# Patient Record
Sex: MALE | Race: WHITE | Employment: OTHER | URBAN - METROPOLITAN AREA
[De-identification: names, ages, dates, MRNs, and addresses within clinical notes are randomized per-mention and may not be internally consistent; named-entity substitution may affect disease eponyms.]

---

## 2017-03-30 ENCOUNTER — APPOINTMENT (OUTPATIENT)
Dept: CT IMAGING | Age: 77
DRG: 190 | End: 2017-03-30
Attending: STUDENT IN AN ORGANIZED HEALTH CARE EDUCATION/TRAINING PROGRAM
Payer: MEDICARE

## 2017-03-30 ENCOUNTER — HOSPITAL ENCOUNTER (INPATIENT)
Age: 77
LOS: 5 days | Discharge: NURSING, REHAB OR LONG-TERM CARE | DRG: 190 | End: 2017-04-04
Attending: STUDENT IN AN ORGANIZED HEALTH CARE EDUCATION/TRAINING PROGRAM | Admitting: INTERNAL MEDICINE
Payer: MEDICARE

## 2017-03-30 ENCOUNTER — APPOINTMENT (OUTPATIENT)
Dept: GENERAL RADIOLOGY | Age: 77
DRG: 190 | End: 2017-03-30
Attending: STUDENT IN AN ORGANIZED HEALTH CARE EDUCATION/TRAINING PROGRAM
Payer: MEDICARE

## 2017-03-30 DIAGNOSIS — J44.1 ACUTE EXACERBATION OF CHRONIC OBSTRUCTIVE PULMONARY DISEASE (COPD) (HCC): Primary | ICD-10-CM

## 2017-03-30 PROBLEM — R77.8 ELEVATED TROPONIN: Status: ACTIVE | Noted: 2017-03-30

## 2017-03-30 LAB
ALBUMIN SERPL BCP-MCNC: 2.7 G/DL (ref 3.5–5)
ALBUMIN/GLOB SERPL: 0.8 {RATIO} (ref 1.1–2.2)
ALP SERPL-CCNC: 72 U/L (ref 45–117)
ALT SERPL-CCNC: 31 U/L (ref 12–78)
ANION GAP BLD CALC-SCNC: 7 MMOL/L (ref 5–15)
APPEARANCE UR: ABNORMAL
APTT PPP: 26.5 SEC (ref 22.1–32.5)
AST SERPL W P-5'-P-CCNC: 40 U/L (ref 15–37)
ATRIAL RATE: 59 BPM
BACTERIA URNS QL MICRO: ABNORMAL /HPF
BASOPHILS # BLD AUTO: 0 K/UL (ref 0–0.1)
BASOPHILS # BLD: 0 % (ref 0–1)
BILIRUB SERPL-MCNC: 0.4 MG/DL (ref 0.2–1)
BILIRUB UR QL CFM: NEGATIVE
BNP SERPL-MCNC: 539 PG/ML (ref 0–450)
BUN SERPL-MCNC: 14 MG/DL (ref 6–20)
BUN/CREAT SERPL: 14 (ref 12–20)
CALCIUM SERPL-MCNC: 8.7 MG/DL (ref 8.5–10.1)
CALCULATED R AXIS, ECG10: -30 DEGREES
CALCULATED T AXIS, ECG11: 59 DEGREES
CHLORIDE SERPL-SCNC: 106 MMOL/L (ref 97–108)
CK MB CFR SERPL CALC: 1 % (ref 0–2.5)
CK MB SERPL-MCNC: 3.4 NG/ML (ref 5–25)
CK SERPL-CCNC: 237 U/L (ref 39–308)
CK SERPL-CCNC: 340 U/L (ref 39–308)
CO2 SERPL-SCNC: 31 MMOL/L (ref 21–32)
COLOR UR: ABNORMAL
CREAT SERPL-MCNC: 0.97 MG/DL (ref 0.7–1.3)
DIAGNOSIS, 93000: NORMAL
EOSINOPHIL # BLD: 0.2 K/UL (ref 0–0.4)
EOSINOPHIL NFR BLD: 1 % (ref 0–7)
EPITH CASTS URNS QL MICRO: ABNORMAL /LPF
ERYTHROCYTE [DISTWIDTH] IN BLOOD BY AUTOMATED COUNT: 13 % (ref 11.5–14.5)
FLUAV AG NPH QL IA: NEGATIVE
FLUBV AG NOSE QL IA: NEGATIVE
GLOBULIN SER CALC-MCNC: 3.5 G/DL (ref 2–4)
GLUCOSE SERPL-MCNC: 95 MG/DL (ref 65–100)
GLUCOSE UR STRIP.AUTO-MCNC: NEGATIVE MG/DL
GRAN CASTS URNS QL MICRO: ABNORMAL /LPF
HCT VFR BLD AUTO: 39.2 % (ref 36.6–50.3)
HGB BLD-MCNC: 13.2 G/DL (ref 12.1–17)
HGB UR QL STRIP: ABNORMAL
INR PPP: 1.1 (ref 0.9–1.1)
KETONES UR QL STRIP.AUTO: >80 MG/DL
LEUKOCYTE ESTERASE UR QL STRIP.AUTO: ABNORMAL
LYMPHOCYTES # BLD AUTO: 11 % (ref 12–49)
LYMPHOCYTES # BLD: 1.3 K/UL (ref 0.8–3.5)
MAGNESIUM SERPL-MCNC: 1.8 MG/DL (ref 1.6–2.4)
MCH RBC QN AUTO: 32.1 PG (ref 26–34)
MCHC RBC AUTO-ENTMCNC: 33.7 G/DL (ref 30–36.5)
MCV RBC AUTO: 95.4 FL (ref 80–99)
MONOCYTES # BLD: 0.7 K/UL (ref 0–1)
MONOCYTES NFR BLD AUTO: 6 % (ref 5–13)
MUCOUS THREADS URNS QL MICRO: ABNORMAL /LPF
NEUTS SEG # BLD: 10.1 K/UL (ref 1.8–8)
NEUTS SEG NFR BLD AUTO: 82 % (ref 32–75)
NITRITE UR QL STRIP.AUTO: NEGATIVE
PH UR STRIP: 6 [PH] (ref 5–8)
PLATELET # BLD AUTO: 292 K/UL (ref 150–400)
POTASSIUM SERPL-SCNC: 3.8 MMOL/L (ref 3.5–5.1)
PROT SERPL-MCNC: 6.2 G/DL (ref 6.4–8.2)
PROT UR STRIP-MCNC: 100 MG/DL
PROTHROMBIN TIME: 11.1 SEC (ref 9–11.1)
Q-T INTERVAL, ECG07: 478 MS
QRS DURATION, ECG06: 120 MS
QTC CALCULATION (BEZET), ECG08: 489 MS
RBC # BLD AUTO: 4.11 M/UL (ref 4.1–5.7)
RBC #/AREA URNS HPF: ABNORMAL /HPF (ref 0–5)
SODIUM SERPL-SCNC: 144 MMOL/L (ref 136–145)
SP GR UR REFRACTOMETRY: 1.02 (ref 1–1.03)
THERAPEUTIC RANGE,PTTT: NORMAL SECS (ref 58–77)
TROPONIN I SERPL-MCNC: 0.08 NG/ML
TROPONIN I SERPL-MCNC: 0.1 NG/ML
UROBILINOGEN UR QL STRIP.AUTO: 0.2 EU/DL (ref 0.2–1)
VENTRICULAR RATE, ECG03: 63 BPM
WBC # BLD AUTO: 12.3 K/UL (ref 4.1–11.1)
WBC URNS QL MICRO: ABNORMAL /HPF (ref 0–4)

## 2017-03-30 PROCEDURE — 96361 HYDRATE IV INFUSION ADD-ON: CPT

## 2017-03-30 PROCEDURE — 36415 COLL VENOUS BLD VENIPUNCTURE: CPT | Performed by: INTERNAL MEDICINE

## 2017-03-30 PROCEDURE — 85730 THROMBOPLASTIN TIME PARTIAL: CPT | Performed by: STUDENT IN AN ORGANIZED HEALTH CARE EDUCATION/TRAINING PROGRAM

## 2017-03-30 PROCEDURE — 96374 THER/PROPH/DIAG INJ IV PUSH: CPT

## 2017-03-30 PROCEDURE — 94640 AIRWAY INHALATION TREATMENT: CPT

## 2017-03-30 PROCEDURE — 77030011943

## 2017-03-30 PROCEDURE — 84484 ASSAY OF TROPONIN QUANT: CPT | Performed by: STUDENT IN AN ORGANIZED HEALTH CARE EDUCATION/TRAINING PROGRAM

## 2017-03-30 PROCEDURE — 85610 PROTHROMBIN TIME: CPT | Performed by: STUDENT IN AN ORGANIZED HEALTH CARE EDUCATION/TRAINING PROGRAM

## 2017-03-30 PROCEDURE — 65660000000 HC RM CCU STEPDOWN

## 2017-03-30 PROCEDURE — 74011250637 HC RX REV CODE- 250/637: Performed by: INTERNAL MEDICINE

## 2017-03-30 PROCEDURE — 87804 INFLUENZA ASSAY W/OPTIC: CPT | Performed by: STUDENT IN AN ORGANIZED HEALTH CARE EDUCATION/TRAINING PROGRAM

## 2017-03-30 PROCEDURE — 77030013140 HC MSK NEB VYRM -A

## 2017-03-30 PROCEDURE — 87040 BLOOD CULTURE FOR BACTERIA: CPT | Performed by: STUDENT IN AN ORGANIZED HEALTH CARE EDUCATION/TRAINING PROGRAM

## 2017-03-30 PROCEDURE — 71275 CT ANGIOGRAPHY CHEST: CPT

## 2017-03-30 PROCEDURE — 74011000250 HC RX REV CODE- 250: Performed by: INTERNAL MEDICINE

## 2017-03-30 PROCEDURE — 83735 ASSAY OF MAGNESIUM: CPT | Performed by: STUDENT IN AN ORGANIZED HEALTH CARE EDUCATION/TRAINING PROGRAM

## 2017-03-30 PROCEDURE — 74011250636 HC RX REV CODE- 250/636: Performed by: STUDENT IN AN ORGANIZED HEALTH CARE EDUCATION/TRAINING PROGRAM

## 2017-03-30 PROCEDURE — 99285 EMERGENCY DEPT VISIT HI MDM: CPT

## 2017-03-30 PROCEDURE — 81001 URINALYSIS AUTO W/SCOPE: CPT | Performed by: STUDENT IN AN ORGANIZED HEALTH CARE EDUCATION/TRAINING PROGRAM

## 2017-03-30 PROCEDURE — 74011000250 HC RX REV CODE- 250: Performed by: STUDENT IN AN ORGANIZED HEALTH CARE EDUCATION/TRAINING PROGRAM

## 2017-03-30 PROCEDURE — 71010 XR CHEST PORT: CPT

## 2017-03-30 PROCEDURE — 93306 TTE W/DOPPLER COMPLETE: CPT

## 2017-03-30 PROCEDURE — 80053 COMPREHEN METABOLIC PANEL: CPT | Performed by: STUDENT IN AN ORGANIZED HEALTH CARE EDUCATION/TRAINING PROGRAM

## 2017-03-30 PROCEDURE — 93005 ELECTROCARDIOGRAM TRACING: CPT

## 2017-03-30 PROCEDURE — 51702 INSERT TEMP BLADDER CATH: CPT

## 2017-03-30 PROCEDURE — 82550 ASSAY OF CK (CPK): CPT | Performed by: INTERNAL MEDICINE

## 2017-03-30 PROCEDURE — 83880 ASSAY OF NATRIURETIC PEPTIDE: CPT | Performed by: INTERNAL MEDICINE

## 2017-03-30 PROCEDURE — 85025 COMPLETE CBC W/AUTO DIFF WBC: CPT | Performed by: STUDENT IN AN ORGANIZED HEALTH CARE EDUCATION/TRAINING PROGRAM

## 2017-03-30 PROCEDURE — 82553 CREATINE MB FRACTION: CPT | Performed by: INTERNAL MEDICINE

## 2017-03-30 PROCEDURE — 74011250636 HC RX REV CODE- 250/636: Performed by: INTERNAL MEDICINE

## 2017-03-30 RX ORDER — IPRATROPIUM BROMIDE AND ALBUTEROL SULFATE 2.5; .5 MG/3ML; MG/3ML
3 SOLUTION RESPIRATORY (INHALATION)
Status: DISCONTINUED | OUTPATIENT
Start: 2017-03-30 | End: 2017-04-04

## 2017-03-30 RX ORDER — SENNOSIDES 8.6 MG/1
2 TABLET ORAL
COMMUNITY
End: 2017-04-24 | Stop reason: ALTCHOICE

## 2017-03-30 RX ORDER — BISMUTH SUBSALICYLATE 262 MG
1 TABLET,CHEWABLE ORAL DAILY
COMMUNITY

## 2017-03-30 RX ORDER — SENNOSIDES 8.6 MG/1
2 TABLET ORAL
Status: DISCONTINUED | OUTPATIENT
Start: 2017-03-30 | End: 2017-04-04 | Stop reason: HOSPADM

## 2017-03-30 RX ORDER — FAMOTIDINE 20 MG/1
20 TABLET, FILM COATED ORAL
Status: DISCONTINUED | OUTPATIENT
Start: 2017-03-30 | End: 2017-04-04 | Stop reason: HOSPADM

## 2017-03-30 RX ORDER — ACETAMINOPHEN 325 MG/1
650 TABLET ORAL
Status: DISCONTINUED | OUTPATIENT
Start: 2017-03-30 | End: 2017-04-04 | Stop reason: HOSPADM

## 2017-03-30 RX ORDER — ATENOLOL 50 MG/1
50 TABLET ORAL EVERY EVENING
Status: DISCONTINUED | OUTPATIENT
Start: 2017-03-30 | End: 2017-04-04 | Stop reason: HOSPADM

## 2017-03-30 RX ORDER — ENOXAPARIN SODIUM 100 MG/ML
40 INJECTION SUBCUTANEOUS EVERY 24 HOURS
Status: DISCONTINUED | OUTPATIENT
Start: 2017-03-30 | End: 2017-04-04 | Stop reason: HOSPADM

## 2017-03-30 RX ORDER — ALBUTEROL SULFATE 0.83 MG/ML
1.25 SOLUTION RESPIRATORY (INHALATION)
Status: COMPLETED | OUTPATIENT
Start: 2017-03-30 | End: 2017-03-30

## 2017-03-30 RX ORDER — FLUTICASONE FUROATE AND VILANTEROL 100; 25 UG/1; UG/1
1 POWDER RESPIRATORY (INHALATION) DAILY
Status: DISCONTINUED | OUTPATIENT
Start: 2017-03-31 | End: 2017-04-04 | Stop reason: HOSPADM

## 2017-03-30 RX ORDER — HYDROCODONE BITARTRATE AND ACETAMINOPHEN 5; 325 MG/1; MG/1
1 TABLET ORAL
Status: ON HOLD | COMMUNITY
End: 2017-04-03

## 2017-03-30 RX ORDER — THERA TABS 400 MCG
1 TAB ORAL DAILY
Status: DISCONTINUED | OUTPATIENT
Start: 2017-03-31 | End: 2017-04-04 | Stop reason: HOSPADM

## 2017-03-30 RX ORDER — ONDANSETRON 4 MG/1
4 TABLET, FILM COATED ORAL
COMMUNITY
End: 2017-04-24 | Stop reason: ALTCHOICE

## 2017-03-30 RX ORDER — IPRATROPIUM BROMIDE AND ALBUTEROL SULFATE 2.5; .5 MG/3ML; MG/3ML
3 SOLUTION RESPIRATORY (INHALATION)
Status: COMPLETED | OUTPATIENT
Start: 2017-03-30 | End: 2017-03-30

## 2017-03-30 RX ORDER — SIMVASTATIN 20 MG/1
20 TABLET, FILM COATED ORAL
Status: DISCONTINUED | OUTPATIENT
Start: 2017-03-30 | End: 2017-04-04 | Stop reason: HOSPADM

## 2017-03-30 RX ORDER — ALBUTEROL SULFATE 90 UG/1
2 AEROSOL, METERED RESPIRATORY (INHALATION)
COMMUNITY
End: 2017-04-24 | Stop reason: ALTCHOICE

## 2017-03-30 RX ORDER — NALOXONE HYDROCHLORIDE 0.4 MG/ML
0.4 INJECTION, SOLUTION INTRAMUSCULAR; INTRAVENOUS; SUBCUTANEOUS AS NEEDED
Status: DISCONTINUED | OUTPATIENT
Start: 2017-03-30 | End: 2017-04-04 | Stop reason: HOSPADM

## 2017-03-30 RX ORDER — FERROUS SULFATE, DRIED 160(50) MG
1 TABLET, EXTENDED RELEASE ORAL
Status: DISCONTINUED | OUTPATIENT
Start: 2017-03-30 | End: 2017-04-04 | Stop reason: HOSPADM

## 2017-03-30 RX ORDER — HYDROCODONE BITARTRATE AND ACETAMINOPHEN 5; 325 MG/1; MG/1
1 TABLET ORAL
Status: DISCONTINUED | OUTPATIENT
Start: 2017-03-30 | End: 2017-04-04 | Stop reason: HOSPADM

## 2017-03-30 RX ORDER — LISINOPRIL 20 MG/1
10 TABLET ORAL DAILY
Status: DISCONTINUED | OUTPATIENT
Start: 2017-03-31 | End: 2017-04-04 | Stop reason: HOSPADM

## 2017-03-30 RX ORDER — ALBUTEROL SULFATE 1.25 MG/3ML
1.25 SOLUTION RESPIRATORY (INHALATION)
Status: DISCONTINUED | OUTPATIENT
Start: 2017-03-30 | End: 2017-04-04 | Stop reason: HOSPADM

## 2017-03-30 RX ORDER — ONDANSETRON 4 MG/1
4 TABLET, ORALLY DISINTEGRATING ORAL
Status: DISCONTINUED | OUTPATIENT
Start: 2017-03-30 | End: 2017-04-04 | Stop reason: HOSPADM

## 2017-03-30 RX ORDER — SODIUM CHLORIDE 0.9 % (FLUSH) 0.9 %
5-10 SYRINGE (ML) INJECTION AS NEEDED
Status: DISCONTINUED | OUTPATIENT
Start: 2017-03-30 | End: 2017-04-04 | Stop reason: HOSPADM

## 2017-03-30 RX ORDER — CLOPIDOGREL BISULFATE 75 MG/1
75 TABLET ORAL DAILY
Status: DISCONTINUED | OUTPATIENT
Start: 2017-03-31 | End: 2017-04-04 | Stop reason: HOSPADM

## 2017-03-30 RX ORDER — GUAIFENESIN 600 MG/1
600 TABLET, EXTENDED RELEASE ORAL EVERY 12 HOURS
Status: DISCONTINUED | OUTPATIENT
Start: 2017-03-30 | End: 2017-04-04 | Stop reason: HOSPADM

## 2017-03-30 RX ORDER — LEVOFLOXACIN 5 MG/ML
500 INJECTION, SOLUTION INTRAVENOUS ONCE
Status: COMPLETED | OUTPATIENT
Start: 2017-03-30 | End: 2017-03-30

## 2017-03-30 RX ORDER — TAMSULOSIN HYDROCHLORIDE 0.4 MG/1
0.8 CAPSULE ORAL DAILY
Status: DISCONTINUED | OUTPATIENT
Start: 2017-03-31 | End: 2017-04-04 | Stop reason: HOSPADM

## 2017-03-30 RX ORDER — GABAPENTIN 300 MG/1
300 CAPSULE ORAL
COMMUNITY
End: 2017-04-24 | Stop reason: ALTCHOICE

## 2017-03-30 RX ORDER — SODIUM CHLORIDE 0.9 % (FLUSH) 0.9 %
5-10 SYRINGE (ML) INJECTION EVERY 8 HOURS
Status: DISCONTINUED | OUTPATIENT
Start: 2017-03-30 | End: 2017-04-04 | Stop reason: HOSPADM

## 2017-03-30 RX ORDER — GABAPENTIN 300 MG/1
300 CAPSULE ORAL
Status: DISCONTINUED | OUTPATIENT
Start: 2017-03-30 | End: 2017-04-04 | Stop reason: HOSPADM

## 2017-03-30 RX ORDER — RANITIDINE 150 MG/1
150 TABLET, FILM COATED ORAL
COMMUNITY
End: 2017-12-07 | Stop reason: SDUPTHER

## 2017-03-30 RX ADMIN — SODIUM CHLORIDE 500 ML: 900 INJECTION, SOLUTION INTRAVENOUS at 11:21

## 2017-03-30 RX ADMIN — METHYLPREDNISOLONE SODIUM SUCCINATE 125 MG: 125 INJECTION, POWDER, FOR SOLUTION INTRAMUSCULAR; INTRAVENOUS at 13:40

## 2017-03-30 RX ADMIN — ALBUTEROL SULFATE 1.25 MG: 2.5 SOLUTION RESPIRATORY (INHALATION) at 13:41

## 2017-03-30 RX ADMIN — LEVOFLOXACIN 500 MG: 5 INJECTION, SOLUTION INTRAVENOUS at 13:49

## 2017-03-30 RX ADMIN — SODIUM CHLORIDE 500 ML: 900 INJECTION, SOLUTION INTRAVENOUS at 13:29

## 2017-03-30 RX ADMIN — ATENOLOL 50 MG: 50 TABLET ORAL at 17:31

## 2017-03-30 RX ADMIN — FAMOTIDINE 20 MG: 20 TABLET, FILM COATED ORAL at 22:12

## 2017-03-30 RX ADMIN — SIMVASTATIN 20 MG: 20 TABLET, FILM COATED ORAL at 22:12

## 2017-03-30 RX ADMIN — ENOXAPARIN SODIUM 40 MG: 40 INJECTION SUBCUTANEOUS at 15:22

## 2017-03-30 RX ADMIN — CALCIUM CARBONATE 500 MG (1,250 MG)-VITAMIN D3 200 UNIT TABLET 1 TABLET: at 17:31

## 2017-03-30 RX ADMIN — Medication 5 ML: at 14:00

## 2017-03-30 RX ADMIN — IPRATROPIUM BROMIDE AND ALBUTEROL SULFATE 3 ML: .5; 3 SOLUTION RESPIRATORY (INHALATION) at 11:22

## 2017-03-30 RX ADMIN — IPRATROPIUM BROMIDE AND ALBUTEROL SULFATE 3 ML: .5; 3 SOLUTION RESPIRATORY (INHALATION) at 21:01

## 2017-03-30 RX ADMIN — Medication 10 ML: at 22:14

## 2017-03-30 RX ADMIN — METHYLPREDNISOLONE SODIUM SUCCINATE 125 MG: 125 INJECTION, POWDER, FOR SOLUTION INTRAMUSCULAR; INTRAVENOUS at 17:31

## 2017-03-30 RX ADMIN — GUAIFENESIN 600 MG: 600 TABLET, EXTENDED RELEASE ORAL at 15:21

## 2017-03-30 NOTE — CONSULTS
Cardiology Consultation Note                                 [facility] SYSTEM HUGH Naranjo MD, [address], [place], [address] [facility]                         Phone [phone]; Fax [phone]            3/30/2017 10:28 AM  Prabha Medrano MD  :  1940   MRN:  [id number]     CC: Terry Carmona  Reason for consult: elevated troponin  Admission Diagnosis: COPD exacerbation ([facility] [address]  Requesting MD:     ASSESSMENT/RECOMMENDATIONS:   1)Elevated troponin  -supply and demand issue and not related to ischemia  -will see as needed    2) HTN  -hold antihypertensives with dehydration and reinitiate when appropriate. 3) Dyslipidemia      CT no PE and WBC elevated. ECG: 3/30/17): NSR with RBBB         Jarred Mcfarlane is a 69 y.o. male I am seeing for hx of CAD, CABG (2010) , HTN who is admitted with generalized weakness. He recently had a cholecystectomy ad JW. He was discharged two days ago and is now having diarrhea 2-3 daily. He has had abdominal pain and increasing SOB. hypertension, hyperlipidemia, coronary artery disease and status post CABG. Symptoms include: none  Cardiac risk factors: dyslipidemia, sedentary life style, male gender, hypertension. He has been followed by Dr. Darby Mendez and last saw him in . With chest pain. And work up at that time was negative patient stated. Heis coming in for dehydration from excessive diarrhea. He has fallen from being dehydrated. He has no chest pain or shortness of breath. No Known Allergies      Past Medical History:   Diagnosis Date    Arthritis of foot, degenerative     Dr. Satya Larry Bladder tumor     Dr Chasity Johnson, cysto and [facility] 10/29/11    CAD (coronary artery disease)     MI 2010,s/p CABG. Dr. Damaris Leon Carotid stenosis 10/20/15    Dr. Dian Grewal. endarterectomy 12/31/15. 50-69% L on doppler.   80% \"per CT\"    Cataract     Chest wall pain     incomplete sternal wound healing s/p CABG    COPD (chronic obstructive pulmonary disease) ([facility])     moderate, FEV1 1.41 7/2009. Maria Teresa Walker    GERD (gastroesophageal reflux disease)     HTN (hypertension)     Hyperlipidemia LDL goal < 70     Osteoporosis     tx w calcium, DEXA improved T-2.1 10/2011, 2012, 1/2015    PHN (postherpetic neuralgia)     Physiological tremor     Dr. Jessika Ramos [facility]) 2/2004    on lupron. PSA increased 0.149 2/2014    Pulmonary nodule, right     5 mm, stable on CT 10/14/10, 1/14/14    Shingles 12/12/12    right neck and back. Past Surgical History:   Procedure Laterality Date    BLADDER TUMOR ASSOC      CABG, ARTERIAL, THREE  7/2010    Dr Alida Lockhart. HUI to LAD, spah to cirm    CYSTOSCOPY  10/29/11    FISH, normal    CYSTOURETHROSCOPY  11/29/2016    normal    HX CAROTID ENDARTERECTOMY Left 12/31/15    Dr. Erika Iglesias  12/2011    right eye, Dr. Marlon Herrera HX COLONOSCOPY  10/2/13    hyperplastic polyp, Dr. Cezar Grande  2000        . Home Medications:  Prior to Admission Medications   Prescriptions Last Dose Informant Patient Reported? Taking? LEUPROLIDE ACETATE (LUPRON DEPOT, 4 MONTH, IM)  Self Yes No   Sig: by IntraMUSCular route. OTHER   Yes No   Sig: Foot ointment by prescription per Dr Gutierrez [place]- both Tim [place] 18 mcg inhalation capsule   No No   Sig: Inhale the contents of 1  capsule via HandiHaler  daily   acetaminophen (TYLENOL) 325 mg tablet   Yes No   Sig: Take  by mouth every four (4) hours as needed for Pain. albuterol (PROAIR HFA) 90 mcg/actuation inhaler   No No   Sig: USE 2 PUFFS EVERY 8 HOURS AS NEEDED   aspirin delayed-release 325 mg tablet  Self Yes No   Sig: Take 325 mg by mouth daily. atenolol (TENORMIN) 50 mg tablet   No No   Sig: TAKE ONE TABLET BY MOUTH ONCE DAILY.  INDICATIONS: HYPERTENSION   bacitracin (BACITRACIN) ointment   Yes No   calcium-vitamin D (OSCAL 250) 250-125 mg-unit tablet  Self Yes No   Sig: Take 2 Tabs by mouth daily (with breakfast). clopidogrel (PLAVIX) 75 mg tablet   Yes No   Sig: Take  by mouth daily. diclofenac sodium (PENNSAID) 20 mg/gram /actuation(2 %) sopm   Yes No   Sig: by Apply Externally route. fluticasone-vilanterol (BREO ELLIPTA) 100-25 mcg/dose inhaler   Yes No   Sig: Take 1 Puff by inhalation daily. gabapentin (NEURONTIN) 300 mg capsule   Yes Yes   Sig: Take 300 mg by mouth three (3) times daily. lisinopril (PRINIVIL, ZESTRIL) 10 mg tablet   No No   Sig: TAKE ONE TABLET BY MOUTH ONCE DAILY. INDICATIONS: HYPERTENSION   raNITIdine (ZANTAC) 150 mg tablet   No No   Sig: TAKE ONE TABLET BY MOUTH EVERY DAY PLUS TAKE ONE TABLET AT BEDTIME   simvastatin (ZOCOR) 20 mg tablet   No No   Sig: TAKE ONE TABLET BY MOUTH IN THE EVENING   tamsulosin (FLOMAX) 0.4 mg capsule   Yes No   Sig: Take 0.4 mg by mouth daily.       Facility-Administered Medications: None       Hospital Medications:  Current Facility-Administered Medications   Medication Dose Route Frequency    iopamidol (ISOVUE-370) 76 % injection 100 mL  100 mL IntraVENous RAD ONCE    methylPREDNISolone (PF) (SOLU-MEDROL) injection 125 mg  125 mg IntraVENous Q6H    levoFLOXacin (LEVAQUIN) 500 mg in D5W IVPB  500 mg IntraVENous ONCE    albuterol-ipratropium (DUO-NEB) 2.5 MG-0.5 MG/3 ML  3 mL Nebulization Q4H RT    albuterol (ACCUNEB) nebulizer solution 1.25 mg  1.25 mg Nebulization Q2H PRN    guaiFENesin ER (MUCINEX) tablet 600 mg  600 mg Oral Q12H    [START ON 3/31/2017] levoFLOXacin (LEVAQUIN) tablet 750 mg  750 mg Oral Q24H    sodium chloride (NS) flush 5-10 mL  5-10 mL IntraVENous Q8H    sodium chloride (NS) flush 5-10 mL  5-10 mL IntraVENous PRN    acetaminophen (TYLENOL) tablet 650 mg  650 mg Oral Q4H PRN    HYDROcodone-acetaminophen (NORCO) 5-325 mg per tablet 1 Tab  1 Tab Oral Q4H PRN    naloxone (NARCAN) injection 0.4 mg  0.4 mg IntraVENous PRN    ondansetron (ZOFRAN ODT) tablet 4 mg  4 mg Oral Q4H PRN    enoxaparin (LOVENOX) injection 40 mg  40 mg SubCUTAneous Q24H     Current Outpatient Prescriptions   Medication Sig    gabapentin (NEURONTIN) 300 mg capsule Take 300 mg by mouth three (3) times daily.  atenolol (TENORMIN) 50 mg tablet TAKE ONE TABLET BY MOUTH ONCE DAILY. INDICATIONS: HYPERTENSION    lisinopril (PRINIVIL, ZESTRIL) 10 mg tablet TAKE ONE TABLET BY MOUTH ONCE DAILY. INDICATIONS: HYPERTENSION    simvastatin (ZOCOR) 20 mg tablet TAKE ONE TABLET BY MOUTH IN THE EVENING    raNITIdine (ZANTAC) 150 mg tablet TAKE ONE TABLET BY MOUTH EVERY DAY PLUS TAKE ONE TABLET AT BEDTIME    albuterol (PROAIR HFA) 90 mcg/actuation inhaler USE 2 PUFFS EVERY 8 HOURS AS NEEDED    acetaminophen (TYLENOL) 325 mg tablet Take  by mouth every four (4) hours as needed for Pain.  clopidogrel (PLAVIX) 75 mg tablet Take  by mouth daily.  bacitracin (BACITRACIN) ointment     tamsulosin (FLOMAX) 0.4 mg capsule Take 0.4 mg by mouth daily.  OTHER Foot ointment by prescription per Dr Renetta Solis- both feet    diclofenac sodium (PENNSAID) 20 mg/gram /actuation(2 %) sopm by Apply Externally route.  SPIRIVA WITH HANDIHALER 18 mcg inhalation capsule Inhale the contents of 1  capsule via HandiHaler  daily    fluticasone-vilanterol (BREO ELLIPTA) 100-25 mcg/dose inhaler Take 1 Puff by inhalation daily.  LEUPROLIDE ACETATE (LUPRON DEPOT, 4 MONTH, IM) by IntraMUSCular route.  calcium-vitamin D (OSCAL 250) 250-125 mg-unit tablet Take 2 Tabs by mouth daily (with breakfast).  aspirin delayed-release 325 mg tablet Take 325 mg by mouth daily.           OBJECTIVE       Laboratory and Imaging have been reviewed and are notable for      ECG:  Date:  normal sinus rhythm, RBBB      Diagnostic Tests:     Recent Labs      03/30/17   1107   TROIQ  0.10*     Recent Labs      03/30/17   1107   NA  144   K  3.8   CO2  31   BUN  14   CREA  0.97   GLU  95   MG  1.8   WBC  12.3*   HGB  13.2   HCT  39.2   PLT  292         Cardiac work up to date:    1) Cholesterol  (12/19/16): , HDL 52, LDL 71,     2) Echocardiogram  (11/17/15): EF 65%    3) Nuclear stress  (7/8/10): significant septal wall defect    4) CABG  (7/9/10): LIMA to LAD, SVG to OM and SVG to PDA                   Social History:  Social History   Substance Use Topics    Smoking status: Former Smoker     Types: Cigarettes     Quit date: 12/5/2003    Smokeless tobacco: Never Used    Alcohol use No       Family History:  Family History   Problem Relation Age of Onset    Cancer Mother      bladder    Heart Disease Brother        Review of Symptoms:  A comprehensive review of systems was negative except for that written in the HPI. Physical Exam:      Visit Vitals    BP (!) 139/107    Pulse 69    Temp 97.8 °F (36.6 °C)    Resp 26    Ht 5' 6\" (1.676 m)    Wt 190 lb (86.2 kg)    SpO2 92%    BMI 30.67 kg/m2     General Appearance:  Well developed, well nourished,alert and oriented x 3, and individual in no acute distress. overweight and deconditioned   Ears/Nose/Mouth/Throat:   Hearing grossly normal.dry  oral mucosa,no scleral icterus     Neck: Supple no JVD or bruits,no cervical lymphadenopathy   Chest:   Lungs clear to auscultation anterior   Cardiovascular:  Regular rate and rhythm   Abdomen:   Soft, non-tender, bowel sounds are active. No abdominal bruits   Extremities: No edema bilaterally. Pulses detected, no varicosities   Skin: Warm and dry. + bruising on left arm   Neuro:                                                             I have discussed the diagnosis with the patient and the intended plan as seen in the above orders. Questions were answered concerning future plans. I have discussed medication side effects and warnings with the patient as well. Pura Renteria is in agreement to the plan listed above and wishes to proceed. he  was instructed not to smoke, eat heart healthy diet  and to exercise. Thank you for this consult.       Vadim Vogel Farrukh Bermeo MD

## 2017-03-30 NOTE — ED TRIAGE NOTES
Pt states he has been falling everyday about twice a day and injury to both knees and legs from falls. Some bruising noted and abrasions.

## 2017-03-30 NOTE — ED NOTES
TRANSFER - OUT REPORT:    Verbal report given to PEARL Graham on Talita Neighbor  being transferred to telemetry room 328 for routine progression of care       Report consisted of patients Situation, Background, Assessment and   Recommendations(SBAR). Information from the following report(s) SBAR was reviewed with the receiving nurse. Lines:   Peripheral IV 03/30/17 Left Forearm (Active)   Site Assessment Clean, dry, & intact 3/30/2017 11:10 AM   Phlebitis Assessment 0 3/30/2017 11:10 AM   Infiltration Assessment 0 3/30/2017 11:10 AM   Dressing Status Clean, dry, & intact 3/30/2017 11:10 AM   Dressing Type Tape;Transparent 3/30/2017 11:10 AM   Hub Color/Line Status Pink;Flushed;Patent 3/30/2017 11:10 AM   Action Taken Blood drawn 3/30/2017 11:10 AM        Opportunity for questions and clarification was provided. Patient transported with:   Monitor  O2 @ 2 liters    Pearl Pal states she will give breathing treatment once pt upstairs.

## 2017-03-30 NOTE — PROGRESS NOTES
Bedside shift change report given to Haleigh RN (oncoming nurse) by Willa Barrett RN (offgoing nurse). Report included the following information SBAR, Kardex, Intake/Output, MAR, Recent Results and Cardiac Rhythm NSR.

## 2017-03-30 NOTE — ED NOTES
Pt resting in room, pt alert and oriented x3, NSR on monitor, vs stable noted, pt has no new complaints at this time. Call bell within reach, bed in low position and locked. Pt aware to call the RN on the call bell if needs anything. Pt verbalizes understanding.

## 2017-03-30 NOTE — H&P
[facility]  [address], [place] [address]  [phone]    Admission History and Physical      NAME:  Pura Renteria   :   1940   MRN:  [id number]     PCP:  Bonnell Riedel, MD     Date/Time:  3/30/2017         Subjective:     CHIEF COMPLAINT: \"I'm okay, I'm just weak\"    HISTORY OF PRESENT ILLNESS:     Mr. Aldair Caldwell is a 69 y.o.  male with PMH of CAD, carotid stenosis, COPD, HTN, GERD and recently hospitalized and discharged from [facility] on 3/28 for lap shantanu (pt uncertain why he had this done) admitted for weakness and dyspnea. (+) cough. No LE edema. No fevers/chills. Has noted wheezing. Denies CP. Also with diarrhea that started when he was at [facility]. He was not sent home with home health services. Past Medical History:   Diagnosis Date    Arthritis of foot, degenerative     Dr. Jaimie Reeys Bladder tumor     Dr Beverly Gaspar, cysto and [facility] 10/29/11    CAD (coronary artery disease)     MI 2010,s/p CABG. Dr. Moody Economy Carotid stenosis 10/20/15    Dr. Renetta Horner. endarterectomy 12/31/15. 50-69% L on doppler. 80% \"per CT\"    Cataract     Chest wall pain     incomplete sternal wound healing s/p CABG    COPD (chronic obstructive pulmonary disease) (HCC)     moderate, FEV1 1.41 2009. Maria Teresa Walker    GERD (gastroesophageal reflux disease)     HTN (hypertension)     Hyperlipidemia LDL goal < 70     Osteoporosis     tx w calcium, DEXA improved T-2.1 10/2011, , 2015    PHN (postherpetic neuralgia)     Physiological tremor     Dr. Jessika Ramos [facility]) 2004    on lupron. PSA increased 0.149 2014    Pulmonary nodule, right     5 mm, stable on CT 10/14/10, 14    Shingles 12    right neck and back. Past Surgical History:   Procedure Laterality Date    BLADDER TUMOR ASSOC      CABG, ARTERIAL, THREE  2010    Dr Alida Lockhart.   HUI to LAD, spah to cirm    CYSTOSCOPY  10/29/11    FISH, normal    CYSTOURETHROSCOPY  11/29/2016    normal    HX CAROTID ENDARTERECTOMY Left 12/31/15    Dr. Jessika Garcia CATARACT REMOVAL  12/2011    right eye, Dr. Doreen Shaw HX COLONOSCOPY  10/2/13    hyperplastic polyp, Dr. Lauren Chu       Social History   Substance Use Topics    Smoking status: Former Smoker     Types: Cigarettes     Quit date: 12/5/2003    Smokeless tobacco: Never Used    Alcohol use No        Family History   Problem Relation Age of Onset    Cancer Mother      bladder    Heart Disease Brother         No Known Allergies     Prior to Admission medications    Medication Sig Start Date End Date Taking? Authorizing Provider   gabapentin (NEURONTIN) 300 mg capsule Take 300 mg by mouth three (3) times daily. Yes Phys Other, MD   atenolol (TENORMIN) 50 mg tablet TAKE ONE TABLET BY MOUTH ONCE DAILY. INDICATIONS: HYPERTENSION 3/10/17   Juanis Armstrong MD   lisinopril (PRINIVIL, ZESTRIL) 10 mg tablet TAKE ONE TABLET BY MOUTH ONCE DAILY. INDICATIONS: HYPERTENSION 3/10/17   Juanis Armstrong MD   simvastatin (ZOCOR) 20 mg tablet TAKE ONE TABLET BY MOUTH IN THE EVENING 3/10/17   Juanis Armstrong MD   raNITIdine (ZANTAC) 150 mg tablet TAKE ONE TABLET BY MOUTH EVERY DAY PLUS TAKE ONE TABLET AT BEDTIME 11/13/16   Juanis Armstrong MD   albuterol [facility] HFA) 90 mcg/actuation inhaler USE 2 PUFFS EVERY 8 HOURS AS NEEDED 8/4/16   Juanis Armstrong MD   acetaminophen (TYLENOL) 325 mg tablet Take  by mouth every four (4) hours as needed for Pain. Historical Provider   clopidogrel (PLAVIX) 75 mg tablet Take  by mouth daily. Historical Provider   bacitracin (BACITRACIN) ointment  1/21/16   Historical Provider   tamsulosin (FLOMAX) 0.4 mg capsule Take 0.4 mg by mouth daily. 12/14/15   Historical Provider   OTHER Foot ointment by prescription per Dr Darnell Waters- both feet    Historical Provider   diclofenac sodium (PENNSAID) 20 mg/gram /actuation(2 %) sopm by Apply Externally route.     Historical Provider   [address] HANDIHALER 18 mcg inhalation capsule Inhale the contents of 1  capsule via HandiHaler  daily 3/11/15   Taylor Coffman MD   fluticasone-vilanterol (BREO ELLIPTA) 100-25 mcg/dose inhaler Take 1 Puff by inhalation daily. Historical Provider   LEUPROLIDE ACETATE (LUPRON DEPOT, 4 MONTH, IM) by IntraMUSCular route. Historical Provider   calcium-vitamin D (OSCAL 250) 250-125 mg-unit tablet Take 2 Tabs by mouth daily (with breakfast). Historical Provider   aspirin delayed-release 325 mg tablet Take 325 mg by mouth daily. Historical Provider         Review of Systems:  (bold if positive, if negative)    Gen:  Eyes:  ENT:  CVS:  Pulm:  GI:    :    MS:  Skin:  Psych:  Endo:    Hem:  Renal:    Neuro:     Diarrhea, cough, sputum, dyspnea        Objective:      VITALS:    Vital signs reviewed; most recent are:    Visit Vitals    BP (!) 139/107    Pulse 69    Temp 97.8 °F (36.6 °C)    Resp 26    Ht 5' 6\" (1.676 m)    Wt 86.2 kg (190 lb)    SpO2 92%    BMI 30.67 kg/m2     SpO2 Readings from Last 6 Encounters:   03/30/17 92%   08/04/16 95%   01/27/16 95%   12/28/15 96%   10/12/15 96%   06/22/15 94%        No intake or output data in the 24 hours ending 03/30/17 1434         Exam:     Physical Exam:    Gen: Increased WOB   HEENT:  Pink conjunctivae, PERRL, hearing intact to voice, moist mucous membranes  Neck:  Supple, without masses, thyroid non-tender  Resp: Scattered expiratory wheezing   Card:  No murmurs, normal S1, S2 without thrills, bruits or peripheral edema  Abd: Multiple lap shantanu incisions C/D/I   Lymph:  No cervical adenopathy  Musc:  No cyanosis or clubbing  Skin:  No rashes or ulcers, skin turgor is good  Neuro: No focal deficits. Mild diffuse weakness   Psych:  Alert with good insight.   Oriented to person, place, and time    Labs:    Recent Labs      03/30/17   1107   WBC  12.3*   HGB  13.2   HCT  39.2   PLT  292     Recent Labs      03/30/17   1107   NA  144   K  3.8   CL  106   CO2  31   GLU  95 BUN  14   CREA  0.97   CA  8.7   MG  1.8   ALB  2.7*   SGOT  40*   ALT  31     No components found for: GLPOC  No results for input(s): PH, PCO2, PO2, HCO3, FIO2 in the last 72 hours. Recent Labs      03/30/17   1107   INR  1.1     Chest CT =>   Small bilateral effusions with minimal associated atelectasis. There is no pulmonary embolism. There is no aortic aneurysm or dissection. Assessment/Plan:    COPD exacerbation (HCC) ()      Overview: moderate, FEV1 1.41 7/2009  -start scheduled nebs, IV steroid and antibiotics   -continue home Breo       CAD (coronary artery disease) ()      Overview: MI 7/2010,s/p CABG  -currently without chest pain; trop mildly elevated   -trend CE's   -morphine PRN   -O2  -nitrates PRN   -continue home plavix       Elevated troponin (3/30/2017) - ?demand.  No chest pain   -trend CE's   -check TTE  -cards eval if CE's uptrend   -continue plavix, statin, beta blocker       GERD (gastroesophageal reflux disease) ()  -continue home Zantac      HTN (hypertension) ()  -continue home meds; lisinopril, atenolol      Cataract ()  -does not appear to be on meds    Surrogate decision maker: Wife    Total time spent with patient: 79 [address] discussed with: Patient and Family    Discussed:  Code Status, Care Plan and D/C Planning    Prophylaxis:  Lovenox    Probable Disposition:  Home w/Family           ___________________________________________________    Attending Physician: Alvaro Joshi MD

## 2017-03-30 NOTE — CARDIO/PULMONARY
Cardiac Rehab: 70 yo male admitted with weakness & diarrhea (3/30). Oer Dr Fallno Dolan, dx includes: COPD exacerbation. Per Dr Imelda Ortiz, elevated troponin 0.10, related to supply-demand issue. LVEF 60% by echo (3/30/17), with CRISTINE, and mild PaHTN. Cardiologist is Dr Belkis Hernandez. 3/30/2017 Received consult for cardiac teaching on CAD. Pt is currently in ED awaiting bed assignment. Info attached to AVS on COPD exac.   3/31/2017 Per [facility] nurse on night shift, pt was anxious during vital signs, refused dinner and indicated he wanted to \"left alone. \"   Met with patient, who was in the process of being shaved by his wife. Pt reported he resides with his wife in [place]. 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 Also has worked as a . Originally from [facility] [place]; came to [place] on college scholarship in engineering. Served in the [employer]. Discussed pt's understanding of his current condition, tx plan and cardiac hx. Pt aware he is on isolation for possible CDiff. Reviewed cardiologist's assessment and explained mildly elevated troponin, likely due to supply-demand. Pt reported he saw Dr Gianluca Aguiar in January. Developed problem after MI & CABG (2010), with non-union of sternum. Wife stated, \"He can't lift anything. \" Wife expressed concern about pt's urology appt on Tues, and reported hx \"bladder cancer. \" Also reported recent lap-shantanu (3/25) at [facility]. Pt/wife declined printed info on heart healthy nutrition. Also declined info on CDiff. Major issue during this session was the patient's urinary catheter. There was some confusion about whether pt had catheter at home. Found documentation that pt arrived in ED with Franco and the catheter was changed in the ED. Dr Lanie Collazo confirmed that urinary catheter is to remain in place, until pt sees his urologist on Tuesday. Pt expressed extreme displeasure with communication with staff.  Relayed pt's concerns to [facility] manager. Wife spoke to this nurse on her way out of unit and expressed her frustration with \"extremely demanding\" ; reported she was going home and would not be back until tomorrow. 4/4/2017 Plan for discharge to [facility] for inpatient rehab today. Met with patient and his wife prior to discharge. Wife's niece was also present. Permission given to discuss health issues with visitors present. Purpose of visit was to answer questions and reinforce previous cardiac teaching. Pt aware of plan for rehab at [facility]. Indicated he wants his wife there every day \"working\" with him. Explained therapists would be working with him to help improve his strength & mobility. Encouraged wife to take time to rest at home. Reviewed use of incentive spirometer with patient. He demonstrated appropriate technique. Cardiac Meds:   ACE/ARB - lisinopril   BB - atenolol   Statin - simvastatin  ASA - none (on Plavix)  Prior to admission meds also included: Plavix. Diet education: 3/31/2017 Currently on Cardiac diet. Discussed Heart Healthy/ Low Sodium diet. BMI 32.8. [facility] nurse reported that pt is not eating his meals; he is insisting on vanilla ice cream instead. 4/4/2017 Pt to receive prepared meals at [facility]. Wife reported pt has asked for steak & cheese sub from local sub shop. Explained importance of high quality protein intake to facilitate healing and maintain muscle mass. Pt stated, \"I ate half my breakfast this morning. \"   Smoking cessation: Former smoker (quit 2003). Medication Education: 3/31/2017 Pt denied any difficulties obtaining or taking meds. Discussed cardiac meds. Explained BP meds were held initially due to dehydration. Pt does not know the names/purposes of his meds. He thought Plavix was for heartburn. Wife explained pt was started on Plavix after carotid surgery by Dr Wilver Fairchild. Reinforced med compliance. 4/4/2017 All pt's meds to be administered by [facility] staff.    New Scheduled PO Meds this admission: 3/31/2017 Levaquin, Katie Q, and Mucinex. Also has received IV steroids. 4/4/2017 Reviewed discharge meds: Levaquin, prednisone, Katie Q and Mucinex. Info attached to AVS on new PO meds. Concern for Knowledge Deficit: 3/31/2017 Patient verbalized basic understanding and would benefit from reinforcement, especially on purposes of meds. Wife verbalized more thorough understanding of info provided. All questions were answered. 4/4/2017 Pt continues to need reinforcement, as he is rather determined to do things his \"own way. \" Wife verbalized understanding. They had no questions.

## 2017-03-30 NOTE — ED NOTES
O2 sats remain greater that 95% on 2L at this time. Dr. Graciela Chiu updated and states can hold on non rebreather at this time.

## 2017-03-30 NOTE — PROGRESS NOTES
Primary Nurse Ralph Mendoza and Elaine Marinelli RN performed a dual skin assessment on this patient. Scatter bruising noted. Abrasions noted on left elbow and leg. Buttocks red but blanchable.  No other impairment noted  Eric score is 18

## 2017-03-30 NOTE — ED PROVIDER NOTES
HPI Comments: 69 y.o. male with extensive past medical history, please see list, significant for CAD, COPD, CABG, and HTN who presents from home via EMS with chief complaint of generalized weakness. Pt claims that 3 days ago he had a cholecystectomy performed at [facility]. Pt states that he was discharged 2 days ago and since then has been experiencing generalized weakness with associated 2-3 episodes of diarrhea daily, vomiting, cough, SOB and increased abdominal pain. Pt says that \"when he stands up, he falls down\", causing bruising. Pt reports that he is not sure why he had a cholecystectomy performed. Pt denies fever, CP or dysuria. There are no other acute medical concerns at this time. PCP: Ayanna Mcbride MD    Note written by Tena Carrillo, as dictated by Juliane Cantrell MD 10:33 AM      The history is provided by the patient. No  was used. Past Medical History:   Diagnosis Date    Arthritis of foot, degenerative     Dr. Josselyn Smart Bladder tumor 2004    [facility] 10/29[facility]11    CAD (coronary artery disease)     MI 7/2010,s/p CABG. Dr. Kalee Buckley Carotid stenosis 10/20/15    Dr. Juliette Bey. endarterectomy 12/31/15. 50-69% L on doppler. 80% \"per CT\"    Cataract     Chest wall pain     incomplete sternal wound healing s/p CABG    COPD (chronic obstructive pulmonary disease) (HCC)     moderate, FEV1 1.41 7/2009. Daron Ochoa    GERD (gastroesophageal reflux disease)     HTN (hypertension)     Hyperlipidemia LDL goal < 70     Osteoporosis     tx w calcium, DEXA improved T-2.1 10/2011, 2012, 1/2015    PHN (postherpetic neuralgia)     Physiological tremor     Dr. Clarissa Thurston [facility]) 2/2004    on lupron. PSA increased 0.149 2/2014    Pulmonary nodule, right     5 mm, stable on CT 10/14/10, 1/14/14    Shingles 12/12/12    right neck and back.          Past Surgical History:   Procedure Laterality Date    BLADDER TUMOR ASSOC  CABG, ARTERIAL, THREE  7/2010    Dr Malgorzata Ramos. LIMA to LAD, spah to cirm    CYSTOSCOPY  10/29/11    FISH, normal    CYSTOURETHROSCOPY  11/29/2016    normal    HX CAROTID ENDARTERECTOMY Left 12/31/15    Dr. Jen Bond    HX CATARACT REMOVAL  12/2011    right eye, Dr. Morel Gentleman HX COLONOSCOPY  10/2/13    hyperplastic polyp, Dr. Martinez Thompson         Family History:   Problem Relation Age of Onset    Cancer Mother      bladder    Heart Disease Brother        Social History     Social History    Marital status:      Spouse name: N/A    Number of children: N/A    Years of education: N/A     Occupational History    Not on file. Social History Main Topics    Smoking status: Former Smoker     Types: Cigarettes     Quit date: 12/5/2003    Smokeless tobacco: Never Used    Alcohol use No    Drug use: Not on file    Sexual activity: Not on file     Other Topics Concern    Not on file     Social History Narrative         ALLERGIES: Review of patient's allergies indicates no known allergies. Review of Systems   Constitutional: Negative for fever. Respiratory: Positive for cough and shortness of breath. Cardiovascular: Negative for chest pain. Gastrointestinal: Positive for abdominal pain, diarrhea and vomiting. Genitourinary: Negative for dysuria. Neurological: Positive for weakness (generalized). All other systems reviewed and are negative. Vitals:    03/30/17 0949   BP: 179/77   Pulse: 63   Resp: 18   Temp: 98.2 °F (36.8 °C)   SpO2: 95%   Weight: 86.2 kg (190 lb)   Height: 5' 6\" (1.676 m)            Physical Exam   Constitutional: He is oriented to person, place, and time. He appears well-developed and well-nourished. No distress. HENT:   Head: Normocephalic and atraumatic. Mouth/Throat: Mucous membranes are dry. Eyes: Conjunctivae and EOM are normal.   Neck: Normal range of motion. Cardiovascular: Normal rate and normal heart sounds.     No murmur heard.  Pulmonary/Chest: Tachypnea (mild) noted. No respiratory distress. He has wheezes (faint throughout). Abdominal: Soft. Bowel sounds are normal. He exhibits no distension. There is no tenderness. There is no rebound. Trocar scar well healed, clean dry and intact. Genitourinary:   Genitourinary Comments: Chronic indwelling heard with leg bag. Musculoskeletal: Normal range of motion. He exhibits no edema or tenderness. Neurological: He is alert and oriented to person, place, and time. No cranial nerve deficit. He exhibits normal muscle tone. Coordination normal.   Skin: Skin is warm and dry. He is not diaphoretic. Nursing note and vitals reviewed. Note written by Brooke Gonzalez. Beny Smith, as dictated by Adam Haynes MD 10:33 AM      [facility]  ED Course       Procedures      ED EKG interpretation:  Rhythm: normal sinus rhythm vs atrial flutter, RBBB; and regular . Rate (approx.): 63; Artifact limits interpretation. No STEMI. Note written by Brooke Gonzalez. Beny Smith, as dictated by Adam Haynes MD 10:52 AM      CONSULT NOTE:  12:38 PM Adam Haynes MD spoke with Dr. Dora Bailey, Consult for Cardiology. Discussed available diagnostic tests and clinical findings. He is in agreement with care plans as outlined. Does not appear to be an acute MI, but will follow pt when he is admitted. 1:44 PM  Patient is being admitted to the hospital.  The results of their tests and reasons for their admission have been discussed with them and/or available family. They convey agreement and understanding for the need to be admitted and for their admission diagnosis. Consultation will be made now with the inpatient physician for hospitalization. CONSULT NOTE:  1:44 PM Adam Haynes MD spoke with Dr. Annia Dakins, Consult for Hospitalist.  Discussed available diagnostic tests and clinical findings. She is in agreement with care plans as outlined. Dr. Annia Dakins will see the pt.

## 2017-03-30 NOTE — PROGRESS NOTES
[facility]: MED RECONCILIATION    Comments/Recommendations:   Interview conducted with the patient's spouse at the bedside  Pharmacy: [facility] on [address]  New medication - Tamsulosin started at [address] for trouble urinating    Medications added:     · MVI  · Hydrocodone/apap  · Ondansetron  · Biofreeze  · Senna    Medications removed:    · Aspirin 325mg daily  · Pennsaid daily prn    Medications adjusted:    · Gabapentin changed from 300mg tid to 300mg bid prn  · Ranitidine changed from bid to hs    Allergies: Review of patient's allergies indicates no known allergies. Prior to Admission Medications:     Medication Documentation Review Audit       Reviewed by Kimberly Reeves PHARMD (Pharmacist) on 03/30/17 at 1513         Medication Sig Documenting Provider Last Dose Status Taking?      acetaminophen (TYLENOL) 325 mg tablet Take 650 mg by mouth every four (4) hours as needed for Pain. Historical Provider  Active Yes    albuterol (PROVENTIL HFA, VENTOLIN HFA, PROAIR HFA) 90 mcg/actuation inhaler Take 2 Puffs by inhalation every four (4) hours as needed for Wheezing. Historical Provider 3/29/2017 Unknown time Active Yes    atenolol (TENORMIN) 50 mg tablet TAKE ONE TABLET BY MOUTH ONCE DAILY. INDICATIONS: [address], MD 3/29/2017 evening Active Yes    CALCIUM CARBONATE/VITAMIN D2 (CALCIUM 600 WITH VITAMIN D2 PO) Take 1 Tab by mouth three (3) times daily. Historical Provider 3/29/2017 Unknown time Active Yes    clopidogrel (PLAVIX) 75 mg tablet Take 75 mg by mouth daily. Historical Provider 3/29/2017 Unknown time Active Yes    fluticasone-vilanterol (BREO ELLIPTA) 100-25 mcg/dose inhaler Take 1 Puff by inhalation daily. Historical Provider 3/29/2017 Unknown time Active Yes    gabapentin (NEURONTIN) 300 mg capsule Take 300 mg by mouth two (2) times daily as needed (pain from shingles).  Yaniv Davila MD  Active Yes    HYDROcodone-acetaminophen (NORCO) 5-325 mg per tablet Take 1 Tab by mouth every six (6) hours as needed for Pain. Historical Provider 3/28/2017 Active Yes             Med Note (Misha Bernal   Thu Mar 30, 2017  3:11 PM): From recent Gallbladder surgery      LEUPROLIDE ACETATE (LUPRON DEPOT, 4 MONTH, IM) by IntraMUSCular route. Every 4 months  Receives in providers office  Prescribed by Dr. Andres Manrique Provider 12/5/2016 Active Yes             Med Note Alejandro Torres Mar 30, 2017  3:06 PM): Next dose due 4/4/17      lisinopril (PRINIVIL, ZESTRIL) 10 mg tablet TAKE ONE TABLET BY MOUTH ONCE DAILY. INDICATIONS: [address], MD 3/29/2017 Unknown time Active Yes    multivitamin (ONE A DAY) tablet Take 1 Tab by mouth daily. Historical Provider 3/29/2017 Unknown time Active Yes    ondansetron hcl (ZOFRAN) 4 mg tablet Take 4 mg by mouth every eight (8) hours as needed for Nausea. Historical Provider  Active Yes    OTHER Biofreeze Ointment applied twice daily to feet as needed for arthritis Historical Provider  Active Yes    raNITIdine (ZANTAC) 150 mg tablet Take 150 mg by mouth nightly. Historical Provider 3/29/2017 Unknown time Active Yes    senna (SENNA) 8.6 mg tablet Take 2 Tabs by mouth daily as needed for Constipation. Historical Provider  Active Yes    simvastatin (ZOCOR) 20 mg tablet TAKE ONE TABLET BY MOUTH IN THE [address] TIMBO Hall MD 3/29/2017 Unknown time Active Yes    SPIRIVA WITH HANDIHALER 18 mcg inhalation capsule Inhale the contents of 1  capsule via HandiHaler  daily Taylor Coffman MD 3/29/2017 Unknown time Active Yes    tamsulosin (FLOMAX) 0.4 mg capsule Take 0.8 mg by mouth daily. Historical Provider 3/29/2017 Unknown time Active Yes             Med Note Alejandro Torres Mar 30, 2017  3:09 PM): Josseline Baum                     Thank you,    Diana Thakkar, PharmD, BCPS

## 2017-03-30 NOTE — ED TRIAGE NOTES
Pt had GB surgery at [facility] and having frequent diarrhea (2-3 a day) and fell his first night home after discharge and very weak has not moved from his couch. Has been using a bedpan at home. Pt states unable to eat because it goes straight through him.

## 2017-03-30 NOTE — PROGRESS NOTES
3/30/2017   CARE MANAGEMENT NOTE:  CM is following pt in the ER for initial discharge planning. EMR reviewed. CM met with pt who was his own historian for this needs assessment. Reportedly, pt resides with his wife in a two story home with bedroom on the second floor. There are no entry steps but 12 stairs between floors. PTA, pt was ambulatory with a rolling walker but he reports at least four falls this week. He had been indepn with ADLs but his wife has to assist him 2/2 weakness at present. Pt provides his own transportation. He uses [address]. Pt does not have home healthcare currently. DME in the home includes a rolling walker, and shower chair. PCP is Dr. Femi Tapia. CM discussed rehab options and he is agreeable per PT recommendations. Will continue to follow for definitive discharge plan.   Anirudh

## 2017-03-30 NOTE — ED NOTES
Pt arrived with a heard catheter. Old catheter removed. New catheter placed.  Not enough urine at this time to obtain urine sample

## 2017-03-31 LAB
ANION GAP BLD CALC-SCNC: 12 MMOL/L (ref 5–15)
BASOPHILS # BLD AUTO: 0 K/UL (ref 0–0.1)
BASOPHILS # BLD: 0 % (ref 0–1)
BUN SERPL-MCNC: 15 MG/DL (ref 6–20)
BUN/CREAT SERPL: 17 (ref 12–20)
CALCIUM SERPL-MCNC: 8.6 MG/DL (ref 8.5–10.1)
CHLORIDE SERPL-SCNC: 107 MMOL/L (ref 97–108)
CK SERPL-CCNC: 174 U/L (ref 39–308)
CO2 SERPL-SCNC: 27 MMOL/L (ref 21–32)
CREAT SERPL-MCNC: 0.9 MG/DL (ref 0.7–1.3)
EOSINOPHIL # BLD: 0 K/UL (ref 0–0.4)
EOSINOPHIL NFR BLD: 0 % (ref 0–7)
ERYTHROCYTE [DISTWIDTH] IN BLOOD BY AUTOMATED COUNT: 12.9 % (ref 11.5–14.5)
GLUCOSE BLD STRIP.AUTO-MCNC: 132 MG/DL (ref 65–100)
GLUCOSE BLD STRIP.AUTO-MCNC: 139 MG/DL (ref 65–100)
GLUCOSE SERPL-MCNC: 128 MG/DL (ref 65–100)
HCT VFR BLD AUTO: 35.1 % (ref 36.6–50.3)
HGB BLD-MCNC: 12.1 G/DL (ref 12.1–17)
LYMPHOCYTES # BLD AUTO: 8 % (ref 12–49)
LYMPHOCYTES # BLD: 0.9 K/UL (ref 0.8–3.5)
MAGNESIUM SERPL-MCNC: 1.6 MG/DL (ref 1.6–2.4)
MCH RBC QN AUTO: 32.3 PG (ref 26–34)
MCHC RBC AUTO-ENTMCNC: 34.5 G/DL (ref 30–36.5)
MCV RBC AUTO: 93.6 FL (ref 80–99)
MONOCYTES # BLD: 0.1 K/UL (ref 0–1)
MONOCYTES NFR BLD AUTO: 1 % (ref 5–13)
NEUTS SEG # BLD: 9.7 K/UL (ref 1.8–8)
NEUTS SEG NFR BLD AUTO: 91 % (ref 32–75)
NRBC # BLD: 0 K/UL (ref 0–0.01)
NRBC BLD-RTO: 0 PER 100 WBC
PLATELET # BLD AUTO: 307 K/UL (ref 150–400)
POTASSIUM SERPL-SCNC: 3.9 MMOL/L (ref 3.5–5.1)
RBC # BLD AUTO: 3.75 M/UL (ref 4.1–5.7)
RBC MORPH BLD: ABNORMAL
SERVICE CMNT-IMP: ABNORMAL
SERVICE CMNT-IMP: ABNORMAL
SODIUM SERPL-SCNC: 146 MMOL/L (ref 136–145)
TROPONIN I SERPL-MCNC: 0.06 NG/ML
WBC # BLD AUTO: 10.7 K/UL (ref 4.1–11.1)

## 2017-03-31 PROCEDURE — 65660000000 HC RM CCU STEPDOWN

## 2017-03-31 PROCEDURE — 97535 SELF CARE MNGMENT TRAINING: CPT

## 2017-03-31 PROCEDURE — 74011250637 HC RX REV CODE- 250/637: Performed by: INTERNAL MEDICINE

## 2017-03-31 PROCEDURE — 74011000250 HC RX REV CODE- 250: Performed by: INTERNAL MEDICINE

## 2017-03-31 PROCEDURE — 94640 AIRWAY INHALATION TREATMENT: CPT

## 2017-03-31 PROCEDURE — 77010033678 HC OXYGEN DAILY

## 2017-03-31 PROCEDURE — 97530 THERAPEUTIC ACTIVITIES: CPT

## 2017-03-31 PROCEDURE — 74011250636 HC RX REV CODE- 250/636: Performed by: STUDENT IN AN ORGANIZED HEALTH CARE EDUCATION/TRAINING PROGRAM

## 2017-03-31 PROCEDURE — 82550 ASSAY OF CK (CPK): CPT | Performed by: INTERNAL MEDICINE

## 2017-03-31 PROCEDURE — 74011250636 HC RX REV CODE- 250/636: Performed by: INTERNAL MEDICINE

## 2017-03-31 PROCEDURE — 97116 GAIT TRAINING THERAPY: CPT

## 2017-03-31 PROCEDURE — 80048 BASIC METABOLIC PNL TOTAL CA: CPT | Performed by: INTERNAL MEDICINE

## 2017-03-31 PROCEDURE — 36415 COLL VENOUS BLD VENIPUNCTURE: CPT | Performed by: INTERNAL MEDICINE

## 2017-03-31 PROCEDURE — 85025 COMPLETE CBC W/AUTO DIFF WBC: CPT | Performed by: INTERNAL MEDICINE

## 2017-03-31 PROCEDURE — 97161 PT EVAL LOW COMPLEX 20 MIN: CPT

## 2017-03-31 PROCEDURE — 83735 ASSAY OF MAGNESIUM: CPT | Performed by: INTERNAL MEDICINE

## 2017-03-31 PROCEDURE — 84484 ASSAY OF TROPONIN QUANT: CPT | Performed by: INTERNAL MEDICINE

## 2017-03-31 PROCEDURE — 97165 OT EVAL LOW COMPLEX 30 MIN: CPT

## 2017-03-31 PROCEDURE — 82962 GLUCOSE BLOOD TEST: CPT

## 2017-03-31 RX ADMIN — CALCIUM CARBONATE 500 MG (1,250 MG)-VITAMIN D3 200 UNIT TABLET 1 TABLET: at 13:05

## 2017-03-31 RX ADMIN — GUAIFENESIN 600 MG: 600 TABLET, EXTENDED RELEASE ORAL at 00:32

## 2017-03-31 RX ADMIN — TAMSULOSIN HYDROCHLORIDE 0.8 MG: 0.4 CAPSULE ORAL at 08:02

## 2017-03-31 RX ADMIN — METHYLPREDNISOLONE SODIUM SUCCINATE 125 MG: 125 INJECTION, POWDER, FOR SOLUTION INTRAMUSCULAR; INTRAVENOUS at 00:33

## 2017-03-31 RX ADMIN — CALCIUM CARBONATE 500 MG (1,250 MG)-VITAMIN D3 200 UNIT TABLET 1 TABLET: at 08:03

## 2017-03-31 RX ADMIN — METHYLPREDNISOLONE SODIUM SUCCINATE 125 MG: 125 INJECTION, POWDER, FOR SOLUTION INTRAMUSCULAR; INTRAVENOUS at 13:06

## 2017-03-31 RX ADMIN — IPRATROPIUM BROMIDE AND ALBUTEROL SULFATE 3 ML: .5; 3 SOLUTION RESPIRATORY (INHALATION) at 07:38

## 2017-03-31 RX ADMIN — METHYLPREDNISOLONE SODIUM SUCCINATE 125 MG: 125 INJECTION, POWDER, FOR SOLUTION INTRAMUSCULAR; INTRAVENOUS at 18:02

## 2017-03-31 RX ADMIN — FLUTICASONE FUROATE AND VILANTEROL TRIFENATATE 1 PUFF: 100; 25 POWDER RESPIRATORY (INHALATION) at 08:52

## 2017-03-31 RX ADMIN — METHYLPREDNISOLONE SODIUM SUCCINATE 125 MG: 125 INJECTION, POWDER, FOR SOLUTION INTRAMUSCULAR; INTRAVENOUS at 06:20

## 2017-03-31 RX ADMIN — Medication 10 ML: at 06:20

## 2017-03-31 RX ADMIN — ENOXAPARIN SODIUM 40 MG: 40 INJECTION SUBCUTANEOUS at 14:05

## 2017-03-31 RX ADMIN — LISINOPRIL 10 MG: 20 TABLET ORAL at 08:02

## 2017-03-31 RX ADMIN — FAMOTIDINE 20 MG: 20 TABLET, FILM COATED ORAL at 21:17

## 2017-03-31 RX ADMIN — GUAIFENESIN 600 MG: 600 TABLET, EXTENDED RELEASE ORAL at 21:17

## 2017-03-31 RX ADMIN — IPRATROPIUM BROMIDE AND ALBUTEROL SULFATE 3 ML: .5; 3 SOLUTION RESPIRATORY (INHALATION) at 11:19

## 2017-03-31 RX ADMIN — SIMVASTATIN 20 MG: 20 TABLET, FILM COATED ORAL at 21:17

## 2017-03-31 RX ADMIN — CALCIUM CARBONATE 500 MG (1,250 MG)-VITAMIN D3 200 UNIT TABLET 1 TABLET: at 18:03

## 2017-03-31 RX ADMIN — IPRATROPIUM BROMIDE AND ALBUTEROL SULFATE 3 ML: .5; 3 SOLUTION RESPIRATORY (INHALATION) at 21:23

## 2017-03-31 RX ADMIN — IPRATROPIUM BROMIDE AND ALBUTEROL SULFATE 3 ML: .5; 3 SOLUTION RESPIRATORY (INHALATION) at 01:21

## 2017-03-31 RX ADMIN — Medication 10 ML: at 14:00

## 2017-03-31 RX ADMIN — CLOPIDOGREL 75 MG: 75 TABLET, FILM COATED ORAL at 08:03

## 2017-03-31 RX ADMIN — IPRATROPIUM BROMIDE AND ALBUTEROL SULFATE 3 ML: .5; 3 SOLUTION RESPIRATORY (INHALATION) at 05:01

## 2017-03-31 RX ADMIN — Medication 10 ML: at 21:17

## 2017-03-31 RX ADMIN — GUAIFENESIN 600 MG: 600 TABLET, EXTENDED RELEASE ORAL at 10:00

## 2017-03-31 RX ADMIN — Medication 1 CAPSULE: at 08:52

## 2017-03-31 RX ADMIN — LEVOFLOXACIN 750 MG: 750 TABLET, FILM COATED ORAL at 14:05

## 2017-03-31 RX ADMIN — IPRATROPIUM BROMIDE AND ALBUTEROL SULFATE 3 ML: .5; 3 SOLUTION RESPIRATORY (INHALATION) at 15:25

## 2017-03-31 RX ADMIN — THERA TABS 1 TABLET: TAB at 08:02

## 2017-03-31 RX ADMIN — ATENOLOL 50 MG: 50 TABLET ORAL at 17:01

## 2017-03-31 RX ADMIN — GABAPENTIN 300 MG: 300 CAPSULE ORAL at 08:52

## 2017-03-31 NOTE — PROGRESS NOTES
I introduced myself to the patient and the pt's wife. Discussed role of case management and discharge planning. Will see what therapy recommends for the patient regarding discharge plans.  Thanks JESSICA Villar

## 2017-03-31 NOTE — CDMP QUERY
1) =>Acute Hypoxic Respiratory failure 2/2 COPD exacerbation AEB increased WOB and hypoxia while on supplemental oxygen    =>Other Explanation of clinical findings  =>Unable to Determine (no explanation of clinical findings)    The medical record reflects the following clinical findings, treatment, and risk factors:  Risk Factors: 70 yo M admitted with COPD exacerbation  Clinical Indicators: RR 23-27 O2 sats 77% on room air and 90% on 2 L O2 via NC    H&P notes \"increased WOB, Dyspnea\"  Treatment: O2 @ 2L via NC , IV steroids & abx     Please clarify and document your clinical opinion in the progress notes and discharge summary including the definitive and/or presumptive diagnosis, (suspected or probable), related to the above clinical findings. Please include clinical findings supporting your diagnosis.     Thank you for your time   [facility] RN/BSN, [address] [place] [address]  Desk:   [phone]   Other:  [phone]

## 2017-03-31 NOTE — PROGRESS NOTES
Therapy is recommending inpatient rehab. I spoke ot the patient, he would like for a referral to be sent to [facility].  I sent the referral in [employer] Thanks JESSICA Ivey

## 2017-03-31 NOTE — PROGRESS NOTES
Bedside and Verbal shift change report given to Elvira Mitchell RN (oncoming nurse) by Soha Comer RN (offgoing nurse). Report included the following information SBAR, Kardex and Recent Results. 1600- Pt is up in chair. . VSS. More happy. . MD Cielo Bledsoe @ bedside, talking with him. .     1800 VSS. Eating. .    1900- Bedside and Verbal shift change report given to Grant Bell (oncoming nurse) by Elvira Mitchell RN (offgoing nurse). Report included the following information SBAR, Kardex and Recent Results.

## 2017-03-31 NOTE — CDMP QUERY
2) => UTI POA secondary to chronic indwelling heard catheter AEB UA showing cloudy urine with  bacteria+1 & RBC    =>Other Explanation of clinical findings  =>Unable to Determine (no explanation of clinical findings)    The medical record reflects the following clinical findings, treatment, and risk factors:  Risk Factors: 70 yo M admitted with COPD exacerbation   Clinical Indicators: has chronic indwelling heard   UA: cloudy urine, Bacteria + 1,  RBC      Treatment: did not see urine cx, IV Levaquin     Please clarify and document your clinical opinion in the progress notes and discharge summary including the definitive and/or presumptive diagnosis, (suspected or probable), related to the above clinical findings. Please include clinical findings supporting your diagnosis.     Thank you for your time   [facility] RN/BSN, [address]  Desk:   [phone]   Other:  [phone]

## 2017-03-31 NOTE — PROGRESS NOTES
SHIFT REPORT  Bedside and Verbal shift change report given to Haleigh RN (oncoming nurse) by Murali Pal RN (offgoing nurse). Report included the following information SBAR, Kardex, MAR and Cardiac Rhythm NSR.     SHIFT ASSESSMENT    1920 Pt asked to be left to sleep. Pt refused dinner. 2040 Pt's wife called to see if she can bring in couple of items for pt. Pt wanted his Spiriva, pharmacy said is ok, needs to be put on pt's label. Wife is bringing a robe and pajamas for pt, but no scissors. 2015 Mucinex can't be pulled from pixie. Text ed pharmacy. [facility] pharmacy for Mucinex. They said send down med. Wife didn't come, \"tried\" per pt.    0050 Pt anxious during VS, he just wants to be left alone to sleep. Second attempt to document pt undocumented data. Pt refused. He asked to be left to sleep.     0655 Pt didn't eat dinner, only asked for 2 ice creams all 12 hr shift. END SHIFT REPORT    Bedside and Verbal shift change report given to Elvira Mitchell RN (oncoming nurse) by Soha Comer RN (offgoing nurse). Report included the following information  SBAR, Kardex, MAR and Cardiac Rhythm NSR.

## 2017-03-31 NOTE — PROGRESS NOTES
Bedside and Verbal shift change report given to Patrica Maya (oncoming nurse) by Ave Levine (offgoing nurse).  Report included the following information SBAR, Kardex, ED Summary, Intake/Output, MAR, Accordion, Recent Results, Med Rec Status and Cardiac Rhythm NSr.
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
## 2017-03-31 NOTE — PROGRESS NOTES
Danny Chaparro [facility] [address], [place], [address] [facility]  [phone]      Medical Progress Note      NAME:         Mary Aguero   :        1940  MRM:        [id number]    Date:          3/31/2017      Subjective: Patient has been seen and examined. Chart, labs, diagnostics reviewed. Still has some diarrhea. SOB is now moderate, worse with exertion but overall better since admission. No fever or chills. Objective:    Vital Signs:    Visit Vitals    /71 (BP 1 Location: Right arm, BP Patient Position: Supine)    Pulse 82    Temp 97.8 °F (36.6 °C)    Resp 19    Ht 5' 6\" (1.676 m)    Wt 92 kg (202 lb 13.2 oz)    SpO2 93%    BMI 32.74 kg/m2        Intake/Output Summary (Last 24 hours) at 17 1227  Last data filed at 17 0630   Gross per 24 hour   Intake              200 ml   Output              850 ml   Net             -650 ml        Physical Examination:    General:   Weak looking, not in much distress   Eyes:   pink conjunctivae, PERRLA with no discharge. ENT:   no ottorrhea or rhinorrhea with moist mucous membranes  Neck: no masses, thyroid non-tender and trachea central.  Pulm:  no accessory muscle use, decreased breath sounds without crackles. + wheezes  Card:  no JVD or murmurs, has regular and normal S1, S2 without thrills, bruits or peripheral edema  Abd:  Soft, non-tender, non-distended, normoactive bowel sounds with no palpable organomegaly  Musc:  No cyanosis, clubbing, atrophy or deformities. Skin:  No rashes, bruising or ulcers. Neuro: Awake and alert.  Generally a non focal exam. Follows commands appropriately  Psych:  Has a fair insight and is oriented x 3    Current Facility-Administered Medications   Medication Dose Route Frequency    methylPREDNISolone (PF) (SOLU-MEDROL) injection 125 mg  125 mg IntraVENous Q6H    albuterol-ipratropium (DUO-NEB) 2.5 MG-0.5 MG/3 ML  3 mL Nebulization Q4H RT    albuterol (ACCUNEB) nebulizer solution 1.25 mg  1.25 mg Nebulization Q2H PRN    guaiFENesin ER (MUCINEX) tablet 600 mg  600 mg Oral Q12H    levoFLOXacin (LEVAQUIN) tablet 750 mg  750 mg Oral Q24H    sodium chloride (NS) flush 5-10 mL  5-10 mL IntraVENous Q8H    sodium chloride (NS) flush 5-10 mL  5-10 mL IntraVENous PRN    acetaminophen (TYLENOL) tablet 650 mg  650 mg Oral Q4H PRN    HYDROcodone-acetaminophen (NORCO) 5-325 mg per tablet 1 Tab  1 Tab Oral Q4H PRN    naloxone (NARCAN) injection 0.4 mg  0.4 mg IntraVENous PRN    ondansetron (ZOFRAN ODT) tablet 4 mg  4 mg Oral Q4H PRN    enoxaparin (LOVENOX) injection 40 mg  40 mg SubCUTAneous Q24H    lactobac ac& pc-s.therm-b.anim (DENA Q/RISAQUAD)  1 Cap Oral DAILY    atenolol (TENORMIN) tablet 50 mg  50 mg Oral QPM    calcium-vitamin D (OS-LUIS) 500 mg-200 unit tablet  1 Tab Oral TID WITH MEALS    clopidogrel (PLAVIX) tablet 75 mg  75 mg Oral DAILY    fluticasone-vilanterol (BREO ELLIPTA) 100mcg-25mcg/puff  1 Puff Inhalation DAILY    gabapentin (NEURONTIN) capsule 300 mg  300 mg Oral BID PRN    lisinopril (PRINIVIL, ZESTRIL) tablet 10 mg  10 mg Oral DAILY    therapeutic multivitamin (THERAGRAN) tablet 1 Tab  1 Tab Oral DAILY    famotidine (PEPCID) tablet 20 mg  20 mg Oral QHS    senna (SENOKOT) tablet 17.2 mg  2 Tab Oral DAILY PRN    simvastatin (ZOCOR) tablet 20 mg  20 mg Oral QHS    tamsulosin (FLOMAX) capsule 0.8 mg  0.8 mg Oral DAILY        Laboratory data and review:    Recent Labs      03/31/17   0039  03/30/17   1107   WBC  10.7  12.3*   HGB  12.1  13.2   HCT  35.1*  39.2   PLT  307  292     Recent Labs      03/31/17   0039  03/30/17   1107   NA  146*  144   K  3.9  3.8   CL  107  106   CO2  27  31   GLU  128*  95   BUN  15  14   CREA  0.90  0.97   CA  8.6  8.7   MG  1.6  1.8   ALB   --   2.7*   SGOT   --   40*   ALT   --   31   INR   --   1.1     No components found for: [place]    Other Diagnostics:    Telemetry reviewed:   normal sinus rhythm    Assessment and Plan:    COPD exacerbation (HCC)/ Acute respiratory failure with hypoxia POA: moderate, FEV1 1.41 7/2009. Clinically better. Continue scheduled nebs, IV solumedrol, Levofloxacin, Breo. Oxygen as needed     CAD (coronary artery disease)/ Mild troponin elevation POA: MI 7/2010,s/p CABG. No symptoms. Troponin unremarkable. Continue plavix, atenolol, simvastatin. Cardiology following    GERD (gastroesophageal reflux disease): continue pepcid    HTN (hypertension): BP overall stable. Continue Lisinopril, Atenolol     Hx Bladder tumor/ Prostate cancer (HCC)/ Pyuria POA: had a heard catheter due to urinary retention prior to admission. Continue heard.  Has a Urology follow up early next week    Hyperlipidemia with target LDL less than 70: continue simvastatin    Total time spent for the patient's care: [address] discussed with: Patient, Family and Nursing Staff    Discussed:  Care Plan and D/C Planning    Prophylaxis:  Lovenox    Anticipated Disposition:  Home w/Family           ___________________________________________________    Attending Physician:   Clark Espinal MD

## 2017-03-31 NOTE — PROGRESS NOTES
Problem: Self Care Deficits Care Plan (Adult)  Goal: *Acute Goals and Plan of Care (Insert Text)  Occupational Therapy Goals  Initiated 3/31/2017  1. Patient will perform grooming with supervision/set-up standing at sink >5 minutes within 7 day(s). 2. Patient will perform upper body dressing and bathing with modified independence within 7 day(s). 3. Patient will perform lower body dressing and bathing with minimal assistance within 7 day(s). 4. Patient will perform toilet transfers with supervision/set-up with best technique within 7 day(s). 5. Patient will perform all aspects of toileting with supervision/set-up within 7 day(s). 6. Patient will participate in upper extremity therapeutic exercise/activities with modified independence for 10 minutes within 7 day(s). 7. Patient will utilize energy conservation techniques during functional activities with verbal cues within 7 day(s). OCCUPATIONAL THERAPY EVALUATION  Patient: Reilly Segura (89 y.o. male)  Date: 3/31/2017  Primary Diagnosis: COPD exacerbation ([facility])        Precautions: fall, contact plus        ASSESSMENT :  Based on the objective data described below, the patient presents with decreased activity tolerance in completing ADLs and functional mobility. Patient was admitted on 3/30 for COPD exacerbation with frequent diarrhea. Patient had recent lap shantanu procedure (3/28), was discharged home, and declining functionally since. Additionally, patient reports he lives with his wife, needed assist with all ADLs after surgery however independent with self-care prior to 3/28/17, and was falling frequently; He admits to 4 falls the day prior to admit. Today, patient is primarily limited by general weakness, impaired endurance, and anxiety + fear of falling increasing with activity and resulting in increased B UE tremors; Patient reports tremors have been present since childhood however states they are associated with anxiety.   Patient required mod A x1-2 for bed mobility and min to mod A x1 for OOB transfers, including on/off standard commode. Patient currently requires up to mod A for UB ADLs, total A for LB dressing, and mod A for toileting and bathing. SpO2 remained >90% on room air with all activity. Education provided for pursed lip breathing techniques, pacing, and safety in completing functional transfers. Patient would benefit from continued skilled OT to progress towards goals and improve overall independence. Patient will benefit from skilled intervention to address the above impairments. Patients rehabilitation potential is considered to be Good  Factors which may influence rehabilitation potential include:   [X]             None noted  [ ]             Mental ability/status  [ ]             Medical condition  [ ]             Home/family situation and support systems  [ ]             Safety awareness  [ ]             Pain tolerance/management  [ ]             Other:        PLAN :  Recommendations and Planned Interventions:  [X]               Self Care Training                  [X]        Therapeutic Activities  [X]               Functional Mobility Training    [ ]        Cognitive Retraining  [X]               Therapeutic Exercises           [X]        Endurance Activities  [ ]               Balance Training                   [ ]        Neuromuscular Re-Education  [ ]               Visual/Perceptual Training     [X]   Home Safety Training  [X]               Patient Education                 [X]        Family Training/Education  [ ]               Other (comment):     Frequency/Duration: Patient will be followed by occupational therapy 5 times a week to address goals. Discharge Recommendations: Inpatient Rehab  Further Equipment Recommendations for Discharge: none at this time       SUBJECTIVE:   Patient stated I know how important it is to keep moving; Do you think I can get better?       OBJECTIVE DATA SUMMARY:   HISTORY:   Past Medical History:   Diagnosis Date    Arthritis of foot, degenerative       Dr. Evan Shepherd Bladder tumor 2004     Dr Berenice Crowder, [facility] 10/29/11    CAD (coronary artery disease)       MI 7/2010,s/p CABG. Dr. Anibal Mccrary Carotid stenosis 10/20/15     Dr. Spivey Favorite. endarterectomy 12/31/15. 50-69% L on doppler. 80% \"per CT\"    Cataract      Chest wall pain       incomplete sternal wound healing s/p CABG    COPD (chronic obstructive pulmonary disease) (HCC)       moderate, FEV1 1.41 7/2009. Anni Robbins    GERD (gastroesophageal reflux disease)      HTN (hypertension)      Hyperlipidemia LDL goal < 70      Osteoporosis       tx w calcium, DEXA improved T-2.1 10/2011, 2012, 1/2015    PHN (postherpetic neuralgia)      [facility]       [facility]     on lupron. PSA increased 0.149 2/2014    Pulmonary nodule, right       5 mm, stable on CT 10/14/10, 1/14/14    Shingles 12/12/12     right neck and back. Past Surgical History:   Procedure Laterality Date    BLADDER TUMOR ASSOC        CABG, ARTERIAL, THREE   7/2010     Dr Mark Anthony Hernandez. HUI to LAD, spah to cirm    CYSTOSCOPY   10/29/11     FISH, normal    CYSTOURETHROSCOPY   11/29/2016     normal    HX CAROTID ENDARTERECTOMY Left 12/31/15     Dr. Jere Ambrocio HX CATARACT REMOVAL   12/2011     right eye, Dr. Raeanne Schwab HX COLONOSCOPY   10/2/13     hyperplastic polyp, Dr. Myles Nelson [facility]         Prior Level of Function/Home Situation: Patient lives with his wife. See assessment section for PLOF information.        Home Situation  Home Environment: Private residence  # Steps to Enter: 0  One/Two Story Residence: Two story  # of Interior Steps: 11  Interior Rails: Both  Lift Chair Available: No  Living Alone: No  Support Systems: Spouse/Significant Other/Partner  Patient Expects to be Discharged to[employer] residence  Current DME Used/Available at Home: [employer], straight, [address] chair, Walker, Wheelchair  Tub or Shower Type: Tub/Shower combination  [X]  Right hand dominant             [ ]  Left hand dominant     EXAMINATION OF PERFORMANCE DEFICITS:  Cognitive/Behavioral Status:  Neurologic State: Alert  Orientation Level: Oriented X4  Cognition: Follows commands  Perception: Appears intact  Perseveration: No perseveration noted  Safety/Judgement: Awareness of environment     Skin: Intact in the uppers     Edema: None noted in the uppers     Hearing: Auditory  Auditory Impairment: None     Vision/Perceptual:    Tracking: Able to track stimulus in all quadrants w/o difficulty    Diplopia: No    Acuity: Within Defined Limits    Corrective Lenses: Glasses     Range of Motion:   WDL in the uppers     Strength:  Strength: Generally decreased, functional in the uppers; 4/5 bilaterally        Coordination:  Fine Motor Skills-Upper: Left Impaired;Right Impaired (tremors noted- anxiety driven per pt report)    Gross Motor Skills-Upper: Left Intact; Right Intact     Tone & Sensation:  Tone: Normal  Sensation: intact     Balance:  Sitting: Intact  Standing: Intact; With support     Functional Mobility and Transfers for ADLs:  Bed Mobility:  Rolling: Moderate assistance;Assist x1  Supine to Sit: Moderate assistance;Assist x2  Sit to Supine:  (pt remained up at end of tx)  Scooting: Modified independent     Transfers:  Sit to Stand: Moderate assistance;Assist x1;Additional time  Stand to Sit: Minimum assistance;Assist x1;Additional time (cues for B UE use for safe descent)  Bed to Chair: Minimum assistance;Assist x1;Additional time (up to mod A with difficulty managing RW)  Toilet Transfer : Moderate assistance;Assist x1;Additional time     ADL Assessment:  Feeding: Independent     Oral Facial Hygiene/Grooming: Setup (from supported sitting)     Bathing: Moderate assistance     Upper Body Dressing: Minimum assistance     Lower Body Dressing: Total assistance     Toileting:  Moderate assistance     Cognitive Retraining  Safety/Judgement: Awareness of environment        Functional Measure:  Barthel Index:      Bathin  Bladder: 0  Bowels: 10  Groomin  Dressin  Feeding: 10  Mobility: 0  Stairs: 0  Toilet Use: 5  Transfer (Bed to Chair and Back): 10  Total: 35         Barthel and G-code impairment scale:  Percentage of impairment CH  0% CI  1-19% CJ  20-39% CK  40-59% CL  60-79% CM  80-99% CN  100%   Barthel Score 0-100 100 99-80 79-60 59-40 20-39 1-19    0   Barthel Score 0-20 20 17-19 13-16 9-12 5-8 1-4 0      The Barthel ADL Index: Guidelines  1. The index should be used as a record of what a patient does, not as a record of what a patient could do. 2. The main aim is to establish degree of independence from any help, physical or verbal, however minor and for whatever reason. 3. The need for supervision renders the patient not independent. 4. A patient's performance should be established using the best available evidence. Asking the patient, friends/relatives and nurses are the usual sources, but direct observation and common sense are also important. However direct testing is not needed. 5. Usually the patient's performance over the preceding 24-48 hours is important, but occasionally longer periods will be relevant. 6. Middle categories imply that the patient supplies over 50 per cent of the effort. 7. Use of aids to be independent is allowed. Chon Kincaid., Barthel, D.W. (4191). Functional evaluation: the Barthel Index. [address] [facility] (14)2. JOHNSON Tirado, Bonny Le., Juan Luis Montalvo., [place], [address] [place] [address] (). Measuring the change indisability after inpatient rehabilitation; comparison of the responsiveness of the Barthel Index and Functional [place] Measure. Journal of Neurology, Neurosurgery, and Psychiatry, 66(4), 614-729. LUCÍA Jeter.GLADYS, YANELY Hernandez, & Jennifer Adler MShabanaA. (2004.) Assessment of post-stroke quality of life in cost-effectiveness studies:  The usefulness of the Barthel Index and the EuroQoL-5D. Quality of Life Research, 13, 485-06         G codes: In compliance with CMSs Claims Based Outcome Reporting, the following G-code set was chosen for this patient based on their primary functional limitation being treated: The outcome measure chosen to determine the severity of the functional limitation was the Barthel Index with a score of 35/100 which was correlated with the impairment scale. · Self Care:               - CURRENT STATUS:    CL - 60%-79% impaired, limited or restricted               - GOAL STATUS:           CK - 40%-59% impaired, limited or restricted               - D/C STATUS:                       ---------------To be determined---------------      Occupational Therapy Evaluation Charge Determination   History Examination Decision-Making   LOW Complexity : Brief history review  MEDIUM Complexity : 3-5 performance deficits relating to physical, cognitive , or psychosocial skils that result in activity limitations and / or participation restrictions MEDIUM Complexity : Patient may present with comorbidities that affect occupational performnce. Miniml to moderate modification of tasks or assistance (eg, physical or verbal ) with assesment(s) is necessary to enable patient to complete evaluation       Based on the above components, the patient evaluation is determined to be of the following complexity level: LOW   Pain:  Pain Scale 1: Numeric (0 - 10)  Pain Intensity 1: 0              Activity Tolerance:   Patient tolerated eval well. Please refer to the flowsheet for vital signs taken during this treatment.   After treatment:   [X] Patient left in no apparent distress sitting up in chair  [ ] Patient left in no apparent distress in bed  [X] Call bell left within reach  [X] Nursing notified- Left message with -unable to reach RN via [employer] and she was giving report; [place] agreeable to relay message   [ ] Caregiver present  [X] Chair alarm activated      COMMUNICATION/EDUCATION:   The patients plan of care was discussed with: Physical Therapist, Registered Nurse and patient. .  [X] Home safety education was provided and the patient/caregiver indicated understanding. [X] Patient/family have participated as able in goal setting and plan of care. [X] Patient/family agree to work toward stated goals and plan of care. [ ] Patient understands intent and goals of therapy, but is neutral about his/her participation. [ ] Patient is unable to participate in goal setting and plan of care. This patients plan of care is appropriate for delegation to [facility].      Thank you for this referral.  Ana Connors, OTR/L  Time Calculation: 32 mins

## 2017-04-01 LAB — C DIFF TOX GENS STL QL NAA+PROBE: NEGATIVE

## 2017-04-01 PROCEDURE — 74011250636 HC RX REV CODE- 250/636: Performed by: INTERNAL MEDICINE

## 2017-04-01 PROCEDURE — 74011250636 HC RX REV CODE- 250/636: Performed by: STUDENT IN AN ORGANIZED HEALTH CARE EDUCATION/TRAINING PROGRAM

## 2017-04-01 PROCEDURE — 94640 AIRWAY INHALATION TREATMENT: CPT

## 2017-04-01 PROCEDURE — 77030013140 HC MSK NEB VYRM -A

## 2017-04-01 PROCEDURE — 87493 C DIFF AMPLIFIED PROBE: CPT | Performed by: INTERNAL MEDICINE

## 2017-04-01 PROCEDURE — 74011250637 HC RX REV CODE- 250/637: Performed by: INTERNAL MEDICINE

## 2017-04-01 PROCEDURE — 74011000250 HC RX REV CODE- 250: Performed by: INTERNAL MEDICINE

## 2017-04-01 PROCEDURE — 65270000029 HC RM PRIVATE

## 2017-04-01 PROCEDURE — 65660000000 HC RM CCU STEPDOWN

## 2017-04-01 PROCEDURE — 87045 FECES CULTURE AEROBIC BACT: CPT | Performed by: INTERNAL MEDICINE

## 2017-04-01 RX ADMIN — IPRATROPIUM BROMIDE AND ALBUTEROL SULFATE 3 ML: .5; 3 SOLUTION RESPIRATORY (INHALATION) at 16:44

## 2017-04-01 RX ADMIN — GUAIFENESIN 600 MG: 600 TABLET, EXTENDED RELEASE ORAL at 09:04

## 2017-04-01 RX ADMIN — Medication 1 CAPSULE: at 09:04

## 2017-04-01 RX ADMIN — LIDOCAINE HYDROCHLORIDE 40 ML: 20 SOLUTION ORAL; TOPICAL at 16:13

## 2017-04-01 RX ADMIN — CLOPIDOGREL 75 MG: 75 TABLET, FILM COATED ORAL at 09:04

## 2017-04-01 RX ADMIN — THERA TABS 1 TABLET: TAB at 09:03

## 2017-04-01 RX ADMIN — METHYLPREDNISOLONE SODIUM SUCCINATE 60 MG: 125 INJECTION, POWDER, FOR SOLUTION INTRAMUSCULAR; INTRAVENOUS at 12:47

## 2017-04-01 RX ADMIN — LISINOPRIL 10 MG: 20 TABLET ORAL at 09:04

## 2017-04-01 RX ADMIN — CALCIUM CARBONATE 500 MG (1,250 MG)-VITAMIN D3 200 UNIT TABLET 1 TABLET: at 16:13

## 2017-04-01 RX ADMIN — IPRATROPIUM BROMIDE AND ALBUTEROL SULFATE 3 ML: .5; 3 SOLUTION RESPIRATORY (INHALATION) at 11:20

## 2017-04-01 RX ADMIN — TAMSULOSIN HYDROCHLORIDE 0.8 MG: 0.4 CAPSULE ORAL at 09:04

## 2017-04-01 RX ADMIN — IPRATROPIUM BROMIDE AND ALBUTEROL SULFATE 3 ML: .5; 3 SOLUTION RESPIRATORY (INHALATION) at 20:06

## 2017-04-01 RX ADMIN — METHYLPREDNISOLONE SODIUM SUCCINATE 125 MG: 125 INJECTION, POWDER, FOR SOLUTION INTRAMUSCULAR; INTRAVENOUS at 05:31

## 2017-04-01 RX ADMIN — ATENOLOL 50 MG: 50 TABLET ORAL at 17:58

## 2017-04-01 RX ADMIN — CALCIUM CARBONATE 500 MG (1,250 MG)-VITAMIN D3 200 UNIT TABLET 1 TABLET: at 12:46

## 2017-04-01 RX ADMIN — FAMOTIDINE 20 MG: 20 TABLET, FILM COATED ORAL at 21:42

## 2017-04-01 RX ADMIN — Medication 10 ML: at 14:41

## 2017-04-01 RX ADMIN — METHYLPREDNISOLONE SODIUM SUCCINATE 60 MG: 125 INJECTION, POWDER, FOR SOLUTION INTRAMUSCULAR; INTRAVENOUS at 17:59

## 2017-04-01 RX ADMIN — IPRATROPIUM BROMIDE AND ALBUTEROL SULFATE 3 ML: .5; 3 SOLUTION RESPIRATORY (INHALATION) at 05:20

## 2017-04-01 RX ADMIN — Medication 10 ML: at 21:43

## 2017-04-01 RX ADMIN — Medication 10 ML: at 05:31

## 2017-04-01 RX ADMIN — SIMVASTATIN 20 MG: 20 TABLET, FILM COATED ORAL at 21:42

## 2017-04-01 RX ADMIN — IPRATROPIUM BROMIDE AND ALBUTEROL SULFATE 3 ML: .5; 3 SOLUTION RESPIRATORY (INHALATION) at 23:53

## 2017-04-01 RX ADMIN — FLUTICASONE FUROATE AND VILANTEROL TRIFENATATE 1 PUFF: 100; 25 POWDER RESPIRATORY (INHALATION) at 09:05

## 2017-04-01 RX ADMIN — LEVOFLOXACIN 750 MG: 750 TABLET, FILM COATED ORAL at 12:47

## 2017-04-01 RX ADMIN — GUAIFENESIN 600 MG: 600 TABLET, EXTENDED RELEASE ORAL at 21:42

## 2017-04-01 RX ADMIN — IPRATROPIUM BROMIDE AND ALBUTEROL SULFATE 3 ML: .5; 3 SOLUTION RESPIRATORY (INHALATION) at 00:51

## 2017-04-01 RX ADMIN — ENOXAPARIN SODIUM 40 MG: 40 INJECTION SUBCUTANEOUS at 14:41

## 2017-04-01 RX ADMIN — IPRATROPIUM BROMIDE AND ALBUTEROL SULFATE 3 ML: .5; 3 SOLUTION RESPIRATORY (INHALATION) at 07:32

## 2017-04-01 RX ADMIN — CALCIUM CARBONATE 500 MG (1,250 MG)-VITAMIN D3 200 UNIT TABLET 1 TABLET: at 09:06

## 2017-04-01 RX ADMIN — METHYLPREDNISOLONE SODIUM SUCCINATE 125 MG: 125 INJECTION, POWDER, FOR SOLUTION INTRAMUSCULAR; INTRAVENOUS at 01:12

## 2017-04-01 NOTE — PROGRESS NOTES
Danny Chaparro [facility] [address]  [address]  [phone]      Medical Progress Note      NAME: Rain Rodrigues   :  1940  MRM:  [id number]    Date/Time: 2017  9:43 AM       Assessment and Plan:     COPD exacerbation (HCC)/ Acute respiratory failure with hypoxia POA: moderate, FEV1 1.41 2009. Improving. Continue scheduled nebs, wena IV solumedrol, continue Levofloxacin, Breo. Oxygen as needed. May need 6 min walk      CAD (coronary artery disease)/ Mild troponin elevation POA: MI 2010,s/p CABG. No symptoms. Troponin unremarkable. Continue plavix, atenolol, simvastatin. Cardiology following     GERD (gastroesophageal reflux disease): continue pepcid     HTN (hypertension): BP overall stable. Continue Lisinopril, Atenolol      Hx Bladder tumor/ Prostate cancer (HCC)/ Pyuria POA: had a heard catheter due to urinary retention prior to admission. Continue heard. Has a Urology follow up early next week     Hyperlipidemia: continue simvastatin    Diarrhea. ?related to meds/abx v. CDiff/Infectious. Check c. Diff and stool cultures. Wean steroids. If stool studies negative, can give imodium       Subjective:     Chief Complaint:  Patient seen and examined. Feels discouraged by setbacks in his health but thinks that rehab will be helpful    ROS:  (bold if positive, if negative)      Tolerating PT  Tolerating Diet        Objective:     Last 24hrs VS reviewed since prior progress note.  Most recent are:    Visit Vitals    /80 (BP 1 Location: Right arm, BP Patient Position: At rest)    Pulse 78    Temp 98.5 °F (36.9 °C)    Resp 12    Ht 5' 6\" (1.676 m)    Wt 92 kg (202 lb 13.2 oz)    SpO2 98%    BMI 32.74 kg/m2     SpO2 Readings from Last 6 Encounters:   17 98%   16 95%   16 95%   12/28/15 96%   10/12/15 96%   06/22/15 94%    O2 Flow Rate (L/min): 2 l/min     Intake/Output Summary (Last 24 hours) at 17 4007  Last data filed at 04/01/17 0553   Gross per 24 hour   Intake             1400 ml   Output              900 ml   Net              500 ml        Physical Exam:    Gen:  Well-developed, well-nourished, in no acute distress  HEENT:  Pink conjunctivae, PERRL, hearing intact to voice, moist mucous membranes  Neck:  Supple, without masses, thyroid non-tender  Resp:  No accessory muscle use, minimal wheezing bilaterally, good air movement.   Card:  No murmurs, normal S1, S2 without thrills, bruits or peripheral edema  Abd:  Soft, non-tender, non-distended, normoactive bowel sounds are present, no palpable organomegaly and no detectable hernias  Lymph:  No cervical or inguinal adenopathy  Musc:  No cyanosis or clubbing  Skin:  No rashes or ulcers, skin turgor is good  Neuro:  Cranial nerves are grossly intact, no focal motor weakness, follows commands appropriately  Psych:  Good insight, oriented to person, place and time, alert    ____________________________________________________  Medications Reviewed: (see below)  Medications:     Current Facility-Administered Medications   Medication Dose Route Frequency    methylPREDNISolone (PF) (SOLU-MEDROL) injection 60 mg  60 mg IntraVENous Q6H    albuterol-ipratropium (DUO-NEB) 2.5 MG-0.5 MG/3 ML  3 mL Nebulization Q4H RT    albuterol (ACCUNEB) nebulizer solution 1.25 mg  1.25 mg Nebulization Q2H PRN    guaiFENesin ER (MUCINEX) tablet 600 mg  600 mg Oral Q12H    levoFLOXacin (LEVAQUIN) tablet 750 mg  750 mg Oral Q24H    sodium chloride (NS) flush 5-10 mL  5-10 mL IntraVENous Q8H    sodium chloride (NS) flush 5-10 mL  5-10 mL IntraVENous PRN    acetaminophen (TYLENOL) tablet 650 mg  650 mg Oral Q4H PRN    HYDROcodone-acetaminophen (NORCO) 5-325 mg per tablet 1 Tab  1 Tab Oral Q4H PRN    naloxone (NARCAN) injection 0.4 mg  0.4 mg IntraVENous PRN    ondansetron (ZOFRAN ODT) tablet 4 mg  4 mg Oral Q4H PRN    enoxaparin (LOVENOX) injection 40 mg  40 mg SubCUTAneous Q24H    lactobac ac& pc-s.therm-b.anim (DENA Q/RISAQUAD)  1 Cap Oral DAILY    atenolol (TENORMIN) tablet 50 mg  50 mg Oral QPM    calcium-vitamin D (OS-LUIS) 500 mg-200 unit tablet  1 Tab Oral TID WITH MEALS    clopidogrel (PLAVIX) tablet 75 mg  75 mg Oral DAILY    fluticasone-vilanterol (BREO ELLIPTA) 100mcg-25mcg/puff  1 Puff Inhalation DAILY    gabapentin (NEURONTIN) capsule 300 mg  300 mg Oral BID PRN    lisinopril (PRINIVIL, ZESTRIL) tablet 10 mg  10 mg Oral DAILY    therapeutic multivitamin (THERAGRAN) tablet 1 Tab  1 Tab Oral DAILY    famotidine (PEPCID) tablet 20 mg  20 mg Oral QHS    senna (SENOKOT) tablet 17.2 mg  2 Tab Oral DAILY PRN    simvastatin (ZOCOR) tablet 20 mg  20 mg Oral QHS    tamsulosin (FLOMAX) capsule 0.8 mg  0.8 mg Oral DAILY        Lab Data Reviewed: (see below)  Lab Review:     Recent Labs      03/31/17   0039  03/30/17   1107   WBC  10.7  12.3*   HGB  12.1  13.2   HCT  35.1*  39.2   PLT  307  292     Recent Labs      03/31/17   0039  03/30/17   1107   NA  146*  144   K  3.9  3.8   CL  107  106   CO2  27  31   GLU  128*  95   BUN  15  14   CREA  0.90  0.97   CA  8.6  8.7   MG  1.6  1.8   ALB   --   2.7*   TBILI   --   0.4   SGOT   --   40*   ALT   --   31   INR   --   1.1     Lab Results   Component Value Date/Time    Glucose (POC) 139 03/31/2017 04:06 PM    Glucose (POC) 132 03/31/2017 07:39 AM     No results for input(s): PH, PCO2, PO2, HCO3, FIO2 in the last 72 hours. Recent Labs      03/30/17   1107   INR  1.1     All Micro Results     Procedure Component Value Units Date/Time    CULTURE, STOOL [[id number]] Collected:  04/01/17 0451    Order Status:  Completed Specimen:  Stool Updated:  04/01/17 0753    C.  DIFFICILE (DNA) [[id number]] Collected:  04/01/17 0455    Order Status:  Completed Updated:  04/01/17 0752    CULTURE, BLOOD [[id number]] Collected:  03/30/17 1347    Order Status:  Completed Specimen:  Blood from Blood Updated:  03/31/17 1005     Special Requests: NO SPECIAL REQUESTS Culture result: NO GROWTH AFTER 19 HOURS       CULTURE, URINE [[id number]]     Order Status:  Sent Specimen:  Urine from Franco Specimen     INFLUENZA A & B AG (RAPID TEST) [[id number]] Collected:  03/30/17 1107    Order Status:  Completed Specimen:  Nasopharyngeal from Nasal washing Updated:  03/30/17 1151     Influenza A Antigen NEGATIVE         Influenza B Antigen NEGATIVE              I have reviewed notes of prior 24hr.     Other pertinent lab: C.diff pending    Total time spent with patient: 30 [address] [facility] discussed with: Patient, Nursing Staff and >50% of time spent in counseling and coordination of care    Discussed:  Care Plan and D/C Planning    Prophylaxis:  Lovenox    Disposition:  [facility]/Rehab           ___________________________________________________    Attending Physician: [facility] [place], DO

## 2017-04-01 NOTE — PROGRESS NOTES
[facility]

## 2017-04-01 NOTE — PROGRESS NOTES
Primary Nurse Heather Lopez RN and Melodie Jerome RN performed a dual skin assessment on this patient No impairment noted  Eric score is 21    Old lap sites from PTA surgery. Scattered abrasions on LE and abdomen. Abrasion on L elbow with bruising. Cut on face ( R side) from shaving. Otherwise, skin intact.

## 2017-04-01 NOTE — PROGRESS NOTES
Bedside and Verbal shift change report given to Brit Olson (oncoming nurse) by Manav Figueroa (offgoing nurse). Report included the following information SBAR, Kardex, Intake/Output, MAR and Recent Results.

## 2017-04-01 NOTE — PROGRESS NOTES
Bedside and Verbal shift change report given to ivy queen (oncoming nurse) by Sabrina Conway (offgoing nurse). Report included the following information SBAR, Kardex and Recent Results. 0900- noted pt is having shaking episode. . Pt is saying that when he get anxious he does more. . Like life planing, discharged plan, regarding diet, and diarhoea. .updated everything. . Will be chill out shortly. .. TRANSFER - OUT REPORT:    Verbal report given to [address] (name) on Wagner Reyes  being transferred to [facility](unit) for routine progression of care       Report consisted of patients Situation, Background, Assessment and   Recommendations(SBAR). Information from the following report(s) SBAR, Kardex and Recent Results was reviewed with the receiving nurse. Lines:   Peripheral IV 03/30/17 Left Forearm (Active)   Site Assessment Clean, dry, & intact 3/31/2017  8:52 PM   Phlebitis Assessment 0 3/31/2017  8:52 PM   Infiltration Assessment 0 3/31/2017  8:52 PM   Dressing Status Clean, dry, & intact 3/31/2017  8:52 PM   Dressing Type Transparent 3/31/2017  8:52 PM   Hub Color/Line Status Pink 3/31/2017  8:52 PM   Action Taken Open ports on tubing capped 3/31/2017  8:52 PM   Alcohol Cap Used Yes 3/31/2017  8:52 PM        Opportunity for questions and clarification was provided. Patient transported with:   KIM Alfaro

## 2017-04-02 LAB
ANION GAP BLD CALC-SCNC: 11 MMOL/L (ref 5–15)
BASOPHILS # BLD AUTO: 0 K/UL (ref 0–0.1)
BASOPHILS # BLD: 0 % (ref 0–1)
BUN SERPL-MCNC: 21 MG/DL (ref 6–20)
BUN/CREAT SERPL: 18 (ref 12–20)
CALCIUM SERPL-MCNC: 8.4 MG/DL (ref 8.5–10.1)
CHLORIDE SERPL-SCNC: 110 MMOL/L (ref 97–108)
CO2 SERPL-SCNC: 28 MMOL/L (ref 21–32)
CREAT SERPL-MCNC: 1.14 MG/DL (ref 0.7–1.3)
DIFFERENTIAL METHOD BLD: ABNORMAL
EOSINOPHIL # BLD: 0 K/UL (ref 0–0.4)
EOSINOPHIL NFR BLD: 0 % (ref 0–7)
ERYTHROCYTE [DISTWIDTH] IN BLOOD BY AUTOMATED COUNT: 13.4 % (ref 11.5–14.5)
GLUCOSE SERPL-MCNC: 122 MG/DL (ref 65–100)
HCT VFR BLD AUTO: 34.6 % (ref 36.6–50.3)
HGB BLD-MCNC: 11.2 G/DL (ref 12.1–17)
LYMPHOCYTES # BLD AUTO: 7 % (ref 12–49)
LYMPHOCYTES # BLD: 0.8 K/UL (ref 0.8–3.5)
MAGNESIUM SERPL-MCNC: 1.8 MG/DL (ref 1.6–2.4)
MCH RBC QN AUTO: 31 PG (ref 26–34)
MCHC RBC AUTO-ENTMCNC: 32.4 G/DL (ref 30–36.5)
MCV RBC AUTO: 95.8 FL (ref 80–99)
MONOCYTES # BLD: 0.6 K/UL (ref 0–1)
MONOCYTES NFR BLD AUTO: 6 % (ref 5–13)
NEUTS SEG # BLD: 9.3 K/UL (ref 1.8–8)
NEUTS SEG NFR BLD AUTO: 87 % (ref 32–75)
PHOSPHATE SERPL-MCNC: 4.5 MG/DL (ref 2.6–4.7)
PLATELET # BLD AUTO: 288 K/UL (ref 150–400)
POTASSIUM SERPL-SCNC: 3.3 MMOL/L (ref 3.5–5.1)
RBC # BLD AUTO: 3.61 M/UL (ref 4.1–5.7)
RBC MORPH BLD: ABNORMAL
SODIUM SERPL-SCNC: 149 MMOL/L (ref 136–145)
WBC # BLD AUTO: 10.7 K/UL (ref 4.1–11.1)

## 2017-04-02 PROCEDURE — 74011250636 HC RX REV CODE- 250/636: Performed by: INTERNAL MEDICINE

## 2017-04-02 PROCEDURE — 84100 ASSAY OF PHOSPHORUS: CPT | Performed by: INTERNAL MEDICINE

## 2017-04-02 PROCEDURE — 74011000250 HC RX REV CODE- 250: Performed by: INTERNAL MEDICINE

## 2017-04-02 PROCEDURE — 83735 ASSAY OF MAGNESIUM: CPT | Performed by: INTERNAL MEDICINE

## 2017-04-02 PROCEDURE — 36415 COLL VENOUS BLD VENIPUNCTURE: CPT | Performed by: INTERNAL MEDICINE

## 2017-04-02 PROCEDURE — 94640 AIRWAY INHALATION TREATMENT: CPT

## 2017-04-02 PROCEDURE — 65660000000 HC RM CCU STEPDOWN

## 2017-04-02 PROCEDURE — 80048 BASIC METABOLIC PNL TOTAL CA: CPT | Performed by: INTERNAL MEDICINE

## 2017-04-02 PROCEDURE — 74011250637 HC RX REV CODE- 250/637: Performed by: INTERNAL MEDICINE

## 2017-04-02 PROCEDURE — 85025 COMPLETE CBC W/AUTO DIFF WBC: CPT | Performed by: INTERNAL MEDICINE

## 2017-04-02 RX ORDER — LOPERAMIDE HYDROCHLORIDE 2 MG/1
2 CAPSULE ORAL
Status: DISCONTINUED | OUTPATIENT
Start: 2017-04-02 | End: 2017-04-04 | Stop reason: HOSPADM

## 2017-04-02 RX ORDER — DEXTROSE AND POTASSIUM CHLORIDE 5; .15 G/100ML; G/100ML
SOLUTION INTRAVENOUS CONTINUOUS
Status: DISCONTINUED | OUTPATIENT
Start: 2017-04-02 | End: 2017-04-02

## 2017-04-02 RX ADMIN — Medication 10 ML: at 05:47

## 2017-04-02 RX ADMIN — GUAIFENESIN 600 MG: 600 TABLET, EXTENDED RELEASE ORAL at 22:27

## 2017-04-02 RX ADMIN — ENOXAPARIN SODIUM 40 MG: 40 INJECTION SUBCUTANEOUS at 13:52

## 2017-04-02 RX ADMIN — METHYLPREDNISOLONE SODIUM SUCCINATE 60 MG: 125 INJECTION, POWDER, FOR SOLUTION INTRAMUSCULAR; INTRAVENOUS at 01:20

## 2017-04-02 RX ADMIN — LISINOPRIL 10 MG: 20 TABLET ORAL at 09:12

## 2017-04-02 RX ADMIN — POTASSIUM CHLORIDE AND DEXTROSE MONOHYDRATE: 150; 5 INJECTION, SOLUTION INTRAVENOUS at 07:53

## 2017-04-02 RX ADMIN — IPRATROPIUM BROMIDE AND ALBUTEROL SULFATE 3 ML: .5; 3 SOLUTION RESPIRATORY (INHALATION) at 04:06

## 2017-04-02 RX ADMIN — ATENOLOL 50 MG: 50 TABLET ORAL at 17:42

## 2017-04-02 RX ADMIN — Medication 10 ML: at 22:28

## 2017-04-02 RX ADMIN — THERA TABS 1 TABLET: TAB at 09:11

## 2017-04-02 RX ADMIN — METHYLPREDNISOLONE SODIUM SUCCINATE 40 MG: 40 INJECTION, POWDER, FOR SOLUTION INTRAMUSCULAR; INTRAVENOUS at 13:52

## 2017-04-02 RX ADMIN — TAMSULOSIN HYDROCHLORIDE 0.8 MG: 0.4 CAPSULE ORAL at 09:11

## 2017-04-02 RX ADMIN — Medication 10 ML: at 13:53

## 2017-04-02 RX ADMIN — SIMVASTATIN 20 MG: 20 TABLET, FILM COATED ORAL at 22:27

## 2017-04-02 RX ADMIN — IPRATROPIUM BROMIDE AND ALBUTEROL SULFATE 3 ML: .5; 3 SOLUTION RESPIRATORY (INHALATION) at 08:13

## 2017-04-02 RX ADMIN — LEVOFLOXACIN 750 MG: 750 TABLET, FILM COATED ORAL at 12:27

## 2017-04-02 RX ADMIN — IPRATROPIUM BROMIDE AND ALBUTEROL SULFATE 3 ML: .5; 3 SOLUTION RESPIRATORY (INHALATION) at 11:26

## 2017-04-02 RX ADMIN — FLUTICASONE FUROATE AND VILANTEROL TRIFENATATE 1 PUFF: 100; 25 POWDER RESPIRATORY (INHALATION) at 09:12

## 2017-04-02 RX ADMIN — IPRATROPIUM BROMIDE AND ALBUTEROL SULFATE 3 ML: .5; 3 SOLUTION RESPIRATORY (INHALATION) at 15:36

## 2017-04-02 RX ADMIN — FAMOTIDINE 20 MG: 20 TABLET, FILM COATED ORAL at 22:27

## 2017-04-02 RX ADMIN — GUAIFENESIN 600 MG: 600 TABLET, EXTENDED RELEASE ORAL at 09:12

## 2017-04-02 RX ADMIN — CLOPIDOGREL 75 MG: 75 TABLET, FILM COATED ORAL at 09:11

## 2017-04-02 RX ADMIN — Medication 1 CAPSULE: at 09:11

## 2017-04-02 RX ADMIN — CALCIUM CARBONATE 500 MG (1,250 MG)-VITAMIN D3 200 UNIT TABLET 1 TABLET: at 09:12

## 2017-04-02 RX ADMIN — CALCIUM CARBONATE 500 MG (1,250 MG)-VITAMIN D3 200 UNIT TABLET 1 TABLET: at 17:42

## 2017-04-02 RX ADMIN — METHYLPREDNISOLONE SODIUM SUCCINATE 40 MG: 40 INJECTION, POWDER, FOR SOLUTION INTRAMUSCULAR; INTRAVENOUS at 22:27

## 2017-04-02 RX ADMIN — IPRATROPIUM BROMIDE AND ALBUTEROL SULFATE 3 ML: .5; 3 SOLUTION RESPIRATORY (INHALATION) at 19:51

## 2017-04-02 RX ADMIN — CALCIUM CARBONATE 500 MG (1,250 MG)-VITAMIN D3 200 UNIT TABLET 1 TABLET: at 12:26

## 2017-04-02 RX ADMIN — METHYLPREDNISOLONE SODIUM SUCCINATE 60 MG: 125 INJECTION, POWDER, FOR SOLUTION INTRAMUSCULAR; INTRAVENOUS at 05:45

## 2017-04-02 NOTE — PROGRESS NOTES
Danny Chaparro [facility] 79  [address]  [phone]      Medical Progress Note      NAME: Bhanu Woodard   :  1940  MRM:  [id number]    Date/Time: 2017  9:43 AM       Assessment and Plan:     COPD exacerbation (HCC)/ Acute respiratory failure with hypoxia POA: moderate, FEV1 1.41 2009. Improving. Continue scheduled nebs, weaning IV solumedrol, continue Levofloxacin, Breo. Oxygen as needed. May need 6 min walk      CAD (coronary artery disease)/ Mild troponin elevation POA: MI 2010,s/p CABG. No symptoms. Troponin unremarkable. Continue plavix, atenolol, simvastatin. Cardiology following     GERD (gastroesophageal reflux disease): continue pepcid     HTN (hypertension): BP overall stable. Continue Lisinopril, Atenolol      Hx Bladder tumor/ Prostate cancer (HCC)/ Pyuria POA: had a heard catheter due to urinary retention prior to admission. Continue heard. Has a Urology follow up early next week for Lupron injection so will need to arrange for that to be given. Debility. Evaluation for [facility] pending.     Hyperlipidemia: continue simvastatin    Diarrhea. Likely related to meds/abx. C. Diff and stool cultures negative. can give imodium. On probiotics as well. Subjective:     Chief Complaint:  Patient seen and examined. Feels like breathing is getting better    ROS:  (bold if positive, if negative)      Tolerating PT  Tolerating Diet        Objective:     Last 24hrs VS reviewed since prior progress note.  Most recent are:    Visit Vitals    /71 (BP 1 Location: Right arm, BP Patient Position: At rest)    Pulse 89    Temp 97.9 °F (36.6 °C)    Resp 16    Ht 5' 6\" (1.676 m)    Wt 92 kg (202 lb 13.2 oz)    SpO2 94%    BMI 32.74 kg/m2     SpO2 Readings from Last 6 Encounters:   17 94%   16 95%   16 95%   12/28/15 96%   10/12/15 96%   06/22/15 94%    O2 Flow Rate (L/min): 2 l/min       Intake/Output Summary (Last 24 hours) at 04/02/17 1353  Last data filed at 04/02/17 0747   Gross per 24 hour   Intake                0 ml   Output              850 ml   Net             -850 ml        Physical Exam:    Gen:  Well-developed, well-nourished, in no acute distress  HEENT:  Pink conjunctivae, PERRL, hearing intact to voice, moist mucous membranes  Neck:  Supple, without masses, thyroid non-tender  Resp:  No accessory muscle use, minimal wheezing bilaterally, good air movement.   Card:  No murmurs, normal S1, S2 without thrills, bruits or peripheral edema  Abd:  Soft, non-tender, non-distended, normoactive bowel sounds are present, no palpable organomegaly and no detectable hernias  Lymph:  No cervical or inguinal adenopathy  Musc:  No cyanosis or clubbing  Skin:  No rashes or ulcers, skin turgor is good  Neuro:  Cranial nerves are grossly intact, no focal motor weakness, follows commands appropriately  Psych:  Good insight, oriented to person, place and time, alert    ____________________________________________________  Medications Reviewed: (see below)  Medications:     Current Facility-Administered Medications   Medication Dose Route Frequency    methylPREDNISolone (PF) (SOLU-MEDROL) injection 40 mg  40 mg IntraVENous Q8H    loperamide (IMODIUM) capsule 2 mg  2 mg Oral Q4H PRN    albuterol-ipratropium (DUO-NEB) 2.5 MG-0.5 MG/3 ML  3 mL Nebulization Q4H RT    albuterol (ACCUNEB) nebulizer solution 1.25 mg  1.25 mg Nebulization Q2H PRN    guaiFENesin ER (MUCINEX) tablet 600 mg  600 mg Oral Q12H    levoFLOXacin (LEVAQUIN) tablet 750 mg  750 mg Oral Q24H    sodium chloride (NS) flush 5-10 mL  5-10 mL IntraVENous Q8H    sodium chloride (NS) flush 5-10 mL  5-10 mL IntraVENous PRN    acetaminophen (TYLENOL) tablet 650 mg  650 mg Oral Q4H PRN    HYDROcodone-acetaminophen (NORCO) 5-325 mg per tablet 1 Tab  1 Tab Oral Q4H PRN    naloxone (NARCAN) injection 0.4 mg  0.4 mg IntraVENous PRN    ondansetron (ZOFRAN ODT) tablet 4 mg  4 mg Oral Q4H PRN    enoxaparin (LOVENOX) injection 40 mg  40 mg SubCUTAneous Q24H    lactobac ac& pc-s.therm-b.anim (DENA Q/RISAQUAD)  1 Cap Oral DAILY    atenolol (TENORMIN) tablet 50 mg  50 mg Oral QPM    calcium-vitamin D (OS-LUIS) 500 mg-200 unit tablet  1 Tab Oral TID WITH MEALS    clopidogrel (PLAVIX) tablet 75 mg  75 mg Oral DAILY    fluticasone-vilanterol (BREO ELLIPTA) 100mcg-25mcg/puff  1 Puff Inhalation DAILY    gabapentin (NEURONTIN) capsule 300 mg  300 mg Oral BID PRN    lisinopril (PRINIVIL, ZESTRIL) tablet 10 mg  10 mg Oral DAILY    therapeutic multivitamin (THERAGRAN) tablet 1 Tab  1 Tab Oral DAILY    famotidine (PEPCID) tablet 20 mg  20 mg Oral QHS    senna (SENOKOT) tablet 17.2 mg  2 Tab Oral DAILY PRN    simvastatin (ZOCOR) tablet 20 mg  20 mg Oral QHS    tamsulosin (FLOMAX) capsule 0.8 mg  0.8 mg Oral DAILY        Lab Data Reviewed: (see below)  Lab Review:     Recent Labs      04/02/17 0128  03/31/17   0039   WBC  10.7  10.7   HGB  11.2*  12.1   HCT  34.6*  35.1*   PLT  288  307     Recent Labs      04/02/17 0128  03/31/17   0039   NA  149*  146*   K  3.3*  3.9   CL  110*  107   CO2  28  27   GLU  122*  128*   BUN  21*  15   CREA  1.14  0.90   CA  8.4*  8.6   MG  1.8  1.6   PHOS  4.5   --      Lab Results   Component Value Date/Time    Glucose (POC) 139 03/31/2017 04:06 PM    Glucose (POC) 132 03/31/2017 07:39 AM     No results for input(s): PH, PCO2, PO2, HCO3, FIO2 in the last 72 hours. No results for input(s): INR in the last 72 hours.     No lab exists for component: Chucho Joel  All Micro Results     Procedure Component Value Units Date/Time    CULTURE, BLOOD [[id number]] Collected:  03/30/17 1341    Order Status:  Completed Specimen:  Blood from Blood Updated:  04/02/17 6348     Special Requests: NO SPECIAL REQUESTS        Culture result: NO GROWTH 3 DAYS       CULTURE, STOOL [[id number]] Collected:  04/01/17 0455    Order Status:  Completed Specimen:  Stool Updated:  04/02/17 8427     Special Requests: NO SPECIAL REQUESTS        Campylobacter antigen NEGATIVE        Culture result:       NO ROUTINE ENTERIC PATHOGENS ISOLATED INCLUDING SALMONELLA, SHIGELLA, YERSINIA, VIBRIO OR SHIGA TOXIN PRODUCING E. COLI  SO FAR        NO COLIFORMS ISOLATED         HEAVY  GRAM POSITIVE DENA       C. DIFFICILE (DNA) [[id number]] Collected:  04/01/17 0455    Order Status:  Completed Specimen:  Stool Updated:  04/01/17 1503     C. difficile (DNA) NEGATIVE          This specimen is negative for toxigenic C difficile by DNA amplification. Repeat testing is not recommended for confirmation, samples received within 7 days of this negative result will be rejected. CULTURE, URINE [[id number]] Collected:  03/30/17 1245    Order Status:  Canceled Specimen:  Urine from Franco Specimen     INFLUENZA A & B AG (RAPID TEST) [[id number]] Collected:  03/30/17 1107    Order Status:  Completed Specimen:  Nasopharyngeal from Nasal washing Updated:  03/30/17 1151     Influenza A Antigen NEGATIVE         Influenza B Antigen NEGATIVE              I have reviewed notes of prior 24hr.     Other pertinent lab: C.diff pending    Total time spent with patient: [address] discussed with: Patient, Nursing Staff and >50% of time spent in counseling and coordination of care    Discussed:  Care Plan and D/C Planning    Prophylaxis:  Lovenox    Disposition:  [facility]/Rehab           ___________________________________________________    Attending Physician: Addi Rene DO

## 2017-04-02 NOTE — PROGRESS NOTES
Bedside and Verbal shift change report given to [facility] (oncoming nurse) by Valentino Pick, RN (offgoing nurse). Report included the following information SBAR, Kardex, Intake/Output, MAR, Accordion and Recent Results.

## 2017-04-02 NOTE — PROGRESS NOTES
Bedside and Verbal shift change report given to Brit S Harjinder Olson, (oncoming nurse) by Steven Rivera (offgoing nurse). Report included the following information SBAR, Kardex, Intake/Output, MAR and Recent Results.

## 2017-04-03 LAB
ANION GAP BLD CALC-SCNC: 9 MMOL/L (ref 5–15)
BACTERIA SPEC CULT: NORMAL
BUN SERPL-MCNC: 25 MG/DL (ref 6–20)
BUN/CREAT SERPL: 22 (ref 12–20)
C JEJUNI+C COLI AG STL QL: NEGATIVE
CALCIUM SERPL-MCNC: 8.3 MG/DL (ref 8.5–10.1)
CHLORIDE SERPL-SCNC: 111 MMOL/L (ref 97–108)
CO2 SERPL-SCNC: 28 MMOL/L (ref 21–32)
CREAT SERPL-MCNC: 1.13 MG/DL (ref 0.7–1.3)
E COLI SXT1+2 STL IA: NEGATIVE
GLUCOSE SERPL-MCNC: 106 MG/DL (ref 65–100)
MAGNESIUM SERPL-MCNC: 1.7 MG/DL (ref 1.6–2.4)
PHOSPHATE SERPL-MCNC: 3.9 MG/DL (ref 2.6–4.7)
POTASSIUM SERPL-SCNC: 3.9 MMOL/L (ref 3.5–5.1)
SERVICE CMNT-IMP: NORMAL
SODIUM SERPL-SCNC: 148 MMOL/L (ref 136–145)

## 2017-04-03 PROCEDURE — 36415 COLL VENOUS BLD VENIPUNCTURE: CPT | Performed by: INTERNAL MEDICINE

## 2017-04-03 PROCEDURE — 74011250637 HC RX REV CODE- 250/637: Performed by: INTERNAL MEDICINE

## 2017-04-03 PROCEDURE — 74011250636 HC RX REV CODE- 250/636: Performed by: INTERNAL MEDICINE

## 2017-04-03 PROCEDURE — 65660000000 HC RM CCU STEPDOWN

## 2017-04-03 PROCEDURE — 83735 ASSAY OF MAGNESIUM: CPT | Performed by: INTERNAL MEDICINE

## 2017-04-03 PROCEDURE — 74011000250 HC RX REV CODE- 250: Performed by: INTERNAL MEDICINE

## 2017-04-03 PROCEDURE — 74011636637 HC RX REV CODE- 636/637: Performed by: INTERNAL MEDICINE

## 2017-04-03 PROCEDURE — 84100 ASSAY OF PHOSPHORUS: CPT | Performed by: INTERNAL MEDICINE

## 2017-04-03 PROCEDURE — 97530 THERAPEUTIC ACTIVITIES: CPT

## 2017-04-03 PROCEDURE — 97116 GAIT TRAINING THERAPY: CPT

## 2017-04-03 PROCEDURE — 80048 BASIC METABOLIC PNL TOTAL CA: CPT | Performed by: INTERNAL MEDICINE

## 2017-04-03 PROCEDURE — 94640 AIRWAY INHALATION TREATMENT: CPT

## 2017-04-03 PROCEDURE — 97535 SELF CARE MNGMENT TRAINING: CPT

## 2017-04-03 RX ORDER — LEVOFLOXACIN 750 MG/1
750 TABLET ORAL EVERY 24 HOURS
Qty: 3 TAB | Refills: 0 | Status: SHIPPED | OUTPATIENT
Start: 2017-04-03 | End: 2017-04-17 | Stop reason: ALTCHOICE

## 2017-04-03 RX ORDER — PREDNISONE 20 MG/1
40 TABLET ORAL
Status: DISCONTINUED | OUTPATIENT
Start: 2017-04-03 | End: 2017-04-04 | Stop reason: HOSPADM

## 2017-04-03 RX ORDER — GUAIFENESIN 600 MG/1
600 TABLET, EXTENDED RELEASE ORAL EVERY 12 HOURS
Qty: 14 TAB | Refills: 0 | Status: SHIPPED | OUTPATIENT
Start: 2017-04-03 | End: 2017-04-10

## 2017-04-03 RX ORDER — HYDROCODONE BITARTRATE AND ACETAMINOPHEN 5; 325 MG/1; MG/1
1 TABLET ORAL
Qty: 20 TAB | Refills: 0 | Status: SHIPPED | OUTPATIENT
Start: 2017-04-03 | End: 2017-04-24 | Stop reason: ALTCHOICE

## 2017-04-03 RX ORDER — PREDNISONE 20 MG/1
TABLET ORAL
Qty: 15 TAB | Refills: 0 | Status: SHIPPED | OUTPATIENT
Start: 2017-04-03 | End: 2017-04-24 | Stop reason: ALTCHOICE

## 2017-04-03 RX ADMIN — THERA TABS 1 TABLET: TAB at 09:15

## 2017-04-03 RX ADMIN — Medication 1 CAPSULE: at 09:16

## 2017-04-03 RX ADMIN — FAMOTIDINE 20 MG: 20 TABLET, FILM COATED ORAL at 21:00

## 2017-04-03 RX ADMIN — Medication 10 ML: at 21:00

## 2017-04-03 RX ADMIN — IPRATROPIUM BROMIDE AND ALBUTEROL SULFATE 3 ML: .5; 3 SOLUTION RESPIRATORY (INHALATION) at 00:01

## 2017-04-03 RX ADMIN — IPRATROPIUM BROMIDE AND ALBUTEROL SULFATE 3 ML: .5; 3 SOLUTION RESPIRATORY (INHALATION) at 03:45

## 2017-04-03 RX ADMIN — IPRATROPIUM BROMIDE AND ALBUTEROL SULFATE 3 ML: .5; 3 SOLUTION RESPIRATORY (INHALATION) at 11:47

## 2017-04-03 RX ADMIN — CALCIUM CARBONATE 500 MG (1,250 MG)-VITAMIN D3 200 UNIT TABLET 1 TABLET: at 12:11

## 2017-04-03 RX ADMIN — TAMSULOSIN HYDROCHLORIDE 0.8 MG: 0.4 CAPSULE ORAL at 09:15

## 2017-04-03 RX ADMIN — LEVOFLOXACIN 750 MG: 750 TABLET, FILM COATED ORAL at 12:11

## 2017-04-03 RX ADMIN — Medication 10 ML: at 05:21

## 2017-04-03 RX ADMIN — CALCIUM CARBONATE 500 MG (1,250 MG)-VITAMIN D3 200 UNIT TABLET 1 TABLET: at 16:27

## 2017-04-03 RX ADMIN — METHYLPREDNISOLONE SODIUM SUCCINATE 40 MG: 40 INJECTION, POWDER, FOR SOLUTION INTRAMUSCULAR; INTRAVENOUS at 05:21

## 2017-04-03 RX ADMIN — IPRATROPIUM BROMIDE AND ALBUTEROL SULFATE 3 ML: .5; 3 SOLUTION RESPIRATORY (INHALATION) at 20:30

## 2017-04-03 RX ADMIN — ENOXAPARIN SODIUM 40 MG: 40 INJECTION SUBCUTANEOUS at 16:26

## 2017-04-03 RX ADMIN — CALCIUM CARBONATE 500 MG (1,250 MG)-VITAMIN D3 200 UNIT TABLET 1 TABLET: at 09:14

## 2017-04-03 RX ADMIN — PREDNISONE 40 MG: 20 TABLET ORAL at 09:15

## 2017-04-03 RX ADMIN — LISINOPRIL 10 MG: 20 TABLET ORAL at 09:00

## 2017-04-03 RX ADMIN — GUAIFENESIN 600 MG: 600 TABLET, EXTENDED RELEASE ORAL at 21:00

## 2017-04-03 RX ADMIN — SIMVASTATIN 20 MG: 20 TABLET, FILM COATED ORAL at 21:00

## 2017-04-03 RX ADMIN — Medication 10 ML: at 16:26

## 2017-04-03 RX ADMIN — CLOPIDOGREL 75 MG: 75 TABLET, FILM COATED ORAL at 09:15

## 2017-04-03 RX ADMIN — ATENOLOL 50 MG: 50 TABLET ORAL at 17:05

## 2017-04-03 RX ADMIN — FLUTICASONE FUROATE AND VILANTEROL TRIFENATATE 1 PUFF: 100; 25 POWDER RESPIRATORY (INHALATION) at 09:20

## 2017-04-03 RX ADMIN — GUAIFENESIN 600 MG: 600 TABLET, EXTENDED RELEASE ORAL at 09:16

## 2017-04-03 RX ADMIN — IPRATROPIUM BROMIDE AND ALBUTEROL SULFATE 3 ML: .5; 3 SOLUTION RESPIRATORY (INHALATION) at 15:28

## 2017-04-03 NOTE — PROGRESS NOTES
Problem: Mobility Impaired (Adult and Pediatric)  Goal: *Acute Goals and Plan of Care (Insert Text)  Physical Therapy Goals  Initiated 3/31/2017  1. Patient will move from supine to sit and sit to supine in bed with minimal assistance/contact guard assist within 7 day(s). 2. Patient will transfer from bed to chair and chair to bed with minimal assistance/contact guard assist using the least restrictive device within 7 day(s). 3. Patient will perform sit to stand with minimal assistance/contact guard assist within 7 day(s). 4. Patient will ambulate with minimal assistance/contact guard assist for 100 feet with the least restrictive device within 7 day(s). 5. Patient will ascend/descend 11 stairs with single handrail(s) with minimal assistance/contact guard assist within 7 day(s). PHYSICAL THERAPY TREATMENT  Patient: Leopoldo Gault (64 y.o. male)  Date: 4/3/2017  Diagnosis: COPD exacerbation (HCC) COPD exacerbation (HCC)       Precautions:        ASSESSMENT:  Pt received in bed, agreeable to participate with physical therapy. Bed mobility>sitting EOB with CGA. Sit<>stand using RW with CGA. Gait training using RW x 40' then 2 x 10' with min A, demonstrates increased postural sway and requires verbal cues to stay within walker for steadying. Pt understands pacing techniques,PLB and need for rest breaks due to dyspnea with activity. O2 sats 91 % or greater on room air during activity. No buckling of knees noted during gait training. Performed bathroom transfers using RW and grab bars. Pt requested to return to bed secondary to fatigue post activity. Progression toward goals:  [X]    Improving appropriately and progressing toward goals  [ ]    Improving slowly and progressing toward goals  [ ]    Not making progress toward goals and plan of care will be adjusted       PLAN:  Patient continues to benefit from skilled intervention to address the above impairments.   Continue treatment per established plan of care.  Discharge Recommendations:  Inpatient Rehab  Further Equipment Recommendations for Discharge:  none       SUBJECTIVE:   Patient stated I am feeling better.       OBJECTIVE DATA SUMMARY:   Critical Behavior:  Neurologic State: Alert, Appropriate for age  Orientation Level: Oriented X4, Appropriate for age  Cognition: Appropriate for age attention/concentration, Appropriate decision making, Appropriate safety awareness, Follows commands  Safety/Judgement: Awareness of environment  Functional Mobility Training:  Bed Mobility:  Rolling: Contact guard assistance  Supine to Sit: Contact guard assistance  Sit to Supine: Contact guard assistance  Scooting: Contact guard assistance        Transfers:  Sit to Stand: Contact guard assistance  Stand to Sit: Contact guard assistance                             Balance:  Sitting: Intact  Standing - Static: Fair;Constant support  Standing - Dynamic : Poor  Ambulation/Gait Training:  Distance (ft): 50 Feet (ft)  Assistive Device: Walker, rolling;Gait belt  Ambulation - Level of Assistance: Minimal assistance        Gait Abnormalities: Path deviations; Antalgic        Base of Support: Widened     Speed/Salima: Slow                                  Stairs:                       Neuro Re-Education:     Therapeutic Exercises:      Pain:  Pain Scale 1: Numeric (0 - 10)  Pain Intensity 1: 0              Activity Tolerance:   Good  Please refer to the flowsheet for vital signs taken during this treatment.   After treatment:   [ ]    Patient left in no apparent distress sitting up in chair  [X]    Patient left in no apparent distress in bed  [X]    Call bell left within reach  [X]    Nursing notified  [ ]    Caregiver present  [ ]    Bed alarm activated      COMMUNICATION/COLLABORATION:   The patients plan of care was discussed with: Registered Nurse     Modesta Hammans   Time Calculation: 25 mins

## 2017-04-03 NOTE — DISCHARGE INSTRUCTIONS
Patient Discharge Instructions    Ike Del Angel / [id number] : 1940    Admitted 3/30/2017 Discharged: 4/3/2017     Primary Diagnoses  Problem List as of 4/3/2017  Date Reviewed: 3/30/2017          Codes Class Noted - Resolved    Elevated troponin ICD-10-CM: R74.8  ICD-9-CM: 790.6  3/30/2017 - Present        Advanced care planning/counseling discussion ICD-10-CM: Z71.89  ICD-9-CM: V65.49  2015 - Present    Overview Signed 2015 11:40 AM by Rosa Dickey RN     A copy of patient's AMD is on file. NN reviewed document with patient & patient denies changes to the document. Carotid stenosis ICD-10-CM: I65.29  ICD-9-CM: 433.10  10/20/2015 - Present    Overview Signed 2015  9:44 AM by MD Dr. Randee Alvarez. endarterectomy 12/31/15. 50-69% L on doppler.   80% \"per CT\"             Arthritis of foot, degenerative ICD-10-CM: M19.079  ICD-9-CM: 715.97  Unknown - Present        PHN (postherpetic neuralgia) ICD-10-CM: B02.29  ICD-9-CM: 053.19  Unknown - Present        Hyperlipidemia with target LDL less than 70 ICD-10-CM: E78.5  ICD-9-CM: 272.4  Unknown - Present        Cataract ICD-10-CM: H26.9  ICD-9-CM: 366.9  Unknown - Present        Bladder tumor ICD-10-CM: D49.4  ICD-9-CM: 239.4  Unknown - Present    Overview Signed 2011  2:22 PM by MD Dr Paloma Alvarez             Prostate cancer [facility]) ICD-10-CM: C61  ICD-9-CM: 80  Unknown - Present    Overview Signed 2011  2:23 PM by MD silas Alvarezron             CAD (coronary artery disease) ICD-10-CM: I25.10  ICD-9-CM: 414.00  Unknown - Present    Overview Signed 2011  2:23 PM by Marzena Bautista MD     MI 2010,s/p CABG             Osteoporosis ICD-10-CM: M81.0  ICD-9-CM: 733.00  Unknown - Present        * (Principal[facility] ICD-10-CM: J44.1  ICD-9-CM: 491.21  Unknown - Present    Overview Signed 2011  2:23 PM by Marzena Bautista MD     moderate, FEV1 1.41 2009 GERD (gastroesophageal reflux disease) ICD-10-CM: K21.9  ICD-9-CM: 530.81  Unknown - Present        Chest wall pain ICD-10-CM: R07.89  ICD-9-CM: 786.52  Unknown - Present    Overview Signed 12/5/2011  2:23 PM by Antonina Wang MD     incomplete sternal wound             HTN (hypertension) ICD-10-CM: I10  ICD-9-CM: 401.9  Unknown - Present        Physiological tremor ICD-10-CM: R25.1  ICD-9-CM: 333.1  Unknown - Present    Overview Signed 12/5/2011  2:23 PM by MD Dr. Raul Frias, resolved                   Take Home Medications     · It is important that you take the medication exactly as they are prescribed. · Keep your medication in the bottles provided by the pharmacist and keep a list of the medication names, dosages, and times to be taken in your wallet. · Do not take other medications without consulting your doctor. What to do at Home    Recommended diet: Cardiac Diet    Recommended activity: Activity as tolerated    If you experience worsening shortness of breath not improved with your inhalers or with rest, please follow up with nearest ER. Follow-up with your PCP in 2 week        Information obtained by :  I understand that if any problems occur once I am at home I am to contact my physician. I understand and acknowledge receipt of the instructions indicated above.                                                                                                                                            Physician's or R.N.'s Signature                                                                  Date/Time                                                                                                                                              Patient or Representative Signature                                                          Date/Time       Chronic Obstructive Pulmonary Disease (COPD) Flare-Ups: Care Instructions  Your Care Instructions    Chronic obstructive pulmonary disease (COPD) is a lung disease that makes it hard to breathe. It is caused by damage to the lungs over many years, usually from smoking. COPD is often a mix of two diseases:  · Chronic bronchitis: The airways that carry air to the lungs (bronchial tubes) get inflamed and make a lot of mucus. This can narrow or block the airways. · Emphysema: In a healthy person, the tiny air sacs in the lungs are like balloons. As you breathe in and out, they get bigger and smaller to move air through your lungs. But with emphysema, these air sacs are damaged and lose their stretch. Less air gets in and out of the lungs. Many people with COPD have attacks called flare-ups or exacerbations. This is when your usual symptoms quickly get worse and stay worse. The doctor has checked you carefully. But problems can develop later. If you notice any problems or new symptoms, get medical treatment right away. Follow-up care is a key part of your treatment and safety. Be sure to make and go to all appointments, and call your doctor if you are having problems. It's also a good idea to know your test results and keep a list of the medicines you take. How can you care for yourself at home? · Be safe with medicines. Take your medicines exactly as prescribed. Call your doctor if you think you are having a problem with your medicine. You may be taking medicines such as:  ¨ Bronchodilators. These help open your airways and make breathing easier. ¨ Corticosteroids. These reduce airway inflammation. They may be given as pills, in a vein, or in an inhaled form. You may go home with pills in addition to an inhaler that you already use. · A spacer may help you get more inhaled medicine to your lungs. Ask your doctor or pharmacist if a spacer is right for you. If it is, ask how to use it properly. · If your doctor prescribed antibiotics, take them as directed. Do not stop taking them just because you feel better.  You need to take the full course of antibiotics. · If your doctor prescribed oxygen, use the flow rate your doctor has recommended. Do not change it without talking to your doctor first.  · Do not smoke. Smoking makes COPD worse. If you need help quitting, talk to your doctor about stop-smoking programs and medicines. These can increase your chances of quitting for good. When should you call for help? Call 911 anytime you think you may need emergency care. For example, call if:  · You have severe trouble breathing. Call your doctor now or seek immediate medical care if:  · You have new or worse trouble breathing. · Your coughing or wheezing gets worse. · You cough up dark brown or bloody mucus (sputum). · You have a new or higher fever. Watch closely for changes in your health, and be sure to contact your doctor if:  · You notice more mucus or a change in the color of your mucus. · You need to use your antibiotic or steroid pills. · You do not get better as expected. Where can you learn more? Go to http://india-renard.info/. Enter S313 in the search box to learn more about \"Chronic Obstructive Pulmonary Disease (COPD) Flare-Ups: Care Instructions. \"  Current as of: July 21, 2016  Content Version: 11.2  © 6521-7266 [employer]. Care instructions adapted under license by [employer] (which disclaims liability or warranty for this information). If you have questions about a medical condition or this instruction, always ask your healthcare professional. [place] [address] any warranty or liability for your use of this information.

## 2017-04-03 NOTE — PROGRESS NOTES
Bedside and Verbal shift change report given to Denise Lazo RN (oncoming nurse) by Juan Pablo Woodson RN (offgoing nurse). Report included the following information SBAR, Kardex, ED Summary, Intake/Output, MAR, Accordion and Recent Results.

## 2017-04-03 NOTE — PROGRESS NOTES
[facility]  [address] [facility], [place], [address] [place] [address]  [phone]      Medical Progress Note      NAME: Leroy Mcclure   :  1940  MRM:  [id number]    Date/Time: 4/3/2017  9:43 AM       Assessment and Plan:     COPD exacerbation (HCC)/ Acute respiratory failure with hypoxia POA: moderate, FEV1 1.41 2009. Improving. Continue scheduled nebs, prednisone taper, continue Levofloxacin, Breo. Oxygen as needed.      CAD (coronary artery disease)/ Mild troponin elevation POA: MI 2010,s/p CABG. No symptoms. Troponin unremarkable. Continue plavix, atenolol, simvastatin. Cardiology consult appreciated.     GERD (gastroesophageal reflux disease): continue pepcid     HTN (hypertension): BP overall stable. Continue Lisinopril, Atenolol      Hx Bladder tumor/ Prostate cancer (HCC)/ Pyuria POA: had a heard catheter due to urinary retention prior to admission. Continue heard. Needs follow-up once out of rehab to get lupron depot. Debility. Evaluation for [facility] pending.     Hyperlipidemia: continue simvastatin    Diarrhea. Likely related to meds/abx. C. Diff and stool cultures negative. can give imodium if needed. On probiotics as well. Subjective:     Chief Complaint:  Patient seen and examined. Feels like breathing is much better. Wants to go to rehab    ROS:  (bold if positive, if negative)      Tolerating PT  Tolerating Diet        Objective:     Last 24hrs VS reviewed since prior progress note.  Most recent are:    Visit Vitals    /83 (BP 1 Location: Right arm)    Pulse 86    Temp 98.3 °F (36.8 °C)    Resp 20    Ht 5' 6\" (1.676 m)    Wt 92 kg (202 lb 13.2 oz)    SpO2 94%    BMI 32.74 kg/m2     SpO2 Readings from Last 6 Encounters:   17 94%   16 95%   16 95%   12/28/15 96%   10/12/15 96%   06/22/15 94%    O2 Flow Rate (L/min): 2 l/min       Intake/Output Summary (Last 24 hours) at 17 1406  Last data filed at 17 1105   Gross per 24 hour   Intake                0 ml   Output             2150 ml   Net            -2150 ml        Physical Exam:    Gen:  Well-developed, well-nourished, in no acute distress  HEENT:  Pink conjunctivae, PERRL, hearing intact to voice, moist mucous membranes  Neck:  Supple, without masses, thyroid non-tender  Resp:  No accessory muscle use, no wheezing or crackles, good air movement.   Card:  No murmurs, normal S1, S2 without thrills, bruits or peripheral edema  Abd:  Soft, non-tender, non-distended, normoactive bowel sounds are present, no palpable organomegaly and no detectable hernias  Lymph:  No cervical or inguinal adenopathy  Musc:  No cyanosis or clubbing  Skin:  No rashes or ulcers, skin turgor is good  Neuro:  Cranial nerves are grossly intact, no focal motor weakness, follows commands appropriately  Psych:  Good insight, oriented to person, place and time, alert    ____________________________________________________  Medications Reviewed: (see below)  Medications:     Current Facility-Administered Medications   Medication Dose Route Frequency    predniSONE (DELTASONE) tablet 40 mg  40 mg Oral DAILY WITH BREAKFAST    loperamide (IMODIUM) capsule 2 mg  2 mg Oral Q4H PRN    albuterol-ipratropium (DUO-NEB) 2.5 MG-0.5 MG/3 ML  3 mL Nebulization Q4H RT    albuterol (ACCUNEB) nebulizer solution 1.25 mg  1.25 mg Nebulization Q2H PRN    guaiFENesin ER (MUCINEX) tablet 600 mg  600 mg Oral Q12H    levoFLOXacin (LEVAQUIN) tablet 750 mg  750 mg Oral Q24H    sodium chloride (NS) flush 5-10 mL  5-10 mL IntraVENous Q8H    sodium chloride (NS) flush 5-10 mL  5-10 mL IntraVENous PRN    acetaminophen (TYLENOL) tablet 650 mg  650 mg Oral Q4H PRN    HYDROcodone-acetaminophen (NORCO) 5-325 mg per tablet 1 Tab  1 Tab Oral Q4H PRN    naloxone (NARCAN) injection 0.4 mg  0.4 mg IntraVENous PRN    ondansetron (ZOFRAN ODT) tablet 4 mg  4 mg Oral Q4H PRN    enoxaparin (LOVENOX) injection 40 mg  40 mg SubCUTAneous Q24H    lactobac ac& pc-s.therm-b.anim (DENA Q/RISAQUAD)  1 Cap Oral DAILY    atenolol (TENORMIN) tablet 50 mg  50 mg Oral QPM    calcium-vitamin D (OS-LUIS) 500 mg-200 unit tablet  1 Tab Oral TID WITH MEALS    clopidogrel (PLAVIX) tablet 75 mg  75 mg Oral DAILY    fluticasone-vilanterol (BREO ELLIPTA) 100mcg-25mcg/puff  1 Puff Inhalation DAILY    gabapentin (NEURONTIN) capsule 300 mg  300 mg Oral BID PRN    lisinopril (PRINIVIL, ZESTRIL) tablet 10 mg  10 mg Oral DAILY    therapeutic multivitamin (THERAGRAN) tablet 1 Tab  1 Tab Oral DAILY    famotidine (PEPCID) tablet 20 mg  20 mg Oral QHS    senna (SENOKOT) tablet 17.2 mg  2 Tab Oral DAILY PRN    simvastatin (ZOCOR) tablet 20 mg  20 mg Oral QHS    tamsulosin (FLOMAX) capsule 0.8 mg  0.8 mg Oral DAILY        Lab Data Reviewed: (see below)  Lab Review:     Recent Labs      04/02/17   0128   WBC  10.7   HGB  11.2*   HCT  34.6*   PLT  288     Recent Labs      04/03/17   0356  04/02/17   0128   NA  148*  149*   K  3.9  3.3*   CL  111*  110*   CO2  28  28   GLU  106*  122*   BUN  25*  21*   CREA  1.13  1.14   CA  8.3*  8.4*   MG  1.7  1.8   PHOS  3.9  4.5     Lab Results   Component Value Date/Time    Glucose (POC) 139 03/31/2017 04:06 PM    Glucose (POC) 132 03/31/2017 07:39 AM     No results for input(s): PH, PCO2, PO2, HCO3, FIO2 in the last 72 hours. No results for input(s): INR in the last 72 hours.     No lab exists for component: Codie Zamora  All Micro Results     Procedure Component Value Units Date/Time    CULTURE, BLOOD [[id number]] Collected:  03/30/17 1347    Order Status:  Completed Specimen:  Blood from Blood Updated:  04/03/17 0890     Special Requests: NO SPECIAL REQUESTS        Culture result: NO GROWTH 4 DAYS       CULTURE, STOOL [[id number]] Collected:  04/01/17 3583    Order Status:  Completed Specimen:  Stool Updated:  04/03/17 0824     Special Requests: NO SPECIAL REQUESTS        Campylobacter antigen NEGATIVE        Shiga toxin-producing E. coli Ag NEGATIVE        Culture result:         NO ROUTINE ENTERIC PATHOGENS ISOLATED INCLUDING SALMONELLA, SHIGELLA, YERSINIA, VIBRIO OR SHIGA TOXIN PRODUCING E. COLI      NO COLIFORMS ISOLATED         HEAVY  GRAM POSITIVE DENA       C. DIFFICILE (DNA) [[id number]] Collected:  04/01/17 0455    Order Status:  Completed Specimen:  Stool Updated:  04/01/17 1503     C. difficile (DNA) NEGATIVE          This specimen is negative for toxigenic C difficile by DNA amplification. Repeat testing is not recommended for confirmation, samples received within 7 days of this negative result will be rejected. CULTURE, URINE [[id number]] Collected:  03/30/17 1245    Order Status:  Canceled Specimen:  Urine from Franco Specimen     INFLUENZA A & B AG (RAPID TEST) [[id number]] Collected:  03/30/17 1107    Order Status:  Completed Specimen:  Nasopharyngeal from Nasal washing Updated:  03/30/17 1151     Influenza A Antigen NEGATIVE         Influenza B Antigen NEGATIVE              I have reviewed notes of prior 24hr.     Other pertinent lab: None    Total time spent with patient: [address] [place] [address] discussed with: Patient, Nursing Staff and >50% of time spent in counseling and coordination of care    Discussed:  Care Plan and D/C Planning    Prophylaxis:  Lovenox    Disposition:  [facility]hab           ___________________________________________________    Attending Physician: Gurwinder Ruvalcaba, DO

## 2017-04-03 NOTE — PROGRESS NOTES
Problem: Self Care Deficits Care Plan (Adult)  Goal: *Acute Goals and Plan of Care (Insert Text)  Occupational Therapy Goals  Initiated 3/31/2017  1. Patient will perform grooming with supervision/set-up standing at sink >5 minutes within 7 day(s). 2. Patient will perform upper body dressing and bathing with modified independence within 7 day(s). 3. Patient will perform lower body dressing and bathing with minimal assistance within 7 day(s). 4. Patient will perform toilet transfers with supervision/set-up with best technique within 7 day(s). 5. Patient will perform all aspects of toileting with supervision/set-up within 7 day(s). 6. Patient will participate in upper extremity therapeutic exercise/activities with modified independence for 10 minutes within 7 day(s). 7. Patient will utilize energy conservation techniques during functional activities with verbal cues within 7 day(s). OCCUPATIONAL THERAPY TREATMENT  Patient: Rubén Roberts (63 y.o. male)  Date: 4/3/2017  Diagnosis: COPD exacerbation ([facility]) COPD exacerbation ([facility] [address]       Precautions:    Chart, occupational therapy assessment, plan of care, and goals were reviewed. ASSESSMENT:  Pt agreeable to ADL's. O2 sats on room air 94% before activity. Pt bathed with min assist for his back, he washed both LE's down to his feet and moderate assist for posterior. Pt wanted to don pajama pants and was able to thread them over both feet with rest break between each LE. He than needed to use bathroom urgently. Moderate/minimal assist to transfer to commode and with verbal cueing for hand placement and safety. Pt needed min assist for bowel hygiene. Assisted pt to change pants as they had gotten soiled. Pt donned hospital gown with min assist. Pt left seated in chair. Recommend rehab.   Progression toward goals:  [ ]          Improving appropriately and progressing toward goals  [X]          Improving slowly and progressing toward goals  [ ]          Not making progress toward goals and plan of care will be adjusted       PLAN:  Patient continues to benefit from skilled intervention to address the above impairments. Continue treatment per established plan of care. Discharge Recommendations:  Rehab  Further Equipment Recommendations for Discharge:  None       SUBJECTIVE:   Patient stated I fell a lot at home.       OBJECTIVE DATA SUMMARY:   Cognitive/Behavioral Status:  Neurologic State: Alert; Appropriate for age  Orientation Level: Appropriate for age;Oriented X4  Cognition: Appropriate decision making; Appropriate for age attention/concentration; Appropriate safety awareness; Follows commands           Functional Mobility and Transfers for ADLs:              Bed Mobility:     Supine to Sit: Moderate assistance;Assist x1                      Transfers:  Sit to Stand: Moderate assistance  Functional Transfers  Toilet Transfer : Minimum assistance; Moderate assistance  Adaptive Equipment: Grab bars     Balance:     ADL Intervention:        Grooming  Shaving:  Total assistance (dependent) (wanted his wife's assist, would not try)     Upper Body Bathing  Bathing Assistance: Minimum assistance  Position Performed: Seated in chair  Cues: Physical assistance     Lower Body Bathing  Bathing Assistance: Minimum assistance  Perineal  : Minimum assistance  Position Performed: Seated in chair     [address]: Minimum assistance  Hospital Gown: Minimum  assistance     Lower Body Dressing Assistance  Dressing Assistance: Minimum assistance  Pants With Elastic Waist: Minimum assistance  Leg Crossed Method Used: No  Position Performed: Seated in chair  Cues: Doff;Don;Physical assistance     Toileting  Toileting Assistance: Minimum assistance  Bowel Hygiene: Minimum assistance        Pain:  Pain Scale 1: Numeric (0 - 10)  Pain Intensity 1: 0                 Activity Tolerance:    Fair  Please refer to the flowsheet for vital signs taken during this treatment.   After treatment:   [X]  Patient left in no apparent distress sitting up in chair  [ ]  Patient left in no apparent distress in bed  [X]  Call bell left within reach  [X]  Nursing notified  [X]  Caregiver present  [X]  Chair  alarm activated      COMMUNICATION/COLLABORATION:   The patients plan of care was discussed with: Physical Therapy Assistant, Occupational Therapist and Registered Nurse     ALLIE Crain  Time Calculation: 44 mins

## 2017-04-03 NOTE — PROGRESS NOTES
4/3/2017 10:59 AM [facility] is evaluating for admission. Discussed with pt and pt's [facility]([phone]). 1 Derek Sebastian liaison reported she is awaiting PT and OT updates from today. CM will follow up.  KANG CookW

## 2017-04-03 NOTE — PROGRESS NOTES
Bedside and Verbal shift change report given to [address] (oncoming nurse) by Renato Seat (offgoing nurse). Report included the following information SBAR, Kardex, Intake/Output, MAR and Recent Results.

## 2017-04-04 ENCOUNTER — HOSPITAL ENCOUNTER (OUTPATIENT)
Age: 77
Discharge: HOME | End: 2017-04-15
Attending: PHYSICAL MEDICINE & REHABILITATION | Admitting: PHYSICAL MEDICINE & REHABILITATION

## 2017-04-04 VITALS
HEART RATE: 73 BPM | WEIGHT: 202.82 LBS | DIASTOLIC BLOOD PRESSURE: 68 MMHG | RESPIRATION RATE: 20 BRPM | OXYGEN SATURATION: 94 % | HEIGHT: 66 IN | TEMPERATURE: 97.9 F | BODY MASS INDEX: 32.6 KG/M2 | SYSTOLIC BLOOD PRESSURE: 136 MMHG

## 2017-04-04 LAB
APPEARANCE UR: ABNORMAL
BACTERIA URNS QL MICRO: NEGATIVE /HPF
BILIRUB UR QL: NEGATIVE
COLOR UR: ABNORMAL
EPITH CASTS URNS QL MICRO: ABNORMAL /LPF
GLUCOSE UR STRIP.AUTO-MCNC: NEGATIVE MG/DL
HGB UR QL STRIP: ABNORMAL
KETONES UR QL STRIP.AUTO: NEGATIVE MG/DL
LEUKOCYTE ESTERASE UR QL STRIP.AUTO: NEGATIVE
NITRITE UR QL STRIP.AUTO: NEGATIVE
PH UR STRIP: 7 [PH] (ref 5–8)
PROT UR STRIP-MCNC: ABNORMAL MG/DL
RBC #/AREA URNS HPF: ABNORMAL /HPF (ref 0–5)
SP GR UR REFRACTOMETRY: 1.02 (ref 1–1.03)
UROBILINOGEN UR QL STRIP.AUTO: 0.2 EU/DL (ref 0.2–1)
WBC URNS QL MICRO: ABNORMAL /HPF (ref 0–4)

## 2017-04-04 PROCEDURE — 87086 URINE CULTURE/COLONY COUNT: CPT | Performed by: PHYSICAL MEDICINE & REHABILITATION

## 2017-04-04 PROCEDURE — 74011250636 HC RX REV CODE- 250/636: Performed by: INTERNAL MEDICINE

## 2017-04-04 PROCEDURE — 74011636637 HC RX REV CODE- 636/637: Performed by: INTERNAL MEDICINE

## 2017-04-04 PROCEDURE — 74011250637 HC RX REV CODE- 250/637: Performed by: PHYSICAL MEDICINE & REHABILITATION

## 2017-04-04 PROCEDURE — 94640 AIRWAY INHALATION TREATMENT: CPT

## 2017-04-04 PROCEDURE — 74011250637 HC RX REV CODE- 250/637: Performed by: INTERNAL MEDICINE

## 2017-04-04 PROCEDURE — 74011000250 HC RX REV CODE- 250: Performed by: INTERNAL MEDICINE

## 2017-04-04 PROCEDURE — 81001 URINALYSIS AUTO W/SCOPE: CPT | Performed by: PHYSICAL MEDICINE & REHABILITATION

## 2017-04-04 PROCEDURE — 97110 THERAPEUTIC EXERCISES: CPT

## 2017-04-04 PROCEDURE — 97535 SELF CARE MNGMENT TRAINING: CPT

## 2017-04-04 RX ORDER — ONDANSETRON 4 MG/1
4 TABLET, ORALLY DISINTEGRATING ORAL
Status: DISCONTINUED | OUTPATIENT
Start: 2017-04-04 | End: 2017-04-15 | Stop reason: HOSPADM

## 2017-04-04 RX ORDER — ENOXAPARIN SODIUM 100 MG/ML
40 INJECTION SUBCUTANEOUS EVERY 24 HOURS
Status: DISCONTINUED | OUTPATIENT
Start: 2017-04-05 | End: 2017-04-15 | Stop reason: HOSPADM

## 2017-04-04 RX ORDER — DOCUSATE SODIUM 100 MG/1
100 CAPSULE, LIQUID FILLED ORAL 2 TIMES DAILY
Status: DISCONTINUED | OUTPATIENT
Start: 2017-04-04 | End: 2017-04-06

## 2017-04-04 RX ORDER — ALBUTEROL SULFATE 90 UG/1
2 AEROSOL, METERED RESPIRATORY (INHALATION)
Status: DISCONTINUED | OUTPATIENT
Start: 2017-04-04 | End: 2017-04-15 | Stop reason: HOSPADM

## 2017-04-04 RX ORDER — GABAPENTIN 300 MG/1
300 CAPSULE ORAL
Status: DISCONTINUED | OUTPATIENT
Start: 2017-04-04 | End: 2017-04-15 | Stop reason: HOSPADM

## 2017-04-04 RX ORDER — CLOPIDOGREL BISULFATE 75 MG/1
75 TABLET ORAL DAILY
Status: DISCONTINUED | OUTPATIENT
Start: 2017-04-05 | End: 2017-04-15 | Stop reason: HOSPADM

## 2017-04-04 RX ORDER — TAMSULOSIN HYDROCHLORIDE 0.4 MG/1
0.8 CAPSULE ORAL
Status: DISCONTINUED | OUTPATIENT
Start: 2017-04-05 | End: 2017-04-15 | Stop reason: HOSPADM

## 2017-04-04 RX ORDER — FAMOTIDINE 20 MG/1
20 TABLET, FILM COATED ORAL
Status: DISCONTINUED | OUTPATIENT
Start: 2017-04-04 | End: 2017-04-15 | Stop reason: HOSPADM

## 2017-04-04 RX ORDER — ACETAMINOPHEN 325 MG/1
650 TABLET ORAL
Status: DISCONTINUED | OUTPATIENT
Start: 2017-04-04 | End: 2017-04-15 | Stop reason: HOSPADM

## 2017-04-04 RX ORDER — FACIAL-BODY WIPES
10 EACH TOPICAL DAILY PRN
Status: DISCONTINUED | OUTPATIENT
Start: 2017-04-04 | End: 2017-04-15 | Stop reason: HOSPADM

## 2017-04-04 RX ORDER — THERA TABS 400 MCG
1 TAB ORAL DAILY
Status: DISCONTINUED | OUTPATIENT
Start: 2017-04-05 | End: 2017-04-15 | Stop reason: HOSPADM

## 2017-04-04 RX ORDER — PREDNISONE 5 MG/1
10 TABLET ORAL
Status: DISCONTINUED | OUTPATIENT
Start: 2017-04-14 | End: 2017-04-15 | Stop reason: HOSPADM

## 2017-04-04 RX ORDER — LISINOPRIL 5 MG/1
10 TABLET ORAL DAILY
Status: DISCONTINUED | OUTPATIENT
Start: 2017-04-05 | End: 2017-04-15 | Stop reason: HOSPADM

## 2017-04-04 RX ORDER — ADHESIVE BANDAGE
30 BANDAGE TOPICAL DAILY PRN
Status: DISCONTINUED | OUTPATIENT
Start: 2017-04-04 | End: 2017-04-15 | Stop reason: HOSPADM

## 2017-04-04 RX ORDER — OXYCODONE HYDROCHLORIDE 5 MG/1
5 TABLET ORAL
Status: DISCONTINUED | OUTPATIENT
Start: 2017-04-04 | End: 2017-04-15 | Stop reason: HOSPADM

## 2017-04-04 RX ORDER — FERROUS SULFATE, DRIED 160(50) MG
1 TABLET, EXTENDED RELEASE ORAL
Status: DISCONTINUED | OUTPATIENT
Start: 2017-04-05 | End: 2017-04-15 | Stop reason: HOSPADM

## 2017-04-04 RX ORDER — FLUTICASONE FUROATE AND VILANTEROL 100; 25 UG/1; UG/1
1 POWDER RESPIRATORY (INHALATION) DAILY
Status: DISCONTINUED | OUTPATIENT
Start: 2017-04-05 | End: 2017-04-15 | Stop reason: HOSPADM

## 2017-04-04 RX ORDER — PREDNISONE 20 MG/1
20 TABLET ORAL
Status: COMPLETED | OUTPATIENT
Start: 2017-04-11 | End: 2017-04-13

## 2017-04-04 RX ORDER — ATENOLOL 50 MG/1
50 TABLET ORAL DAILY
Status: DISCONTINUED | OUTPATIENT
Start: 2017-04-05 | End: 2017-04-15 | Stop reason: HOSPADM

## 2017-04-04 RX ORDER — AMOXICILLIN 250 MG
1 CAPSULE ORAL DAILY
Status: DISCONTINUED | OUTPATIENT
Start: 2017-04-05 | End: 2017-04-06

## 2017-04-04 RX ORDER — GUAIFENESIN 600 MG/1
600 TABLET, EXTENDED RELEASE ORAL EVERY 12 HOURS
Status: COMPLETED | OUTPATIENT
Start: 2017-04-04 | End: 2017-04-11

## 2017-04-04 RX ORDER — SIMVASTATIN 20 MG/1
20 TABLET, FILM COATED ORAL
Status: DISCONTINUED | OUTPATIENT
Start: 2017-04-04 | End: 2017-04-15 | Stop reason: HOSPADM

## 2017-04-04 RX ORDER — IPRATROPIUM BROMIDE AND ALBUTEROL SULFATE 2.5; .5 MG/3ML; MG/3ML
3 SOLUTION RESPIRATORY (INHALATION)
Status: DISCONTINUED | OUTPATIENT
Start: 2017-04-04 | End: 2017-04-04 | Stop reason: HOSPADM

## 2017-04-04 RX ORDER — PREDNISONE 20 MG/1
40 TABLET ORAL
Status: COMPLETED | OUTPATIENT
Start: 2017-04-05 | End: 2017-04-07

## 2017-04-04 RX ADMIN — CALCIUM CARBONATE 500 MG (1,250 MG)-VITAMIN D3 200 UNIT TABLET 1 TABLET: at 17:53

## 2017-04-04 RX ADMIN — Medication 10 ML: at 06:42

## 2017-04-04 RX ADMIN — FAMOTIDINE 20 MG: 20 TABLET, FILM COATED ORAL at 21:55

## 2017-04-04 RX ADMIN — IPRATROPIUM BROMIDE AND ALBUTEROL SULFATE 3 ML: .5; 3 SOLUTION RESPIRATORY (INHALATION) at 00:38

## 2017-04-04 RX ADMIN — THERA TABS 1 TABLET: TAB at 08:31

## 2017-04-04 RX ADMIN — CALCIUM CARBONATE 500 MG (1,250 MG)-VITAMIN D3 200 UNIT TABLET 1 TABLET: at 08:32

## 2017-04-04 RX ADMIN — ENOXAPARIN SODIUM 40 MG: 40 INJECTION SUBCUTANEOUS at 13:23

## 2017-04-04 RX ADMIN — GUAIFENESIN 600 MG: 600 TABLET, EXTENDED RELEASE ORAL at 21:55

## 2017-04-04 RX ADMIN — DOCUSATE SODIUM 100 MG: 100 CAPSULE ORAL at 21:55

## 2017-04-04 RX ADMIN — GUAIFENESIN 600 MG: 600 TABLET, EXTENDED RELEASE ORAL at 08:32

## 2017-04-04 RX ADMIN — TAMSULOSIN HYDROCHLORIDE 0.8 MG: 0.4 CAPSULE ORAL at 08:30

## 2017-04-04 RX ADMIN — FLUTICASONE FUROATE AND VILANTEROL TRIFENATATE 1 PUFF: 100; 25 POWDER RESPIRATORY (INHALATION) at 08:36

## 2017-04-04 RX ADMIN — LISINOPRIL 10 MG: 20 TABLET ORAL at 08:31

## 2017-04-04 RX ADMIN — SIMVASTATIN 20 MG: 20 TABLET, FILM COATED ORAL at 21:54

## 2017-04-04 RX ADMIN — Medication 10 ML: at 15:42

## 2017-04-04 RX ADMIN — CLOPIDOGREL 75 MG: 75 TABLET, FILM COATED ORAL at 08:32

## 2017-04-04 RX ADMIN — CALCIUM CARBONATE 500 MG (1,250 MG)-VITAMIN D3 200 UNIT TABLET 1 TABLET: at 13:22

## 2017-04-04 RX ADMIN — PREDNISONE 40 MG: 20 TABLET ORAL at 08:32

## 2017-04-04 RX ADMIN — IPRATROPIUM BROMIDE AND ALBUTEROL SULFATE 3 ML: .5; 3 SOLUTION RESPIRATORY (INHALATION) at 04:34

## 2017-04-04 RX ADMIN — Medication 1 CAPSULE: at 08:31

## 2017-04-04 RX ADMIN — LEVOFLOXACIN 750 MG: 750 TABLET, FILM COATED ORAL at 13:22

## 2017-04-04 RX ADMIN — ATENOLOL 50 MG: 50 TABLET ORAL at 17:53

## 2017-04-04 NOTE — PROGRESS NOTES
04/04/17 0705   Chart and Patient  Assessment   Pulmonary History 3   Surgical History 0   Chest X-ray 0   Respiratory Pattern 0   Mental Status 0   Breath Sounds 0   Cough 0   Level of Activity/Mobility 1   Respiratory Assessment Total Score 4   Date Last Assessed 04/04/17     Neb frequency changed to PRN per RT Protocol. Spoke with patient yesterday about jet nebs and he stated he is fine just taking his inhalers, which are in his home-regimen.

## 2017-04-04 NOTE — DISCHARGE SUMMARY
Physician Discharge Summary     Patient ID:  Urbano Brown  [id number]  59 y.o.  1940    Admit date: 3/30/2017    Discharge date and time: 4/4/2017    Admission Diagnoses: COPD exacerbation [facility])    Discharge Diagnoses:    Principal Diagnosis   COPD exacerbation ([facility] [address]                                             Other Diagnoses  Principal Problem:    COPD exacerbation ([facility] [address] ()      Overview: moderate, FEV1 1.41 7/2009    Active Problems:    Bladder tumor ()      Overview: Dr Lazarus [facility]      Prostate cancer [facility]) ()      Overview: lupron      CAD (coronary artery disease) ()      Overview: MI 7/2010,s/p CABG      GERD (gastroesophageal reflux disease) ()      HTN (hypertension) ()      Cataract ()      Hyperlipidemia with target LDL less than 70 ()      Elevated troponin (3/30/2017)         Hospital Course:     COPD exacerbation (HCC)/ Acute respiratory failure with hypoxia POA: moderate, FEV1 1.41 7/2009. Improving. Continue scheduled nebs, prednisone taper, continue Levofloxacin, Breo. .      CAD (coronary artery disease)/ Mild troponin elevation POA: MI 7/2010,s/p CABG. No symptoms. Troponin unremarkable. Continue plavix, atenolol, simvastatin. Cardiology consult appreciated.      GERD (gastroesophageal reflux disease): continue pepcid      HTN (hypertension): BP overall stable. Continue Lisinopril, Atenolol      Hx Bladder tumor/ Prostate cancer (HCC)/ Pyuria POA: had a heard catheter due to urinary retention prior to admission. Continue heard. Needs follow-up once out of rehab to get lupron depot.     Debility. [facility] after DC      Hyperlipidemia: continue simvastatin     Diarrhea. Likely related to meds/abx. C. Diff and stool cultures negative. can give imodium if needed. On probiotics as well.        PCP: Alessandro Pollard MD    Consults: Cardiology    Significant Diagnostic Studies: See Hospital Course    Discharged home in improved condition.     Discharge Exam:    Visit Vitals    /79 (BP 1 Location: Right arm, BP Patient Position: At rest)    Pulse 77    Temp 98 °F (36.7 °C)    Resp 20    Ht 5' 6\" (1.676 m)    Wt 92 kg (202 lb 13.2 oz)    SpO2 94%    BMI 32.74 kg/m2     Physical Exam:    Gen: Well-developed, well-nourished, in no acute distress  HEENT:  Pink conjunctivae, hearing intact to voice, moist mucous membranes  Neck: Supple  Resp: No accessory muscle use, clear breath sounds without wheezes rales or rhonchi  Card: No murmurs, normal S1, S2 without thrills or peripheral edema  Abd:  Soft, non-tender, non-distended, normoactive bowel sounds are present  Musc: No cyanosis or clubbing  Skin: No rashes or ulcers, skin turgor is good  Neuro:  Cranial nerves are grossly intact,  strength is 5/5 bilaterally, hip flexion is 5/5 bilaterally, follows commands appropriately  Psych:  Good insight, oriented to person, place and time, alert    Disposition: Rehab      Patient Instructions:   Current Discharge Medication List      START taking these medications    Details   guaiFENesin ER (MUCINEX) 600 mg ER tablet Take 1 Tab by mouth every twelve (12) hours for 7 days. Qty: 14 Tab, Refills: 0      L. acidoph & paracasei- S therm- Bifido (DENA-Q/RISAQUAD) 8 billion cell cap cap Take 1 Cap by mouth daily for 7 days. Qty: 7 Cap, Refills: 0      levoFLOXacin (LEVAQUIN) 750 mg tablet Take 1 Tab by mouth every twenty-four (24) hours. Qty: 3 Tab, Refills: 0      predniSONE (DELTASONE) 20 mg tablet Please take 2 tabs PO x 3 days then take 1.5 tabs PO x 3 days then take 1 tabs PO x 3 days then take 0.5 tabs PO x 3 days  Qty: 15 Tab, Refills: 0         CONTINUE these medications which have CHANGED    Details   HYDROcodone-acetaminophen (NORCO) 5-325 mg per tablet Take 1 Tab by mouth every six (6) hours as needed for Pain. Max Daily Amount: 4 Tabs.   Qty: 20 Tab, Refills: 0         CONTINUE these medications which have NOT CHANGED    Details   gabapentin (NEURONTIN) 300 mg capsule Take 300 mg by mouth two (2) times daily as needed (pain from shingles). albuterol (PROVENTIL HFA, VENTOLIN HFA, PROAIR HFA) 90 mcg/actuation inhaler Take 2 Puffs by inhalation every four (4) hours as needed for Wheezing. CALCIUM CARBONATE/VITAMIN D2 (CALCIUM 600 WITH VITAMIN D2 PO) Take 1 Tab by mouth three (3) times daily. raNITIdine (ZANTAC) 150 mg tablet Take 150 mg by mouth nightly. senna (SENNA) 8.6 mg tablet Take 2 Tabs by mouth daily as needed for Constipation. multivitamin (ONE A DAY) tablet Take 1 Tab by mouth daily. ondansetron hcl (ZOFRAN) 4 mg tablet Take 4 mg by mouth every eight (8) hours as needed for Nausea. atenolol (TENORMIN) 50 mg tablet TAKE ONE TABLET BY MOUTH ONCE DAILY. INDICATIONS: HYPERTENSION  Qty: 90 Tab, Refills: 1      lisinopril (PRINIVIL, ZESTRIL) 10 mg tablet TAKE ONE TABLET BY MOUTH ONCE DAILY. INDICATIONS: HYPERTENSION  Qty: 90 Tab, Refills: 1      simvastatin (ZOCOR) 20 mg tablet TAKE ONE TABLET BY MOUTH IN THE EVENING  Qty: 90 Tab, Refills: 1      acetaminophen (TYLENOL) 325 mg tablet Take 650 mg by mouth every four (4) hours as needed for Pain. clopidogrel (PLAVIX) 75 mg tablet Take 75 mg by mouth daily. tamsulosin (FLOMAX) 0.4 mg capsule Take 0.8 mg by mouth daily. OTHER Biofreeze Ointment applied twice daily to feet as needed for arthritis      SPIRIVA WITH HANDIHALER 18 mcg inhalation capsule Inhale the contents of 1  capsule via HandiHaler  daily  Qty: 90 Cap, Refills: 2      fluticasone-vilanterol (BREO ELLIPTA) 100-25 mcg/dose inhaler Take 1 Puff by inhalation daily. Associated Diagnoses: COPD (chronic obstructive pulmonary disease) (HCC)      LEUPROLIDE ACETATE (LUPRON DEPOT, 4 MONTH, IM) by IntraMUSCular route.  Every 4 months  Receives in providers office  Prescribed by Dr. Antionette Clark           Activity: Activity as tolerated  Diet: Cardiac Diet  Wound Care: None needed    Follow-up with PCP after rehab    Signed:  Macie Bolton DO  4/4/2017  9:57 AM    Greater than 30 mins was spent in coordination, counseling, and execution of this patient's discharge

## 2017-04-04 NOTE — PROGRESS NOTES
Rounded on [facility] patients and provided Anointing of the Sick at request of patient    Fr. Cari Rey

## 2017-04-04 NOTE — PROGRESS NOTES
Problem: Self Care Deficits Care Plan (Adult)  Goal: *Acute Goals and Plan of Care (Insert Text)  Occupational Therapy Goals  Initiated 3/31/2017  1. Patient will perform grooming with supervision/set-up standing at sink >5 minutes within 7 day(s). 2. Patient will perform upper body dressing and bathing with modified independence within 7 day(s). 3. Patient will perform lower body dressing and bathing with minimal assistance within 7 day(s). 4. Patient will perform toilet transfers with supervision/set-up with best technique within 7 day(s). 5. Patient will perform all aspects of toileting with supervision/set-up within 7 day(s). 6. Patient will participate in upper extremity therapeutic exercise/activities with modified independence for 10 minutes within 7 day(s). 7. Patient will utilize energy conservation techniques during functional activities with verbal cues within 7 day(s). OCCUPATIONAL THERAPY TREATMENT  Patient: Cyndi Bowens (30 y.o. male)  Date: 4/4/2017  Diagnosis: COPD exacerbation ([facility]) COPD exacerbation ([facility] [address]       Precautions:    Chart, occupational therapy assessment, plan of care, and goals were reviewed. ASSESSMENT:  Pt seated in chair and agreeable to OT. He ambulated to bathroom to stand at the sink to perform basic grooming with contact guard for safety. Chair was placed behind him in case he became fatigued. Pt able to stand 3 minutes before ambulating back to his chair. O2 sats decreased briefly on room air to 88% however with rest break increased to 90%. Pt engaged with one set of UE therapeutic exercises and he agreed to perform more later. Pt very motivated to improve his statis and has been accepted to [facility] rehab.   Progression toward goals:  [X]          Improving appropriately and progressing toward goals  [ ]          Improving slowly and progressing toward goals  [ ]          Not making progress toward goals and plan of care will be adjusted       PLAN:  Patient continues to benefit from skilled intervention to address the above impairments. Continue treatment per established plan of care. Discharge Recommendations:  Rehab  Further Equipment Recommendations for Discharge:  None       SUBJECTIVE:   Patient stated This thing here has been a life saver.  and regarding spirometer. OBJECTIVE DATA SUMMARY:   Cognitive/Behavioral Status:  Neurologic State: Alert  Orientation Level: Oriented X4  Cognition: Appropriate decision making           Functional Mobility and Transfers for ADLs:              Bed Mobility:      Not tested as pt already up in chair. Transfers:  Sit to Stand: Contact guard assistance        Balance:  Sitting: Intact  Standing: Intact; With support  ADL Intervention:        Grooming  Brushing Teeth: Contact guard assistance (standing at sink)  Brushing/Combing Hair: Contact guard assistance  For safety. Pt requires frequent rest breaks during ADL tasks. Therapeutic Exercises:     EXERCISE   Sets   Reps   Active Active Assist   Passive   Comments   Finger flex/ext 1 10 [X]           [ ]           [ ]               Wrist flex/ext 1 10 [X]           [ ]           [ ]               Elbow flex/ext 1 10 [X]           [ ]           [ ]               Chest presses 1 10 [X]           [ ]           [ ]               Forearm supination/pronation 1 10 [X]           [ ]           [ ]               Shoulder flex/ext 1 10 [X]           [ ]           [ ]                     [ ]           [ ]           [ ]                     [ ]           [ ]           [ ]                     [ ]           [ ]           [ ]                     [ ]           [ ]           [ ]                     [ ]           [ ]           [ ]                              Activity Tolerance:    Fair  Please refer to the flowsheet for vital signs taken during this treatment.   After treatment:   [X]  Patient left in no apparent distress sitting up in chair  [ ]  Patient left in no apparent distress in bed  [X]  Call bell left within reach  [ ]  Nursing notified  [ ]  Caregiver present  [ ]  Bed alarm activated      COMMUNICATION/COLLABORATION:   The patients plan of care was discussed with: Occupational Therapist     BUNNY Triplett/L  Time Calculation: 30 mins

## 2017-04-04 NOTE — ROUTINE PROCESS
Bedside and Verbal shift change report given to Avtar Maki RN (oncoming nurse) by Sonia Richards RN (offgoing nurse). Report included the following information SBAR, Procedure Summary, Intake/Output, MAR and Accordion.

## 2017-04-04 NOTE — PROGRESS NOTES
4/4/2017 2:52 PM [facility] can accept pt after 4PM today. Nursing please call reported to [phone]. Thanks. 4/4/2017  10:01 AM Pt has been accepted to [facility] today. Awaiting room assignment.  KANG CollinsW

## 2017-04-05 LAB
25(OH)D3 SERPL-MCNC: 37 NG/ML (ref 30–100)
ANION GAP BLD CALC-SCNC: 9 MMOL/L (ref 5–15)
BACTERIA SPEC CULT: NORMAL
BUN SERPL-MCNC: 21 MG/DL (ref 6–20)
BUN/CREAT SERPL: 21 (ref 12–20)
CALCIUM SERPL-MCNC: 8.5 MG/DL (ref 8.5–10.1)
CHLORIDE SERPL-SCNC: 108 MMOL/L (ref 97–108)
CO2 SERPL-SCNC: 27 MMOL/L (ref 21–32)
CREAT SERPL-MCNC: 1 MG/DL (ref 0.7–1.3)
ERYTHROCYTE [DISTWIDTH] IN BLOOD BY AUTOMATED COUNT: 13.3 % (ref 11.5–14.5)
GLUCOSE SERPL-MCNC: 90 MG/DL (ref 65–100)
HCT VFR BLD AUTO: 36.5 % (ref 36.6–50.3)
HGB BLD-MCNC: 11.6 G/DL (ref 12.1–17)
MCH RBC QN AUTO: 30.5 PG (ref 26–34)
MCHC RBC AUTO-ENTMCNC: 31.8 G/DL (ref 30–36.5)
MCV RBC AUTO: 96.1 FL (ref 80–99)
PLATELET # BLD AUTO: 304 K/UL (ref 150–400)
POTASSIUM SERPL-SCNC: 3.3 MMOL/L (ref 3.5–5.1)
RBC # BLD AUTO: 3.8 M/UL (ref 4.1–5.7)
SERVICE CMNT-IMP: NORMAL
SODIUM SERPL-SCNC: 144 MMOL/L (ref 136–145)
WBC # BLD AUTO: 9.1 K/UL (ref 4.1–11.1)

## 2017-04-05 PROCEDURE — 74011250636 HC RX REV CODE- 250/636: Performed by: PHYSICAL MEDICINE & REHABILITATION

## 2017-04-05 PROCEDURE — 85027 COMPLETE CBC AUTOMATED: CPT | Performed by: PHYSICAL MEDICINE & REHABILITATION

## 2017-04-05 PROCEDURE — 74011636637 HC RX REV CODE- 636/637: Performed by: PHYSICAL MEDICINE & REHABILITATION

## 2017-04-05 PROCEDURE — 74011250637 HC RX REV CODE- 250/637: Performed by: PHYSICAL MEDICINE & REHABILITATION

## 2017-04-05 PROCEDURE — 82306 VITAMIN D 25 HYDROXY: CPT | Performed by: PHYSICAL MEDICINE & REHABILITATION

## 2017-04-05 PROCEDURE — 80048 BASIC METABOLIC PNL TOTAL CA: CPT | Performed by: PHYSICAL MEDICINE & REHABILITATION

## 2017-04-05 PROCEDURE — 36415 COLL VENOUS BLD VENIPUNCTURE: CPT | Performed by: PHYSICAL MEDICINE & REHABILITATION

## 2017-04-05 RX ADMIN — Medication 1 CAPSULE: at 09:03

## 2017-04-05 RX ADMIN — THERA TABS 1 TABLET: TAB at 09:03

## 2017-04-05 RX ADMIN — FAMOTIDINE 20 MG: 20 TABLET, FILM COATED ORAL at 21:24

## 2017-04-05 RX ADMIN — FLUTICASONE FUROATE AND VILANTEROL TRIFENATATE 1 PUFF: 100; 25 POWDER RESPIRATORY (INHALATION) at 09:02

## 2017-04-05 RX ADMIN — ENOXAPARIN SODIUM 40 MG: 40 INJECTION SUBCUTANEOUS at 13:01

## 2017-04-05 RX ADMIN — CALCIUM CARBONATE-VITAMIN D TAB 500 MG-200 UNIT 1 TABLET: 500-200 TAB at 09:03

## 2017-04-05 RX ADMIN — ATENOLOL 50 MG: 50 TABLET ORAL at 09:03

## 2017-04-05 RX ADMIN — LISINOPRIL 10 MG: 5 TABLET ORAL at 09:03

## 2017-04-05 RX ADMIN — CALCIUM CARBONATE-VITAMIN D TAB 500 MG-200 UNIT 1 TABLET: 500-200 TAB at 12:34

## 2017-04-05 RX ADMIN — GUAIFENESIN 600 MG: 600 TABLET, EXTENDED RELEASE ORAL at 21:24

## 2017-04-05 RX ADMIN — CLOPIDOGREL 75 MG: 75 TABLET, FILM COATED ORAL at 09:03

## 2017-04-05 RX ADMIN — LEVOFLOXACIN 750 MG: 500 TABLET, FILM COATED ORAL at 12:34

## 2017-04-05 RX ADMIN — DOCUSATE SODIUM 100 MG: 100 CAPSULE ORAL at 21:24

## 2017-04-05 RX ADMIN — CALCIUM CARBONATE-VITAMIN D TAB 500 MG-200 UNIT 1 TABLET: 500-200 TAB at 17:35

## 2017-04-05 RX ADMIN — SIMVASTATIN 20 MG: 20 TABLET, FILM COATED ORAL at 21:24

## 2017-04-05 RX ADMIN — TAMSULOSIN HYDROCHLORIDE 0.8 MG: 0.4 CAPSULE ORAL at 09:03

## 2017-04-05 RX ADMIN — PREDNISONE 40 MG: 20 TABLET ORAL at 09:03

## 2017-04-05 RX ADMIN — GUAIFENESIN 600 MG: 600 TABLET, EXTENDED RELEASE ORAL at 09:03

## 2017-04-06 LAB
BACTERIA SPEC CULT: NORMAL
CC UR VC: NORMAL
SERVICE CMNT-IMP: NORMAL

## 2017-04-06 PROCEDURE — 74011250636 HC RX REV CODE- 250/636: Performed by: PHYSICAL MEDICINE & REHABILITATION

## 2017-04-06 PROCEDURE — 74011636637 HC RX REV CODE- 636/637: Performed by: PHYSICAL MEDICINE & REHABILITATION

## 2017-04-06 PROCEDURE — 74011250637 HC RX REV CODE- 250/637: Performed by: PHYSICAL MEDICINE & REHABILITATION

## 2017-04-06 RX ADMIN — LEVOFLOXACIN 750 MG: 500 TABLET, FILM COATED ORAL at 12:23

## 2017-04-06 RX ADMIN — ATENOLOL 50 MG: 50 TABLET ORAL at 08:51

## 2017-04-06 RX ADMIN — DOCUSATE SODIUM 100 MG: 100 CAPSULE ORAL at 08:51

## 2017-04-06 RX ADMIN — CALCIUM CARBONATE-VITAMIN D TAB 500 MG-200 UNIT 1 TABLET: 500-200 TAB at 08:51

## 2017-04-06 RX ADMIN — Medication 1 CAPSULE: at 08:50

## 2017-04-06 RX ADMIN — THERA TABS 1 TABLET: TAB at 08:50

## 2017-04-06 RX ADMIN — FLUTICASONE FUROATE AND VILANTEROL TRIFENATATE 1 PUFF: 100; 25 POWDER RESPIRATORY (INHALATION) at 08:50

## 2017-04-06 RX ADMIN — SIMVASTATIN 20 MG: 20 TABLET, FILM COATED ORAL at 22:03

## 2017-04-06 RX ADMIN — FAMOTIDINE 20 MG: 20 TABLET, FILM COATED ORAL at 22:03

## 2017-04-06 RX ADMIN — TAMSULOSIN HYDROCHLORIDE 0.8 MG: 0.4 CAPSULE ORAL at 08:51

## 2017-04-06 RX ADMIN — CALCIUM CARBONATE-VITAMIN D TAB 500 MG-200 UNIT 1 TABLET: 500-200 TAB at 17:27

## 2017-04-06 RX ADMIN — ENOXAPARIN SODIUM 40 MG: 40 INJECTION SUBCUTANEOUS at 14:08

## 2017-04-06 RX ADMIN — GUAIFENESIN 600 MG: 600 TABLET, EXTENDED RELEASE ORAL at 08:51

## 2017-04-06 RX ADMIN — PREDNISONE 40 MG: 20 TABLET ORAL at 08:51

## 2017-04-06 RX ADMIN — CLOPIDOGREL 75 MG: 75 TABLET, FILM COATED ORAL at 08:51

## 2017-04-06 RX ADMIN — GUAIFENESIN 600 MG: 600 TABLET, EXTENDED RELEASE ORAL at 22:03

## 2017-04-06 RX ADMIN — CALCIUM CARBONATE-VITAMIN D TAB 500 MG-200 UNIT 1 TABLET: 500-200 TAB at 12:24

## 2017-04-06 RX ADMIN — LISINOPRIL 10 MG: 5 TABLET ORAL at 08:51

## 2017-04-07 PROCEDURE — 74011250636 HC RX REV CODE- 250/636: Performed by: PHYSICAL MEDICINE & REHABILITATION

## 2017-04-07 PROCEDURE — 74011636637 HC RX REV CODE- 636/637: Performed by: PHYSICAL MEDICINE & REHABILITATION

## 2017-04-07 PROCEDURE — 74011250637 HC RX REV CODE- 250/637: Performed by: PHYSICAL MEDICINE & REHABILITATION

## 2017-04-07 RX ADMIN — Medication 1 CAPSULE: at 09:14

## 2017-04-07 RX ADMIN — ENOXAPARIN SODIUM 40 MG: 40 INJECTION SUBCUTANEOUS at 13:01

## 2017-04-07 RX ADMIN — LISINOPRIL 10 MG: 5 TABLET ORAL at 09:14

## 2017-04-07 RX ADMIN — SIMVASTATIN 20 MG: 20 TABLET, FILM COATED ORAL at 21:38

## 2017-04-07 RX ADMIN — GUAIFENESIN 600 MG: 600 TABLET, EXTENDED RELEASE ORAL at 09:15

## 2017-04-07 RX ADMIN — CALCIUM CARBONATE-VITAMIN D TAB 500 MG-200 UNIT 1 TABLET: 500-200 TAB at 09:14

## 2017-04-07 RX ADMIN — FAMOTIDINE 20 MG: 20 TABLET, FILM COATED ORAL at 21:38

## 2017-04-07 RX ADMIN — PREDNISONE 40 MG: 20 TABLET ORAL at 09:15

## 2017-04-07 RX ADMIN — THERA TABS 1 TABLET: TAB at 09:15

## 2017-04-07 RX ADMIN — TAMSULOSIN HYDROCHLORIDE 0.8 MG: 0.4 CAPSULE ORAL at 09:14

## 2017-04-07 RX ADMIN — LEVOFLOXACIN 750 MG: 500 TABLET, FILM COATED ORAL at 13:02

## 2017-04-07 RX ADMIN — CALCIUM CARBONATE-VITAMIN D TAB 500 MG-200 UNIT 1 TABLET: 500-200 TAB at 13:01

## 2017-04-07 RX ADMIN — ATENOLOL 50 MG: 50 TABLET ORAL at 09:15

## 2017-04-07 RX ADMIN — CALCIUM CARBONATE-VITAMIN D TAB 500 MG-200 UNIT 1 TABLET: 500-200 TAB at 17:35

## 2017-04-07 RX ADMIN — FLUTICASONE FUROATE AND VILANTEROL TRIFENATATE 1 PUFF: 100; 25 POWDER RESPIRATORY (INHALATION) at 09:13

## 2017-04-07 RX ADMIN — GUAIFENESIN 600 MG: 600 TABLET, EXTENDED RELEASE ORAL at 21:38

## 2017-04-07 RX ADMIN — CLOPIDOGREL 75 MG: 75 TABLET, FILM COATED ORAL at 09:15

## 2017-04-08 PROCEDURE — 74011250637 HC RX REV CODE- 250/637: Performed by: PHYSICAL MEDICINE & REHABILITATION

## 2017-04-08 PROCEDURE — 74011250636 HC RX REV CODE- 250/636: Performed by: PHYSICAL MEDICINE & REHABILITATION

## 2017-04-08 PROCEDURE — 74011636637 HC RX REV CODE- 636/637: Performed by: PHYSICAL MEDICINE & REHABILITATION

## 2017-04-08 RX ORDER — POTASSIUM CHLORIDE 750 MG/1
20 TABLET, FILM COATED, EXTENDED RELEASE ORAL 2 TIMES DAILY
Status: DISPENSED | OUTPATIENT
Start: 2017-04-08 | End: 2017-04-10

## 2017-04-08 RX ADMIN — CALCIUM CARBONATE-VITAMIN D TAB 500 MG-200 UNIT 1 TABLET: 500-200 TAB at 12:06

## 2017-04-08 RX ADMIN — SIMVASTATIN 20 MG: 20 TABLET, FILM COATED ORAL at 21:00

## 2017-04-08 RX ADMIN — GUAIFENESIN 600 MG: 600 TABLET, EXTENDED RELEASE ORAL at 20:45

## 2017-04-08 RX ADMIN — CALCIUM CARBONATE-VITAMIN D TAB 500 MG-200 UNIT 1 TABLET: 500-200 TAB at 16:07

## 2017-04-08 RX ADMIN — FLUTICASONE FUROATE AND VILANTEROL TRIFENATATE 1 PUFF: 100; 25 POWDER RESPIRATORY (INHALATION) at 08:45

## 2017-04-08 RX ADMIN — ATENOLOL 50 MG: 50 TABLET ORAL at 08:41

## 2017-04-08 RX ADMIN — Medication 1 CAPSULE: at 08:41

## 2017-04-08 RX ADMIN — GUAIFENESIN 600 MG: 600 TABLET, EXTENDED RELEASE ORAL at 08:41

## 2017-04-08 RX ADMIN — LISINOPRIL 10 MG: 5 TABLET ORAL at 08:41

## 2017-04-08 RX ADMIN — ENOXAPARIN SODIUM 40 MG: 40 INJECTION SUBCUTANEOUS at 13:59

## 2017-04-08 RX ADMIN — FAMOTIDINE 20 MG: 20 TABLET, FILM COATED ORAL at 21:00

## 2017-04-08 RX ADMIN — PREDNISONE 30 MG: 5 TABLET ORAL at 08:41

## 2017-04-08 RX ADMIN — TAMSULOSIN HYDROCHLORIDE 0.8 MG: 0.4 CAPSULE ORAL at 08:41

## 2017-04-08 RX ADMIN — CLOPIDOGREL 75 MG: 75 TABLET, FILM COATED ORAL at 08:41

## 2017-04-08 RX ADMIN — THERA TABS 1 TABLET: TAB at 08:41

## 2017-04-08 RX ADMIN — POTASSIUM CHLORIDE 20 MEQ: 750 TABLET, FILM COATED, EXTENDED RELEASE ORAL at 19:10

## 2017-04-08 RX ADMIN — CALCIUM CARBONATE-VITAMIN D TAB 500 MG-200 UNIT 1 TABLET: 500-200 TAB at 08:41

## 2017-04-09 PROCEDURE — 74011250637 HC RX REV CODE- 250/637: Performed by: PHYSICAL MEDICINE & REHABILITATION

## 2017-04-09 PROCEDURE — 74011250636 HC RX REV CODE- 250/636: Performed by: PHYSICAL MEDICINE & REHABILITATION

## 2017-04-09 PROCEDURE — 74011636637 HC RX REV CODE- 636/637: Performed by: PHYSICAL MEDICINE & REHABILITATION

## 2017-04-09 RX ORDER — LOPERAMIDE HYDROCHLORIDE 2 MG/1
2 CAPSULE ORAL AS NEEDED
Status: DISCONTINUED | OUTPATIENT
Start: 2017-04-09 | End: 2017-04-15 | Stop reason: HOSPADM

## 2017-04-09 RX ADMIN — PREDNISONE 30 MG: 5 TABLET ORAL at 07:44

## 2017-04-09 RX ADMIN — FLUTICASONE FUROATE AND VILANTEROL TRIFENATATE 1 PUFF: 100; 25 POWDER RESPIRATORY (INHALATION) at 07:46

## 2017-04-09 RX ADMIN — THERA TABS 1 TABLET: TAB at 07:44

## 2017-04-09 RX ADMIN — ENOXAPARIN SODIUM 40 MG: 40 INJECTION SUBCUTANEOUS at 12:13

## 2017-04-09 RX ADMIN — CALCIUM CARBONATE-VITAMIN D TAB 500 MG-200 UNIT 1 TABLET: 500-200 TAB at 07:43

## 2017-04-09 RX ADMIN — CALCIUM CARBONATE-VITAMIN D TAB 500 MG-200 UNIT 1 TABLET: 500-200 TAB at 12:12

## 2017-04-09 RX ADMIN — LISINOPRIL 10 MG: 5 TABLET ORAL at 07:45

## 2017-04-09 RX ADMIN — POTASSIUM CHLORIDE 20 MEQ: 750 TABLET, FILM COATED, EXTENDED RELEASE ORAL at 07:43

## 2017-04-09 RX ADMIN — Medication 1 CAPSULE: at 07:43

## 2017-04-09 RX ADMIN — CLOPIDOGREL 75 MG: 75 TABLET, FILM COATED ORAL at 07:44

## 2017-04-09 RX ADMIN — GUAIFENESIN 600 MG: 600 TABLET, EXTENDED RELEASE ORAL at 07:43

## 2017-04-09 RX ADMIN — POTASSIUM CHLORIDE 20 MEQ: 750 TABLET, FILM COATED, EXTENDED RELEASE ORAL at 22:03

## 2017-04-09 RX ADMIN — GUAIFENESIN 600 MG: 600 TABLET, EXTENDED RELEASE ORAL at 22:03

## 2017-04-09 RX ADMIN — ATENOLOL 50 MG: 50 TABLET ORAL at 07:45

## 2017-04-09 RX ADMIN — CALCIUM CARBONATE-VITAMIN D TAB 500 MG-200 UNIT 1 TABLET: 500-200 TAB at 17:35

## 2017-04-09 RX ADMIN — FAMOTIDINE 20 MG: 20 TABLET, FILM COATED ORAL at 22:03

## 2017-04-09 RX ADMIN — SIMVASTATIN 20 MG: 20 TABLET, FILM COATED ORAL at 22:03

## 2017-04-09 RX ADMIN — TAMSULOSIN HYDROCHLORIDE 0.8 MG: 0.4 CAPSULE ORAL at 07:44

## 2017-04-10 LAB
ANION GAP BLD CALC-SCNC: 10 MMOL/L (ref 5–15)
BUN SERPL-MCNC: 20 MG/DL (ref 6–20)
BUN/CREAT SERPL: 20 (ref 12–20)
CALCIUM SERPL-MCNC: 8.6 MG/DL (ref 8.5–10.1)
CHLORIDE SERPL-SCNC: 109 MMOL/L (ref 97–108)
CO2 SERPL-SCNC: 25 MMOL/L (ref 21–32)
CREAT SERPL-MCNC: 1 MG/DL (ref 0.7–1.3)
GLUCOSE SERPL-MCNC: 87 MG/DL (ref 65–100)
POTASSIUM SERPL-SCNC: 4.2 MMOL/L (ref 3.5–5.1)
SODIUM SERPL-SCNC: 144 MMOL/L (ref 136–145)

## 2017-04-10 PROCEDURE — 74011250636 HC RX REV CODE- 250/636: Performed by: PHYSICAL MEDICINE & REHABILITATION

## 2017-04-10 PROCEDURE — 74011250637 HC RX REV CODE- 250/637: Performed by: PHYSICAL MEDICINE & REHABILITATION

## 2017-04-10 PROCEDURE — 36415 COLL VENOUS BLD VENIPUNCTURE: CPT | Performed by: PHYSICAL MEDICINE & REHABILITATION

## 2017-04-10 PROCEDURE — 74011636637 HC RX REV CODE- 636/637: Performed by: PHYSICAL MEDICINE & REHABILITATION

## 2017-04-10 PROCEDURE — 80048 BASIC METABOLIC PNL TOTAL CA: CPT | Performed by: PHYSICAL MEDICINE & REHABILITATION

## 2017-04-10 RX ADMIN — GUAIFENESIN 600 MG: 600 TABLET, EXTENDED RELEASE ORAL at 08:40

## 2017-04-10 RX ADMIN — ATENOLOL 50 MG: 50 TABLET ORAL at 08:40

## 2017-04-10 RX ADMIN — Medication 1 CAPSULE: at 08:40

## 2017-04-10 RX ADMIN — PREDNISONE 30 MG: 5 TABLET ORAL at 08:40

## 2017-04-10 RX ADMIN — SIMVASTATIN 20 MG: 20 TABLET, FILM COATED ORAL at 21:55

## 2017-04-10 RX ADMIN — CALCIUM CARBONATE-VITAMIN D TAB 500 MG-200 UNIT 1 TABLET: 500-200 TAB at 12:26

## 2017-04-10 RX ADMIN — ENOXAPARIN SODIUM 40 MG: 40 INJECTION SUBCUTANEOUS at 14:00

## 2017-04-10 RX ADMIN — CALCIUM CARBONATE-VITAMIN D TAB 500 MG-200 UNIT 1 TABLET: 500-200 TAB at 08:40

## 2017-04-10 RX ADMIN — TAMSULOSIN HYDROCHLORIDE 0.8 MG: 0.4 CAPSULE ORAL at 08:40

## 2017-04-10 RX ADMIN — FLUTICASONE FUROATE AND VILANTEROL TRIFENATATE 1 PUFF: 100; 25 POWDER RESPIRATORY (INHALATION) at 09:00

## 2017-04-10 RX ADMIN — CLOPIDOGREL 75 MG: 75 TABLET, FILM COATED ORAL at 08:40

## 2017-04-10 RX ADMIN — GUAIFENESIN 600 MG: 600 TABLET, EXTENDED RELEASE ORAL at 21:55

## 2017-04-10 RX ADMIN — FAMOTIDINE 20 MG: 20 TABLET, FILM COATED ORAL at 21:55

## 2017-04-10 RX ADMIN — THERA TABS 1 TABLET: TAB at 08:40

## 2017-04-10 RX ADMIN — CALCIUM CARBONATE-VITAMIN D TAB 500 MG-200 UNIT 1 TABLET: 500-200 TAB at 17:14

## 2017-04-10 RX ADMIN — LISINOPRIL 10 MG: 5 TABLET ORAL at 08:40

## 2017-04-10 RX ADMIN — LOPERAMIDE HYDROCHLORIDE 2 MG: 2 CAPSULE ORAL at 08:53

## 2017-04-11 PROCEDURE — 74011636637 HC RX REV CODE- 636/637: Performed by: PHYSICAL MEDICINE & REHABILITATION

## 2017-04-11 PROCEDURE — 74011250636 HC RX REV CODE- 250/636: Performed by: PHYSICAL MEDICINE & REHABILITATION

## 2017-04-11 PROCEDURE — 74011250637 HC RX REV CODE- 250/637: Performed by: PHYSICAL MEDICINE & REHABILITATION

## 2017-04-11 RX ADMIN — FLUTICASONE FUROATE AND VILANTEROL TRIFENATATE 1 PUFF: 100; 25 POWDER RESPIRATORY (INHALATION) at 08:31

## 2017-04-11 RX ADMIN — ATENOLOL 50 MG: 50 TABLET ORAL at 08:31

## 2017-04-11 RX ADMIN — CALCIUM CARBONATE-VITAMIN D TAB 500 MG-200 UNIT 1 TABLET: 500-200 TAB at 17:09

## 2017-04-11 RX ADMIN — CLOPIDOGREL 75 MG: 75 TABLET, FILM COATED ORAL at 08:31

## 2017-04-11 RX ADMIN — FAMOTIDINE 20 MG: 20 TABLET, FILM COATED ORAL at 21:19

## 2017-04-11 RX ADMIN — CALCIUM CARBONATE-VITAMIN D TAB 500 MG-200 UNIT 1 TABLET: 500-200 TAB at 08:31

## 2017-04-11 RX ADMIN — GUAIFENESIN 600 MG: 600 TABLET, EXTENDED RELEASE ORAL at 08:31

## 2017-04-11 RX ADMIN — PREDNISONE 20 MG: 20 TABLET ORAL at 08:31

## 2017-04-11 RX ADMIN — SIMVASTATIN 20 MG: 20 TABLET, FILM COATED ORAL at 21:19

## 2017-04-11 RX ADMIN — CALCIUM CARBONATE-VITAMIN D TAB 500 MG-200 UNIT 1 TABLET: 500-200 TAB at 12:31

## 2017-04-11 RX ADMIN — TAMSULOSIN HYDROCHLORIDE 0.8 MG: 0.4 CAPSULE ORAL at 08:31

## 2017-04-11 RX ADMIN — Medication 1 CAPSULE: at 08:31

## 2017-04-11 RX ADMIN — LISINOPRIL 10 MG: 5 TABLET ORAL at 08:31

## 2017-04-11 RX ADMIN — ENOXAPARIN SODIUM 40 MG: 40 INJECTION SUBCUTANEOUS at 12:31

## 2017-04-11 RX ADMIN — THERA TABS 1 TABLET: TAB at 08:31

## 2017-04-12 PROCEDURE — 74011250636 HC RX REV CODE- 250/636: Performed by: PHYSICAL MEDICINE & REHABILITATION

## 2017-04-12 PROCEDURE — 74011250637 HC RX REV CODE- 250/637: Performed by: PHYSICAL MEDICINE & REHABILITATION

## 2017-04-12 PROCEDURE — 74011636637 HC RX REV CODE- 636/637: Performed by: PHYSICAL MEDICINE & REHABILITATION

## 2017-04-12 RX ADMIN — ATENOLOL 50 MG: 50 TABLET ORAL at 08:38

## 2017-04-12 RX ADMIN — TAMSULOSIN HYDROCHLORIDE 0.8 MG: 0.4 CAPSULE ORAL at 08:38

## 2017-04-12 RX ADMIN — THERA TABS 1 TABLET: TAB at 08:37

## 2017-04-12 RX ADMIN — CALCIUM CARBONATE-VITAMIN D TAB 500 MG-200 UNIT 1 TABLET: 500-200 TAB at 17:33

## 2017-04-12 RX ADMIN — ENOXAPARIN SODIUM 40 MG: 40 INJECTION SUBCUTANEOUS at 15:42

## 2017-04-12 RX ADMIN — FAMOTIDINE 20 MG: 20 TABLET, FILM COATED ORAL at 21:50

## 2017-04-12 RX ADMIN — CALCIUM CARBONATE-VITAMIN D TAB 500 MG-200 UNIT 1 TABLET: 500-200 TAB at 12:37

## 2017-04-12 RX ADMIN — FLUTICASONE FUROATE AND VILANTEROL TRIFENATATE 1 PUFF: 100; 25 POWDER RESPIRATORY (INHALATION) at 08:42

## 2017-04-12 RX ADMIN — SIMVASTATIN 20 MG: 20 TABLET, FILM COATED ORAL at 21:50

## 2017-04-12 RX ADMIN — CLOPIDOGREL 75 MG: 75 TABLET, FILM COATED ORAL at 08:37

## 2017-04-12 RX ADMIN — PREDNISONE 20 MG: 20 TABLET ORAL at 08:38

## 2017-04-12 RX ADMIN — CALCIUM CARBONATE-VITAMIN D TAB 500 MG-200 UNIT 1 TABLET: 500-200 TAB at 08:38

## 2017-04-13 LAB
ANION GAP BLD CALC-SCNC: 10 MMOL/L (ref 5–15)
BUN SERPL-MCNC: 17 MG/DL (ref 6–20)
BUN/CREAT SERPL: 17 (ref 12–20)
CALCIUM SERPL-MCNC: 8.9 MG/DL (ref 8.5–10.1)
CHLORIDE SERPL-SCNC: 106 MMOL/L (ref 97–108)
CO2 SERPL-SCNC: 27 MMOL/L (ref 21–32)
CREAT SERPL-MCNC: 1.03 MG/DL (ref 0.7–1.3)
ERYTHROCYTE [DISTWIDTH] IN BLOOD BY AUTOMATED COUNT: 13.3 % (ref 11.5–14.5)
GLUCOSE SERPL-MCNC: 83 MG/DL (ref 65–100)
HCT VFR BLD AUTO: 38.7 % (ref 36.6–50.3)
HGB BLD-MCNC: 13 G/DL (ref 12.1–17)
MCH RBC QN AUTO: 31.8 PG (ref 26–34)
MCHC RBC AUTO-ENTMCNC: 33.6 G/DL (ref 30–36.5)
MCV RBC AUTO: 94.6 FL (ref 80–99)
PLATELET # BLD AUTO: 229 K/UL (ref 150–400)
POTASSIUM SERPL-SCNC: 3.5 MMOL/L (ref 3.5–5.1)
RBC # BLD AUTO: 4.09 M/UL (ref 4.1–5.7)
SODIUM SERPL-SCNC: 143 MMOL/L (ref 136–145)
WBC # BLD AUTO: 9.4 K/UL (ref 4.1–11.1)

## 2017-04-13 PROCEDURE — 74011250636 HC RX REV CODE- 250/636: Performed by: PHYSICAL MEDICINE & REHABILITATION

## 2017-04-13 PROCEDURE — 74011636637 HC RX REV CODE- 636/637: Performed by: PHYSICAL MEDICINE & REHABILITATION

## 2017-04-13 PROCEDURE — 85027 COMPLETE CBC AUTOMATED: CPT | Performed by: PHYSICAL MEDICINE & REHABILITATION

## 2017-04-13 PROCEDURE — 74011250637 HC RX REV CODE- 250/637: Performed by: PHYSICAL MEDICINE & REHABILITATION

## 2017-04-13 PROCEDURE — 80048 BASIC METABOLIC PNL TOTAL CA: CPT | Performed by: PHYSICAL MEDICINE & REHABILITATION

## 2017-04-13 PROCEDURE — 36415 COLL VENOUS BLD VENIPUNCTURE: CPT | Performed by: PHYSICAL MEDICINE & REHABILITATION

## 2017-04-13 RX ORDER — POTASSIUM CHLORIDE 750 MG/1
10 TABLET, FILM COATED, EXTENDED RELEASE ORAL DAILY
Status: DISCONTINUED | OUTPATIENT
Start: 2017-04-13 | End: 2017-04-15 | Stop reason: HOSPADM

## 2017-04-13 RX ADMIN — SIMVASTATIN 20 MG: 20 TABLET, FILM COATED ORAL at 22:04

## 2017-04-13 RX ADMIN — FAMOTIDINE 20 MG: 20 TABLET, FILM COATED ORAL at 22:04

## 2017-04-13 RX ADMIN — CALCIUM CARBONATE-VITAMIN D TAB 500 MG-200 UNIT 1 TABLET: 500-200 TAB at 18:07

## 2017-04-13 RX ADMIN — ENOXAPARIN SODIUM 40 MG: 40 INJECTION SUBCUTANEOUS at 15:02

## 2017-04-13 RX ADMIN — ACETAMINOPHEN 650 MG: 325 TABLET ORAL at 12:33

## 2017-04-13 RX ADMIN — PREDNISONE 20 MG: 20 TABLET ORAL at 09:17

## 2017-04-13 RX ADMIN — THERA TABS 1 TABLET: TAB at 09:16

## 2017-04-13 RX ADMIN — ATENOLOL 50 MG: 50 TABLET ORAL at 09:16

## 2017-04-13 RX ADMIN — LISINOPRIL 10 MG: 5 TABLET ORAL at 09:16

## 2017-04-13 RX ADMIN — FLUTICASONE FUROATE AND VILANTEROL TRIFENATATE 1 PUFF: 100; 25 POWDER RESPIRATORY (INHALATION) at 09:17

## 2017-04-13 RX ADMIN — LOPERAMIDE HYDROCHLORIDE 2 MG: 2 CAPSULE ORAL at 15:07

## 2017-04-13 RX ADMIN — POTASSIUM CHLORIDE 10 MEQ: 750 TABLET, FILM COATED, EXTENDED RELEASE ORAL at 12:33

## 2017-04-13 RX ADMIN — CALCIUM CARBONATE-VITAMIN D TAB 500 MG-200 UNIT 1 TABLET: 500-200 TAB at 12:33

## 2017-04-13 RX ADMIN — CLOPIDOGREL 75 MG: 75 TABLET, FILM COATED ORAL at 09:16

## 2017-04-13 RX ADMIN — TAMSULOSIN HYDROCHLORIDE 0.8 MG: 0.4 CAPSULE ORAL at 09:16

## 2017-04-13 RX ADMIN — CALCIUM CARBONATE-VITAMIN D TAB 500 MG-200 UNIT 1 TABLET: 500-200 TAB at 09:16

## 2017-04-14 PROCEDURE — 74011636637 HC RX REV CODE- 636/637: Performed by: PHYSICAL MEDICINE & REHABILITATION

## 2017-04-14 PROCEDURE — 74011250637 HC RX REV CODE- 250/637: Performed by: PHYSICAL MEDICINE & REHABILITATION

## 2017-04-14 PROCEDURE — 74011250636 HC RX REV CODE- 250/636: Performed by: PHYSICAL MEDICINE & REHABILITATION

## 2017-04-14 RX ADMIN — LISINOPRIL 10 MG: 5 TABLET ORAL at 08:37

## 2017-04-14 RX ADMIN — PREDNISONE 10 MG: 5 TABLET ORAL at 08:37

## 2017-04-14 RX ADMIN — THERA TABS 1 TABLET: TAB at 08:37

## 2017-04-14 RX ADMIN — CLOPIDOGREL 75 MG: 75 TABLET, FILM COATED ORAL at 08:37

## 2017-04-14 RX ADMIN — FLUTICASONE FUROATE AND VILANTEROL TRIFENATATE 1 PUFF: 100; 25 POWDER RESPIRATORY (INHALATION) at 08:38

## 2017-04-14 RX ADMIN — FAMOTIDINE 20 MG: 20 TABLET, FILM COATED ORAL at 22:31

## 2017-04-14 RX ADMIN — ENOXAPARIN SODIUM 40 MG: 40 INJECTION SUBCUTANEOUS at 15:22

## 2017-04-14 RX ADMIN — ACETAMINOPHEN 650 MG: 325 TABLET ORAL at 17:04

## 2017-04-14 RX ADMIN — POTASSIUM CHLORIDE 10 MEQ: 750 TABLET, FILM COATED, EXTENDED RELEASE ORAL at 08:37

## 2017-04-14 RX ADMIN — TAMSULOSIN HYDROCHLORIDE 0.8 MG: 0.4 CAPSULE ORAL at 08:37

## 2017-04-14 RX ADMIN — CALCIUM CARBONATE-VITAMIN D TAB 500 MG-200 UNIT 1 TABLET: 500-200 TAB at 12:12

## 2017-04-14 RX ADMIN — SIMVASTATIN 20 MG: 20 TABLET, FILM COATED ORAL at 22:31

## 2017-04-14 RX ADMIN — ATENOLOL 50 MG: 50 TABLET ORAL at 08:37

## 2017-04-14 RX ADMIN — CALCIUM CARBONATE-VITAMIN D TAB 500 MG-200 UNIT 1 TABLET: 500-200 TAB at 16:56

## 2017-04-14 RX ADMIN — CALCIUM CARBONATE-VITAMIN D TAB 500 MG-200 UNIT 1 TABLET: 500-200 TAB at 08:37

## 2017-04-15 VITALS
SYSTOLIC BLOOD PRESSURE: 117 MMHG | WEIGHT: 183.19 LBS | BODY MASS INDEX: 29.57 KG/M2 | DIASTOLIC BLOOD PRESSURE: 82 MMHG | HEART RATE: 81 BPM

## 2017-04-15 PROCEDURE — 74011636637 HC RX REV CODE- 636/637: Performed by: PHYSICAL MEDICINE & REHABILITATION

## 2017-04-15 PROCEDURE — 74011250637 HC RX REV CODE- 250/637: Performed by: PHYSICAL MEDICINE & REHABILITATION

## 2017-04-15 RX ADMIN — PREDNISONE 10 MG: 5 TABLET ORAL at 09:08

## 2017-04-15 RX ADMIN — TAMSULOSIN HYDROCHLORIDE 0.8 MG: 0.4 CAPSULE ORAL at 09:08

## 2017-04-15 RX ADMIN — FLUTICASONE FUROATE AND VILANTEROL TRIFENATATE 1 PUFF: 100; 25 POWDER RESPIRATORY (INHALATION) at 09:14

## 2017-04-15 RX ADMIN — CALCIUM CARBONATE-VITAMIN D TAB 500 MG-200 UNIT 1 TABLET: 500-200 TAB at 09:08

## 2017-04-15 RX ADMIN — LISINOPRIL 10 MG: 5 TABLET ORAL at 09:08

## 2017-04-15 RX ADMIN — ATENOLOL 50 MG: 50 TABLET ORAL at 09:08

## 2017-04-15 RX ADMIN — POTASSIUM CHLORIDE 10 MEQ: 750 TABLET, FILM COATED, EXTENDED RELEASE ORAL at 09:08

## 2017-04-15 RX ADMIN — THERA TABS 1 TABLET: TAB at 09:08

## 2017-04-15 RX ADMIN — CLOPIDOGREL 75 MG: 75 TABLET, FILM COATED ORAL at 09:08

## 2017-04-17 ENCOUNTER — PATIENT OUTREACH (OUTPATIENT)
Dept: INTERNAL MEDICINE CLINIC | Age: 77
End: 2017-04-17

## 2017-04-24 ENCOUNTER — OFFICE VISIT (OUTPATIENT)
Dept: INTERNAL MEDICINE CLINIC | Age: 77
End: 2017-04-24

## 2017-04-24 VITALS
HEART RATE: 64 BPM | DIASTOLIC BLOOD PRESSURE: 66 MMHG | SYSTOLIC BLOOD PRESSURE: 134 MMHG | BODY MASS INDEX: 29.86 KG/M2 | OXYGEN SATURATION: 96 % | WEIGHT: 185.8 LBS | RESPIRATION RATE: 24 BRPM | HEIGHT: 66 IN | TEMPERATURE: 98.1 F

## 2017-04-24 DIAGNOSIS — Z90.49 S/P CHOLECYSTECTOMY: ICD-10-CM

## 2017-04-24 DIAGNOSIS — R53.81 DEBILITY: ICD-10-CM

## 2017-04-24 DIAGNOSIS — R53.1 WEAKNESS: Primary | ICD-10-CM

## 2017-04-24 DIAGNOSIS — R53.83 FATIGUE, UNSPECIFIED TYPE: ICD-10-CM

## 2017-04-24 DIAGNOSIS — R25.1 TREMOR: ICD-10-CM

## 2017-04-24 DIAGNOSIS — J44.9 CHRONIC OBSTRUCTIVE PULMONARY DISEASE, UNSPECIFIED COPD TYPE (HCC): ICD-10-CM

## 2017-04-24 RX ORDER — POTASSIUM CHLORIDE 750 MG/1
TABLET, FILM COATED, EXTENDED RELEASE ORAL
COMMUNITY
End: 2018-01-03 | Stop reason: CLARIF

## 2017-04-24 RX ORDER — ZOLPIDEM TARTRATE 5 MG/1
TABLET ORAL
COMMUNITY
End: 2018-01-03 | Stop reason: CLARIF

## 2017-04-24 RX ORDER — ASPIRIN 325 MG
325 TABLET ORAL DAILY
Status: ON HOLD | COMMUNITY
End: 2018-01-04

## 2017-04-24 NOTE — PROGRESS NOTES
HISTORY OF PRESENT ILLNESS    Chief Complaint   Patient presents with   [facility] Follow Up       Presents for follow-up of 2 admissions. Presents for 10-day transitional care management (TCM) visit s/p of hospital admission. Nurse Navigator call noted to patient and documented in [facility]. Hospital record and relevant lab results, test results and consult notes have personally been reviewed by me at this office visit. Medications reviewed and reconciled. Was admitted to [facility] 3/22/17 for gangrenous cholecystis. Had urgent cholecystectomy 3/25/17 at [facility]. Was home 3/28-3/30/17 and started feeling weak, so went back to hospital.     Admitted 3/30-4/4/17 to [facility], then rehab admission until 4/15/17  COPD exacerbation (HCC)/ Acute respiratory failure with hypoxia POA: moderate, FEV1 1.41 7/2009. Improving. Continue scheduled nebs, prednisone taper, continue Levofloxacin, Breo. Gevena Kim CAD (coronary artery disease)/ Mild troponin elevation POA: MI 7/2010,s/p CABG. No symptoms. Troponin unremarkable. Continue plavix, atenolol, simvastatin. Cardiology consult appreciated. GERD (gastroesophageal reflux disease): continue pepcid  HTN (hypertension): BP overall stable. Continue Lisinopril, Atenolol  Hx Bladder tumor/ Prostate cancer (HCC)/ Pyuria POA: had a heard catheter due to urinary retention prior to admission. Continue heard. Needs follow-up once out of rehab to get lupron depot. Debility. SAH after DC  Hyperlipidemia: continue simvastatin  Diarrhea. Likely related to meds/abx. C. Diff and stool cultures negative. can give imodium if needed. On probiotics as well. Was sent to [facility] until 4/15/17. Home is coming [facility] for PT, OT, SN    His weakness if improved. Reports mild right epigastric pains. No vomiting. Labs showed hgb. He is able to urinate w/o a heard for 10 days. Seeing urology next week for Lupron and PSA. Taking tamsulosin. Taking KCL for 1 month. Review of Systems   All other systems reviewed and are negative, except as noted in HPI    Past Medical and Surgical History   has a past medical history of Arthritis of foot, degenerative; Bladder tumor (2004); CAD (coronary artery disease); Carotid stenosis (10/20/15); Cataract; Chest wall pain; COPD (chronic obstructive pulmonary disease) ([facility] [address] GERD (gastroesophageal reflux disease); HTN (hypertension); Hyperlipidemia LDL goal < 70; Osteoporosis; PHN (postherpetic neuralgia); Physiological tremor; Prostate cancer ([facility] [address] (2/2004); Pulmonary nodule, right; and Shingles (12/12/12). has a past surgical history that includes cabg, arterial, three (7/2010); hernia repair (2000); bladder tumor assoc; cystoscopy (10/29/11); cataract removal (12/2011); colonoscopy (10/2/13); carotid endarterectomy (Left, 12/31/15); cystourethroscopy (11/29/2016); and lap cholecystectomy (03/24/2017). reports that he quit smoking about 13 years ago. His smoking use included Cigarettes. He has never used smokeless tobacco. He reports that he does not drink alcohol.  family history includes Cancer in his mother; Heart Disease in his brother. Physical Exam   Nursing note and vitals reviewed. Blood pressure 134/66, pulse 64, temperature 98.1 °F (36.7 °C), resp. rate 24, height 5' 6\" (1.676 m), weight 185 lb 12.8 oz (84.3 kg), SpO2 96 %. Constitutional:  No distress. Eyes: Conjunctivae are normal.   Ears:  Hearing grossly intact  Cardiovascular: Normal rate. regular rhythm, no murmurs or gallops  No edema  Pulmonary/Chest: Effort normal.   CTAB  Musculoskeletal: moves all 4 extremities   Neurological: Alert and oriented to person, place, and time. Skin: No rash noted. Psychiatric: Normal mood and affect. Behavior is normal.   Walking w rolling walker    ASSESSMENT and PLAN  Roopa Ball was seen today for hospital follow up.     Diagnoses and all orders for this visit:    Weakness  Fatigue, unspecified type  S/P cholecystectomy  Debility  Continues to recover. Cont PT, OT    Chronic obstructive pulmonary disease, unspecified COPD type ([facility] [address]  Currently asymptomatic      Tremor  A little worse since hospital, due to debility  Hopefully will improve      lab results and schedule of future lab studies reviewed with patient  reviewed medications and side effects in detail    Return to clinic for further evaluation if new symptoms develop    Follow-up Disposition: Not on File    Current Outpatient Prescriptions   Medication Sig    zolpidem (AMBIEN) 5 mg tablet Take  by mouth nightly as needed for Sleep.  aspirin (ASPIRIN) 325 mg tablet Take 325 mg by mouth daily.  potassium chloride SR (KLOR-CON 10) 10 mEq tablet Take  by mouth.  CALCIUM CARBONATE/VITAMIN D2 (CALCIUM 600 WITH VITAMIN D2 PO) Take 1 Tab by mouth three (3) times daily.  raNITIdine (ZANTAC) 150 mg tablet Take 150 mg by mouth nightly.  multivitamin (ONE A DAY) tablet Take 1 Tab by mouth daily.  atenolol (TENORMIN) 50 mg tablet TAKE ONE TABLET BY MOUTH ONCE DAILY. INDICATIONS: HYPERTENSION    lisinopril (PRINIVIL, ZESTRIL) 10 mg tablet TAKE ONE TABLET BY MOUTH ONCE DAILY. INDICATIONS: HYPERTENSION    simvastatin (ZOCOR) 20 mg tablet TAKE ONE TABLET BY MOUTH IN THE EVENING    clopidogrel (PLAVIX) 75 mg tablet Take 75 mg by mouth daily.  tamsulosin (FLOMAX) 0.4 mg capsule Take 0.8 mg by mouth daily.  OTHER Biofreeze Ointment applied twice daily to feet as needed for arthritis    SPIRIVA WITH HANDIHALER 18 mcg inhalation capsule Inhale the contents of 1  capsule via HandiHaler  daily    fluticasone-vilanterol (BREO ELLIPTA) 100-25 mcg/dose inhaler Take 1 Puff by inhalation daily.  LEUPROLIDE ACETATE (LUPRON DEPOT, 4 MONTH, IM) by IntraMUSCular route. Every 4 months  Receives in providers office  Prescribed by Dr. Evie Gonzalez     No current facility-administered medications for this visit.

## 2017-04-24 NOTE — MR AVS SNAPSHOT
Visit Information Date & Time Provider Department Dept. Phone Encounter #  
 4/24/2017  9:30 AM Maria Ferrell MD [facility] [id number] Your Appointments 5/3/2017  9:45 AM  
ROUTINE CARE with Maria Ferrell MD  
[facility] [address]) Appt Note: f[facility] [address] [facility] [id number]  
[phone]  
  
   
 [address] [facility] [id number] Upcoming Health Maintenance Date Due ZOSTER VACCINE AGE 60> 2/9/2001 GLAUCOMA SCREENING Q2Y 11/12/2017 MEDICARE YEARLY EXAM 12/16/2017 COLONOSCOPY 10/2/2018 DTaP/Tdap/Td series (2 - Td) 10/24/2023 Allergies as of 4/24/2017  Review Complete On: 4/24/2017 By: Maria Ferrell MD  
 No Known Allergies Current Immunizations  Reviewed on 12/15/2016 Name Date Influenza Vaccine 8/25/2015, 10/3/2013 Pneumococcal Conjugate (PCV-13) 12/28/2015 Pneumococcal Vaccine (Unspecified Type) 2/1/2011 Tdap 10/24/2013 Not reviewed this visit You Were Diagnosed With   
  
 Codes Comments Weakness    -  Primary ICD-10-CM: R53.1 ICD-9-CM: 780.79 Fatigue, unspecified type     ICD-10-CM: R53.83 ICD-9-CM: 780.79 S/P cholecystectomy     ICD-10-CM: Z90.49 ICD-9-CM: V45.79 Debility     ICD-10-CM: R53.81 ICD-9-CM: 799.3 Chronic obstructive pulmonary disease, unspecified COPD type ([facility]     ICD-10-CM: J44.9 ICD-9-CM: 075 Tremor     ICD-10-CM: R25.1 ICD-9-CM: 051. 0 Vitals BP Pulse Temp Resp Height(growth percentile) Weight(growth percentile) 134/66 (BP 1 Location: Left arm, BP Patient Position: Sitting) 64 98.1 °F (36.7 °C) 24 5' 6\" (1.676 m) 185 lb 12.8 oz (84.3 kg) SpO2 BMI Smoking Status 96% 29.99 kg/m2 Former Smoker Vitals History BMI and BSA Data Body Mass Index Body Surface Area  
 29.99 kg/m 2 1.98 m 2 [facility] [phone] [phone] Your Updated Medication List  
  
   
This list is accurate as of: 4/24/17 10:21 AM.  Always use your most recent med list.  
  
  
  
  
 AMBIEN 5 mg tablet Generic drug:  zolpidem Take  by mouth nightly as needed for Sleep. aspirin 325 mg tablet Commonly known as:  ASPIRIN Take 325 mg by mouth daily. atenolol 50 mg tablet Commonly known as:  TENORMIN  
TAKE ONE TABLET BY MOUTH ONCE DAILY. INDICATIONS: HYPERTENSION  
  
 BREO ELLIPTA 100-25 mcg/dose inhaler Generic drug:  fluticasone-vilanterol Take 1 Puff by inhalation daily. CALCIUM 600 WITH VITAMIN D2 PO Take 1 Tab by mouth three (3) times daily. clopidogrel 75 mg Tab Commonly known as:  PLAVIX Take 75 mg by mouth daily. lisinopril 10 mg tablet Commonly known as:  PRINIVIL, ZESTRIL  
TAKE ONE TABLET BY MOUTH ONCE DAILY. INDICATIONS: HYPERTENSION  
  
 LUPRON DEPOT (4 MONTH) IM  
by IntraMUSCular route. Every 4 months Receives in providers office Prescribed by Dr. Honorio Winchester  
  
 multivitamin tablet Commonly known as:  ONE A DAY Take 1 Tab by mouth daily. OTHER Biofreeze Ointment applied twice daily to feet as needed for arthritis  
  
 potassium chloride SR 10 mEq tablet Commonly known as:  KLOR-CON 10 Take  by mouth. raNITIdine 150 mg tablet Commonly known as:  ZANTAC Take 150 mg by mouth nightly. simvastatin 20 mg tablet Commonly known as:  ZOCOR  
TAKE ONE TABLET BY MOUTH IN THE EVENING  
  
 SPIRIVA WITH HANDIHALER 18 mcg inhalation capsule Generic drug:  tiotropium Inhale the contents of 1  capsule via HandiHaler  daily  
  
 tamsulosin 0.4 mg capsule Commonly known as:  FLOMAX Take 0.8 mg by mouth daily. Introducing [facility]! Dear Jleena Thompson: Thank you for requesting a [employer] account.   Our records indicate that you already have an active [employer] account. You can access your account anytime at [email or web address]. [employer] Did you know that you can access your hospital and ER discharge instructions at any time in [employer]? You can also review all of your test results from your hospital stay or ER visit. Additional Information If you have questions, please visit the Frequently Asked Questions section of the [employer] website at [email or web address]. [employer]/. Remember, [employer] is NOT to be used for urgent needs. For medical emergencies, dial 911. Now available from your iPhone and Android! Please provide this summary of care documentation to your next provider. Your primary care clinician is listed as Brian Jones. If you have any questions after today's visit, please call [phone].

## 2017-04-24 NOTE — PROGRESS NOTES
Patient presents for a hospital follow up. He states his stomach hurts from [address] surgery. Weakness in legs. Will \"buckle\". PT coming to home.

## 2017-09-05 RX ORDER — LISINOPRIL 10 MG/1
TABLET ORAL
Qty: 90 TAB | Refills: 1 | Status: ON HOLD | OUTPATIENT
Start: 2017-09-05 | End: 2018-01-04

## 2017-10-03 RX ORDER — ATENOLOL 50 MG/1
TABLET ORAL
Qty: 90 TAB | Refills: 1 | Status: SHIPPED | OUTPATIENT
Start: 2017-10-03 | End: 2017-10-04 | Stop reason: ALTCHOICE

## 2017-10-04 ENCOUNTER — TELEPHONE (OUTPATIENT)
Dept: INTERNAL MEDICINE CLINIC | Age: 77
End: 2017-10-04

## 2017-10-04 RX ORDER — METOPROLOL SUCCINATE 50 MG/1
50 TABLET, EXTENDED RELEASE ORAL DAILY
Qty: 90 TAB | Refills: 1 | Status: SHIPPED | OUTPATIENT
Start: 2017-10-04 | End: 2018-01-03 | Stop reason: CLARIF

## 2017-10-04 NOTE — TELEPHONE ENCOUNTER
Pharmacy advised pt that they dont make the atenolol anymore? And pt would like us to call pharmacy to see what is going on or need to start a new medication to take place of atenolol.   [facility] # [phone] (H) and pharmacy [address] [place] [address] # [phone]

## 2017-10-05 RX ORDER — SIMVASTATIN 20 MG/1
TABLET, FILM COATED ORAL
Qty: 90 TAB | Refills: 1 | Status: SHIPPED | OUTPATIENT
Start: 2017-10-05 | End: 2018-04-23 | Stop reason: SDUPTHER

## 2017-10-05 NOTE — TELEPHONE ENCOUNTER
Pt does not want Metoprolol per pt. ., Atenolol was sent to [facility] , #[phone].

## 2018-01-03 ENCOUNTER — HOSPITAL ENCOUNTER (INPATIENT)
Age: 78
LOS: 8 days | Discharge: HOME HEALTH | DRG: 392 | End: 2018-01-11
Attending: EMERGENCY MEDICINE | Admitting: INTERNAL MEDICINE
Payer: MEDICARE

## 2018-01-03 ENCOUNTER — APPOINTMENT (OUTPATIENT)
Dept: GENERAL RADIOLOGY | Age: 78
DRG: 392 | End: 2018-01-03
Attending: EMERGENCY MEDICINE
Payer: MEDICARE

## 2018-01-03 ENCOUNTER — APPOINTMENT (OUTPATIENT)
Dept: CT IMAGING | Age: 78
DRG: 392 | End: 2018-01-03
Attending: EMERGENCY MEDICINE
Payer: MEDICARE

## 2018-01-03 DIAGNOSIS — J18.9 COMMUNITY ACQUIRED PNEUMONIA, UNSPECIFIED LATERALITY: ICD-10-CM

## 2018-01-03 DIAGNOSIS — R19.7 DIARRHEA, UNSPECIFIED TYPE: Primary | ICD-10-CM

## 2018-01-03 DIAGNOSIS — R53.1 WEAKNESS: ICD-10-CM

## 2018-01-03 DIAGNOSIS — R09.02 HYPOXIA: ICD-10-CM

## 2018-01-03 PROBLEM — R00.0 SINUS TACHYCARDIA: Status: ACTIVE | Noted: 2018-01-03

## 2018-01-03 PROBLEM — R10.9 ABDOMINAL PAIN: Status: ACTIVE | Noted: 2018-01-03

## 2018-01-03 PROBLEM — D72.829 LEUKOCYTOSIS: Status: ACTIVE | Noted: 2018-01-03

## 2018-01-03 PROBLEM — R11.2 NAUSEA & VOMITING: Status: ACTIVE | Noted: 2018-01-03

## 2018-01-03 PROBLEM — A41.9 SEPSIS (HCC): Status: ACTIVE | Noted: 2018-01-03

## 2018-01-03 PROBLEM — R77.8 ELEVATED TROPONIN: Status: RESOLVED | Noted: 2017-03-30 | Resolved: 2018-01-03

## 2018-01-03 PROBLEM — K52.9 COLITIS: Status: ACTIVE | Noted: 2018-01-03

## 2018-01-03 PROBLEM — R50.9 FEVER: Status: ACTIVE | Noted: 2018-01-03

## 2018-01-03 LAB
ALBUMIN SERPL-MCNC: 3.5 G/DL (ref 3.5–5)
ALBUMIN/GLOB SERPL: 0.8 {RATIO} (ref 1.1–2.2)
ALP SERPL-CCNC: 98 U/L (ref 45–117)
ALT SERPL-CCNC: 46 U/L (ref 12–78)
ANION GAP SERPL CALC-SCNC: 12 MMOL/L (ref 5–15)
APPEARANCE UR: CLEAR
AST SERPL-CCNC: 43 U/L (ref 15–37)
BACTERIA URNS QL MICRO: NEGATIVE /HPF
BASOPHILS # BLD: 0 K/UL (ref 0–0.1)
BASOPHILS NFR BLD: 0 % (ref 0–1)
BILIRUB SERPL-MCNC: 0.7 MG/DL (ref 0.2–1)
BILIRUB UR QL: NEGATIVE
BNP SERPL-MCNC: 112 PG/ML (ref 0–450)
BUN SERPL-MCNC: 17 MG/DL (ref 6–20)
BUN/CREAT SERPL: 14 (ref 12–20)
CALCIUM SERPL-MCNC: 9.5 MG/DL (ref 8.5–10.1)
CHLORIDE SERPL-SCNC: 101 MMOL/L (ref 97–108)
CO2 SERPL-SCNC: 27 MMOL/L (ref 21–32)
COLOR UR: ABNORMAL
CREAT SERPL-MCNC: 1.19 MG/DL (ref 0.7–1.3)
EOSINOPHIL # BLD: 0.1 K/UL (ref 0–0.4)
EOSINOPHIL NFR BLD: 1 % (ref 0–7)
EPITH CASTS URNS QL MICRO: ABNORMAL /LPF
ERYTHROCYTE [DISTWIDTH] IN BLOOD BY AUTOMATED COUNT: 13 % (ref 11.5–14.5)
FLUAV AG NPH QL IA: NEGATIVE
FLUBV AG NOSE QL IA: NEGATIVE
GLOBULIN SER CALC-MCNC: 4.5 G/DL (ref 2–4)
GLUCOSE SERPL-MCNC: 118 MG/DL (ref 65–100)
GLUCOSE UR STRIP.AUTO-MCNC: NEGATIVE MG/DL
HCT VFR BLD AUTO: 43.7 % (ref 36.6–50.3)
HEMOCCULT STL QL: NEGATIVE
HGB BLD-MCNC: 15 G/DL (ref 12.1–17)
HGB UR QL STRIP: NEGATIVE
HYALINE CASTS URNS QL MICRO: ABNORMAL /LPF (ref 0–5)
KETONES UR QL STRIP.AUTO: NEGATIVE MG/DL
LACTATE SERPL-SCNC: 1.5 MMOL/L (ref 0.4–2)
LEUKOCYTE ESTERASE UR QL STRIP.AUTO: NEGATIVE
LYMPHOCYTES # BLD: 1.5 K/UL (ref 0.8–3.5)
LYMPHOCYTES NFR BLD: 10 % (ref 12–49)
MCH RBC QN AUTO: 32.2 PG (ref 26–34)
MCHC RBC AUTO-ENTMCNC: 34.3 G/DL (ref 30–36.5)
MCV RBC AUTO: 93.8 FL (ref 80–99)
MONOCYTES # BLD: 1.2 K/UL (ref 0–1)
MONOCYTES NFR BLD: 8 % (ref 5–13)
NEUTS SEG # BLD: 11.9 K/UL (ref 1.8–8)
NEUTS SEG NFR BLD: 81 % (ref 32–75)
NITRITE UR QL STRIP.AUTO: NEGATIVE
PH UR STRIP: 5 [PH] (ref 5–8)
PLATELET # BLD AUTO: 221 K/UL (ref 150–400)
POTASSIUM SERPL-SCNC: 3.9 MMOL/L (ref 3.5–5.1)
PROT SERPL-MCNC: 8 G/DL (ref 6.4–8.2)
PROT UR STRIP-MCNC: 100 MG/DL
RBC # BLD AUTO: 4.66 M/UL (ref 4.1–5.7)
RBC #/AREA URNS HPF: ABNORMAL /HPF (ref 0–5)
SODIUM SERPL-SCNC: 140 MMOL/L (ref 136–145)
SP GR UR REFRACTOMETRY: 1.02 (ref 1–1.03)
TROPONIN I SERPL-MCNC: <0.04 NG/ML
UA: UC IF INDICATED,UAUC: ABNORMAL
UROBILINOGEN UR QL STRIP.AUTO: 0.2 EU/DL (ref 0.2–1)
WBC # BLD AUTO: 14.7 K/UL (ref 4.1–11.1)
WBC URNS QL MICRO: ABNORMAL /HPF (ref 0–4)

## 2018-01-03 PROCEDURE — 74011250637 HC RX REV CODE- 250/637: Performed by: EMERGENCY MEDICINE

## 2018-01-03 PROCEDURE — 71046 X-RAY EXAM CHEST 2 VIEWS: CPT

## 2018-01-03 PROCEDURE — 81001 URINALYSIS AUTO W/SCOPE: CPT | Performed by: EMERGENCY MEDICINE

## 2018-01-03 PROCEDURE — 96365 THER/PROPH/DIAG IV INF INIT: CPT

## 2018-01-03 PROCEDURE — 87899 AGENT NOS ASSAY W/OPTIC: CPT | Performed by: INTERNAL MEDICINE

## 2018-01-03 PROCEDURE — 74011000250 HC RX REV CODE- 250: Performed by: EMERGENCY MEDICINE

## 2018-01-03 PROCEDURE — 87040 BLOOD CULTURE FOR BACTERIA: CPT | Performed by: EMERGENCY MEDICINE

## 2018-01-03 PROCEDURE — 36415 COLL VENOUS BLD VENIPUNCTURE: CPT | Performed by: EMERGENCY MEDICINE

## 2018-01-03 PROCEDURE — 83880 ASSAY OF NATRIURETIC PEPTIDE: CPT | Performed by: EMERGENCY MEDICINE

## 2018-01-03 PROCEDURE — 99285 EMERGENCY DEPT VISIT HI MDM: CPT

## 2018-01-03 PROCEDURE — 94640 AIRWAY INHALATION TREATMENT: CPT

## 2018-01-03 PROCEDURE — 82272 OCCULT BLD FECES 1-3 TESTS: CPT | Performed by: EMERGENCY MEDICINE

## 2018-01-03 PROCEDURE — 80053 COMPREHEN METABOLIC PANEL: CPT | Performed by: EMERGENCY MEDICINE

## 2018-01-03 PROCEDURE — 51701 INSERT BLADDER CATHETER: CPT

## 2018-01-03 PROCEDURE — 74011250636 HC RX REV CODE- 250/636: Performed by: INTERNAL MEDICINE

## 2018-01-03 PROCEDURE — 74011000258 HC RX REV CODE- 258: Performed by: EMERGENCY MEDICINE

## 2018-01-03 PROCEDURE — 77030013140 HC MSK NEB VYRM -A

## 2018-01-03 PROCEDURE — 74011250636 HC RX REV CODE- 250/636: Performed by: EMERGENCY MEDICINE

## 2018-01-03 PROCEDURE — 74011636320 HC RX REV CODE- 636/320: Performed by: RADIOLOGY

## 2018-01-03 PROCEDURE — 74011250637 HC RX REV CODE- 250/637: Performed by: INTERNAL MEDICINE

## 2018-01-03 PROCEDURE — 87798 DETECT AGENT NOS DNA AMP: CPT | Performed by: INTERNAL MEDICINE

## 2018-01-03 PROCEDURE — 87449 NOS EACH ORGANISM AG IA: CPT | Performed by: INTERNAL MEDICINE

## 2018-01-03 PROCEDURE — 83605 ASSAY OF LACTIC ACID: CPT | Performed by: EMERGENCY MEDICINE

## 2018-01-03 PROCEDURE — 84484 ASSAY OF TROPONIN QUANT: CPT | Performed by: EMERGENCY MEDICINE

## 2018-01-03 PROCEDURE — 65660000000 HC RM CCU STEPDOWN

## 2018-01-03 PROCEDURE — 87804 INFLUENZA ASSAY W/OPTIC: CPT | Performed by: EMERGENCY MEDICINE

## 2018-01-03 PROCEDURE — 85025 COMPLETE CBC W/AUTO DIFF WBC: CPT | Performed by: EMERGENCY MEDICINE

## 2018-01-03 PROCEDURE — 96361 HYDRATE IV INFUSION ADD-ON: CPT

## 2018-01-03 PROCEDURE — 94762 N-INVAS EAR/PLS OXIMTRY CONT: CPT

## 2018-01-03 PROCEDURE — 77030011943

## 2018-01-03 PROCEDURE — 74177 CT ABD & PELVIS W/CONTRAST: CPT

## 2018-01-03 PROCEDURE — 93005 ELECTROCARDIOGRAM TRACING: CPT

## 2018-01-03 RX ORDER — PROMETHAZINE HYDROCHLORIDE 25 MG/1
25 TABLET ORAL
Qty: 12 TAB | Refills: 0 | Status: SHIPPED | OUTPATIENT
Start: 2018-01-03 | End: 2020-01-01

## 2018-01-03 RX ORDER — ARFORMOTEROL TARTRATE 15 UG/2ML
15 SOLUTION RESPIRATORY (INHALATION)
Status: DISCONTINUED | OUTPATIENT
Start: 2018-01-03 | End: 2018-01-11 | Stop reason: HOSPADM

## 2018-01-03 RX ORDER — IPRATROPIUM BROMIDE AND ALBUTEROL SULFATE 2.5; .5 MG/3ML; MG/3ML
3 SOLUTION RESPIRATORY (INHALATION)
Status: COMPLETED | OUTPATIENT
Start: 2018-01-03 | End: 2018-01-03

## 2018-01-03 RX ORDER — LEVOFLOXACIN 750 MG/1
750 TABLET ORAL
Status: DISCONTINUED | OUTPATIENT
Start: 2018-01-03 | End: 2018-01-03

## 2018-01-03 RX ORDER — IPRATROPIUM BROMIDE AND ALBUTEROL SULFATE 2.5; .5 MG/3ML; MG/3ML
3 SOLUTION RESPIRATORY (INHALATION)
Status: DISCONTINUED | OUTPATIENT
Start: 2018-01-03 | End: 2018-01-11 | Stop reason: HOSPADM

## 2018-01-03 RX ORDER — DIPHENHYDRAMINE HCL 25 MG
25 CAPSULE ORAL
Status: DISCONTINUED | OUTPATIENT
Start: 2018-01-03 | End: 2018-01-11 | Stop reason: HOSPADM

## 2018-01-03 RX ORDER — ONDANSETRON 2 MG/ML
4 INJECTION INTRAMUSCULAR; INTRAVENOUS
Status: DISCONTINUED | OUTPATIENT
Start: 2018-01-03 | End: 2018-01-11 | Stop reason: HOSPADM

## 2018-01-03 RX ORDER — ASPIRIN 325 MG
325 TABLET ORAL DAILY
Status: DISCONTINUED | OUTPATIENT
Start: 2018-01-04 | End: 2018-01-04

## 2018-01-03 RX ORDER — HYDROCODONE BITARTRATE AND ACETAMINOPHEN 5; 325 MG/1; MG/1
1 TABLET ORAL
Status: DISCONTINUED | OUTPATIENT
Start: 2018-01-03 | End: 2018-01-11 | Stop reason: HOSPADM

## 2018-01-03 RX ORDER — FAMOTIDINE 20 MG/1
10 TABLET, FILM COATED ORAL 2 TIMES DAILY
Status: DISCONTINUED | OUTPATIENT
Start: 2018-01-03 | End: 2018-01-11 | Stop reason: HOSPADM

## 2018-01-03 RX ORDER — ENOXAPARIN SODIUM 100 MG/ML
40 INJECTION SUBCUTANEOUS EVERY 24 HOURS
Status: DISCONTINUED | OUTPATIENT
Start: 2018-01-03 | End: 2018-01-11 | Stop reason: HOSPADM

## 2018-01-03 RX ORDER — LEVOFLOXACIN 750 MG/1
750 TABLET ORAL DAILY
Qty: 6 TAB | Refills: 0 | Status: SHIPPED | OUTPATIENT
Start: 2018-01-03 | End: 2018-01-03

## 2018-01-03 RX ORDER — BUDESONIDE 0.5 MG/2ML
500 INHALANT ORAL
Status: DISCONTINUED | OUTPATIENT
Start: 2018-01-03 | End: 2018-01-11 | Stop reason: HOSPADM

## 2018-01-03 RX ORDER — ATENOLOL 50 MG/1
50 TABLET ORAL DAILY
Status: DISCONTINUED | OUTPATIENT
Start: 2018-01-04 | End: 2018-01-11 | Stop reason: HOSPADM

## 2018-01-03 RX ORDER — ATENOLOL 50 MG/1
50 TABLET ORAL DAILY
COMMUNITY
End: 2018-01-16 | Stop reason: SDUPTHER

## 2018-01-03 RX ORDER — DEXTROSE, SODIUM CHLORIDE, AND POTASSIUM CHLORIDE 5; .45; .15 G/100ML; G/100ML; G/100ML
50 INJECTION INTRAVENOUS CONTINUOUS
Status: DISCONTINUED | OUTPATIENT
Start: 2018-01-03 | End: 2018-01-06

## 2018-01-03 RX ORDER — SODIUM CHLORIDE 0.9 % (FLUSH) 0.9 %
5-10 SYRINGE (ML) INJECTION AS NEEDED
Status: DISCONTINUED | OUTPATIENT
Start: 2018-01-03 | End: 2018-01-11 | Stop reason: HOSPADM

## 2018-01-03 RX ORDER — ACETAMINOPHEN 325 MG/1
650 TABLET ORAL
Status: COMPLETED | OUTPATIENT
Start: 2018-01-03 | End: 2018-01-03

## 2018-01-03 RX ORDER — ACETAMINOPHEN 325 MG/1
650 TABLET ORAL
Status: DISCONTINUED | OUTPATIENT
Start: 2018-01-03 | End: 2018-01-11 | Stop reason: HOSPADM

## 2018-01-03 RX ADMIN — AZITHROMYCIN MONOHYDRATE 500 MG: 500 INJECTION, POWDER, LYOPHILIZED, FOR SOLUTION INTRAVENOUS at 19:22

## 2018-01-03 RX ADMIN — IOPAMIDOL 95 ML: 755 INJECTION, SOLUTION INTRAVENOUS at 17:09

## 2018-01-03 RX ADMIN — FAMOTIDINE 10 MG: 20 TABLET ORAL at 20:24

## 2018-01-03 RX ADMIN — SODIUM CHLORIDE 1000 ML: 900 INJECTION, SOLUTION INTRAVENOUS at 18:30

## 2018-01-03 RX ADMIN — SODIUM CHLORIDE 500 ML: 900 INJECTION, SOLUTION INTRAVENOUS at 13:04

## 2018-01-03 RX ADMIN — SODIUM CHLORIDE 500 ML: 900 INJECTION, SOLUTION INTRAVENOUS at 16:35

## 2018-01-03 RX ADMIN — IPRATROPIUM BROMIDE AND ALBUTEROL SULFATE 3 ML: .5; 3 SOLUTION RESPIRATORY (INHALATION) at 13:01

## 2018-01-03 RX ADMIN — ACETAMINOPHEN 650 MG: 325 TABLET ORAL at 13:00

## 2018-01-03 RX ADMIN — Medication 10 ML: at 22:33

## 2018-01-03 RX ADMIN — CEFTRIAXONE 2 G: 2 INJECTION, POWDER, FOR SOLUTION INTRAMUSCULAR; INTRAVENOUS at 18:34

## 2018-01-03 RX ADMIN — DEXTROSE MONOHYDRATE, SODIUM CHLORIDE, AND POTASSIUM CHLORIDE 50 ML/HR: 50; 4.5; 1.49 INJECTION, SOLUTION INTRAVENOUS at 22:40

## 2018-01-03 RX ADMIN — ENOXAPARIN SODIUM 40 MG: 100 INJECTION SUBCUTANEOUS at 20:22

## 2018-01-03 NOTE — IP AVS SNAPSHOT
[address] 
[phone] Patient: Imtiaz Ochoa MRN: [id number] ZXV:[birth year] A check andi indicates which time of day the medication should be taken. My Medications START taking these medications Instructions Each Dose to Equal  
 Morning Noon Evening Bedtime  
 cholestyramine-aspartame 4 gram packet Commonly known as:  Tra Collazo Your last dose was: Your next dose is: Take 1 Packet by mouth three (3) times daily (with meals) for 7 days. 4 g  
    
   
   
   
  
 L. acidoph & paracasei- S therm- Bifido 8 billion cell Cap cap Commonly known as:  DENA-Q/RISAQUAD Start taking on:  1/12/2018 Your last dose was: Your next dose is: Take 1 Cap by mouth daily. 1 Cap  
    
   
   
   
  
 predniSONE 20 mg tablet Commonly known as:  Lucy Jennifer Your last dose was: Your next dose is: Take 40mg x 2 days then 20mg x 2 days then 10mg x 2 days  
     
   
   
   
  
 promethazine 25 mg tablet Commonly known as:  PHENERGAN Your last dose was: Your next dose is: Take 1 Tab by mouth every six (6) hours as needed for Nausea. 25 mg  
    
   
   
   
  
 tamsulosin 0.4 mg capsule Commonly known as:  FLOMAX Start taking on:  1/12/2018 Your last dose was: Your next dose is: Take 1 Cap by mouth daily. 0.4 mg  
    
   
   
   
  
  
CHANGE how you take these medications Instructions Each Dose to Equal  
 Morning Noon Evening Bedtime * PROAIR HFA 90 mcg/actuation inhaler Generic drug:  albuterol What changed:  Another medication with the same name was added. Make sure you understand how and when to take each. Your last dose was:     
   
Your next dose is:    
   
   
 USE 2 PUFFS EVERY 8 HOURS AS NEEDED  
     
   
   
   
  
 * albuterol 1.25 mg/3 mL Nebu Commonly known as:  Tavo Polo What changed: You were already taking a medication with the same name, and this prescription was added. Make sure you understand how and when to take each. Your last dose was: Your next dose is:    
   
   
 3 mL by Nebulization route every six (6) hours as needed. Indications: Chronic Obstructive Pulmonary Disease 1.25 mg  
    
   
   
   
  
 * Notice: This list has 2 medication(s) that are the same as other medications prescribed for you. Read the directions carefully, and ask your doctor or other care provider to review them with you. CONTINUE taking these medications Instructions Each Dose to Equal  
 Morning Noon Evening Bedtime  
 atenolol 50 mg tablet Commonly known as:  TENORMIN Your last dose was: Your next dose is: Take 50 mg by mouth daily. 50 mg  
    
   
   
   
  
 BREO ELLIPTA 100-25 mcg/dose inhaler Generic drug:  fluticasone-vilanterol Your last dose was: Your next dose is: Take 1 Puff by inhalation daily. 1 Puff CALCIUM 600 WITH VITAMIN D2 PO Your last dose was: Your next dose is: Take 1 Tab by mouth two (2) times a day. 1 Tab  
    
   
   
   
  
 clopidogrel 75 mg Tab Commonly known as:  PLAVIX Your last dose was: Your next dose is: Take 75 mg by mouth daily. 75 mg  
    
   
   
   
  
 lisinopril 20 mg tablet Commonly known as:  Cantrell Nguyễn Your last dose was: Your next dose is: Take 20 mg by mouth daily. 20 mg  
    
   
   
   
  
 LUPRON DEPOT (4 MONTH) IM Your last dose was: Your next dose is:    
   
   
 by IntraMUSCular route. Every 4 months Receives in providers office Prescribed by Dr. Sergei Burns  
     
   
   
   
  
 multivitamin tablet Commonly known as:  ONE A DAY  
   
 Your last dose was: Your next dose is: Take 1 Tab by mouth daily. 1 Tab OTHER Your last dose was: Your next dose is:    
   
   
 Biofreeze Ointment applied twice daily to feet as needed for arthritis  
     
   
   
   
  
 raNITIdine 150 mg tablet Commonly known as:  ZANTAC Your last dose was: Your next dose is: TAKE ONE TABLET BY MOUTH EVERY DAY PLUS TAKE ONE TABLET AT BEDTIME  
     
   
   
   
  
 simvastatin 20 mg tablet Commonly known as:  ZOCOR Your last dose was: Your next dose is: TAKE ONE TABLET BY MOUTH ONCE DAILY IN THE EVENING  
     
   
   
   
  
 SPIRIVA WITH HANDIHALER 18 mcg inhalation capsule Generic drug:  tiotropium Your last dose was: Your next dose is:    
   
   
 Inhale the contents of 1  capsule via HandiHaler  daily Where to Get Your Medications Information on where to get these meds will be given to you by the nurse or doctor. ! Ask your nurse or doctor about these medications  
  albuterol 1.25 mg/3 mL Nebu  
 cholestyramine-aspartame 4 gram packet L. acidoph & paracasei- S therm- Bifido 8 billion cell Cap cap  
 predniSONE 20 mg tablet  
 promethazine 25 mg tablet  
 tamsulosin 0.4 mg capsule

## 2018-01-03 NOTE — ED TRIAGE NOTES
Patient arrived by ems c/o lower left abdominal pain n/v/d x 2 days states he believes he may have blood in vomit and stool. Patient c/o strong smell in urine with blood in urine. Patient states he takes a blood thinner. Patient c/o generalized weakness and dizziness since yesterday. Patient is resting in bed, bed in low position, call bell in reach, monitor x3.

## 2018-01-03 NOTE — ED NOTES
Pt unable to tolerate ambulation. Walked approx 10ft w/ significant BARKLEY with decrease in sats. MD aware.

## 2018-01-03 NOTE — ED PROVIDER NOTES
HPI Comments: 69 y.o. male with past medical history significant for bladder tumor, prostate CA, HLD, CAD, osteoporosis, COPD, GERD, HTN, pulmonary nodule, carotid stenosis, CABG, hernia repair, and cholecyctectomy who presents from home via EMS with chief complaint of diarrhea. The pt reports that he started to have cold like sx 3 days ago such as a productive cough. 2 days ago, the pt started to have weakness, nausea, diarrhea, one episode of vomiting, fever, and abdominal discomfort. One day ago, he reports that his urine was foul smelling with blood and that he has been having dark stools. The pt took tylenol this morning. The pt denies recent sick contact. There are no other acute medical concerns at this time. Social hx: former smoker, no EtOH use  PCP: Estefania Ford MD    Note written by Fabiano Morgan, as dictated by Eveline Clark MD 12:53 PM      The history is provided by the patient. No  was used. Past Medical History:   Diagnosis Date    Arthritis of foot, degenerative     Dr. Genia Arshad Bladder tumor 2004    Dr Rukhsana Sosa, [facility] 10/29/11    CAD (coronary artery disease)     MI 7/2010,s/p CABG. Dr. Arely Martinez Carotid stenosis 10/20/15    Dr. Padma Robertson. endarterectomy 12/31/15. 50-69% L on doppler. 80% \"per CT\"    Cataract     Chest wall pain     incomplete sternal wound healing s/p CABG    COPD (chronic obstructive pulmonary disease) (HCC)     moderate, FEV1 1.41 7/2009. eSda Gaines    GERD (gastroesophageal reflux disease)     HTN (hypertension)     Hyperlipidemia LDL goal < 70     Osteoporosis     tx w calcium, DEXA improved T-2.1 10/2011, 2012, 1/2015    PHN (postherpetic neuralgia)     Physiological tremor     Dr. Tomás Ferreira [facility]) 2/2004    on lupron. PSA increased 0.149 2/2014    Pulmonary nodule, right     5 mm, stable on CT 10/14/10, 1/14/14    Shingles 12/12/12    right neck and back.          Past Surgical History: Procedure Laterality Date    BLADDER TUMOR ASSOC      CABG, ARTERIAL, THREE  7/2010    Dr Tomeka Riddle. LIMA to LAD, spah to cirm    CYSTOSCOPY  10/29/11    FISH, normal    CYSTOURETHROSCOPY  11/29/2016    normal    HX CAROTID ENDARTERECTOMY Left 12/31/15    Dr. Perez Bending  12/2011    right eye, Dr. Lesa Hennessy HX COLONOSCOPY  10/2/13    hyperplastic polyp, Dr. Dale Metzger HX [facility] [address]  03/24/2017    gangranous. Dr. Cristina Collins         Family History:   Problem Relation Age of Onset    Cancer Mother      bladder    Heart Disease Brother        Social History     Social History    Marital status:      Spouse name: N/A    Number of children: N/A    Years of education: N/A     Occupational History    Not on file. Social History Main Topics    Smoking status: Former Smoker     Types: Cigarettes     Quit date: 12/5/2003    Smokeless tobacco: Never Used    Alcohol use No    Drug use: Not on file    Sexual activity: Not on file     Other Topics Concern    Not on file     Social History Narrative         ALLERGIES: Review of patient's allergies indicates no known allergies. Review of Systems   Constitutional: Positive for fever. Respiratory: Positive for cough. Gastrointestinal: Positive for abdominal pain (discomfort), diarrhea, nausea and vomiting. Genitourinary: Positive for hematuria. Foul urine   Neurological: Positive for weakness. All other systems reviewed and are negative. Vitals:    01/03/18 1223 01/03/18 1230   BP: 160/48 133/88   Pulse: 82 83   Resp: 20 26   Temp: 97.9 °F (36.6 °C) (!) 100.6 °F (38.1 °C)   SpO2: 94% 93%   Weight: 86.2 kg (190 lb)    Height: 5' 6\" (1.676 m)             Physical Exam   Constitutional: He is oriented to person, place, and time. He appears well-developed and well-nourished. No distress. HENT:   Head: Normocephalic and atraumatic.    Eyes: Conjunctivae are normal.   Neck: Normal range of motion. Neck supple. Cardiovascular: Normal rate, regular rhythm, normal heart sounds and intact distal pulses. Exam reveals no friction rub. No murmur heard. Pulmonary/Chest: Effort normal. No respiratory distress. He has no wheezes. He has no rales. He exhibits no tenderness. Rhonchi b/l   Abdominal: Soft. Bowel sounds are normal. He exhibits no distension. There is no tenderness. There is no rebound and no guarding. Genitourinary:   Genitourinary Comments: Brown stool   Musculoskeletal: Normal range of motion. He exhibits no edema or tenderness. Neurological: He is alert and oriented to person, place, and time. Coordination normal.   Skin: Skin is warm and dry. He is not diaphoretic. No pallor. Psychiatric: He has a normal mood and affect. His behavior is normal.   Nursing note and vitals reviewed. MDM  Number of Diagnoses or Management Options  Community acquired pneumonia, unspecified laterality:   Diarrhea, unspecified type:   Weakness:   Diagnosis management comments: PNA vs flu vs bronchitis       Amount and/or Complexity of Data Reviewed  Clinical lab tests: reviewed and ordered  Tests in the radiology section of CPT®: ordered and reviewed  Obtain history from someone other than the patient: yes  Discuss the patient with other providers: yes (hospitalist)  Independent visualization of images, tracings, or specimens: yes (ekg)    Patient Progress  Patient progress: stable    ED Course       Procedures      EKG interpretation: (Preliminary)  Rhythm: RBBB; and regular . Rate (approx.): 80; Axis: normal; P wave: normal; QRS interval: prolonged; ST/T wave: non-specific changes rbbb unchanged     1:42 PM  Reevaluated the pt. The pt is not wheezing. Waiting on urine. 3:39 PM  Reevaluated the pt. The pt still does not feel well. The pt's temperature is 98.9. His abdomen is still bothering him. Will give the pt more fluids and obtain a CT abdomen.      6:04 PM  The pt was just ambulated and he was unable to make it to the nurse's station. Oxygen saturations dropped to the 80's. Initial chest xray was negative but PNa on CT will order abx     CONSULT NOTE:  6:47 PM Heather Rodas MD spoke with Dr. Alexandru Renee DO, Consult for Hospitalist.  Discussed available diagnostic tests and clinical findings. He is in agreement with care plans as outlined. He will see and admit the pt.

## 2018-01-03 NOTE — IP AVS SNAPSHOT
[address] 1007 [facility] 
[phone] Patient: Mahad Churchill MRN: [id number] NVT:[id number]/9/0999 About your hospitalization You were admitted on:  January 3, 2018 You last received care in the:  [facility] 5M1 MED SURG 1 You were discharged on:  January 11, 2018 Why you were hospitalized Your primary diagnosis was:  Not on File Your diagnoses also included:  Pneumonia, Leukocytosis, Sinus Tachycardia, Fever, Sepsis (Hcc), Cad (Coronary Artery Disease), Copd (Chronic Obstructive Pulmonary Disease) (Hcc), Gerd (Gastroesophageal Reflux Disease), Htn (Hypertension), Hyperlipidemia With Target Ldl Less Than 70, Abdominal Pain, Colitis, Nausea & Vomiting Follow-up Information Follow up With Details Comments Contact Info [phone] and [facility] [address] 
[phone] Daniel Puga MD Go on 1/12/2018 Hospital follow-up appointment at 4:00PM [address] [facility] [address] [facility] [address] [place] [address] 
[phone] Discharge Orders None A check andi indicates which time of day the medication should be taken. My Medications START taking these medications Instructions Each Dose to Equal  
 Morning Noon Evening Bedtime  
 cholestyramine-aspartame 4 gram packet Commonly known as:  Allena  Your last dose was: Your next dose is: Take 1 Packet by mouth three (3) times daily (with meals) for 7 days. 4 g  
    
   
   
   
  
 L. acidoph & paracasei- S therm- Bifido 8 billion cell Cap cap Commonly known as:  DENA-Q/RISAQUAD Start taking on:  1/12/2018 Your last dose was: Your next dose is: Take 1 Cap by mouth daily. 1 Cap  
    
   
   
   
  
 predniSONE 20 mg tablet Commonly known as:  Ana Maria Aver Your last dose was: Your next dose is: Take 40mg x 2 days then 20mg x 2 days then 10mg x 2 days  
     
   
   
   
  
 promethazine 25 mg tablet Commonly known as:  PHENERGAN Your last dose was: Your next dose is: Take 1 Tab by mouth every six (6) hours as needed for Nausea. 25 mg  
    
   
   
   
  
 tamsulosin 0.4 mg capsule Commonly known as:  FLOMAX Start taking on:  1/12/2018 Your last dose was: Your next dose is: Take 1 Cap by mouth daily. 0.4 mg  
    
   
   
   
  
  
CHANGE how you take these medications Instructions Each Dose to Equal  
 Morning Noon Evening Bedtime * PROAIR HFA 90 mcg/actuation inhaler Generic drug:  albuterol What changed:  Another medication with the same name was added. Make sure you understand how and when to take each. Your last dose was: Your next dose is:    
   
   
 USE 2 PUFFS EVERY 8 HOURS AS NEEDED  
     
   
   
   
  
 * albuterol 1.25 mg/3 mL Nebu Commonly known as:  Peter Carey What changed: You were already taking a medication with the same name, and this prescription was added. Make sure you understand how and when to take each. Your last dose was: Your next dose is:    
   
   
 3 mL by Nebulization route every six (6) hours as needed. Indications: Chronic Obstructive Pulmonary Disease 1.25 mg  
    
   
   
   
  
 * Notice: This list has 2 medication(s) that are the same as other medications prescribed for you. Read the directions carefully, and ask your doctor or other care provider to review them with you. CONTINUE taking these medications Instructions Each Dose to Equal  
 Morning Noon Evening Bedtime  
 atenolol 50 mg tablet Commonly known as:  TENORMIN Your last dose was: Your next dose is: Take 50 mg by mouth daily. 50 mg  
    
   
   
   
  
 BREO ELLIPTA 100-25 mcg/dose inhaler Generic drug:  fluticasone-vilanterol Your last dose was: Your next dose is: Take 1 Puff by inhalation daily. 1 Puff CALCIUM 600 WITH VITAMIN D2 PO Your last dose was: Your next dose is: Take 1 Tab by mouth two (2) times a day. 1 Tab  
    
   
   
   
  
 clopidogrel 75 mg Tab Commonly known as:  PLAVIX Your last dose was: Your next dose is: Take 75 mg by mouth daily. 75 mg  
    
   
   
   
  
 lisinopril 20 mg tablet Commonly known as:  Asya November Your last dose was: Your next dose is: Take 20 mg by mouth daily. 20 mg  
    
   
   
   
  
 LUPRON DEPOT (4 MONTH) IM Your last dose was: Your next dose is:    
   
   
 by IntraMUSCular route. Every 4 months Receives in providers office Prescribed by Dr. David Caputo  
     
   
   
   
  
 multivitamin tablet Commonly known as:  ONE A DAY Your last dose was: Your next dose is: Take 1 Tab by mouth daily. 1 Tab OTHER Your last dose was: Your next dose is:    
   
   
 Biofreeze Ointment applied twice daily to feet as needed for arthritis  
     
   
   
   
  
 raNITIdine 150 mg tablet Commonly known as:  ZANTAC Your last dose was: Your next dose is: TAKE ONE TABLET BY MOUTH EVERY DAY PLUS TAKE ONE TABLET AT BEDTIME  
     
   
   
   
  
 simvastatin 20 mg tablet Commonly known as:  ZOCOR Your last dose was: Your next dose is: TAKE ONE TABLET BY MOUTH ONCE DAILY IN THE EVENING  
     
   
   
   
  
 SPIRIVA WITH HANDIHALER 18 mcg inhalation capsule Generic drug:  tiotropium Your last dose was: Your next dose is:    
   
   
 Inhale the contents of 1  capsule via HandiHaler  daily Where to Get Your Medications Information on where to get these meds will be given to you by the nurse or doctor. ! Ask your nurse or doctor about these medications  
  albuterol 1.25 mg/3 mL Nebu  
 cholestyramine-aspartame 4 gram packet L. acidoph & paracasei- S therm- Bifido 8 billion cell Cap cap  
 predniSONE 20 mg tablet  
 promethazine 25 mg tablet  
 tamsulosin 0.4 mg capsule Discharge Instructions HOSPITALIST DISCHARGE INSTRUCTIONS 
NAME: Katerin Knight :  1940 MRN:  [id number] Date/Time:  2018 12:06 PM 
 
ADMIT DATE: 1/3/2018 DISCHARGE DATE: 2018 ADMITTING DIAGNOSIS: 
Diarrhea DISCHARGE DIAGNOSIS: 
As above MEDICATIONS: 
  
· It is important that you take the medication exactly as they are prescribed. · Keep your medication in the bottles provided by the pharmacist and keep a list of the medication names, dosages, and times to be taken in your wallet. · Do not take other medications without consulting your doctor. Pain Management: per above medications What to do at [facility] Recommended diet:  Regular Diet Recommended activity: Activity as tolerated If you experience any of the following symptoms then please call your primary care physician or return to the emergency room if you cannot get hold of your doctor: 
Fever, chills, nausea, vomiting, diarrhea, change in mentation, falling, bleeding, shortness of breath, chest pain Follow Up: Follow-up Information Follow up With Details Comments Contact Info  [address] [facility] and [place] [id number] 
[phone] Shira Hidalgo MD Go on 2018 Hospital follow-up appointment at 4:00PM Anna Gama [address] [place] [address] 250 Internal Medicine [address] 
[phone] Please follow-up with [place] [facility] in one week for voiding trial as an outpatient. Please call [phone] to schedule an appointment Information obtained by : 
 I understand that if any problems occur once I am at home I am to contact my physician. I understand and acknowledge receipt of the instructions indicated above. Physician's or R.N.'s Signature                                                                  Date/Time Patient or Representative Signature                                                          Date/Time Diarrhea: Care Instructions Your Care Instructions Diarrhea is loose, watery stools (bowel movements). The exact cause is often hard to find. Sometimes diarrhea is your body's way of getting rid of what caused an upset stomach. Viruses, food poisoning, and many medicines can cause diarrhea. Some people get diarrhea in response to emotional stress, anxiety, or certain foods. Almost everyone has diarrhea now and then. It usually isn't serious, and your stools will return to normal soon. The important thing to do is replace the fluids you have lost, so you can prevent dehydration. The doctor has checked you carefully, but problems can develop later. If you notice any problems or new symptoms, get medical treatment right away. Follow-up care is a key part of your treatment and safety. Be sure to make and go to all appointments, and call your doctor if you are having problems. It's also a good idea to know your test results and keep a list of the medicines you take. How can you care for yourself at home? · Watch for signs of dehydration, which means your body has lost too much water. Dehydration is a serious condition and should be treated right away. Signs of dehydration are: 
¨ Increasing thirst and dry eyes and mouth. ¨ Feeling faint or lightheaded. ¨ Darker urine, and a smaller amount of urine than normal. 
· To prevent dehydration, drink plenty of fluids, enough so that your urine is light yellow or clear like water. Choose water and other caffeine-free clear liquids until you feel better. If you have kidney, heart, or liver disease and have to limit fluids, talk with your doctor before you increase the amount of fluids you drink. · Begin eating small amounts of mild foods the next day, if you feel like it. ¨ Try yogurt that has live cultures of Lactobacillus. (Check the label.) ¨ Avoid spicy foods, fruits, alcohol, and caffeine until 48 hours after all symptoms are gone. ¨ Avoid chewing gum that contains sorbitol. ¨ Avoid dairy products (except for yogurt with Lactobacillus) while you have diarrhea and for 3 days after symptoms are gone. · The doctor may recommend that you take over-the-counter medicine, such as loperamide (Imodium), if you still have diarrhea after 6 hours. Read and follow all instructions on the label. Do not use this medicine if you have bloody diarrhea, a high fever, or other signs of serious illness. Call your doctor if you think you are having a problem with your medicine. When should you call for help? Call 911 anytime you think you may need emergency care. For example, call if: 
? · You passed out (lost consciousness). ? · Your stools are maroon or very bloody. ?Call your doctor now or seek immediate medical care if: 
? · You are dizzy or lightheaded, or you feel like you may faint. ? · Your stools are black and look like tar, or they have streaks of blood. ? · You have new or worse belly pain. ? · You have symptoms of dehydration, such as: ¨ Dry eyes and a dry mouth. ¨ Passing only a little dark urine. ¨ Feeling thirstier than usual.  
? · You have a new or higher fever. ? Watch closely for changes in your health, and be sure to contact your doctor if: 
? · Your diarrhea is getting worse. ? · You see pus in the diarrhea. ? · You are not getting better after 2 days (48 hours). Where can you learn more? Go to http://india-renard.info/. Enter T929 in the search box to learn more about \"Diarrhea: Care Instructions. \" Current as of: March 20, 2017 Content Version: 11.4 © 7843-2895 [employer]. Care instructions adapted under license by [employer] (which disclaims liability or warranty for this information). If you have questions about a medical condition or this instruction, always ask your healthcare professional. [place] [address] any warranty or liability for your use of this information. Pneumonia: Care Instructions Your Care Instructions Pneumonia is an infection of the lungs. Most cases are caused by infections from bacteria or viruses. Pneumonia may be mild or very severe. If it is caused by bacteria, you will be treated with antibiotics. It may take a few weeks to a few months to recover fully from pneumonia, depending on how sick you were and whether your overall health is good. Follow-up care is a key part of your treatment and safety. Be sure to make and go to all appointments, and call your doctor if you are having problems. It's also a good idea to know your test results and keep a list of the medicines you take. How can you care for yourself at home? · Take your antibiotics exactly as directed. Do not stop taking the medicine just because you are feeling better. You need to take the full course of antibiotics. · Take your medicines exactly as prescribed. Call your doctor if you think you are having a problem with your medicine. · Get plenty of rest and sleep. You may feel weak and tired for a while, but your energy level will improve with time. · To prevent dehydration, drink plenty of fluids, enough so that your urine is light yellow or clear like water.  Choose water and other caffeine-free clear liquids until you feel better. If you have kidney, heart, or liver disease and have to limit fluids, talk with your doctor before you increase the amount of fluids you drink. · Take care of your cough so you can rest. A cough that brings up mucus from your lungs is common with pneumonia. It is one way your body gets rid of the infection. But if coughing keeps you from resting or causes severe fatigue and chest-wall pain, talk to your doctor. He or she may suggest that you take a medicine to reduce the cough. · Use a vaporizer or humidifier to add moisture to your bedroom. Follow the directions for cleaning the machine. · Do not smoke or allow others to smoke around you. Smoke will make your cough last longer. If you need help quitting, talk to your doctor about stop-smoking programs and medicines. These can increase your chances of quitting for good. · Take an over-the-counter pain medicine, such as acetaminophen (Tylenol), ibuprofen (Advil, Motrin), or naproxen (Aleve). Read and follow all instructions on the label. · Do not take two or more pain medicines at the same time unless the doctor told you to. Many pain medicines have acetaminophen, which is Tylenol. Too much acetaminophen (Tylenol) can be harmful. · If you were given a spirometer to measure how well your lungs are working, use it as instructed. This can help your doctor tell how your recovery is going. · To prevent pneumonia in the future, talk to your doctor about getting a flu vaccine (once a year) and a pneumococcal vaccine (one time only for most people). When should you call for help? Call 911 anytime you think you may need emergency care. For example, call if: 
? · You have severe trouble breathing. ?Call your doctor now or seek immediate medical care if: 
? · You cough up dark brown or bloody mucus (sputum). ? · You have new or worse trouble breathing. ? · You are dizzy or lightheaded, or you feel like you may faint. ? Watch closely for changes in your health, and be sure to contact your doctor if: 
? · You have a new or higher fever. ? · You are coughing more deeply or more often. ? · You are not getting better after 2 days (48 hours). ? · You do not get better as expected. Where can you learn more? Go to http://india-renard.info/. Enter [id number] in the search box to learn more about \"Pneumonia: Care Instructions. \" Current as of: May 12, 2017 Content Version: 11.4 © 2965-5841 [employer]. Care instructions adapted under license by DocuSign (which disclaims liability or warranty for this information). If you have questions about a medical condition or this instruction, always ask your healthcare professional. [place] [address] any warranty or liability for your use of this information. Weakness: Care Instructions Your Care Instructions Weakness is a lack of physical or muscle strength. You may feel that you need to make extra effort to move your arms, legs, or other muscles. Generalized weakness means that you feel weak in most areas of your body. Another type of weakness may affect just one muscle or group of muscles. You may feel weak and tired after you have done too much activity, such as taking an extra-long hike. This is not a serious problem. It often goes away on its own. Feeling weak can also be caused by medical conditions like thyroid problems, depression, or a virus. Sometimes the cause can be serious. Your doctor may want to do more tests to try to find the cause of the weakness. The doctor has checked you carefully, but problems can develop later. If you notice any problems or new symptoms, get medical treatment right away. Follow-up care is a key part of your treatment and safety.  Be sure to make and go to all appointments, and call your doctor if you are having problems. It's also a good idea to know your test results and keep a list of the medicines you take. How can you care for yourself at home? · Rest when you feel tired. · Be safe with medicines. If your doctor prescribed medicine, take it exactly as prescribed. Call your doctor if you think you are having a problem with your medicine. You will get more details on the specific medicines your doctor prescribes. · Do not skip meals. Eating a balanced diet may increase your energy level. · Get some physical activity every day, but do not get too tired. When should you call for help? Call your doctor now or seek immediate medical care if: 
? · You have new or worse weakness. ? · You are dizzy or lightheaded, or you feel like you may faint. ? Watch closely for changes in your health, and be sure to contact your doctor if: 
? · You do not get better as expected. Where can you learn more? Go to http://indiaNICErenard.info/. Enter 534 [id number] in the search box to learn more about \"Weakness: Care Instructions. \" Current as of: March 20, 2017 Content Version: 11.4 © 6261-0536 [employer]. Care instructions adapted under license by [employer] (which disclaims liability or warranty for this information). If you have questions about a medical condition or this instruction, always ask your healthcare professional. [address] any warranty or liability for your use of this information. ACO Transitions of Care Introducing [employer] [address] Janet Adi offers a voluntary care coordination program to provide high quality service and care to [facility] fee-for-service beneficiaries. Raghu Ramirez was designed to help you enhance your health and well-being through the following services: ? Transitions of Care  support for individuals who are transitioning from one care setting to another (example: Hospital to home). ? Chronic and Complex Care Coordination  support for individuals and caregivers of those with serious or chronic illnesses or with more than one chronic (ongoing) condition and those who take a number of different medications. If you meet specific medical criteria, a [facility] may call you directly to coordinate your care with your primary care physician and your other care providers. For questions about the [facility] programs, please, contact your physicians office. For general questions or additional information about Accountable Care Organizations: 
Please visit www.medicare.gov/acos. html or call 1-800-MEDICARE (4-804.312.3311) TTY users should call 6-577.483.4618. Introducing [facility]! Dear Melo Lewis: Thank you for requesting a Relux account. Our records indicate that you already have an active Relux account. You can access your account anytime at [email or web address] Did you know that you can access your hospital and ER discharge instructions at any time in Relux? You can also review all of your test results from your hospital stay or ER visit. Additional Information If you have questions, please visit the Frequently Asked Questions section of the Relux website at [email or web address] Remember, Relux is NOT to be used for urgent needs. For medical emergencies, dial 911. Now available from your iPhone and Android! Providers Seen During Your Hospitalization Provider Specialty Primary office phone Shimon Newsome MD Emergency Medicine [phone] Wendie Crocker MD Internal Medicine [phone] Bia Moore MD Internal Medicine [phone] Shanti Villalobos DO Internal Medicine [phone] Ambrocio Phelan MD Internal Medicine [phone] Immunizations Administered for This Admission Name Date Influenza Vaccine (Quad) PF  Deferred () Your Primary Care Physician (PCP) Primary Care Physician Office Phone Office Fax Zelalem Mahmood [phone] You are allergic to the following No active allergies Recent Documentation Height Weight BMI Smoking Status 1.676 m 87.8 kg 31.24 kg/m2 Former Smoker Emergency Contacts Name Discharge Info Relation Home Work Mobile Sherita Mann DISCHARGE CAREGIVER [3] Spouse [3] [phone] Patient Belongings The following personal items are in your possession at time of discharge: 
  Dental Appliances: None  Visual Aid: Glasses      Home Medications: None   Jewelry: None  Clothing: At bedside    Other Valuables: At bedside Please provide this summary of care documentation to your next provider. Signatures-by signing, you are acknowledging that this After Visit Summary has been reviewed with you and you have received a copy. Patient Signature:  ____________________________________________________________ Date:  ____________________________________________________________  
  
[facility] Provider Signature:  ____________________________________________________________ Date:  ____________________________________________________________

## 2018-01-04 LAB
ALBUMIN SERPL-MCNC: 2.7 G/DL (ref 3.5–5)
ALBUMIN/GLOB SERPL: 0.8 {RATIO} (ref 1.1–2.2)
ALP SERPL-CCNC: 77 U/L (ref 45–117)
ALT SERPL-CCNC: 35 U/L (ref 12–78)
ANION GAP SERPL CALC-SCNC: 12 MMOL/L (ref 5–15)
AST SERPL-CCNC: 25 U/L (ref 15–37)
ATRIAL RATE: 82 BPM
B PERT DNA SPEC QL NAA+PROBE: NOT DETECTED
BILIRUB SERPL-MCNC: 0.6 MG/DL (ref 0.2–1)
BUN SERPL-MCNC: 14 MG/DL (ref 6–20)
BUN/CREAT SERPL: 12 (ref 12–20)
C PNEUM DNA SPEC QL NAA+PROBE: NOT DETECTED
CALCIUM SERPL-MCNC: 8 MG/DL (ref 8.5–10.1)
CALCULATED P AXIS, ECG09: 60 DEGREES
CALCULATED R AXIS, ECG10: 17 DEGREES
CALCULATED T AXIS, ECG11: 59 DEGREES
CHLORIDE SERPL-SCNC: 105 MMOL/L (ref 97–108)
CO2 SERPL-SCNC: 22 MMOL/L (ref 21–32)
CREAT SERPL-MCNC: 1.13 MG/DL (ref 0.7–1.3)
DIAGNOSIS, 93000: NORMAL
ERYTHROCYTE [DISTWIDTH] IN BLOOD BY AUTOMATED COUNT: 13.2 % (ref 11.5–14.5)
FLUAV H1 2009 PAND RNA SPEC QL NAA+PROBE: NOT DETECTED
FLUAV H1 RNA SPEC QL NAA+PROBE: NOT DETECTED
FLUAV H3 RNA SPEC QL NAA+PROBE: NOT DETECTED
FLUAV SUBTYP SPEC NAA+PROBE: NOT DETECTED
FLUBV RNA SPEC QL NAA+PROBE: NOT DETECTED
GLOBULIN SER CALC-MCNC: 3.6 G/DL (ref 2–4)
GLUCOSE SERPL-MCNC: 121 MG/DL (ref 65–100)
HADV DNA SPEC QL NAA+PROBE: NOT DETECTED
HCOV 229E RNA SPEC QL NAA+PROBE: NOT DETECTED
HCOV HKU1 RNA SPEC QL NAA+PROBE: NOT DETECTED
HCOV NL63 RNA SPEC QL NAA+PROBE: NOT DETECTED
HCOV OC43 RNA SPEC QL NAA+PROBE: NOT DETECTED
HCT VFR BLD AUTO: 38.2 % (ref 36.6–50.3)
HGB BLD-MCNC: 12.6 G/DL (ref 12.1–17)
HMPV RNA SPEC QL NAA+PROBE: NOT DETECTED
HPIV1 RNA SPEC QL NAA+PROBE: NOT DETECTED
HPIV2 RNA SPEC QL NAA+PROBE: NOT DETECTED
HPIV3 RNA SPEC QL NAA+PROBE: NOT DETECTED
HPIV4 RNA SPEC QL NAA+PROBE: NOT DETECTED
M PNEUMO DNA SPEC QL NAA+PROBE: NOT DETECTED
MAGNESIUM SERPL-MCNC: 1.8 MG/DL (ref 1.6–2.4)
MCH RBC QN AUTO: 31.5 PG (ref 26–34)
MCHC RBC AUTO-ENTMCNC: 33 G/DL (ref 30–36.5)
MCV RBC AUTO: 95.5 FL (ref 80–99)
P-R INTERVAL, ECG05: 140 MS
PHOSPHATE SERPL-MCNC: 2.3 MG/DL (ref 2.6–4.7)
PLATELET # BLD AUTO: 184 K/UL (ref 150–400)
POTASSIUM SERPL-SCNC: 3.9 MMOL/L (ref 3.5–5.1)
PROT SERPL-MCNC: 6.3 G/DL (ref 6.4–8.2)
Q-T INTERVAL, ECG07: 398 MS
QRS DURATION, ECG06: 120 MS
QTC CALCULATION (BEZET), ECG08: 464 MS
RBC # BLD AUTO: 4 M/UL (ref 4.1–5.7)
RSV RNA SPEC QL NAA+PROBE: NOT DETECTED
RV+EV RNA SPEC QL NAA+PROBE: NOT DETECTED
SODIUM SERPL-SCNC: 139 MMOL/L (ref 136–145)
VENTRICULAR RATE, ECG03: 82 BPM
WBC # BLD AUTO: 13.3 K/UL (ref 4.1–11.1)

## 2018-01-04 PROCEDURE — 74011000250 HC RX REV CODE- 250: Performed by: INTERNAL MEDICINE

## 2018-01-04 PROCEDURE — 94640 AIRWAY INHALATION TREATMENT: CPT

## 2018-01-04 PROCEDURE — 80053 COMPREHEN METABOLIC PANEL: CPT | Performed by: INTERNAL MEDICINE

## 2018-01-04 PROCEDURE — 97161 PT EVAL LOW COMPLEX 20 MIN: CPT

## 2018-01-04 PROCEDURE — 74011250636 HC RX REV CODE- 250/636: Performed by: INTERNAL MEDICINE

## 2018-01-04 PROCEDURE — 74011250637 HC RX REV CODE- 250/637: Performed by: INTERNAL MEDICINE

## 2018-01-04 PROCEDURE — 85027 COMPLETE CBC AUTOMATED: CPT | Performed by: INTERNAL MEDICINE

## 2018-01-04 PROCEDURE — 36415 COLL VENOUS BLD VENIPUNCTURE: CPT | Performed by: INTERNAL MEDICINE

## 2018-01-04 PROCEDURE — 65660000000 HC RM CCU STEPDOWN

## 2018-01-04 PROCEDURE — 77010033678 HC OXYGEN DAILY

## 2018-01-04 PROCEDURE — 84100 ASSAY OF PHOSPHORUS: CPT | Performed by: INTERNAL MEDICINE

## 2018-01-04 PROCEDURE — 97116 GAIT TRAINING THERAPY: CPT

## 2018-01-04 PROCEDURE — 74011000258 HC RX REV CODE- 258: Performed by: INTERNAL MEDICINE

## 2018-01-04 PROCEDURE — 83735 ASSAY OF MAGNESIUM: CPT | Performed by: INTERNAL MEDICINE

## 2018-01-04 RX ORDER — METRONIDAZOLE 500 MG/100ML
500 INJECTION, SOLUTION INTRAVENOUS EVERY 12 HOURS
Status: DISCONTINUED | OUTPATIENT
Start: 2018-01-04 | End: 2018-01-06

## 2018-01-04 RX ORDER — CLOPIDOGREL BISULFATE 75 MG/1
75 TABLET ORAL DAILY
COMMUNITY

## 2018-01-04 RX ORDER — CLOPIDOGREL BISULFATE 75 MG/1
75 TABLET ORAL DAILY
Status: DISCONTINUED | OUTPATIENT
Start: 2018-01-05 | End: 2018-01-11 | Stop reason: HOSPADM

## 2018-01-04 RX ORDER — LEVOFLOXACIN 5 MG/ML
750 INJECTION, SOLUTION INTRAVENOUS EVERY 24 HOURS
Status: DISCONTINUED | OUTPATIENT
Start: 2018-01-04 | End: 2018-01-06

## 2018-01-04 RX ORDER — LISINOPRIL 20 MG/1
20 TABLET ORAL DAILY
COMMUNITY
End: 2018-03-29 | Stop reason: SDUPTHER

## 2018-01-04 RX ORDER — METRONIDAZOLE 500 MG/100ML
500 INJECTION, SOLUTION INTRAVENOUS EVERY 8 HOURS
Status: DISCONTINUED | OUTPATIENT
Start: 2018-01-04 | End: 2018-01-04 | Stop reason: DRUGHIGH

## 2018-01-04 RX ADMIN — ASPIRIN 325 MG: 325 TABLET, COATED ORAL at 08:15

## 2018-01-04 RX ADMIN — ENOXAPARIN SODIUM 40 MG: 100 INJECTION SUBCUTANEOUS at 20:29

## 2018-01-04 RX ADMIN — PIPERACILLIN SODIUM AND TAZOBACTAM SODIUM 3.38 G: 3; .375 INJECTION, POWDER, LYOPHILIZED, FOR SOLUTION INTRAVENOUS at 00:47

## 2018-01-04 RX ADMIN — BUDESONIDE 500 MCG: 0.5 INHALANT RESPIRATORY (INHALATION) at 21:07

## 2018-01-04 RX ADMIN — IPRATROPIUM BROMIDE AND ALBUTEROL SULFATE 3 ML: .5; 3 SOLUTION RESPIRATORY (INHALATION) at 13:14

## 2018-01-04 RX ADMIN — ARFORMOTEROL TARTRATE 15 MCG: 15 SOLUTION RESPIRATORY (INHALATION) at 07:15

## 2018-01-04 RX ADMIN — IPRATROPIUM BROMIDE AND ALBUTEROL SULFATE 3 ML: .5; 3 SOLUTION RESPIRATORY (INHALATION) at 21:07

## 2018-01-04 RX ADMIN — BUDESONIDE 500 MCG: 0.5 INHALANT RESPIRATORY (INHALATION) at 07:15

## 2018-01-04 RX ADMIN — PIPERACILLIN SODIUM AND TAZOBACTAM SODIUM 3.38 G: 3; .375 INJECTION, POWDER, LYOPHILIZED, FOR SOLUTION INTRAVENOUS at 06:45

## 2018-01-04 RX ADMIN — IPRATROPIUM BROMIDE AND ALBUTEROL SULFATE 3 ML: .5; 3 SOLUTION RESPIRATORY (INHALATION) at 07:15

## 2018-01-04 RX ADMIN — LEVOFLOXACIN 750 MG: 5 INJECTION, SOLUTION INTRAVENOUS at 08:18

## 2018-01-04 RX ADMIN — ARFORMOTEROL TARTRATE 15 MCG: 15 SOLUTION RESPIRATORY (INHALATION) at 21:07

## 2018-01-04 RX ADMIN — METRONIDAZOLE 500 MG: 500 INJECTION, SOLUTION INTRAVENOUS at 08:18

## 2018-01-04 RX ADMIN — ATENOLOL 50 MG: 50 TABLET ORAL at 08:15

## 2018-01-04 RX ADMIN — FAMOTIDINE 10 MG: 20 TABLET ORAL at 08:15

## 2018-01-04 RX ADMIN — FAMOTIDINE 10 MG: 20 TABLET ORAL at 17:42

## 2018-01-04 RX ADMIN — METRONIDAZOLE 500 MG: 500 INJECTION, SOLUTION INTRAVENOUS at 20:29

## 2018-01-04 NOTE — ROUTINE PROCESS
TRANSFER - OUT REPORT:    Verbal report given to  Lata Tobin Aid  being transferred to 5 for routine progression of care       Report consisted of patients Situation, Background, Assessment and   Recommendations(SBAR). Information from the following report(s) SBAR, ED Summary, [facility] and Recent Results was reviewed with the receiving nurse. Opportunity for questions and clarification was provided.

## 2018-01-04 NOTE — PROGRESS NOTES
Bedside and Verbal shift change report given to [employer] (oncoming nurse) by Ricardo Sherwood (offgoing nurse). Report included the following information SBAR, Kardex, Intake/Output, MAR and Recent Results.

## 2018-01-04 NOTE — CONSULTS
Gastroenterology Consultation Note      Admit Date: 1/3/2018  Consult Date: 1/4/2018   I greatly appreciate your asking me to see Mahad Churchill, thank you very much for the opportunity to participate in his care. Narrative Assessment and Plan   · Diarrhea  · Nausea/vomiting  · Abdominal pain    Suspect viral gastroenteritis vs infectious diarrhea (symptoms started [place] Plan for conservative management. - await results of stool tests  - continue fluids for rehydration  - monitor electrolytes and replace as needed  - advance diet as tolerated  - following    Plan of care discussed with attending physician (Dr. Inez Peck) - full attestation to follow    -----  Marlen Vicente. Inez Peck MD  [phone] office  [phone] voicemail   I have personally reviewed the history and independently examined the patient. I have reviewed the chart and agree with the documentation recorded by the Mid Level Provider, including the assessment, treatment plan, and disposition. ASSESSMENT AND PLAN:  Hopefully this is a temporary viral process. Following. Lucille Manrique MD        Subjective:     Chief Complaint: nausea,vomiting, diarrhea, abdominal pain    History of Present Illness: Mahad Churchill is a 69 y.o. male who presents with a sudden onset of nausea, vomiting, diarrhea, and abdominal pain that started 4 days ago. He states that for New Years he went [place] and noticed that evening he had abdominal discomfort. Abdominal pain was worse with trying to eat or drink - describes a cramping sensation. The following day vomiting and diarrhea started. Averaging 2 bowel movements per day but stool was watery. No visible blood in stool. Associated fever (100.6). No chills. Denies recent travel. No new medications. No sick contacts. Presented to ED because of persistent symptoms.      In ED: WBC 14.7 and CT scan with findings of There are fluid-filled loops of small bowel which are not distended. There is a suggestion of air-fluid levels in multiple loops of small bowel. Fluid-filled large bowel with air-fluid levels. Findings are nonspecific and suggests enteritis and/or diarrhea. Patient states that he is feeling better today. Tolerating clear liquids. No further vomiting. Last colonoscopy 2013 - small 2mm polyp in cecum and small 5mm sessile polyp in transverse colon    PCP:  Zaki Marr MD    Past Medical History:   Diagnosis Date    Arthritis of foot, degenerative     Dr. Angelia Clarke Bladder tumor 2004    Dr Moni Laguna, [facility] 10/29/11    CAD (coronary artery disease)     MI 7/2010,s/p CABG. Dr. Brennen Mak Carotid stenosis 10/20/15    Dr. Benton Jo. endarterectomy 12/31/15. 50-69% L on doppler. 80% \"per CT\"    Cataract     COPD (chronic obstructive pulmonary disease) (HCC)     moderate, FEV1 1.41 7/2009. Taco Sanchez    GERD (gastroesophageal reflux disease)     HTN (hypertension)     Hyperlipidemia LDL goal < 70     Osteoporosis     tx w calcium, DEXA improved T-2.1 10/2011, 2012, 1/2015    PHN (postherpetic neuralgia)     Physiological tremor     Dr. Stacey Jeffers [facility]) 2/2004    on lupron. PSA increased 0.149 2/2014    Pulmonary nodule, right     5 mm, stable on CT 10/14/10, 1/14/14    Shingles 12/12/12    right neck and back. Past Surgical History:   Procedure Laterality Date    BLADDER TUMOR ASSOC      CABG, ARTERIAL, THREE  7/2010    Dr Marco Walker. HUI to LAD, spah to cirm    CYSTOSCOPY  10/29/11    FISH, normal    CYSTOURETHROSCOPY  11/29/2016    normal    HX CAROTID ENDARTERECTOMY Left 12/31/15    Dr. Yang Shed  12/2011    right eye, Dr. Nakia Torres HX COLONOSCOPY  10/2/13    hyperplastic [facility] [address]  [facility]    gangranous.   Dr. Clifton [place]       Social History   Substance Use Topics    Smoking status: Former Smoker     Types: Cigarettes     Quit date: 12/5/2003    Smokeless tobacco: Never Used    Alcohol use No        Family History   Problem Relation Age of Onset    Cancer Mother      bladder    Heart Disease Brother         No Known Allergies         Home Medications:  Prior to Admission Medications   Prescriptions Last Dose Informant Patient Reported? Taking? CALCIUM CARBONATE/VITAMIN D2 (CALCIUM 600 WITH VITAMIN D2 PO) 1/3/2018 at am Significant Other Yes Yes   Sig: Take 1 Tab by mouth two (2) times a day. LEUPROLIDE ACETATE (LUPRON DEPOT, 4 MONTH, IM) October at Unknown time Significant Other Yes No   Sig: by IntraMUSCular route. Every 4 months  Receives in providers office  Prescribed by Dr. Natali Garcia Unknown at Unknown time Significant Other Yes No   Sig: Biofreeze Ointment applied twice daily to feet as needed for arthritis   PROAIR HFA 90 mcg/actuation inhaler 1/3/2018 at am  No Yes   Sig: USE 2 PUFFS EVERY 8 HOURS AS NEEDED   SPIRIVA WITH HANDIHALER 18 mcg inhalation capsule 1/3/2018 at am Significant Other No Yes   Sig: Inhale the contents of 1  capsule via HandiHaler  daily   aspirin (ASPIRIN) 325 mg tablet Unknown at    Yes No   Sig: Take 325 mg by mouth daily. atenolol (TENORMIN) 50 mg tablet 1/3/2018 at am  Yes Yes   Sig: Take 50 mg by mouth daily. clopidogrel (PLAVIX) 75 mg tab 1/3/2018 at am  Yes Yes   Sig: Take 75 mg by mouth daily. fluticasone-vilanterol (BREO ELLIPTA) 100-25 mcg/dose inhaler 1/3/2018 at Unknown time Significant Other Yes Yes   Sig: Take 1 Puff by inhalation daily. lisinopril (PRINIVIL, ZESTRIL) 20 mg tablet 1/3/2018 at am  Yes Yes   Sig: Take 20 mg by mouth daily. multivitamin (ONE A DAY) tablet 1/3/2018 at am Significant Other Yes Yes   Sig: Take 1 Tab by mouth daily.    raNITIdine (ZANTAC) 150 mg tablet 1/3/2018 at am  No Yes   Sig: TAKE ONE TABLET BY MOUTH EVERY DAY PLUS TAKE ONE TABLET AT BEDTIME   simvastatin (ZOCOR) 20 mg tablet 1/3/2018 at am  No Yes   Sig: TAKE ONE TABLET BY MOUTH ONCE DAILY IN THE EVENING      Facility-Administered Medications: None       Hospital Medications:  Current Facility-Administered Medications   Medication Dose Route Frequency    levoFLOXacin (LEVAQUIN) 750 mg in D5W IVPB  750 mg IntraVENous Q24H    metroNIDAZOLE (FLAGYL) IVPB premix 500 mg  500 mg IntraVENous Q12H    sodium chloride (NS) flush 5-10 mL  5-10 mL IntraVENous PRN    aspirin (ASPIRIN) tablet 325 mg  325 mg Oral DAILY    atenolol (TENORMIN) tablet 50 mg  50 mg Oral DAILY    famotidine (PEPCID) tablet 10 mg  10 mg Oral BID    albuterol-ipratropium (DUO-NEB) 2.5 MG-0.5 MG/3 ML  3 mL Nebulization Q6H RT    dextrose 5% - 0.45% NaCl with KCl 20 mEq/L infusion  50 mL/hr IntraVENous CONTINUOUS    acetaminophen (TYLENOL) tablet 650 mg  650 mg Oral Q4H PRN    diphenhydrAMINE (BENADRYL) capsule 25 mg  25 mg Oral Q4H PRN    ondansetron (ZOFRAN) injection 4 mg  4 mg IntraVENous Q4H PRN    HYDROcodone-acetaminophen (NORCO) 5-325 mg per tablet 1 Tab  1 Tab Oral Q4H PRN    enoxaparin (LOVENOX) injection 40 mg  40 mg SubCUTAneous Q24H    budesonide (PULMICORT) 500 mcg/2 ml nebulizer suspension  500 mcg Nebulization BID RT    arformoterol (BROVANA) neb solution 15 mcg  15 mcg Nebulization BID RT       Review of Systems: Admission ROS by Rosalind Benz MD from 1/3/2018 were reviewed with the patient and changes (other than per HPI) include: none      Objective:     Physical Exam:  Visit Vitals    /69 (BP 1 Location: Left arm, BP Patient Position: At rest)    Pulse 69    Temp 98.1 °F (36.7 °C)    Resp 20    Ht 5' 6\" (1.676 m)    Wt 86.2 kg (190 lb)    SpO2 95%    BMI 30.67 kg/m2     SpO2 Readings from Last 6 Encounters:   01/04/18 95%   04/24/17 96%   04/04/17 94%   08/04/16 95%   01/27/16 95%   12/28/15 96%    O2 Flow Rate (L/min): 1 l/min     Intake/Output Summary (Last 24 hours) at 01/04/18 1227  Last data filed at 01/04/18 1149   Gross per 24 hour   Intake                0 ml Output              300 ml   Net             -300 ml      General: no distress, comfortable  Skin:  No rash or palpable dermatologic mass lesions  HEENT: Pupils equal, sclera anicteric, oropharynx with no gross lesions  Cardiovascular: No abnormal audible heart sounds, well perfused, no edema  Respiratory:  No abnormal audible breath sounds, normal respiratory effort, no throacic deformity  GI:  Bowel sounds present, soft, nondistended, mild left side tenderness. No rebound or guarding. Musculoskeletal:  No skeletal deformity nor acute arthritis noted. Neurological:  Motor and sensory function intact in upper extremeties  Psychiatric:  Normal affect, memory intact, appears to have insight into current illness    Laboratory:    Recent Results (from the past 24 hour(s))   CBC WITH AUTOMATED DIFF    Collection Time: 01/03/18 12:43 PM   Result Value Ref Range    WBC 14.7 (H) 4.1 - 11.1 K/uL    RBC 4.66 4.10 - 5.70 M/uL    HGB 15.0 12.1 - 17.0 g/dL    HCT 43.7 36.6 - 50.3 %    MCV 93.8 80.0 - 99.0 FL    MCH 32.2 26.0 - 34.0 PG    MCHC 34.3 30.0 - 36.5 g/dL    RDW 13.0 11.5 - 14.5 %    PLATELET 946 730 - 314 K/uL    NEUTROPHILS 81 (H) 32 - 75 %    LYMPHOCYTES 10 (L) 12 - 49 %    MONOCYTES 8 5 - 13 %    EOSINOPHILS 1 0 - 7 %    BASOPHILS 0 0 - 1 %    ABS. NEUTROPHILS 11.9 (H) 1.8 - 8.0 K/UL    ABS. LYMPHOCYTES 1.5 0.8 - 3.5 K/UL    ABS. MONOCYTES 1.2 (H) 0.0 - 1.0 K/UL    ABS. EOSINOPHILS 0.1 0.0 - 0.4 K/UL    ABS.  BASOPHILS 0.0 0.0 - 0.1 K/UL   METABOLIC PANEL, COMPREHENSIVE    Collection Time: 01/03/18 12:43 PM   Result Value Ref Range    Sodium 140 136 - 145 mmol/L    Potassium 3.9 3.5 - 5.1 mmol/L    Chloride 101 97 - 108 mmol/L    CO2 27 21 - 32 mmol/L    Anion gap 12 5 - 15 mmol/L    Glucose 118 (H) 65 - 100 mg/dL    BUN 17 6 - 20 MG/DL    Creatinine 1.19 0.70 - 1.30 MG/DL    BUN/Creatinine ratio 14 12 - 20      GFR est AA >60 >60 ml/min/1.73m2    GFR est non-AA 59 (L) >60 ml/min/1.73m2    Calcium 9.5 8.5 - 10.1 MG/DL    Bilirubin, total 0.7 0.2 - 1.0 MG/DL    ALT (SGPT) 46 12 - 78 U/L    AST (SGOT) 43 (H) 15 - 37 U/L    Alk.  phosphatase 98 45 - 117 U/L    Protein, total 8.0 6.4 - 8.2 g/dL    Albumin 3.5 3.5 - 5.0 g/dL    Globulin 4.5 (H) 2.0 - 4.0 g/dL    A-G Ratio 0.8 (L) 1.1 - 2.2     LACTIC ACID    Collection Time: 01/03/18 12:43 PM   Result Value Ref Range    Lactic acid 1.5 0.4 - 2.0 MMOL/L   INFLUENZA A & B AG (RAPID TEST)    Collection Time: 01/03/18 12:43 PM   Result Value Ref Range    Influenza A Antigen NEGATIVE  NEG      Influenza B Antigen NEGATIVE  NEG     TROPONIN I    Collection Time: 01/03/18 12:43 PM   Result Value Ref Range    Troponin-I, Qt. <0.04 <0.05 ng/mL   NT-PRO BNP    Collection Time: 01/03/18 12:43 PM   Result Value Ref Range    NT pro- 0 - 450 PG/ML   OCCULT BLOOD, STOOL    Collection Time: 01/03/18 12:43 PM   Result Value Ref Range    Occult blood, stool NEGATIVE  NEG     URINALYSIS W/ REFLEX CULTURE    Collection Time: 01/03/18  2:51 PM   Result Value Ref Range    Color DARK YELLOW      Appearance CLEAR CLEAR      Specific gravity 1.025 1.003 - 1.030      pH (UA) 5.0 5.0 - 8.0      Protein 100 (A) NEG mg/dL    Glucose NEGATIVE  NEG mg/dL    Ketone NEGATIVE  NEG mg/dL    Bilirubin NEGATIVE  NEG      Blood NEGATIVE  NEG      Urobilinogen 0.2 0.2 - 1.0 EU/dL    Nitrites NEGATIVE  NEG      Leukocyte Esterase NEGATIVE  NEG      WBC 0-4 0 - 4 /hpf    RBC 0-5 0 - 5 /hpf    Epithelial cells FEW FEW /lpf    Bacteria NEGATIVE  NEG /hpf    UA:UC IF INDICATED CULTURE NOT INDICATED BY UA RESULT CNI      Hyaline cast 0-2 0 - 5 /lpf   RESPIRATORY PANEL,PCR,NASOPHARYNGEAL    Collection Time: 01/03/18  8:19 PM   Result Value Ref Range    Adenovirus NOT DETECTED NOTD      Coronavirus 229E NOT DETECTED NOTD      Coronavirus HKU1 NOT DETECTED NOTD      Coronavirus CVNL63 NOT DETECTED NOTD      Coronavirus OC43 NOT DETECTED NOTD      Metapneumovirus NOT DETECTED NOTD      Rhinovirus and Enterovirus NOT DETECTED NOTD      Influenza A NOT DETECTED NOTD      Influenza A, subtype H1 NOT DETECTED NOTD      Influenza A, subtype H3 NOT DETECTED NOTD      INFLUENZA A H1N1 PCR NOT DETECTED NOTD      Influenza B NOT DETECTED NOTD      Parainfluenza 1 NOT DETECTED NOTD      Parainfluenza 2 NOT DETECTED NOTD      Parainfluenza 3 NOT DETECTED NOTD      Parainfluenza virus 4 NOT DETECTED NOTD      RSV by PCR NOT DETECTED NOTD      Bordetella pertussis - PCR NOT DETECTED NOTD      Chlamydophila pneumoniae DNA, QL, PCR NOT DETECTED NOTD      Mycoplasma pneumoniae DNA, QL, PCR NOT DETECTED NOTD     CBC W/O DIFF    Collection Time: 01/04/18  5:23 AM   Result Value Ref Range    WBC 13.3 (H) 4.1 - 11.1 K/uL    RBC 4.00 (L) 4.10 - 5.70 M/uL    HGB 12.6 12.1 - 17.0 g/dL    HCT 38.2 36.6 - 50.3 %    MCV 95.5 80.0 - 99.0 FL    MCH 31.5 26.0 - 34.0 PG    MCHC 33.0 30.0 - 36.5 g/dL    RDW 13.2 11.5 - 14.5 %    PLATELET 549 173 - 442 K/uL   MAGNESIUM    Collection Time: 01/04/18  5:23 AM   Result Value Ref Range    Magnesium 1.8 1.6 - 2.4 mg/dL   METABOLIC PANEL, COMPREHENSIVE    Collection Time: 01/04/18  5:23 AM   Result Value Ref Range    Sodium 139 136 - 145 mmol/L    Potassium 3.9 3.5 - 5.1 mmol/L    Chloride 105 97 - 108 mmol/L    CO2 22 21 - 32 mmol/L    Anion gap 12 5 - 15 mmol/L    Glucose 121 (H) 65 - 100 mg/dL    BUN 14 6 - 20 MG/DL    Creatinine 1.13 0.70 - 1.30 MG/DL    BUN/Creatinine ratio 12 12 - 20      GFR est AA >60 >60 ml/min/1.73m2    GFR est non-AA >60 >60 ml/min/1.73m2    Calcium 8.0 (L) 8.5 - 10.1 MG/DL    Bilirubin, total 0.6 0.2 - 1.0 MG/DL    ALT (SGPT) 35 12 - 78 U/L    AST (SGOT) 25 15 - 37 U/L    Alk.  phosphatase 77 45 - 117 U/L    Protein, total 6.3 (L) 6.4 - 8.2 g/dL    Albumin 2.7 (L) 3.5 - 5.0 g/dL    Globulin 3.6 2.0 - 4.0 g/dL    A-G Ratio 0.8 (L) 1.1 - 2.2     PHOSPHORUS    Collection Time: 01/04/18  5:23 AM   Result Value Ref Range    Phosphorus 2.3 (L) 2.6 - 4.7 MG/DL         Assessment/Plan:     Active Problems:    CAD (coronary artery disease) ()      Overview: MI 7/2010,s/p CABG      COPD (chronic obstructive pulmonary disease) (HCC) ()      Overview: moderate, FEV1 1.41 7/2009      GERD (gastroesophageal reflux disease) ()      HTN (hypertension) ()      Hyperlipidemia with target LDL less than 70 ()      Pneumonia (1/3/2018)      Leukocytosis (1/3/2018)      Sinus tachycardia (1/3/2018)      Fever (1/3/2018)      Sepsis ([address] (1/3/2018)      Abdominal pain (1/3/2018)      Colitis (1/3/2018)      Nausea & vomiting (1/3/2018)         See above narrative for full detail.     Kaitlynn Han PA-C  01/04/18  12:27 PM

## 2018-01-04 NOTE — PROGRESS NOTES
TRANSFER - IN REPORT:    Verbal report received from Cindy(name) on Jaylen Mann  being received from ER(unit) for routine progression of care      Report consisted of patients Situation, Background, Assessment and   Recommendations(SBAR). Information from the following report(s) SBAR, Kardex, Intake/Output, MAR and Recent Results was reviewed with the receiving nurse. Opportunity for questions and clarification was provided. Assessment completed upon patients arrival to unit and care assumed.      ER nurse will recheck temperature that was previously 100.6 rectally

## 2018-01-04 NOTE — PROGRESS NOTES
01/04/18     MSW met with the patient who is alert and oriented. Also called and talked with his wife. Address confirmed. He lives with his wife in a 2 story home with 4 steps to enter and another 6 steps to the 2nd floor where he mostly stays. He uses a cane at home and also has a walker and wheelchair. He had O2 in the past but does not have it at home anymore. He reports having 9 major surgeries in the past 5 years but has continued to be independent and driving. He had [facility] in the past and was happy with their services so would use them again. His PCP is Dr. Mina Bar. [facility] Will follow and assist as needed. Care Management Interventions  PCP Verified by CM:  Yes (Dr. Mina Bar)  Palliative Care Criteria Met (RRAT>21 & CHF Dx)?: No  Transition of Care Consult (CM Consult): Discharge Planning  Physical Therapy Consult: Yes  Occupational Therapy Consult: Yes  Current Support Network: Lives with Spouse  Confirm Follow Up Transport: Family  Plan discussed with Pt/Family/Caregiver: Yes  Discharge Location  Discharge Placement: Home     JESSICA Oliver

## 2018-01-04 NOTE — H&P
[address]  [phone]    Hospitalist Admission Note      NAME:  Jerrica Albarran   :   1940   MRN:  [id number]     PCP:  Chandrakant Alvarez MD     Date/Time:  1/3/2018 7:39 PM          Subjective:     CHIEF COMPLAINT: vomiting and abd pain     HISTORY OF PRESENT ILLNESS:     Mr. Rao Chavez is a 69 y.o.  male who presented to the Emergency Department complaining of vomiting and abdominal pain. Vomiting started 5 days ago, including the first night, and has persisted every day. Now dry heaves. Some cough but not much sputum. Associated with 4 days of sharp left flank abd pain. Now some diarrhea. Fever at home last 3 days. No sick contacts or travel. ER workup with CT abd showing distended non-obstructed SB and LB, suggesting colitis. Xray lungs suggesting PNA. He meets sepsis criteria. We will admit him for management. Past Medical History:   Diagnosis Date    Arthritis of foot, degenerative     Dr. Gabriel Lagos Bladder tumor     Dr Lucas Warren, [facility] 10/29/11    CAD (coronary artery disease)     MI 2010,s/p CABG. Dr. Gaudencio Linda Carotid stenosis 10/20/15    Dr. Mali Cali. endarterectomy 12/31/15. 50-69% L on doppler. 80% \"per CT\"    Cataract     COPD (chronic obstructive pulmonary disease) (HCC)     moderate, FEV1 1.41 2009. Francenia Bunting    GERD (gastroesophageal reflux disease)     HTN (hypertension)     Hyperlipidemia LDL goal < 70     Osteoporosis     tx w calcium, DEXA improved T-2.1 10/2011, , 2015    PHN (postherpetic neuralgia)     Physiological tremor     Dr. Bebe Palm [facility]) 2004    on lupron. PSA increased 0.149 2014    Pulmonary nodule, right     5 mm, stable on CT 10/14/10, 14    Shingles 12    right neck and back.           Past Surgical History:   Procedure Laterality Date    BLADDER TUMOR ASSOC      CABG, ARTERIAL, THREE  2010     Claudia Koroma.  HUI to LAD, spah to cirm    CYSTOSCOPY  10/29/11    FISH, normal    CYSTOURETHROSCOPY  11/29/2016    normal    HX CAROTID ENDARTERECTOMY Left 12/31/15    Dr. Wes Jensen  12/2011    right eye, Dr. Villa Haas HX COLONOSCOPY  10/2/13    hyperplastic polyp, [facility]ferd [facility] [address]  [facility]2017    gangranous. Dr. Author Vera       Social History   Substance Use Topics    Smoking status: Former Smoker     Types: Cigarettes     Quit date: 12/5/2003    Smokeless tobacco: Never Used    Alcohol use No        Family History   Problem Relation Age of Onset    Cancer Mother      bladder    Heart Disease Brother         No Known Allergies     Prior to Admission medications    Medication Sig Start Date End Date Taking? Authorizing Provider   atenolol (TENORMIN) 50 mg tablet Take  by mouth daily. Yes Yaniv Davila MD   promethazine (PHENERGAN) 25 mg tablet Take 1 Tab by mouth every six (6) hours as needed for Nausea. 1/3/18  Yes India Alvarez MD   raNITIdine (ZANTAC) 150 mg tablet TAKE ONE TABLET BY MOUTH EVERY DAY PLUS TAKE ONE TABLET AT BEDTIME 12/7/17  Yes Rosa Maria Pittman MD   [facility] 90 mcg/actuation inhaler USE 2 PUFFS EVERY 8 HOURS AS NEEDED 11/13/17  Yes Rosa Maria Pittman MD   simvastatin (ZOCOR) 20 mg tablet TAKE ONE TABLET BY MOUTH ONCE DAILY IN THE EVENING 10/5/17  Yes Rosa Maria Pittman MD   lisinopril (PRINIVIL, ZESTRIL) 10 mg tablet TAKE ONE TABLET BY MOUTH ONCE DAILY FOR HYPERTENSION 9/5/17  Yes Rosa Maria Pittman MD   aspirin (ASPIRIN) 325 mg tablet Take 325 mg by mouth daily. Yes Historical Provider   CALCIUM CARBONATE/VITAMIN D2 (CALCIUM 600 WITH VITAMIN D2 PO) Take 1 Tab by mouth three (3) times daily. Yes Historical Provider   multivitamin (ONE A DAY) tablet Take 1 Tab by mouth daily.    Yes Historical Provider   [address] [place] [address] [address] 18 mcg inhalation capsule Inhale the contents of 1  capsule via HandiHaler  daily 3/11/15  Yes Rosa Maria Pittman MD fluticasone-vilanterol (BREO ELLIPTA) 100-25 mcg/dose inhaler Take 1 Puff by inhalation daily. Yes Historical Provider   OTHER Biofreeze Ointment applied twice daily to feet as needed for arthritis    Historical Provider   LEUPROLIDE ACETATE (LUPRON DEPOT, 4 MONTH, IM) by IntraMUSCular route.  Every 4 months  Receives in providers office  Prescribed by Dr. Timoteo Ramirez Provider       Review of Systems:  (bold if positive, if negative)    Gen:  fever, chills, fatigueEyes:  ENT:  CVS:  Pulm:  Cough, dyspnea, sputum,GI:  Abdominal pain, nausea, emesis, diarrhea  :  dysuria  MS:  Skin:  Psych:  anxietyEndo:    Hem:  Renal:    Neuro:  weakness      Objective:      VITALS:    Vital signs reviewed; most recent are:    Visit Vitals    /71    Pulse (!) 101    Temp (!) 100.6 °F (38.1 °C)    Resp 19    Ht 5' 6\" (1.676 m)    Wt 86.2 kg (190 lb)    SpO2 90%    BMI 30.67 kg/m2     SpO2 Readings from Last 6 Encounters:   01/03/18 90%   04/24/17 96%   04/04/17 94%   08/04/16 95%   01/27/16 95%   12/28/15 96%        No intake or output data in the 24 hours ending 01/03/18 1939     Exam:     Physical Exam:    Gen:  Obese, in no acute distress  HEENT:  Pink conjunctivae, PERRL, hearing intact to voice, dry mucous membranes  Neck:  Supple, without masses, thyroid non-tender  Resp:  No accessory muscle use, bilateral breath sounds with rhonchi  Card:  No murmurs, tachycardic S1, S2 without thrills, bruits or peripheral edema  Abd:  Soft, mildly-tender, mildly distended, normoactive bowel sounds are present, no mass  Lymph:  No cervical or inguinal adenopathy  Musc:  No cyanosis or clubbing  Skin:  No rashes or ulcers, skin turgor is reduced  Neuro:  Cranial nerves are grossly intact, general motor weakness, follows commands   Psych:  Good insight, oriented to person, place and time, alert, tangential     Labs:    Recent Labs      01/03/18   1243   WBC  14.7*   HGB  15.0   HCT  43.7   PLT  221     Recent Labs 01/03/18   1243   NA  140   K  3.9   CL  101   CO2  27   GLU  118*   BUN  17   CREA  1.19   CA  9.5   ALB  3.5   TBILI  0.7   SGOT  43*   ALT  46     Lab Results   Component Value Date/Time    Glucose (POC) 139 03/31/2017 04:06 PM    Glucose (POC) 132 03/31/2017 07:39 AM     No results for input(s): PH, PCO2, PO2, HCO3, FIO2 in the last 72 hours. No results for input(s): INR in the last 72 hours. No lab exists for component: INREXT  All Micro Results     Procedure Component Value Units Date/Time    LEGIONELLA PNEUMOPHILA AG, URINE [[id number]]     Order Status:  Sent Specimen:  Urine from Urine     CARA Miller, UR/CSF [[id number]]     Order Status:  Sent Specimen:  Other     RESPIRATORY PANEL,PCR,NASOPHARYNGEAL [[id number]]     Order Status:  Sent Specimen:  NASOPHARYNGEAL SWAB     CULTURE, STOOL [[id number]]     Order Status:  Sent Specimen:  Stool     C. DIFFICILE (DNA) [[id number]]     Order Status:  Sent     CULTURE, BLOOD, PAIRED [[id number]] Collected:  01/03/18 1243    Order Status:  Completed Specimen:  Blood Updated:  01/03/18 1908    INFLUENZA A & B AG (RAPID TEST) [[id number]] Collected:  01/03/18 1243    Order Status:  Completed Specimen:  Nasopharyngeal from Nasal washing Updated:  01/03/18 1320     Influenza A Antigen NEGATIVE         Influenza B Antigen NEGATIVE              I have reviewed previous records       Assessment and Plan:      Pneumonia - POA, based on xray, cough and sepsis. Perhaps aspiration based on initial vomiting. Convert to IV Zosyn. Follow Blood X. Check urine and PCR labs. Sepsis / Leukocytosis / Sinus tachycardia / Fever - POA, presumed due to PNA, possible due to colitis. Zosyn to cover both empirically. Follow Cx. NOT SEVERE sepsis, due to lack of end organ damage, shock or lactic acid. Did not need IVF bolus, but start IVF. Abdominal pain / Colitis / Nausea & vomiting / GERD (gastroesophageal reflux disease) - Unclear etiolgy. Check stool labs. Zosyn.   GI consult. Clear diet. Aspiration precautions. Continue ranitidine. CAD (coronary artery disease) / HTN (hypertension) - Appears stable. For now continue atenolol, ASA. Hold lisinopril due to sepsis. Could resume if needed. COPD (chronic obstructive pulmonary disease) - Appears stable, but PNA may be leading to exacerbation. Continue Breo. Schedule duonebs. Hyperlipidemia - Not listing meds.      Telemetry reviewed:   normal sinus rhythm    Risk of deterioration: high      Total time spent with patient: 79 [phone] [address] discussed with: Patient, Nursing Staff, Consultant/Specialist and >50% of time spent in counseling and coordination of care    Discussed:  Care Plan       ___________________________________________________    Attending Physician: Hannah Giraldo MD

## 2018-01-04 NOTE — PROGRESS NOTES
[facility]: MED RECONCILIATION    Comments/Recommendations:    Patient/wife report medication compliance; last doses were yesterday morning    Medications added:     · Clopidogrel 75 mg PO daily - he has been taking this (instead of full dose aspirin) for a while now since surgery for carotid stenosis. Per wife, patient is scheduled to follow up with Dr. Precious Hawkins in April to see if he still needs this. Medications removed:    · Aspirin 325 mg - takes clopidogrel instead    Medications adjusted:    · Lisinopril - changed to 20 mg PO daily (instead of 10 mg PO daily)    Information obtained from: patient, patient's wife    Significant PMH/Disease States:   Past Medical History:   Diagnosis Date    Arthritis of foot, degenerative     Dr. Maryln Landau Bladder tumor 2004    Dr Cory Claude, [facility] 10/29/11    CAD (coronary artery disease)     MI 7/2010,s/p CABG. Dr. Salgado Dopibeth Carotid stenosis 10/20/15    Dr. Nava Green. endarterectomy 12/31/15. 50-69% L on doppler. 80% \"per CT\"    Cataract     COPD (chronic obstructive pulmonary disease) (HCC)     moderate, FEV1 1.41 7/2009. Raquel Loya    GERD (gastroesophageal reflux disease)     HTN (hypertension)     Hyperlipidemia LDL goal < 70     Osteoporosis     tx w calcium, DEXA improved T-2.1 10/2011, 2012, 1/2015    PHN (postherpetic neuralgia)     Physiological tremor     Dr. Og Todd [facility]) 2/2004    on lupron. PSA increased 0.149 2/2014    Pulmonary nodule, right     5 mm, stable on CT 10/14/10, 1/14/14    Shingles 12/12/12    right neck and back. Chief Complaint for this Admission:   Chief Complaint   Patient presents with    Abdominal Pain    Dizziness    Fatigue     Allergies: Review of patient's allergies indicates no known allergies.     Prior to Admission Medications:     Medication Documentation Review Audit       Reviewed by Mikey Souza, PHARMD (Pharmacist) on 01/04/18 at 1234         Medication Sig Documenting Provider Last Dose Status Taking?      atenolol (TENORMIN) 50 mg tablet Take 50 mg by mouth daily. Yaniv Davila MD 1/3/2018 am Active Yes    CALCIUM CARBONATE/VITAMIN D2 (CALCIUM 600 WITH VITAMIN D2 PO) Take 1 Tab by mouth two (2) times a day. Historical Provider 1/3/2018 am Active Yes    clopidogrel (PLAVIX) 75 mg tab Take 75 mg by mouth daily. Historical Provider 1/3/2018 am Active Yes    fluticasone-vilanterol (BREO ELLIPTA) 100-25 mcg/dose inhaler Take 1 Puff by inhalation daily. Historical Provider 1/3/2018 Unknown time Active Yes    LEUPROLIDE ACETATE (LUPRON DEPOT, 4 MONTH, IM) by IntraMUSCular route. Every 4 months  Receives in providers office  Prescribed by Dr. Bruno Sebastain Provider October Unknown time Active No             Med Note (Jayla Varela Jan 4, 2018 12:11 PM):        lisinopril (PRINIVIL, ZESTRIL) 20 mg tablet Take 20 mg by mouth daily. Historical Provider 1/3/2018 am Active Yes    multivitamin (ONE A DAY) tablet Take 1 Tab by mouth daily. Historical Provider 1/3/2018 am Active Yes    OTHER Biofreeze Ointment applied twice daily to feet as needed for arthritis Historical Provider Unknown Unknown time Active No    PROAIR HFA 90 mcg/actuation inhaler USE 2 PUFFS EVERY 8 HOURS AS NEEDED Marimar Hummel MD 1/3/2018 am Active Yes    raNITIdine (ZANTAC) 150 mg tablet TAKE ONE TABLET BY MOUTH EVERY DAY PLUS TAKE ONE TABLET AT BEDTIME Marimar Hummel MD 1/3/2018 am Active Yes    simvastatin (ZOCOR) 20 mg tablet TAKE ONE TABLET BY MOUTH ONCE DAILY IN THE [address] [facility], MD 1/3/2018 am Active Yes    SPIRIVA WITH HANDIHALER 18 mcg inhalation capsule Inhale the contents of 1  capsule via HandiHaler  daily Marimar Hummel MD 1/3/2018 am Active Yes                  Thank you for the consult,  Sam Bueno

## 2018-01-04 NOTE — PROGRESS NOTES
Problem: Mobility Impaired (Adult and Pediatric)  Goal: *Acute Goals and Plan of Care (Insert Text)  Physical Therapy Goals  Initiated 1/4/2018  1. Patient will move from supine to sit and sit to supine  in bed with modified independence within 7 day(s). 2.  Patient will transfer from bed to chair and chair to bed with modified independence using the least restrictive device within 7 day(s). 3.  Patient will perform sit to stand with modified independence within 7 day(s). 4.  Patient will ambulate with modified independence for 150 feet with the least restrictive device within 7 day(s). 5.  Patient will ascend/descend 4 stairs with 1 handrail(s) with modified independence within 7 day(s). physical Therapy EVALUATION  Patient: Yash Licona (62 y.o. male)  Date: 1/4/2018  Primary Diagnosis: Pneumonia        Precautions:   WBAT, Fall, Contact    ASSESSMENT :  Based on the objective data described below, the patient presents with decreased endurance and strength following admission for abdominal pain and fatigue. Patient underwent a CT of the abdomen which showed bowel obstruction and chest x-ray shows PNA. Patient is on 2L NC. And states he wears home supplemental O2. He is unable of home many liters at home. Patient today with increased fatigue but agreeable to PT. Patient uses a RW at baseline, he was able to ambulate in room and no loss of balance with RW. Increased dyspnea and increased verbal cuing due to O2 sats on 2L at 85%. With sitting rest break able to recover to 90%. Patient would benefit from home health at discharge. Patient will benefit from skilled intervention to address the above impairments.   Patients rehabilitation potential is considered to be Good  Factors which may influence rehabilitation potential include:   [x]         None noted  []         Mental ability/status  []         Medical condition  []         Home/family situation and support systems  []         Safety awareness  []         Pain tolerance/management  []         Other:      PLAN :  Recommendations and Planned Interventions:  [x]           Bed Mobility Training             [x]    Neuromuscular Re-Education  [x]           Transfer Training                   []    Orthotic/Prosthetic Training  [x]           Gait Training                         []    Modalities  [x]           Therapeutic Exercises           []    Edema Management/Control  [x]           Therapeutic Activities            [x]    Patient and Family Training/Education  []           Other (comment):    Frequency/Duration: Patient will be followed by physical therapy  5 times a week to address goals. Discharge Recommendations: Home Health  Further Equipment Recommendations for Discharge: owns RW     SUBJECTIVE:   Patient stated your going to get your way.     OBJECTIVE DATA SUMMARY:   HISTORY:    Past Medical History:   Diagnosis Date    Arthritis of foot, degenerative     Dr. Tracy Mederos Bladder tumor 2004    Dr Danielle Mazariegos, [facility] 10/29/11    CAD (coronary artery disease)     MI 7/2010,s/p CABG. Dr. Benedict Hemphill Carotid stenosis 10/20/15    Dr. Armando Campos. endarterectomy 12/31/15. 50-69% L on doppler. 80% \"per CT\"    Cataract     COPD (chronic obstructive pulmonary disease) (HCC)     moderate, FEV1 1.41 7/2009. Levonia Evens    GERD (gastroesophageal reflux disease)     HTN (hypertension)     Hyperlipidemia LDL goal < 70     Osteoporosis     tx w calcium, DEXA improved T-2.1 10/2011, 2012, 1/2015    PHN (postherpetic neuralgia)     Physiological tremor     Dr. Gulshan Gallardo [facility]) 2/2004    on lupron. PSA increased 0.149 2/2014    Pulmonary nodule, right     5 mm, stable on CT 10/14/10, 1/14/14    Shingles 12/12/12    right neck and back. Past Surgical History:   Procedure Laterality Date    BLADDER TUMOR ASSOC      CABG, ARTERIAL, THREE  7/2010    Dr Daljit Puentes.   HUI to LAD, spah to cirm    CYSTOSCOPY  10/29/11    FISH, normal  CYSTOURETHROSCOPY  11/29/2016    normal    HX CAROTID ENDARTERECTOMY Left 12/31/15    Dr. Eri Colmenares  12/2011    right eye, Dr. Barb Capellan HX COLONOSCOPY  10/2/13    [facility] [address]  03/24/2017    gangranous. Dr. Caesar Poe     Prior Level of Function/Home Situation: all exposed intact  Personal factors and/or comorbidities impacting plan of care:     Home Situation  Home Environment: Private residence  One/Two Story Residence: Two story  # of Interior Steps: 12  Interior Rails: Left  Lift Chair Available: No  Living Alone: No  Support Systems: Spouse/Significant Other/Partner  Current DME Used/Available at Home: Walker, rolling, Wheelchair, Cane, straight    EXAMINATION/PRESENTATION/DECISION MAKING:   Critical Behavior:  Neurologic State: Alert, Appropriate for age  Orientation Level: Oriented X4  Cognition: Appropriate for age attention/concentration     Hearing: Auditory  Auditory Impairment: None  Skin:  All exposed intact  Edema: none noted  Range Of Motion:  AROM: Within functional limits           PROM: Within functional limits           Strength:    Strength: Generally decreased, functional                    Tone & Sensation:   Tone: Normal              Sensation: Intact               Coordination:  Coordination: Within functional limits  Vision:      Functional Mobility:  Bed Mobility:  Rolling: Supervision  Supine to Sit: Supervision  Sit to Supine: Supervision     Transfers:  Sit to Stand: Contact guard assistance  Stand to Sit: Contact guard assistance        Bed to Chair: Contact guard assistance              Balance:   Sitting: Intact  Standing: Intact; With support  Ambulation/Gait Training:  Distance (ft): 25 Feet (ft)  Assistive Device: Walker, rolling;Gait belt  Ambulation - Level of Assistance: Contact guard assistance     Gait Description (WDL): Exceptions to [facility] Stairs: Therapeutic Exercises:       Functional Measure:  Barthel Index:    Bathin  Bladder: 10  Bowels: 10  Groomin  Dressing: 10  Feeding: 10  Mobility: 5  Stairs: 0  Toilet Use: 10  Transfer (Bed to Chair and Back): 10  Total: 75       Barthel and G-code impairment scale:  Percentage of impairment CH  0% CI  1-19% CJ  20-39% CK  40-59% CL  60-79% CM  80-99% CN  100%   Barthel Score 0-100 100 99-80 79-60 59-40 20-39 1-19   0   Barthel Score 0-20 20 17-19 13-16 9-12 5-8 1-4 0      The Barthel ADL Index: Guidelines  1. The index should be used as a record of what a patient does, not as a record of what a patient could do. 2. The main aim is to establish degree of independence from any help, physical or verbal, however minor and for whatever reason. 3. The need for supervision renders the patient not independent. 4. A patient's performance should be established using the best available evidence. Asking the patient, friends/relatives and nurses are the usual sources, but direct observation and common sense are also important. However direct testing is not needed. 5. Usually the patient's performance over the preceding 24-48 hours is important, but occasionally longer periods will be relevant. 6. Middle categories imply that the patient supplies over 50 per cent of the effort. 7. Use of aids to be independent is allowed. Efraín Kearns., Barthel, D.W. (1126). Functional evaluation: the Barthel Index. [address] [facility] (14)2. Murphy Morillo marlen JOHNSON Dotson, Pati Higgins, Chandler Olivia., [place], [address] [place] [address] (). Measuring the change indisability after inpatient rehabilitation; comparison of the responsiveness of the Barthel Index and Functional Thayer Measure. Journal of Neurology, Neurosurgery, and Psychiatry, 66(4), 591-617. MIMI [email or web address], YANELY Hernandez, & Tere Gordon MShabanaA. (2004.) Assessment of post-stroke quality of life in cost-effectiveness studies:  The usefulness of the Barthel Index and the EuroQoL-5D. Quality of Life Research, 13, 237-21       G codes: In compliance with CMSs Claims Based Outcome Reporting, the following G-code set was chosen for this patient based on their primary functional limitation being treated: The outcome measure chosen to determine the severity of the functional limitation was the Barthel Index with a score of 75/100 which was correlated with the impairment scale. ? Mobility - Walking and Moving Around:     - CURRENT STATUS: CJ - 20%-39% impaired, limited or restricted    - GOAL STATUS: CI - 1%-19% impaired, limited or restricted    - D/C STATUS:  ---------------To be determined---------------      Physical Therapy Evaluation Charge Determination   History Examination Presentation Decision-Making   MEDIUM  Complexity : 1-2 comorbidities / personal factors will impact the outcome/ POC  MEDIUM Complexity : 3 Standardized tests and measures addressing body structure, function, activity limitation and / or participation in recreation  LOW Complexity : Stable, uncomplicated  Other outcome measures Barthel Index  LOW       Based on the above components, the patient evaluation is determined to be of the following complexity level: LOW     Pain:  Pain Scale 1: Numeric (0 - 10)  Pain Intensity 1: 5  Pain Location 1: Abdomen  Pain Orientation 1: Anterior; Upper;Left  Pain Description 1: Aching  Pain Intervention(s) 1: Declines  Activity Tolerance:   Fair- no complications  Please refer to the flowsheet for vital signs taken during this treatment.   After treatment:   []         Patient left in no apparent distress sitting up in chair  []         Patient left in no apparent distress in bed  []         Call bell left within reach  []         Nursing notified  []         Caregiver present  []         Bed alarm activated    COMMUNICATION/EDUCATION:   The patients plan of care was discussed with: Registered Nurse.  []         Fall prevention education was provided and the patient/caregiver indicated understanding. []         Patient/family have participated as able in goal setting and plan of care. []         Patient/family agree to work toward stated goals and plan of care. []         Patient understands intent and goals of therapy, but is neutral about his/her participation. []         Patient is unable to participate in goal setting and plan of care.     Thank you for this referral.  Jaylen Stuart, PT, DPT   Time Calculation: 17 mins

## 2018-01-04 NOTE — PROGRESS NOTES
Danny Chaparro [facility] 79  [address] [facility], [address] [place] [address]  [phone]      Medical Progress Note      NAME: Jimena Sandhu   :  1940  MRM:  [id number]    Date/Time: 2018         Subjective:     Chief Complaint:  Patient was seen and examined by me. Chart reviewed. Still with abd pain, but diarrhea is better       Objective:       Vitals:       Last 24hrs VS reviewed since prior progress note.  Most recent are:    Visit Vitals    /69 (BP 1 Location: Left arm, BP Patient Position: At rest)    Pulse 69    Temp 98.1 °F (36.7 °C)    Resp 20    Ht 5' 6\" (1.676 m)    Wt 86.2 kg (190 lb)    SpO2 95%    BMI 30.67 kg/m2     SpO2 Readings from Last 6 Encounters:   18 95%   17 96%   17 94%   16 95%   16 95%   12/28/15 96%    O2 Flow Rate (L/min): 1 l/min     Intake/Output Summary (Last 24 hours) at 18 1313  Last data filed at 18 1149   Gross per 24 hour   Intake                0 ml   Output              300 ml   Net             -300 ml        Exam:     Physical Exam:    Gen:  Well-developed, well-nourished, obese, in mild distress  HEENT:  Pink conjunctivae, PERRL, hearing intact to voice, moist mucous membranes  Neck:  Supple, without masses, thyroid non-tender  Resp:  No accessory muscle use, clear breath sounds without wheezes rales or rhonchi  Card:  No murmurs, normal S1, S2 without thrills, bruits or peripheral edema  Abd:  Soft, LLQ pain, non-distended, normoactive bowel sounds are present  Musc:  No cyanosis or clubbing  Skin:  No rashes  Neuro:  Cranial nerves 3-12 are grossly intact, follows commands appropriately  Psych:  Good insight, oriented to person, place and time, alert    Medications Reviewed: (see below)    Lab Data Reviewed: (see below)    ______________________________________________________________________    Medications:     Current Facility-Administered Medications   Medication Dose Route Frequency  levoFLOXacin (LEVAQUIN) 750 mg in D5W IVPB  750 mg IntraVENous Q24H    metroNIDAZOLE (FLAGYL) IVPB premix 500 mg  500 mg IntraVENous Q12H    [START ON 1/5/2018] clopidogrel (PLAVIX) tablet 75 mg  75 mg Oral DAILY    sodium chloride (NS) flush 5-10 mL  5-10 mL IntraVENous PRN    atenolol (TENORMIN) tablet 50 mg  50 mg Oral DAILY    famotidine (PEPCID) tablet 10 mg  10 mg Oral BID    albuterol-ipratropium (DUO-NEB) 2.5 MG-0.5 MG/3 ML  3 mL Nebulization Q6H RT    dextrose 5% - 0.45% NaCl with KCl 20 mEq/L infusion  50 mL/hr IntraVENous CONTINUOUS    acetaminophen (TYLENOL) tablet 650 mg  650 mg Oral Q4H PRN    diphenhydrAMINE (BENADRYL) capsule 25 mg  25 mg Oral Q4H PRN    ondansetron (ZOFRAN) injection 4 mg  4 mg IntraVENous Q4H PRN    HYDROcodone-acetaminophen (NORCO) 5-325 mg per tablet 1 Tab  1 Tab Oral Q4H PRN    enoxaparin (LOVENOX) injection 40 mg  40 mg SubCUTAneous Q24H    budesonide (PULMICORT) 500 mcg/2 ml nebulizer suspension  500 mcg Nebulization BID RT    arformoterol (BROVANA) neb solution 15 mcg  15 mcg Nebulization BID RT          Lab Review:     Recent Labs      01/04/18   0523  01/03/18   1243   WBC  13.3*  14.7*   HGB  12.6  15.0   HCT  38.2  43.7   PLT  184  221     Recent Labs      01/04/18   0523  01/03/18   1243   NA  139  140   K  3.9  3.9   CL  105  101   CO2  22  27   GLU  121*  118*   BUN  14  17   CREA  1.13  1.19   CA  8.0*  9.5   MG  1.8   --    PHOS  2.3*   --    ALB  2.7*  3.5   TBILI  0.6  0.7   SGOT  25  43*   ALT  35  46     Lab Results   Component Value Date/Time    Glucose (POC) 139 03/31/2017 04:06 PM    Glucose (POC) 132 03/31/2017 07:39 AM          Assessment / Plan: Active Problems:    70 yo hx of COPD, CAD s/p CABG, prostate CA, presented w/ N/V, enteritis    1) Abd pain/N/V/D: CT with small bowel enteritis. Likely viral.  Will monitor stool studies. Changed Zosyn to IV Levaquin/flagyl. Advance diet as tolerated.   GI to see    2) Pneumonia: CXR was neg, but this was seen on abd CT. No respiratory symptoms. Already on Abx. Will monitor    3) Sepsis/leukocytosis: had 2/4 SIRS on admission, now improving. Will cont IVF, IV Abx    4) CAD: cont plavix, statin    5) HTN: holding lisinopril due to hypotension, sepsis.   Monitor     6) COPD: cont nebs prn    Total time spent with patient: 35 min                  Care Plan discussed with: Patient, nursing    Discussed:  Care Plan    Prophylaxis:  Lovenox    Disposition:  Home w/Family           ___________________________________________________    Attending Physician: Yoselyn Parra MD

## 2018-01-05 ENCOUNTER — TELEPHONE (OUTPATIENT)
Dept: INTERNAL MEDICINE CLINIC | Age: 78
End: 2018-01-05

## 2018-01-05 ENCOUNTER — HOME HEALTH ADMISSION (OUTPATIENT)
Dept: HOME HEALTH SERVICES | Facility: HOME HEALTH | Age: 78
End: 2018-01-05
Payer: MEDICARE

## 2018-01-05 LAB
ANION GAP SERPL CALC-SCNC: 14 MMOL/L (ref 5–15)
BUN SERPL-MCNC: 10 MG/DL (ref 6–20)
BUN/CREAT SERPL: 10 (ref 12–20)
C DIFF TOX GENS STL QL NAA+PROBE: NEGATIVE
CALCIUM SERPL-MCNC: 8.6 MG/DL (ref 8.5–10.1)
CHLORIDE SERPL-SCNC: 104 MMOL/L (ref 97–108)
CO2 SERPL-SCNC: 22 MMOL/L (ref 21–32)
CREAT SERPL-MCNC: 1.02 MG/DL (ref 0.7–1.3)
ERYTHROCYTE [DISTWIDTH] IN BLOOD BY AUTOMATED COUNT: 13 % (ref 11.5–14.5)
FLUID CULTURE, SPNG2: NORMAL
GLUCOSE SERPL-MCNC: 114 MG/DL (ref 65–100)
HCT VFR BLD AUTO: 34.8 % (ref 36.6–50.3)
HGB BLD-MCNC: 12.1 G/DL (ref 12.1–17)
L PNEUMO1 AG UR QL IA: NEGATIVE
LACTATE SERPL-SCNC: 1.1 MMOL/L (ref 0.4–2)
MAGNESIUM SERPL-MCNC: 1.8 MG/DL (ref 1.6–2.4)
MCH RBC QN AUTO: 32.6 PG (ref 26–34)
MCHC RBC AUTO-ENTMCNC: 34.8 G/DL (ref 30–36.5)
MCV RBC AUTO: 93.8 FL (ref 80–99)
ORGANISM ID, SPNG3: NORMAL
PHOSPHATE SERPL-MCNC: 2.1 MG/DL (ref 2.6–4.7)
PLATELET # BLD AUTO: 177 K/UL (ref 150–400)
PLEASE NOTE, SPNG4: NORMAL
POTASSIUM SERPL-SCNC: 3.9 MMOL/L (ref 3.5–5.1)
RBC # BLD AUTO: 3.71 M/UL (ref 4.1–5.7)
S PNEUM AG SPEC QL LA: NEGATIVE
SODIUM SERPL-SCNC: 140 MMOL/L (ref 136–145)
SPECIMEN SOURCE: NORMAL
SPECIMEN SOURCE: NORMAL
SPECIMEN, SPNG1: NORMAL
WBC # BLD AUTO: 12.5 K/UL (ref 4.1–11.1)
WBC #/AREA STL HPF: NORMAL /HPF (ref 0–4)

## 2018-01-05 PROCEDURE — 74011250637 HC RX REV CODE- 250/637: Performed by: INTERNAL MEDICINE

## 2018-01-05 PROCEDURE — 74011250637 HC RX REV CODE- 250/637: Performed by: PHYSICIAN ASSISTANT

## 2018-01-05 PROCEDURE — 87045 FECES CULTURE AEROBIC BACT: CPT | Performed by: INTERNAL MEDICINE

## 2018-01-05 PROCEDURE — 74011250636 HC RX REV CODE- 250/636: Performed by: INTERNAL MEDICINE

## 2018-01-05 PROCEDURE — 74011000250 HC RX REV CODE- 250: Performed by: INTERNAL MEDICINE

## 2018-01-05 PROCEDURE — 87493 C DIFF AMPLIFIED PROBE: CPT | Performed by: INTERNAL MEDICINE

## 2018-01-05 PROCEDURE — 74011250637 HC RX REV CODE- 250/637: Performed by: SPECIALIST

## 2018-01-05 PROCEDURE — 36415 COLL VENOUS BLD VENIPUNCTURE: CPT | Performed by: INTERNAL MEDICINE

## 2018-01-05 PROCEDURE — 83735 ASSAY OF MAGNESIUM: CPT | Performed by: INTERNAL MEDICINE

## 2018-01-05 PROCEDURE — 94640 AIRWAY INHALATION TREATMENT: CPT

## 2018-01-05 PROCEDURE — 83605 ASSAY OF LACTIC ACID: CPT | Performed by: INTERNAL MEDICINE

## 2018-01-05 PROCEDURE — 97165 OT EVAL LOW COMPLEX 30 MIN: CPT

## 2018-01-05 PROCEDURE — 65660000000 HC RM CCU STEPDOWN

## 2018-01-05 PROCEDURE — 84100 ASSAY OF PHOSPHORUS: CPT | Performed by: INTERNAL MEDICINE

## 2018-01-05 PROCEDURE — 97116 GAIT TRAINING THERAPY: CPT

## 2018-01-05 PROCEDURE — 85027 COMPLETE CBC AUTOMATED: CPT | Performed by: INTERNAL MEDICINE

## 2018-01-05 PROCEDURE — 80048 BASIC METABOLIC PNL TOTAL CA: CPT | Performed by: INTERNAL MEDICINE

## 2018-01-05 PROCEDURE — 77010033678 HC OXYGEN DAILY

## 2018-01-05 PROCEDURE — 89055 LEUKOCYTE ASSESSMENT FECAL: CPT | Performed by: INTERNAL MEDICINE

## 2018-01-05 RX ORDER — SIMVASTATIN 20 MG/1
20 TABLET, FILM COATED ORAL
Status: DISCONTINUED | OUTPATIENT
Start: 2018-01-05 | End: 2018-01-11 | Stop reason: HOSPADM

## 2018-01-05 RX ORDER — DICYCLOMINE HYDROCHLORIDE 10 MG/1
10 CAPSULE ORAL 4 TIMES DAILY
Status: DISCONTINUED | OUTPATIENT
Start: 2018-01-05 | End: 2018-01-06

## 2018-01-05 RX ORDER — LISINOPRIL 20 MG/1
20 TABLET ORAL DAILY
Status: DISCONTINUED | OUTPATIENT
Start: 2018-01-06 | End: 2018-01-11 | Stop reason: HOSPADM

## 2018-01-05 RX ADMIN — HYDROCODONE BITARTRATE AND ACETAMINOPHEN 1 TABLET: 5; 325 TABLET ORAL at 20:27

## 2018-01-05 RX ADMIN — ONDANSETRON 4 MG: 2 INJECTION INTRAMUSCULAR; INTRAVENOUS at 20:27

## 2018-01-05 RX ADMIN — IPRATROPIUM BROMIDE AND ALBUTEROL SULFATE 3 ML: .5; 3 SOLUTION RESPIRATORY (INHALATION) at 08:00

## 2018-01-05 RX ADMIN — DEXTROSE MONOHYDRATE, SODIUM CHLORIDE, AND POTASSIUM CHLORIDE 50 ML/HR: 50; 4.5; 1.49 INJECTION, SOLUTION INTRAVENOUS at 20:25

## 2018-01-05 RX ADMIN — Medication 1 CAPSULE: at 13:51

## 2018-01-05 RX ADMIN — IPRATROPIUM BROMIDE AND ALBUTEROL SULFATE 3 ML: .5; 3 SOLUTION RESPIRATORY (INHALATION) at 14:40

## 2018-01-05 RX ADMIN — ATENOLOL 50 MG: 50 TABLET ORAL at 10:18

## 2018-01-05 RX ADMIN — DICYCLOMINE HYDROCHLORIDE 10 MG: 10 CAPSULE ORAL at 13:51

## 2018-01-05 RX ADMIN — BUDESONIDE 500 MCG: 0.5 INHALANT RESPIRATORY (INHALATION) at 08:00

## 2018-01-05 RX ADMIN — METRONIDAZOLE 500 MG: 500 INJECTION, SOLUTION INTRAVENOUS at 20:19

## 2018-01-05 RX ADMIN — IPRATROPIUM BROMIDE AND ALBUTEROL SULFATE 3 ML: .5; 3 SOLUTION RESPIRATORY (INHALATION) at 19:42

## 2018-01-05 RX ADMIN — FAMOTIDINE 10 MG: 20 TABLET ORAL at 10:17

## 2018-01-05 RX ADMIN — DEXTROSE MONOHYDRATE, SODIUM CHLORIDE, AND POTASSIUM CHLORIDE 50 ML/HR: 50; 4.5; 1.49 INJECTION, SOLUTION INTRAVENOUS at 00:58

## 2018-01-05 RX ADMIN — ARFORMOTEROL TARTRATE 15 MCG: 15 SOLUTION RESPIRATORY (INHALATION) at 08:00

## 2018-01-05 RX ADMIN — METRONIDAZOLE 500 MG: 500 INJECTION, SOLUTION INTRAVENOUS at 10:18

## 2018-01-05 RX ADMIN — LEVOFLOXACIN 750 MG: 5 INJECTION, SOLUTION INTRAVENOUS at 10:18

## 2018-01-05 RX ADMIN — CLOPIDOGREL BISULFATE 75 MG: 75 TABLET ORAL at 10:17

## 2018-01-05 RX ADMIN — SIMVASTATIN 20 MG: 20 TABLET, FILM COATED ORAL at 23:09

## 2018-01-05 RX ADMIN — ENOXAPARIN SODIUM 40 MG: 100 INJECTION SUBCUTANEOUS at 20:19

## 2018-01-05 RX ADMIN — DICYCLOMINE HYDROCHLORIDE 10 MG: 10 CAPSULE ORAL at 23:09

## 2018-01-05 RX ADMIN — BUDESONIDE 500 MCG: 0.5 INHALANT RESPIRATORY (INHALATION) at 19:42

## 2018-01-05 RX ADMIN — FAMOTIDINE 10 MG: 20 TABLET ORAL at 19:02

## 2018-01-05 RX ADMIN — DICYCLOMINE HYDROCHLORIDE 10 MG: 10 CAPSULE ORAL at 19:02

## 2018-01-05 NOTE — ROUTINE PROCESS
Bedside and Verbal shift change report given to Brock Medina (oncoming nurse) by Daniela SPENCER (offgoing nurse). Report included the following information SBAR, Kardex, Intake/Output, Recent Results and Cardiac Rhythm NSR.

## 2018-01-05 NOTE — PROGRESS NOTES
Bedside and Verbal shift change report given to [address] [place] [address] (oncoming nurse) by Cherise Grey RN (offgoing nurse). Report included the following information SBAR, Kardex, Intake/Output, MAR, Accordion and Recent Results.

## 2018-01-05 NOTE — PROGRESS NOTES
Problem: Self Care Deficits Care Plan (Adult)  Goal: *Acute Goals and Plan of Care (Insert Text)  Occupational Therapy Goals  Initiated 1/5/2018  1. Patient will perform upper body dressing with independence within 7 day(s). 2.  Patient will perform lower body dressing with independence within 7 day(s). 4.  Patient will perform toilet transfers with independence within 7 day(s). 5.  Patient will perform all aspects of toileting with independence within 7 day(s). 6.  Patient will participate in upper extremity therapeutic exercise/activities with independence for 10 minutes within 7 day(s). 7.  Patient will utilize energy conservation techniques during functional activities with verbal cues within 7 day(s). Occupational Therapy EVALUATION   Patient: Aiden Yo (51 y.o. male)  Date: 1/5/2018  Primary Diagnosis: Pneumonia        Precautions:   Fall    ASSESSMENT :  Based on the objective data described below, the patient presents with decreased endurance and strength following admission for abdominal pain and fatigue. Patient underwent a CT of the abdomen which showed bowel obstruction and chest x-ray shows PNA. Patient is on 2L NC. And states he wears home supplemental O2. Patient today with increased fatigue but agreeable to OT. Patient lives with wife and PTA he reports that he was independent in ADL and IADls at a cane level. Patient was able to participate in seated assessment at EOB this day for evaluation and to address bed mobility and functional transfers which were performed at CGA/supervision level. Patient with B UE tremors 2/2 weakness and with SOB with simple exercise to include B UE ROM. Education provided on pursed lip breathing which patient was able to demonstrate during EOB rest break and able to recover to 90%. Patient would benefit from home health at discharge. Patient will benefit from skilled intervention to address the above impairments.   Patients rehabilitation potential is considered to be Good  Factors which may influence rehabilitation potential include:   []             None noted  []             Mental ability/status  [x]             Medical condition  []             Home/family situation and support systems  []             Safety awareness  [x]             Pain tolerance/management  []             Other:      PLAN :  Recommendations and Planned Interventions:  [x]               Self Care Training                  [x]        Therapeutic Activities  [x]               Functional Mobility Training    []        Cognitive Retraining  [x]               Therapeutic Exercises           [x]        Endurance Activities  [x]               Balance Training                   []        Neuromuscular Re-Education  []               Visual/Perceptual Training     [x]   Home Safety Training  [x]               Patient Education                 [x]        Family Training/Education  []               Other (comment):    Frequency/Duration: Patient will be followed by occupational therapy 5 times a week to address goals. Discharge Recommendations: Home Health  Further Equipment Recommendations for Discharge: TBD     SUBJECTIVE:   Patient stated At home I use my cane and I'm just .. up.    OBJECTIVE DATA SUMMARY:   HISTORY:   Past Medical History:   Diagnosis Date    Arthritis of foot, degenerative     Dr. Valdemar Stewart Bladder tumor 2004    Dr Jaimie Higgins, [facility] 10/29/11    CAD (coronary artery disease)     [place] 7/2010,s/p CABG. Dr. Kimi Gatica Carotid stenosis 10/20/15    Dr. Jessica Ace. endarterectomy 12/31/15. 50-69% L on doppler. 80% \"per CT\"    Cataract     COPD (chronic obstructive pulmonary disease) (HCC)     moderate, FEV1 1.41 7/2009.  Sameul Clubs    GERD (gastroesophageal reflux disease)     HTN (hypertension)     Hyperlipidemia LDL goal < 70     Osteoporosis     tx w calcium, DEXA improved T-2.1 10/2011, 2012, 1/2015    PHN (postherpetic neuralgia)     Physiological tremor     Dr. Gulshan Gallardo [facility]) 2/2004    on lupron. PSA increased 0.149 2/2014    Pulmonary nodule, right     5 mm, stable on CT 10/14/10, 1/14/14    Shingles 12/12/12    right neck and back. Past Surgical History:   Procedure Laterality Date    BLADDER TUMOR ASSOC      CABG, ARTERIAL, THREE  7/2010    Dr Daljit Puentes. HUI to LAD, spah to cirm    CYSTOSCOPY  10/29/11    FISH, normal    CYSTOURETHROSCOPY  11/29/2016    normal    HX CAROTID ENDARTERECTOMY Left 12/31/15    Dr. Charlotte Canales  12/2011    right eye, Dr. Rodrick Duque HX COLONOSCOPY  10/2/13    hyperplastic polyp, Dr. Jose Duncan HX [facility] 36  03/24/2017    gangranous. Dr. Virgilio Garcia       Prior Level of Function/Environment/Context: mod I with use of cane for all roles, habits, and routines including self care and cooking    Expanded or extensive additional review of patient history:     Home Situation  Home Environment: Private residence  # Steps to Enter: 8  Rails to Enter: Yes  Hand Rails : Left  One/Two Story Residence: Two story  # of Interior Steps: 12  Interior Rails: Both  Lift Chair Available: No  Living Alone: No  Support Systems: Spouse/Significant Other/Partner  Patient Expects to be Discharged to[de-identified] Private residence  Current DME Used/Available at Home: Cane, straight, Grab bars, Shower chair, Oxygen, portable  Tub or Shower Type: Shower  [x]  Right hand dominant   []  Left hand dominant    EXAMINATION OF PERFORMANCE DEFICITS:  Cognitive/Behavioral Status:  Neurologic State: Alert; Appropriate for age  Orientation Level: Oriented X4  Cognition: Appropriate for age attention/concentration; Appropriate decision making; Follows commands; Appropriate safety awareness  Perception: Appears intact  Perseveration: No perseveration noted  Safety/Judgement: Awareness of environment    Skin: intact in the uppers    Edema: none noted in the uppers    Hearing:   Auditory  Auditory Impairment: None    Vision/Perceptual:                           Acuity: Able to read clock/calendar on wall without difficulty    Corrective Lenses: Reading glasses    Range of Motion:  Performed EOB with SOB; rest breaks required  AROM: Generally decreased, functional  PROM: Generally decreased, functional                      Strength:  B UE: 3+/5 throughout; shakiness/tremors with antigravity movements  Strength: Grossly decreased, non-functional                Coordination:  Coordination: Generally decreased, functional  Fine Motor Skills-Upper: Left Intact; Right Intact    Gross Motor Skills-Upper: Left Intact; Right Intact    Tone & Sensation:    Tone: Normal  Sensation: Intact                      Balance:  Sitting: Intact  Standing: Intact; With support    Functional Mobility and Transfers for ADLs:  Bed Mobility:  Rolling: Supervision  Supine to Sit: Supervision  Sit to Supine: Supervision  Scooting: Supervision    Transfers:  Sit to Stand: Contact guard assistance  Stand to Sit: Contact guard assistance  Bed to Chair: Contact guard assistance    ADL Assessment:  Feeding: Setup    Oral Facial Hygiene/Grooming: Setup; Additional time;Supervision    Bathing: Minimum assistance    Upper Body Dressing: Minimum assistance    Lower Body Dressing: Minimum assistance    Toileting: Minimum assistance                ADL Intervention and task modifications:   Patient received in supine with HOB elevated. Patient was able to participate in seated assessment at EOB this day for evaluation and to address bed mobility and functional transfers which were performed at CGA/supervision level. Patient with B UE tremors 2/2 weakness and with SOB with simple exercise to include B UE ROM. Education provided on pursed lip breathing which patient was able to demonstrate during EOB rest break and able to recover to 90%. Left in supine with HOB elevated; call bell and urinal within reach.     Cognitive Retraining  Safety/Judgement: Awareness of environment      Functional Measure:  Barthel Index:    Bathin  Bladder: 10  Bowels: 10  Groomin  Dressing: 10  Feeding: 10  Mobility: 5  Stairs: 0  Toilet Use: 10  Transfer (Bed to Chair and Back): 10  Total: 75       Barthel and G-code impairment scale:  Percentage of impairment CH  0% CI  1-19% CJ  20-39% CK  40-59% CL  60-79% CM  80-99% CN  100%   Barthel Score 0-100 100 99-80 79-60 59-40 20-39 1-19   0   Barthel Score 0-20 20 17-19 13-16 9-12 5-8 1-4 0      The Barthel ADL Index: Guidelines  1. The index should be used as a record of what a patient does, not as a record of what a patient could do. 2. The main aim is to establish degree of independence from any help, physical or verbal, however minor and for whatever reason. 3. The need for supervision renders the patient not independent. 4. A patient's performance should be established using the best available evidence. Asking the patient, friends/relatives and nurses are the usual sources, but direct observation and common sense are also important. However direct testing is not needed. 5. Usually the patient's performance over the preceding 24-48 hours is important, but occasionally longer periods will be relevant. 6. Middle categories imply that the patient supplies over 50 per cent of the effort. 7. Use of aids to be independent is allowed. Farrukh Cline., Barthel, D.W. (0072). Functional evaluation: the Barthel Index. [facility] (14)2. Gail Martinez, J.J.MSue CHANDLER.Valentina., [place], [address] [place] [address] (). Measuring the change indisability after inpatient rehabilitation; comparison of the responsiveness of the Barthel Index and Functional Stevens Measure. Journal of Neurology, Neurosurgery, and Psychiatry, 66(4), 428-989. Avril Martínez, MIMI.[email or web address].A, YANELY Hernandez, & Shira Martinez MShabanaA. (2004.) Assessment of post-stroke quality of life in cost-effectiveness studies:  The usefulness of the Barthel Index and the EuroQoL-5D. Quality of Life Research, 13, 753-65       G codes: In compliance with CMSs Claims Based Outcome Reporting, the following G-code set was chosen for this patient based on their primary functional limitation being treated: The outcome measure chosen to determine the severity of the functional limitation was the Barthel Index with a score of 75/100 which was correlated with the impairment scale. ? Self Care:     - CURRENT STATUS: CJ - 20%-39% impaired, limited or restricted    - GOAL STATUS: CI - 1%-19% impaired, limited or restricted    - D/C STATUS:  ---------------To be determined---------------     Occupational Therapy Evaluation Charge Determination   History Examination Decision-Making   LOW Complexity : Brief history review  LOW Complexity : 1-3 performance deficits relating to physical, cognitive , or psychosocial skils that result in activity limitations and / or participation restrictions  LOW Complexity : No comorbidities that affect functional and no verbal or physical assistance needed to complete eval tasks       Based on the above components, the patient evaluation is determined to be of the following complexity level: LOW      Activity Tolerance:   Good    Please refer to the flowsheet for vital signs taken during this treatment. After treatment:   [] Patient left in no apparent distress sitting up in chair  [x] Patient left in no apparent distress in bed  [x] Call bell left within reach  [] Nursing notified  [] Caregiver present  [x] Bed alarm activated    COMMUNICATION/EDUCATION:   The patients plan of care was discussed with: Registered Nurse. [x] Home safety education was provided and the patient/caregiver indicated understanding. [x] Patient/family have participated as able in goal setting and plan of care. [x] Patient/family agree to work toward stated goals and plan of care.   [] Patient understands intent and goals of therapy, but is neutral about his/her participation. [] Patient is unable to participate in goal setting and plan of care. This patients plan of care is appropriate for delegation to TAMI.     Thank you for this referral.  Ember Canela, OTR/L  Time Calculation: 24 mins

## 2018-01-05 NOTE — TELEPHONE ENCOUNTER
Renée ABEL from [facility] is calling to see if PCP would sign off on home health orders.        Renée can be reached at [phone]

## 2018-01-05 NOTE — PROGRESS NOTES
Referral sent to [facility] to please assess pt for home health needs. Pt has used [facility] in the past and family prefers to use [facility] again.     Arlyn Mills  [phone]  Team B with Dr Merlyn Tyler

## 2018-01-05 NOTE — PROGRESS NOTES
I met with pt to discuss home health choices again after Usman Leon with [facility] also met with pt. Pt has chosen [address] Per attending,there is a possibility that pt may be discharged home over the weekend. If pt is discharged this weekend,nursing please call [address] @ [phone] to notify home health regarding discharge. Wife will transport pt home. I notified liason nurse with [facility] regarding his choice,I notified Malinda Cheek ,nurse navigator for  [facility] regarding plan.     Maxwell Calderón  Team B with Dr Leora Virgen  [phone]

## 2018-01-05 NOTE — PROGRESS NOTES
Physical Therapy:  Attempted to see patient. Patient currently requesting deferral due to fatigue. We will continue to follow and re-attempt later as able.  Thank you  Sun Kraft PT,DPT,NCS

## 2018-01-05 NOTE — PROGRESS NOTES
Problem: Mobility Impaired (Adult and Pediatric)  Goal: *Acute Goals and Plan of Care (Insert Text)  Physical Therapy Goals  Initiated 1/4/2018  1. Patient will move from supine to sit and sit to supine  in bed with modified independence within 7 day(s). 2.  Patient will transfer from bed to chair and chair to bed with modified independence using the least restrictive device within 7 day(s). 3.  Patient will perform sit to stand with modified independence within 7 day(s). 4.  Patient will ambulate with modified independence for 150 feet with the least restrictive device within 7 day(s). 5.  Patient will ascend/descend 4 stairs with 1 handrail(s) with modified independence within 7 day(s). physical Therapy TREATMENT  Patient: Jerrica Albarran (53 y.o. male)  Date: 1/5/2018  Diagnosis: Pneumonia <principal problem not specified>       Precautions: WBAT, Fall, Contact    ASSESSMENT:  Patient attempted ambulation with SPC per patient request due to premorbid assistive device. Patient with increased trunk sway, and decreased safety. Switched to RW and improved stability and less sway, recommend RW for return to home. Patent requires constant verbal cuing for pursed lip breathing with activity, O2 sats on 2L NC 90-93%. Patient able to increase ambulation distance to 75 feet, but required 2 standing rest breaks due to poor endurance. Recommend home health at discharge. Progression toward goals:  []    Improving appropriately and progressing toward goals  [x]    Improving slowly and progressing toward goals  []    Not making progress toward goals and plan of care will be adjusted     PLAN:  Patient continues to benefit from skilled intervention to address the above impairments. Continue treatment per established plan of care. Discharge Recommendations:  Home Health  Further Equipment Recommendations for Discharge:   Owns RW and SPC     SUBJECTIVE:   Patient stated .    OBJECTIVE DATA SUMMARY: Critical Behavior:  Neurologic State: Alert, Appropriate for age  Orientation Level: Oriented X4  Cognition: Appropriate for age attention/concentration     Functional Mobility Training:  Bed Mobility:                    Transfers:  Sit to Stand: Contact guard assistance  Stand to Sit: Contact guard assistance        Bed to Chair: Contact guard assistance                    Balance:     Ambulation/Gait Training:  Distance (ft): 75 Feet (ft)  Assistive Device: Walker, rolling;Gait belt  Ambulation - Level of Assistance: Contact guard assistance              Stairs:            Neuro Re-Education:    Therapeutic Exercises:   2 sets of 5 pursed lip breathing  Pain:                    Activity Tolerance:   Fair- O2 sats stable but increased dyspnea  Please refer to the flowsheet for vital signs taken during this treatment.   After treatment:   [x]    Patient left in no apparent distress sitting up in chair  [x]    Patient left in no apparent distress in bed- in chair positon  [x]    Call bell left within reach  [x]    Nursing notified  [x]    Caregiver present  []    Bed alarm activated    COMMUNICATION/COLLABORATION:   The patients plan of care was discussed with: Registered Nurse    Wero Mcdaniels PT, DPT   Time Calculation: 14 mins

## 2018-01-05 NOTE — PROGRESS NOTES
Danny Chaparro [facility] 79  [address] [facility], [address] [place] [address]  [phone]      Medical Progress Note      NAME: Malia Tamez   :  1940  MRM:  [id number]    Date/Time: 2018         Subjective:     Chief Complaint:  Patient was seen and examined. Chart reviewed. Abd pain continues and poor PO intake       Objective:       Vitals:       Last 24hrs VS reviewed since prior progress note.  Most recent are:    Visit Vitals    /68 (BP 1 Location: Left arm, BP Patient Position: At rest)    Pulse 90    Temp 98.9 °F (37.2 °C)    Resp 19    Ht 5' 6\" (1.676 m)    Wt 90.5 kg (199 lb 8.3 oz)    SpO2 92%    BMI 32.2 kg/m2     SpO2 Readings from Last 6 Encounters:   18 92%   17 96%   17 94%   16 95%   16 95%   12/28/15 96%    O2 Flow Rate (L/min): 1 l/min       Intake/Output Summary (Last 24 hours) at 18 0914  Last data filed at 18 1149   Gross per 24 hour   Intake                0 ml   Output              200 ml   Net             -200 ml        Exam:     Physical Exam:    Gen:  Well-developed, well-nourished, obese, in no acute distress  HEENT:  Pink conjunctivae, PERRL, hearing intact to voice, moist mucous membranes  Neck:  Supple, without masses, thyroid non-tender  Resp:  No accessory muscle use, clear breath sounds without wheezes rales or rhonchi  Card:  No murmurs, normal S1, S2 without thrills, bruits or peripheral edema  Abd:  Soft, LLQ pain, non-distended, normoactive bowel sounds are present  Musc:  No cyanosis or clubbing  Skin:  No rashes  Neuro:  Cranial nerves 3-12 are grossly intact, follows commands appropriately  Psych:  Good insight, oriented to person, place and time, alert    Medications Reviewed: (see below)    Lab Data Reviewed: (see below)    ______________________________________________________________________    Medications:     Current Facility-Administered Medications   Medication Dose Route Frequency  levoFLOXacin (LEVAQUIN) 750 mg in D5W IVPB  750 mg IntraVENous Q24H    metroNIDAZOLE (FLAGYL) IVPB premix 500 mg  500 mg IntraVENous Q12H    clopidogrel (PLAVIX) tablet 75 mg  75 mg Oral DAILY    influenza vaccine 2017-18 (3 yrs+)(PF) (FLUZONE QUAD/FLUARIX QUAD) injection 0.5 mL  0.5 mL IntraMUSCular PRIOR TO DISCHARGE    sodium chloride (NS) flush 5-10 mL  5-10 mL IntraVENous PRN    atenolol (TENORMIN) tablet 50 mg  50 mg Oral DAILY    famotidine (PEPCID) tablet 10 mg  10 mg Oral BID    albuterol-ipratropium (DUO-NEB) 2.5 MG-0.5 MG/3 ML  3 mL Nebulization Q6H RT    dextrose 5% - 0.45% NaCl with KCl 20 mEq/L infusion  50 mL/hr IntraVENous CONTINUOUS    acetaminophen (TYLENOL) tablet 650 mg  650 mg Oral Q4H PRN    diphenhydrAMINE (BENADRYL) capsule 25 mg  25 mg Oral Q4H PRN    ondansetron (ZOFRAN) injection 4 mg  4 mg IntraVENous Q4H PRN    HYDROcodone-acetaminophen (NORCO) 5-325 mg per tablet 1 Tab  1 Tab Oral Q4H PRN    enoxaparin (LOVENOX) injection 40 mg  40 mg SubCUTAneous Q24H    budesonide (PULMICORT) 500 mcg/2 ml nebulizer suspension  500 mcg Nebulization BID RT    arformoterol (BROVANA) neb solution 15 mcg  15 mcg Nebulization BID RT          Lab Review:     Recent Labs      01/05/18   0050  01/04/18   0523  01/03/18   1243   WBC  12.5*  13.3*  14.7*   HGB  12.1  12.6  15.0   HCT  34.8*  38.2  43.7   PLT  177  184  221     Recent Labs      01/05/18   0050  01/04/18   0523  01/03/18   1243   NA  140  139  140   K  3.9  3.9  3.9   CL  104  105  101   CO2  22  22  27   GLU  114*  121*  118*   BUN  10  14  17   CREA  1.02  1.13  1.19   CA  8.6  8.0*  9.5   MG  1.8  1.8   --    PHOS  2.1*  2.3*   --    ALB   --   2.7*  3.5   TBILI   --   0.6  0.7   SGOT   --   25  43*   ALT   --   35  46     Lab Results   Component Value Date/Time    Glucose (POC) 139 03/31/2017 04:06 PM    Glucose (POC) 132 03/31/2017 07:39 AM          Assessment / Plan:      Active Problems:    70 yo hx of COPD, CAD s/p CABG, prostate CA, presented w/ N/V, enteritis    1) Abd pain/N/V/D: CT with small bowel enteritis. Stool studies obtained this AM. Continue IV Levaquin/flagyl. Advance diet as tolerated. Appreciate GI input. Probiotic added by their team.    2) Pneumonia: CXR was neg, but this was seen on abd CT. No respiratory symptoms. Already on Abx as above. Will monitor    3) Sepsis/leukocytosis: had 2/4 SIRS on admission, now improving. Will cont IVF, IV Abx    4) CAD: cont plavix, statin    5) HTN: holding lisinopril due to hypotension, sepsis.  Restart tomorrow    6) COPD: stable, cont nebs prn    Total time spent with patient: 35 min                  Care Plan discussed with: Patient, nursing    Discussed:  Care Plan    Prophylaxis:  Lovenox    Disposition:  Home w/Family           ___________________________________________________    Attending Physician: Jordy Lyons, DO

## 2018-01-05 NOTE — PROGRESS NOTES
Gastroenterology Progress Note    January 5, 2018  Admit Date: 1/3/2018         Narrative Assessment and Plan   · Abdominal pain  · Diarrhea  · Vomiting - resolved    At this time working hypothesis is gastroenteritis and he is being treated conservatively. Plan  - workup still pending, stool samples were obtained this AM  - continue with conservative management  - add probiotic    ----  Levora Pamela. Pernell Sandoval MD  [phone] office  [phone] voicemail   I have personally reviewed the history and independently examined the patient. I have reviewed the chart and agree with the documentation recorded by the Mid Level Provider, including the assessment, treatment plan, and disposition. ASSESSMENT AND PLAN:  Continues to complain of pain crampy loc epigastric radiates to left side and down and to the back. No visible bleeding, diarrheal stool this AM, no vomiting. Exam: non-surgical abdomen that has mild TTP but no rebound no guarding no mass. Mild distension but soft. A/P  As above we feel this is a non-specific infectious process but we're checking for C diff which is pending. Agree with empiric antibiotic coverage, add probiotic, slowly advance diet as symptoms allow. I've added dicyclomine in the hopes this will improve some of the pain he's reporting. Following. Zay Patel MD        Subjective:   · Return of his symptoms this AM - seemed to start after meal. Sharp pain on left side abdomen. One episode of watery loose stool this AM. No blood in stool. Nausea and vomiting has resolved. ROS:  The previous review of systems on initial consultation / H&P is noted and reviewed. Specific changes noted above in HPI.     Current Medications:     Current Facility-Administered Medications   Medication Dose Route Frequency    lactobac ac& pc-s.therm-b.anim (DENA Q/RISAQUAD)  1 Cap Oral DAILY    levoFLOXacin (LEVAQUIN) 750 mg in D5W IVPB  750 mg IntraVENous Q24H    metroNIDAZOLE (FLAGYL) IVPB premix 500 mg  500 mg IntraVENous Q12H    clopidogrel (PLAVIX) tablet 75 mg  75 mg Oral DAILY    influenza vaccine - (3 yrs+)(PF) (FLUZONE QUAD/FLUARIX QUAD) injection 0.5 mL  0.5 mL IntraMUSCular PRIOR TO DISCHARGE    sodium chloride (NS) flush 5-10 mL  5-10 mL IntraVENous PRN    atenolol (TENORMIN) tablet 50 mg  50 mg Oral DAILY    famotidine (PEPCID) tablet 10 mg  10 mg Oral BID    albuterol-ipratropium (DUO-NEB) 2.5 MG-0.5 MG/3 ML  3 mL Nebulization Q6H RT    dextrose 5% - 0.45% NaCl with KCl 20 mEq/L infusion  50 mL/hr IntraVENous CONTINUOUS    acetaminophen (TYLENOL) tablet 650 mg  650 mg Oral Q4H PRN    diphenhydrAMINE (BENADRYL) capsule 25 mg  25 mg Oral Q4H PRN    ondansetron (ZOFRAN) injection 4 mg  4 mg IntraVENous Q4H PRN    HYDROcodone-acetaminophen (NORCO) 5-325 mg per tablet 1 Tab  1 Tab Oral Q4H PRN    enoxaparin (LOVENOX) injection 40 mg  40 mg SubCUTAneous Q24H    budesonide (PULMICORT) 500 mcg/2 ml nebulizer suspension  500 mcg Nebulization BID RT    arformoterol (BROVANA) neb solution 15 mcg  15 mcg Nebulization BID RT       Objective:     VITALS:   Last 24hrs VS reviewed since prior progress note. Most recent are:  Visit Vitals    /68 (BP 1 Location: Left arm, BP Patient Position: At rest)    Pulse 90    Temp 98.9 °F (37.2 °C)    Resp 19    Ht 5' 6\" (1.676 m)    Wt 90.5 kg (199 lb 8.3 oz)    SpO2 92%    BMI 32.2 kg/m2     Temp (24hrs), Av.5 °F (36.9 °C), Min:98 °F (36.7 °C), Max:99.1 °F (37.3 °C)      Intake/Output Summary (Last 24 hours) at 18 1051  Last data filed at 18 1149   Gross per 24 hour   Intake                0 ml   Output              200 ml   Net             -200 ml       EXAM:  General: Comfortable, no distress    HEENT: Atraumatic skull, pupils equal  Lungs:  No abnormal audible breath sounds.   Speaking in complete sentences  Heart:  Regular rate and rhythm  Abdomen: Hypoactive BS, soft, nondistended, tender left side abdomen. No peritoneal signs. Neurologic:  Cranial nerves grossly intact, moves all 4 extremities  Psych:            Good insight. Not anxious nor agitated  Derm:  No rash, jaundice, nor palpable dermatologic mass    Lab Data Reviewed:   Recent Labs      01/05/18   0050  01/04/18   0523  01/03/18   1243   WBC  12.5*  13.3*  14.7*   HGB  12.1  12.6  15.0   HCT  34.8*  38.2  43.7   PLT  177  184  221     Recent Labs      01/05/18   0050  01/04/18   0523  01/03/18   1243   NA  140  139  140   K  3.9  3.9  3.9   CL  104  105  101   CO2  22  22  27   GLU  114*  121*  118*   BUN  10  14  17   CREA  1.02  1.13  1.19   CA  8.6  8.0*  9.5   MG  1.8  1.8   --    PHOS  2.1*  2.3*   --    ALB   --   2.7*  3.5   TBILI   --   0.6  0.7   SGOT   --   25  43*   ALT   --   35  46     Lab Results   Component Value Date/Time    Glucose (POC) 139 03/31/2017 04:06 PM    Glucose (POC) 132 03/31/2017 07:39 AM     No results for input(s): PH, PCO2, PO2, HCO3, FIO2 in the last 72 hours. No results for input(s): INR in the last 72 hours.     No lab exists for component: INREXT        Assessment:   (See above)  Active Problems:    CAD (coronary artery disease) ()      Overview: MI 7/2010,s/p CABG      COPD (chronic obstructive pulmonary disease) ([facility]) ()      Overview: moderate, FEV1 1.41 7/2009      GERD (gastroesophageal reflux disease) ()      HTN (hypertension) ()      Hyperlipidemia with target LDL less than 70 ()      Pneumonia (1/3/2018)      Leukocytosis (1/3/2018)      Sinus tachycardia (1/3/2018)      Fever (1/3/2018)      Sepsis ([address] (1/3/2018)      Abdominal pain (1/3/2018)      Colitis (1/3/2018)      Nausea & vomiting (1/3/2018)        Plan:   (See above)    Signed By:  Stephen Campbell PA-C  1/5/2018  10:51 AM

## 2018-01-05 NOTE — PROGRESS NOTES
Referral sent to [facility] to please assess pt for home health needs.   Arlyn Mills  Team B  [phone]

## 2018-01-06 ENCOUNTER — APPOINTMENT (OUTPATIENT)
Dept: GENERAL RADIOLOGY | Age: 78
DRG: 392 | End: 2018-01-06
Attending: INTERNAL MEDICINE
Payer: MEDICARE

## 2018-01-06 LAB
ANION GAP SERPL CALC-SCNC: 9 MMOL/L (ref 5–15)
BASOPHILS # BLD: 0 K/UL (ref 0–0.1)
BASOPHILS NFR BLD: 0 % (ref 0–1)
BUN SERPL-MCNC: 10 MG/DL (ref 6–20)
BUN/CREAT SERPL: 10 (ref 12–20)
CALCIUM SERPL-MCNC: 9.2 MG/DL (ref 8.5–10.1)
CHLORIDE SERPL-SCNC: 104 MMOL/L (ref 97–108)
CO2 SERPL-SCNC: 24 MMOL/L (ref 21–32)
CREAT SERPL-MCNC: 1.04 MG/DL (ref 0.7–1.3)
EOSINOPHIL # BLD: 0.3 K/UL (ref 0–0.4)
EOSINOPHIL NFR BLD: 3 % (ref 0–7)
ERYTHROCYTE [DISTWIDTH] IN BLOOD BY AUTOMATED COUNT: 13 % (ref 11.5–14.5)
GLUCOSE SERPL-MCNC: 104 MG/DL (ref 65–100)
HCT VFR BLD AUTO: 35.7 % (ref 36.6–50.3)
HGB BLD-MCNC: 12.1 G/DL (ref 12.1–17)
LYMPHOCYTES # BLD: 1.6 K/UL (ref 0.8–3.5)
LYMPHOCYTES NFR BLD: 14 % (ref 12–49)
MAGNESIUM SERPL-MCNC: 1.9 MG/DL (ref 1.6–2.4)
MCH RBC QN AUTO: 31.6 PG (ref 26–34)
MCHC RBC AUTO-ENTMCNC: 33.9 G/DL (ref 30–36.5)
MCV RBC AUTO: 93.2 FL (ref 80–99)
MONOCYTES # BLD: 0.9 K/UL (ref 0–1)
MONOCYTES NFR BLD: 8 % (ref 5–13)
NEUTS SEG # BLD: 8.5 K/UL (ref 1.8–8)
NEUTS SEG NFR BLD: 75 % (ref 32–75)
NRBC # BLD: 0 K/UL (ref 0–0.01)
NRBC BLD-RTO: 0 PER 100 WBC
PHOSPHATE SERPL-MCNC: 4 MG/DL (ref 2.6–4.7)
PLATELET # BLD AUTO: 203 K/UL (ref 150–400)
POTASSIUM SERPL-SCNC: 4 MMOL/L (ref 3.5–5.1)
RBC # BLD AUTO: 3.83 M/UL (ref 4.1–5.7)
SODIUM SERPL-SCNC: 137 MMOL/L (ref 136–145)
WBC # BLD AUTO: 11.7 K/UL (ref 4.1–11.1)

## 2018-01-06 PROCEDURE — 74011250637 HC RX REV CODE- 250/637: Performed by: SPECIALIST

## 2018-01-06 PROCEDURE — 77010033678 HC OXYGEN DAILY

## 2018-01-06 PROCEDURE — 74011250637 HC RX REV CODE- 250/637: Performed by: INTERNAL MEDICINE

## 2018-01-06 PROCEDURE — 85025 COMPLETE CBC W/AUTO DIFF WBC: CPT | Performed by: INTERNAL MEDICINE

## 2018-01-06 PROCEDURE — 65660000000 HC RM CCU STEPDOWN

## 2018-01-06 PROCEDURE — 74018 RADEX ABDOMEN 1 VIEW: CPT

## 2018-01-06 PROCEDURE — 74011250636 HC RX REV CODE- 250/636: Performed by: INTERNAL MEDICINE

## 2018-01-06 PROCEDURE — 83735 ASSAY OF MAGNESIUM: CPT | Performed by: INTERNAL MEDICINE

## 2018-01-06 PROCEDURE — 74011250637 HC RX REV CODE- 250/637: Performed by: PHYSICIAN ASSISTANT

## 2018-01-06 PROCEDURE — 84100 ASSAY OF PHOSPHORUS: CPT | Performed by: INTERNAL MEDICINE

## 2018-01-06 PROCEDURE — 94640 AIRWAY INHALATION TREATMENT: CPT

## 2018-01-06 PROCEDURE — 74011000250 HC RX REV CODE- 250: Performed by: INTERNAL MEDICINE

## 2018-01-06 PROCEDURE — 36415 COLL VENOUS BLD VENIPUNCTURE: CPT | Performed by: INTERNAL MEDICINE

## 2018-01-06 PROCEDURE — 80048 BASIC METABOLIC PNL TOTAL CA: CPT | Performed by: INTERNAL MEDICINE

## 2018-01-06 RX ORDER — PROCHLORPERAZINE EDISYLATE 5 MG/ML
10 INJECTION INTRAMUSCULAR; INTRAVENOUS EVERY 6 HOURS
Status: DISCONTINUED | OUTPATIENT
Start: 2018-01-06 | End: 2018-01-11 | Stop reason: HOSPADM

## 2018-01-06 RX ORDER — METRONIDAZOLE 250 MG/1
500 TABLET ORAL EVERY 12 HOURS
Status: DISCONTINUED | OUTPATIENT
Start: 2018-01-06 | End: 2018-01-11 | Stop reason: HOSPADM

## 2018-01-06 RX ORDER — LEVOFLOXACIN 750 MG/1
750 TABLET ORAL EVERY 24 HOURS
Status: DISCONTINUED | OUTPATIENT
Start: 2018-01-06 | End: 2018-01-11 | Stop reason: HOSPADM

## 2018-01-06 RX ORDER — DICYCLOMINE HYDROCHLORIDE 10 MG/1
20 CAPSULE ORAL 4 TIMES DAILY
Status: DISCONTINUED | OUTPATIENT
Start: 2018-01-06 | End: 2018-01-11 | Stop reason: HOSPADM

## 2018-01-06 RX ADMIN — METRONIDAZOLE 500 MG: 250 TABLET ORAL at 21:24

## 2018-01-06 RX ADMIN — SIMVASTATIN 20 MG: 20 TABLET, FILM COATED ORAL at 21:24

## 2018-01-06 RX ADMIN — DICYCLOMINE HYDROCHLORIDE 20 MG: 10 CAPSULE ORAL at 12:31

## 2018-01-06 RX ADMIN — IPRATROPIUM BROMIDE AND ALBUTEROL SULFATE 3 ML: .5; 3 SOLUTION RESPIRATORY (INHALATION) at 15:06

## 2018-01-06 RX ADMIN — ATENOLOL 50 MG: 50 TABLET ORAL at 09:34

## 2018-01-06 RX ADMIN — BUDESONIDE 500 MCG: 0.5 INHALANT RESPIRATORY (INHALATION) at 08:17

## 2018-01-06 RX ADMIN — IPRATROPIUM BROMIDE AND ALBUTEROL SULFATE 3 ML: .5; 3 SOLUTION RESPIRATORY (INHALATION) at 20:15

## 2018-01-06 RX ADMIN — IPRATROPIUM BROMIDE AND ALBUTEROL SULFATE 3 ML: .5; 3 SOLUTION RESPIRATORY (INHALATION) at 08:17

## 2018-01-06 RX ADMIN — PROCHLORPERAZINE EDISYLATE 10 MG: 5 INJECTION INTRAMUSCULAR; INTRAVENOUS at 17:38

## 2018-01-06 RX ADMIN — BUDESONIDE 500 MCG: 0.5 INHALANT RESPIRATORY (INHALATION) at 20:15

## 2018-01-06 RX ADMIN — ENOXAPARIN SODIUM 40 MG: 100 INJECTION SUBCUTANEOUS at 21:25

## 2018-01-06 RX ADMIN — PROCHLORPERAZINE EDISYLATE 10 MG: 5 INJECTION INTRAMUSCULAR; INTRAVENOUS at 12:32

## 2018-01-06 RX ADMIN — DICYCLOMINE HYDROCHLORIDE 20 MG: 10 CAPSULE ORAL at 22:27

## 2018-01-06 RX ADMIN — CLOPIDOGREL BISULFATE 75 MG: 75 TABLET ORAL at 09:35

## 2018-01-06 RX ADMIN — Medication 1 CAPSULE: at 09:34

## 2018-01-06 RX ADMIN — FAMOTIDINE 10 MG: 20 TABLET ORAL at 09:35

## 2018-01-06 RX ADMIN — METRONIDAZOLE 500 MG: 250 TABLET ORAL at 09:34

## 2018-01-06 RX ADMIN — LEVOFLOXACIN 750 MG: 750 TABLET, FILM COATED ORAL at 09:35

## 2018-01-06 RX ADMIN — HYDROCODONE BITARTRATE AND ACETAMINOPHEN 1 TABLET: 5; 325 TABLET ORAL at 17:38

## 2018-01-06 RX ADMIN — DICYCLOMINE HYDROCHLORIDE 20 MG: 10 CAPSULE ORAL at 17:38

## 2018-01-06 RX ADMIN — HYDROCODONE BITARTRATE AND ACETAMINOPHEN 1 TABLET: 5; 325 TABLET ORAL at 09:34

## 2018-01-06 RX ADMIN — FAMOTIDINE 10 MG: 20 TABLET ORAL at 17:38

## 2018-01-06 RX ADMIN — ONDANSETRON 4 MG: 2 INJECTION INTRAMUSCULAR; INTRAVENOUS at 09:34

## 2018-01-06 RX ADMIN — DICYCLOMINE HYDROCHLORIDE 10 MG: 10 CAPSULE ORAL at 09:34

## 2018-01-06 RX ADMIN — LISINOPRIL 20 MG: 20 TABLET ORAL at 09:34

## 2018-01-06 NOTE — PROGRESS NOTES
Bedside and Verbal shift change report given to [address] (oncoming nurse) by Geoff Santos (offgoing nurse). Report included the following information SBAR, Kardex, Intake/Output, MAR, Accordion and Recent Results.

## 2018-01-06 NOTE — PROGRESS NOTES
Bedside and Verbal shift change report given to [address] [place] [address] (oncoming nurse) by Enzo Monreal RN (offgoing nurse). Report included the following information SBAR, Kardex, Intake/Output, MAR, Accordion and Recent Results.

## 2018-01-06 NOTE — PROGRESS NOTES
Gi note    Increased abdominal pain but labs better and Xray just shows some gaseous distension of colon. Visit Vitals    /77 (BP 1 Location: Right arm, BP Patient Position: At rest)    Pulse 88    Temp 97.8 °F (36.6 °C)    Resp 18    Ht 5' 6\" (1.676 m)    Wt 90.7 kg (199 lb 15.3 oz)    SpO2 93%    BMI 32.27 kg/m2     Abdomen soft non distended    A/P  Enterocolitis all studies negative  If no significant improvement in next 24-48 hours might consider colonoscopy but I still think he just needs time to recover. No evidence on vitals, labs, or exam of alternate process. Following.   Annalise Rodríguez MD

## 2018-01-06 NOTE — PROGRESS NOTES
Danny Chaparro [facility] 79  [address]  [phone]      Medical Progress Note      NAME: Yash Licona   :  1940  MRM:  [id number]    Date/Time: 2018         Subjective:     Chief Complaint:  Patient was seen and examined. Chart reviewed. Abd pain worsening and cramping now. Poor PO intake. Objective:       Vitals:       Last 24hrs VS reviewed since prior progress note.  Most recent are:    Visit Vitals    /77 (BP 1 Location: Right arm, BP Patient Position: At rest)    Pulse 88    Temp 97.8 °F (36.6 °C)    Resp 18    Ht 5' 6\" (1.676 m)    Wt 90.7 kg (199 lb 15.3 oz)    SpO2 93%    BMI 32.27 kg/m2     SpO2 Readings from Last 6 Encounters:   18 93%   17 96%   17 94%   16 95%   16 95%   12/28/15 96%    O2 Flow Rate (L/min): 2 l/min       Intake/Output Summary (Last 24 hours) at 18 1122  Last data filed at 18 2322   Gross per 24 hour   Intake                0 ml   Output              300 ml   Net             -300 ml        Exam:     Physical Exam:    Gen:  Well-developed, well-nourished, obese, in mild acute distress  HEENT:  Pink conjunctivae, PERRL, hearing intact to voice, moist mucous membranes  Neck:  Supple, without masses, thyroid non-tender  Resp:  No accessory muscle use, clear breath sounds without wheezes rales or rhonchi  Card:  No murmurs, normal S1, S2 without thrills, bruits or peripheral edema  Abd:  Soft, LLQ pain, mildly distended, high pitched bowel sounds  Musc:  No cyanosis or clubbing  Skin:  No rashes  Neuro:  Cranial nerves 3-12 are grossly intact, follows commands appropriately  Psych:  Good insight, oriented to person, place and time, alert    Medications Reviewed: (see below)    Lab Data Reviewed: (see below)    ______________________________________________________________________    Medications:     Current Facility-Administered Medications   Medication Dose Route Frequency    levoFLOXacin (LEVAQUIN) tablet 750 mg  750 mg Oral Q24H    metroNIDAZOLE (FLAGYL) tablet 500 mg  500 mg Oral Q12H    prochlorperazine (COMPAZINE) injection 10 mg  10 mg IntraVENous Q6H    dicyclomine (BENTYL) capsule 20 mg  20 mg Oral QID    lactobac ac& pc-s.therm-b.anim (DENA Q/RISAQUAD)  1 Cap Oral DAILY    lisinopril (PRINIVIL, ZESTRIL) tablet 20 mg  20 mg Oral DAILY    simvastatin (ZOCOR) tablet 20 mg  20 mg Oral QHS    clopidogrel (PLAVIX) tablet 75 mg  75 mg Oral DAILY    influenza vaccine 2017-18 (3 yrs+)(PF) (FLUZONE QUAD/FLUARIX QUAD) injection 0.5 mL  0.5 mL IntraMUSCular PRIOR TO DISCHARGE    sodium chloride (NS) flush 5-10 mL  5-10 mL IntraVENous PRN    atenolol (TENORMIN) tablet 50 mg  50 mg Oral DAILY    famotidine (PEPCID) tablet 10 mg  10 mg Oral BID    albuterol-ipratropium (DUO-NEB) 2.5 MG-0.5 MG/3 ML  3 mL Nebulization Q6H RT    acetaminophen (TYLENOL) tablet 650 mg  650 mg Oral Q4H PRN    diphenhydrAMINE (BENADRYL) capsule 25 mg  25 mg Oral Q4H PRN    ondansetron (ZOFRAN) injection 4 mg  4 mg IntraVENous Q4H PRN    HYDROcodone-acetaminophen (NORCO) 5-325 mg per tablet 1 Tab  1 Tab Oral Q4H PRN    enoxaparin (LOVENOX) injection 40 mg  40 mg SubCUTAneous Q24H    budesonide (PULMICORT) 500 mcg/2 ml nebulizer suspension  500 mcg Nebulization BID RT    arformoterol (BROVANA) neb solution 15 mcg  15 mcg Nebulization BID RT          Lab Review:     Recent Labs      01/06/18   0337  01/05/18   0050  01/04/18   0523   WBC  11.7*  12.5*  13.3*   HGB  12.1  12.1  12.6   HCT  35.7*  34.8*  38.2   PLT  203  177  184     Recent Labs      01/06/18   0337  01/05/18   0050  01/04/18   0523  01/03/18   1243   NA  137  140  139  140   K  4.0  3.9  3.9  3.9   CL  104  104  105  101   CO2  24  22  22  27   GLU  104*  114*  121*  118*   BUN  10  10  14  17   CREA  1.04  1.02  1.13  1.19   CA  9.2  8.6  8.0*  9.5   MG  1.9  1.8  1.8   --    PHOS  4.0  2.1*  2.3*   --    ALB   --    -- 2. 7*  3.5   TBILI   --    --   0.6  0.7   SGOT   --    --   25  43*   ALT   --    --   35  46     Lab Results   Component Value Date/Time    Glucose (POC) 139 03/31/2017 04:06 PM    Glucose (POC) 132 03/31/2017 07:39 AM          Assessment / Plan: Active Problems:    68 yo hx of COPD, CAD s/p CABG, prostate CA, presented w/ N/V, enteritis    1) Abd pain/N/V/D: CT with small bowel enteritis. Stool studies negative so far. Abx switched to PO. Change in bowel sounds and worsening of sx is discouraging, will obtained KUB. Increase bentyl. Continue probiotics. Add scheduled compazine and prn zofran. Advancing diet as tolerated. 2) Pneumonia: CXR was neg, but this was seen on abd CT. No respiratory symptoms. Already on Abx as above. Will monitor    3) Sepsis/leukocytosis: had 2/4 SIRS on admission, now improving.   Will cont IVF, Abx    4) CAD: cont plavix, statin    5) HTN: Continue lisinopril    6) COPD: stable, cont nebs prn    Total time spent with patient: 40 min                  Care Plan discussed with: Patient, nursing    Discussed:  Care Plan    Prophylaxis:  Lovenox    Disposition:  Home w/Family           ___________________________________________________    Attending Physician: Geovanny Rodríguez DO

## 2018-01-07 LAB
ANION GAP SERPL CALC-SCNC: 8 MMOL/L (ref 5–15)
BACTERIA SPEC CULT: ABNORMAL
BASOPHILS # BLD: 0 K/UL (ref 0–0.1)
BASOPHILS NFR BLD: 0 % (ref 0–1)
BUN SERPL-MCNC: 13 MG/DL (ref 6–20)
BUN/CREAT SERPL: 12 (ref 12–20)
C JEJUNI+C COLI AG STL QL: NEGATIVE
CALCIUM SERPL-MCNC: 9 MG/DL (ref 8.5–10.1)
CHLORIDE SERPL-SCNC: 105 MMOL/L (ref 97–108)
CO2 SERPL-SCNC: 25 MMOL/L (ref 21–32)
CREAT SERPL-MCNC: 1.05 MG/DL (ref 0.7–1.3)
E COLI SXT1+2 STL IA: NEGATIVE
EOSINOPHIL # BLD: 0.3 K/UL (ref 0–0.4)
EOSINOPHIL NFR BLD: 2 % (ref 0–7)
ERYTHROCYTE [DISTWIDTH] IN BLOOD BY AUTOMATED COUNT: 12.9 % (ref 11.5–14.5)
GLUCOSE SERPL-MCNC: 93 MG/DL (ref 65–100)
HCT VFR BLD AUTO: 35.8 % (ref 36.6–50.3)
HGB BLD-MCNC: 12.2 G/DL (ref 12.1–17)
LYMPHOCYTES # BLD: 1.3 K/UL (ref 0.8–3.5)
LYMPHOCYTES NFR BLD: 11 % (ref 12–49)
MCH RBC QN AUTO: 31.8 PG (ref 26–34)
MCHC RBC AUTO-ENTMCNC: 34.1 G/DL (ref 30–36.5)
MCV RBC AUTO: 93.2 FL (ref 80–99)
MONOCYTES # BLD: 0.8 K/UL (ref 0–1)
MONOCYTES NFR BLD: 7 % (ref 5–13)
NEUTS SEG # BLD: 9.4 K/UL (ref 1.8–8)
NEUTS SEG NFR BLD: 80 % (ref 32–75)
PLATELET # BLD AUTO: 206 K/UL (ref 150–400)
POTASSIUM SERPL-SCNC: 3.7 MMOL/L (ref 3.5–5.1)
RBC # BLD AUTO: 3.84 M/UL (ref 4.1–5.7)
SERVICE CMNT-IMP: ABNORMAL
SODIUM SERPL-SCNC: 138 MMOL/L (ref 136–145)
WBC # BLD AUTO: 11.8 K/UL (ref 4.1–11.1)

## 2018-01-07 PROCEDURE — 80048 BASIC METABOLIC PNL TOTAL CA: CPT | Performed by: INTERNAL MEDICINE

## 2018-01-07 PROCEDURE — 36415 COLL VENOUS BLD VENIPUNCTURE: CPT | Performed by: INTERNAL MEDICINE

## 2018-01-07 PROCEDURE — 74011250636 HC RX REV CODE- 250/636: Performed by: INTERNAL MEDICINE

## 2018-01-07 PROCEDURE — 74011250637 HC RX REV CODE- 250/637: Performed by: PHYSICIAN ASSISTANT

## 2018-01-07 PROCEDURE — 74011250637 HC RX REV CODE- 250/637: Performed by: INTERNAL MEDICINE

## 2018-01-07 PROCEDURE — 74011000250 HC RX REV CODE- 250: Performed by: INTERNAL MEDICINE

## 2018-01-07 PROCEDURE — 77010033678 HC OXYGEN DAILY

## 2018-01-07 PROCEDURE — 85025 COMPLETE CBC W/AUTO DIFF WBC: CPT | Performed by: INTERNAL MEDICINE

## 2018-01-07 PROCEDURE — 65660000000 HC RM CCU STEPDOWN

## 2018-01-07 PROCEDURE — 94640 AIRWAY INHALATION TREATMENT: CPT

## 2018-01-07 PROCEDURE — 77030005537 HC CATH URETH BARD -A

## 2018-01-07 PROCEDURE — 51798 US URINE CAPACITY MEASURE: CPT

## 2018-01-07 RX ORDER — TAMSULOSIN HYDROCHLORIDE 0.4 MG/1
0.4 CAPSULE ORAL DAILY
Status: DISCONTINUED | OUTPATIENT
Start: 2018-01-07 | End: 2018-01-11 | Stop reason: HOSPADM

## 2018-01-07 RX ADMIN — PROCHLORPERAZINE EDISYLATE 10 MG: 5 INJECTION INTRAMUSCULAR; INTRAVENOUS at 05:11

## 2018-01-07 RX ADMIN — PROCHLORPERAZINE EDISYLATE 10 MG: 5 INJECTION INTRAMUSCULAR; INTRAVENOUS at 17:49

## 2018-01-07 RX ADMIN — PROCHLORPERAZINE EDISYLATE 10 MG: 5 INJECTION INTRAMUSCULAR; INTRAVENOUS at 12:15

## 2018-01-07 RX ADMIN — METRONIDAZOLE 500 MG: 250 TABLET ORAL at 10:28

## 2018-01-07 RX ADMIN — IPRATROPIUM BROMIDE AND ALBUTEROL SULFATE 3 ML: .5; 3 SOLUTION RESPIRATORY (INHALATION) at 19:46

## 2018-01-07 RX ADMIN — Medication 1 CAPSULE: at 10:29

## 2018-01-07 RX ADMIN — IPRATROPIUM BROMIDE AND ALBUTEROL SULFATE 3 ML: .5; 3 SOLUTION RESPIRATORY (INHALATION) at 02:00

## 2018-01-07 RX ADMIN — Medication 10 ML: at 05:11

## 2018-01-07 RX ADMIN — BUDESONIDE 500 MCG: 0.5 INHALANT RESPIRATORY (INHALATION) at 19:46

## 2018-01-07 RX ADMIN — METRONIDAZOLE 500 MG: 250 TABLET ORAL at 20:56

## 2018-01-07 RX ADMIN — FAMOTIDINE 10 MG: 20 TABLET ORAL at 17:50

## 2018-01-07 RX ADMIN — SIMVASTATIN 20 MG: 20 TABLET, FILM COATED ORAL at 21:46

## 2018-01-07 RX ADMIN — IPRATROPIUM BROMIDE AND ALBUTEROL SULFATE 3 ML: .5; 3 SOLUTION RESPIRATORY (INHALATION) at 08:55

## 2018-01-07 RX ADMIN — ATENOLOL 50 MG: 50 TABLET ORAL at 10:28

## 2018-01-07 RX ADMIN — DICYCLOMINE HYDROCHLORIDE 20 MG: 10 CAPSULE ORAL at 10:28

## 2018-01-07 RX ADMIN — ENOXAPARIN SODIUM 40 MG: 100 INJECTION SUBCUTANEOUS at 20:56

## 2018-01-07 RX ADMIN — DICYCLOMINE HYDROCHLORIDE 20 MG: 10 CAPSULE ORAL at 12:15

## 2018-01-07 RX ADMIN — Medication 10 ML: at 00:47

## 2018-01-07 RX ADMIN — LISINOPRIL 20 MG: 20 TABLET ORAL at 10:29

## 2018-01-07 RX ADMIN — IPRATROPIUM BROMIDE AND ALBUTEROL SULFATE 3 ML: .5; 3 SOLUTION RESPIRATORY (INHALATION) at 13:58

## 2018-01-07 RX ADMIN — HYDROCODONE BITARTRATE AND ACETAMINOPHEN 1 TABLET: 5; 325 TABLET ORAL at 00:45

## 2018-01-07 RX ADMIN — BUDESONIDE 500 MCG: 0.5 INHALANT RESPIRATORY (INHALATION) at 08:55

## 2018-01-07 RX ADMIN — TAMSULOSIN HYDROCHLORIDE 0.4 MG: 0.4 CAPSULE ORAL at 12:15

## 2018-01-07 RX ADMIN — LEVOFLOXACIN 750 MG: 750 TABLET, FILM COATED ORAL at 10:29

## 2018-01-07 RX ADMIN — FAMOTIDINE 10 MG: 20 TABLET ORAL at 10:29

## 2018-01-07 RX ADMIN — DICYCLOMINE HYDROCHLORIDE 20 MG: 10 CAPSULE ORAL at 17:49

## 2018-01-07 RX ADMIN — CLOPIDOGREL BISULFATE 75 MG: 75 TABLET ORAL at 10:29

## 2018-01-07 RX ADMIN — PROCHLORPERAZINE EDISYLATE 10 MG: 5 INJECTION INTRAMUSCULAR; INTRAVENOUS at 00:45

## 2018-01-07 RX ADMIN — DICYCLOMINE HYDROCHLORIDE 20 MG: 10 CAPSULE ORAL at 22:00

## 2018-01-07 NOTE — PROGRESS NOTES
Julianne Wynn. Marvella Fabry, MD  [phone] office  [phone] voicemail     Gastroenterology Progress Note    January 7, 2018  Admit Date: 1/3/2018         Narrative Assessment and Plan   · Abdominal pain - improved not resolved  · Diarrhea - continues    \"I'm better but still sick\"  Reports no vomiting no bleeding. 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 My plan is for one more night of supportive care/observation. If he feels better tomorrow will try regular food again. If symptoms unchanged in AM- prep for EGD and colonoscopy on Tuesday. He is in agreement. Subjective:   · Abdominal pain    Location:  diffuse  Duration: days  Timing: intermittent  Radiation: crampy    Associated Symptoms:   Aggravating/Alleviating factors:     ROS:  The previous review of systems on initial consultation / H&P is noted and reviewed. Specific changes noted above in HPI.     Current Medications:     Current Facility-Administered Medications   Medication Dose Route Frequency    tamsulosin (FLOMAX) capsule 0.4 mg  0.4 mg Oral DAILY    levoFLOXacin (LEVAQUIN) tablet 750 mg  750 mg Oral Q24H    metroNIDAZOLE (FLAGYL) tablet 500 mg  500 mg Oral Q12H    prochlorperazine (COMPAZINE) injection 10 mg  10 mg IntraVENous Q6H    dicyclomine (BENTYL) capsule 20 mg  20 mg Oral QID    lactobac ac& pc-s.therm-b.anim (DENA Q/RISAQUAD)  1 Cap Oral DAILY    lisinopril (PRINIVIL, ZESTRIL) tablet 20 mg  20 mg Oral DAILY    simvastatin (ZOCOR) tablet 20 mg  20 mg Oral QHS    clopidogrel (PLAVIX) tablet 75 mg  75 mg Oral DAILY    influenza vaccine 2017-18 (3 yrs+)(PF) (FLUZONE QUAD/FLUARIX QUAD) injection 0.5 mL  0.5 mL IntraMUSCular PRIOR TO DISCHARGE    sodium chloride (NS) flush 5-10 mL  5-10 mL IntraVENous PRN    atenolol (TENORMIN) tablet 50 mg  50 mg Oral DAILY    famotidine (PEPCID) tablet 10 mg  10 mg Oral BID    albuterol-ipratropium (DUO-NEB) 2.5 MG-0.5 MG/3 ML  3 mL Nebulization Q6H RT    acetaminophen (TYLENOL) tablet 650 mg  650 mg Oral Q4H PRN    diphenhydrAMINE (BENADRYL) capsule 25 mg  25 mg Oral Q4H PRN    ondansetron (ZOFRAN) injection 4 mg  4 mg IntraVENous Q4H PRN    HYDROcodone-acetaminophen (NORCO) 5-325 mg per tablet 1 Tab  1 Tab Oral Q4H PRN    enoxaparin (LOVENOX) injection 40 mg  40 mg SubCUTAneous Q24H    budesonide (PULMICORT) 500 mcg/2 ml nebulizer suspension  500 mcg Nebulization BID RT    arformoterol (BROVANA) neb solution 15 mcg  15 mcg Nebulization BID RT       Objective:     VITALS:   Last 24hrs VS reviewed since prior progress note. Most recent are:  Visit Vitals    /65 (BP 1 Location: Right arm, BP Patient Position: At rest)    Pulse 97    Temp 98 °F (36.7 °C)    Resp 17    Ht 5' 6\" (1.676 m)    Wt 90.7 kg (199 lb 15.3 oz)    SpO2 93%    BMI 32.27 kg/m2     Temp (24hrs), Av.9 °F (36.6 °C), Min:97.6 °F (36.4 °C), Max:98.2 °F (36.8 °C)    No intake or output data in the 24 hours ending 18 1358    EXAM:  General: Comfortable, no distress    HEENT:  Atraumatic skull, pupils equal  Lungs:  No abnormal audible breath sounds. Speaking in complete sentences  Heart:   No abnormal audible heart sounds. Well perfused  Abdomen:   Soft but distended.   No mass, guarding or rebound  Musc:   No skeletal defects or deformities  Neurologic:   Cranial nerves grossly intact, moves all 4 extremities  Psych:    Good insight. Not anxious nor agitated  Derm:   No rash, jaundice, nor palpable dermatologic mass    Lab Data Reviewed:   Recent Labs      18   0512  18   0337  18   0050   WBC  11.8*  11.7*  12.5*   HGB  12.2  12.1  12.1   HCT  35.8*  35.7*  34.8*   PLT  206  203  177     Recent Labs      18   0512  18   0337  18   0050   NA  138  137  140   K  3.7  4.0  3.9   CL  105  104  104   CO2  25  24  22   GLU  93  104*  114*   BUN  13  10  10   CREA  1.05  1.04  1.02   CA  9.0  9.2  8.6   MG   --   1.9 1. 8   PHOS   --   4.0  2.1*     Lab Results   Component Value Date/Time    Glucose (POC) 139 03/31/2017 04:06 PM    Glucose (POC) 132 03/31/2017 07:39 AM     No results for input(s): PH, PCO2, PO2, HCO3, FIO2 in the last 72 hours. No results for input(s): INR in the last 72 hours.     No lab exists for component: INREXT        Assessment:   (See above)  Active Problems:    CAD (coronary artery disease) ()      Overview: MI 7/2010,s/p CABG      COPD (chronic obstructive pulmonary disease) ([facility]) ()      Overview: moderate, FEV1 1.41 7/2009      GERD (gastroesophageal reflux disease) ()      HTN (hypertension) ()      Hyperlipidemia with target LDL less than 70 ()      Pneumonia (1/3/2018)      Leukocytosis (1/3/2018)      Sinus tachycardia (1/3/2018)      Fever (1/3/2018)      Sepsis ([address] (1/3/2018)      Abdominal pain (1/3/2018)      Colitis (1/3/2018)      Nausea & vomiting (1/3/2018)        Plan:   (See above)      Signed By: Kip Contreras MD     1/7/2018  1:58 PM

## 2018-01-07 NOTE — PROGRESS NOTES
Danny Chaparro [facility] 79  566 [facility], [address] [place] [address]  [phone]      Medical Progress Note      NAME: Raghu Bazan   :  1940  MRM:  [id number]    Date/Time: 2018         Subjective:     Chief Complaint:  Patient was seen and examined. Chart reviewed. Abd pain stable today. Having diarrhea again but taking clear liquids okay       Objective:       Vitals:       Last 24hrs VS reviewed since prior progress note.  Most recent are:    Visit Vitals    /68 (BP 1 Location: Left arm, BP Patient Position: At rest)    Pulse 88    Temp 97.9 °F (36.6 °C)    Resp 18    Ht 5' 6\" (1.676 m)    Wt 90.7 kg (199 lb 15.3 oz)    SpO2 91%    BMI 32.27 kg/m2     SpO2 Readings from Last 6 Encounters:   18 91%   17 96%   17 94%   16 95%   16 95%   12/28/15 96%    O2 Flow Rate (L/min): 2 l/min     No intake or output data in the 24 hours ending 18 1043     Exam:     Physical Exam:    Gen:  Well-developed, well-nourished, obese, in no acute distress  HEENT:  Pink conjunctivae, PERRL, hearing intact to voice, moist mucous membranes  Neck:  Supple, without masses, thyroid non-tender  Resp:  No accessory muscle use, clear breath sounds without wheezes rales or rhonchi  Card:  No murmurs, normal S1, S2 without thrills, bruits or peripheral edema  Abd:  Soft, LLQ pain, mildly distended, normoactive bowel sounds  Musc:  No cyanosis or clubbing  Skin:  No rashes  Neuro:  Cranial nerves 3-12 are grossly intact, follows commands appropriately  Psych:  Good insight, oriented to person, place and time, alert    Medications Reviewed: (see below)    Lab Data Reviewed: (see below)    ______________________________________________________________________    Medications:     Current Facility-Administered Medications   Medication Dose Route Frequency    levoFLOXacin (LEVAQUIN) tablet 750 mg  750 mg Oral Q24H    metroNIDAZOLE (FLAGYL) tablet 500 mg  500 mg Oral Q12H    prochlorperazine (COMPAZINE) injection 10 mg  10 mg IntraVENous Q6H    dicyclomine (BENTYL) capsule 20 mg  20 mg Oral QID    lactobac ac& pc-s.therm-b.anim (DENA Q/RISAQUAD)  1 Cap Oral DAILY    lisinopril (PRINIVIL, ZESTRIL) tablet 20 mg  20 mg Oral DAILY    simvastatin (ZOCOR) tablet 20 mg  20 mg Oral QHS    clopidogrel (PLAVIX) tablet 75 mg  75 mg Oral DAILY    influenza vaccine 2017-18 (3 yrs+)(PF) (FLUZONE QUAD/FLUARIX QUAD) injection 0.5 mL  0.5 mL IntraMUSCular PRIOR TO DISCHARGE    sodium chloride (NS) flush 5-10 mL  5-10 mL IntraVENous PRN    atenolol (TENORMIN) tablet 50 mg  50 mg Oral DAILY    famotidine (PEPCID) tablet 10 mg  10 mg Oral BID    albuterol-ipratropium (DUO-NEB) 2.5 MG-0.5 MG/3 ML  3 mL Nebulization Q6H RT    acetaminophen (TYLENOL) tablet 650 mg  650 mg Oral Q4H PRN    diphenhydrAMINE (BENADRYL) capsule 25 mg  25 mg Oral Q4H PRN    ondansetron (ZOFRAN) injection 4 mg  4 mg IntraVENous Q4H PRN    HYDROcodone-acetaminophen (NORCO) 5-325 mg per tablet 1 Tab  1 Tab Oral Q4H PRN    enoxaparin (LOVENOX) injection 40 mg  40 mg SubCUTAneous Q24H    budesonide (PULMICORT) 500 mcg/2 ml nebulizer suspension  500 mcg Nebulization BID RT    arformoterol (BROVANA) neb solution 15 mcg  15 mcg Nebulization BID RT          Lab Review:     Recent Labs      01/07/18   0512  01/06/18   0337  01/05/18   0050   WBC  11.8*  11.7*  12.5*   HGB  12.2  12.1  12.1   HCT  35.8*  35.7*  34.8*   PLT  206  203  177     Recent Labs      01/07/18   0512  01/06/18   0337  01/05/18   0050   NA  138  137  140   K  3.7  4.0  3.9   CL  105  104  104   CO2  25  24  22   GLU  93  104*  114*   BUN  13  10  10   CREA  1.05  1.04  1.02   CA  9.0  9.2  8.6   MG   --   1.9  1.8   PHOS   --   4.0  2.1*     Lab Results   Component Value Date/Time    Glucose (POC) 139 03/31/2017 04:06 PM    Glucose (POC) 132 03/31/2017 07:39 AM          Assessment / Plan:      Active Problems:    70 yo hx of COPD, CAD s/p CABG, prostate CA, presented w/ N/V, enteritis    1) Abd pain/N/V/D: CT with small bowel enteritis. Stool studies negative so far. Abx switched to PO. KUB showed colonic distention. Increased bentyl. Continue probiotics. Added scheduled compazine and prn zofran. Advancing diet as tolerated. Appreciate GI input    2) Pneumonia: CXR was neg, but this was seen on abd CT. No respiratory symptoms. Already on Abx as above. Will monitor    3) Sepsis/leukocytosis: had 2/4 SIRS on admission, now improving. Will cont IVF, Abx    4) CAD: cont plavix, statin    5) HTN: Continue lisinopril    6) COPD: stable, cont nebs prn    7) Urinary retention. May be due to meds v. Illness. Check bladder scan.  If greater than 300 mL, straight cath and repeat in 6 hours + will start flomax    Total time spent with patient: 35 min                  Care Plan discussed with: Patient, nursing    Discussed:  Care Plan    Prophylaxis:  Lovenox    Disposition:  Home w/Family           ___________________________________________________    Attending Physician: Anastacia Hendricks DO

## 2018-01-07 NOTE — PROGRESS NOTES
Bedside and Verbal shift change report given to Gracia Bowens RN (oncoming nurse) by Demetria Amado RN (offgoing nurse). Report included the following information SBAR, Kardex, Intake/Output, MAR, Recent Results and Med Rec Status.

## 2018-01-07 NOTE — PROGRESS NOTES
1905 Bedside and Verbal shift change report given to [address] (oncoming nurse) by Ephraim Vance (offgoing nurse). Report included the following information SBAR, Kardex, Recent Results and Cardiac Rhythm NSR.     2310 Bedside and Verbal shift change report given to Caty Greenwood RN (oncoming nurse) by Dimitry Gillette RN (offgoing nurse). Report included the following information SBAR, Kardex, Recent Results and Cardiac Rhythm NSR.

## 2018-01-07 NOTE — PROGRESS NOTES
Problem: Falls - Risk of  Goal: *Absence of Falls  Document Dennis Fall Risk and appropriate interventions in the flowsheet.    Outcome: Progressing Towards Goal  Fall Risk Interventions:  Mobility Interventions: Bed/chair exit alarm         Medication Interventions: Bed/chair exit alarm, Evaluate medications/consider consulting pharmacy, Patient to call before getting OOB, Teach patient to arise slowly, Utilize gait belt for transfers/ambulation    Elimination Interventions: Bed/chair exit alarm    History of Falls Interventions: Consult care management for discharge planning

## 2018-01-08 ENCOUNTER — APPOINTMENT (OUTPATIENT)
Dept: ULTRASOUND IMAGING | Age: 78
DRG: 392 | End: 2018-01-08
Attending: INTERNAL MEDICINE
Payer: MEDICARE

## 2018-01-08 LAB
BACTERIA SPEC CULT: NORMAL
SERVICE CMNT-IMP: NORMAL

## 2018-01-08 PROCEDURE — 74011000250 HC RX REV CODE- 250: Performed by: INTERNAL MEDICINE

## 2018-01-08 PROCEDURE — 77030005520 HC CATH URETH FOL38 BARD -A

## 2018-01-08 PROCEDURE — 65660000000 HC RM CCU STEPDOWN

## 2018-01-08 PROCEDURE — 74011250637 HC RX REV CODE- 250/637: Performed by: PHYSICIAN ASSISTANT

## 2018-01-08 PROCEDURE — 74011250636 HC RX REV CODE- 250/636: Performed by: INTERNAL MEDICINE

## 2018-01-08 PROCEDURE — 94640 AIRWAY INHALATION TREATMENT: CPT

## 2018-01-08 PROCEDURE — 74011000250 HC RX REV CODE- 250: Performed by: SPECIALIST

## 2018-01-08 PROCEDURE — 74011250637 HC RX REV CODE- 250/637: Performed by: INTERNAL MEDICINE

## 2018-01-08 PROCEDURE — 76770 US EXAM ABDO BACK WALL COMP: CPT

## 2018-01-08 PROCEDURE — 97535 SELF CARE MNGMENT TRAINING: CPT

## 2018-01-08 PROCEDURE — 77010033678 HC OXYGEN DAILY

## 2018-01-08 RX ADMIN — METRONIDAZOLE 500 MG: 250 TABLET ORAL at 22:37

## 2018-01-08 RX ADMIN — DICYCLOMINE HYDROCHLORIDE 20 MG: 10 CAPSULE ORAL at 09:29

## 2018-01-08 RX ADMIN — SIMVASTATIN 20 MG: 20 TABLET, FILM COATED ORAL at 22:37

## 2018-01-08 RX ADMIN — FAMOTIDINE 10 MG: 20 TABLET ORAL at 18:04

## 2018-01-08 RX ADMIN — IPRATROPIUM BROMIDE AND ALBUTEROL SULFATE 3 ML: .5; 3 SOLUTION RESPIRATORY (INHALATION) at 01:13

## 2018-01-08 RX ADMIN — PROCHLORPERAZINE EDISYLATE 10 MG: 5 INJECTION INTRAMUSCULAR; INTRAVENOUS at 07:05

## 2018-01-08 RX ADMIN — Medication 10 ML: at 07:05

## 2018-01-08 RX ADMIN — PROCHLORPERAZINE EDISYLATE 10 MG: 5 INJECTION INTRAMUSCULAR; INTRAVENOUS at 12:07

## 2018-01-08 RX ADMIN — ATENOLOL 50 MG: 50 TABLET ORAL at 09:29

## 2018-01-08 RX ADMIN — ENOXAPARIN SODIUM 40 MG: 100 INJECTION SUBCUTANEOUS at 22:36

## 2018-01-08 RX ADMIN — Medication 1 CAPSULE: at 09:29

## 2018-01-08 RX ADMIN — BUDESONIDE 500 MCG: 0.5 INHALANT RESPIRATORY (INHALATION) at 07:41

## 2018-01-08 RX ADMIN — LEVOFLOXACIN 750 MG: 750 TABLET, FILM COATED ORAL at 09:29

## 2018-01-08 RX ADMIN — Medication 10 ML: at 18:10

## 2018-01-08 RX ADMIN — CLOPIDOGREL BISULFATE 75 MG: 75 TABLET ORAL at 09:29

## 2018-01-08 RX ADMIN — LISINOPRIL 20 MG: 20 TABLET ORAL at 09:29

## 2018-01-08 RX ADMIN — POLYETHYLENE GLYCOL-3350 AND ELECTROLYTES 2000 ML: 236; 6.74; 5.86; 2.97; 22.74 POWDER, FOR SOLUTION ORAL at 18:04

## 2018-01-08 RX ADMIN — POLYETHYLENE GLYCOL-3350 AND ELECTROLYTES 2000 ML: 236; 6.74; 5.86; 2.97; 22.74 POWDER, FOR SOLUTION ORAL at 22:37

## 2018-01-08 RX ADMIN — DICYCLOMINE HYDROCHLORIDE 20 MG: 10 CAPSULE ORAL at 12:07

## 2018-01-08 RX ADMIN — IPRATROPIUM BROMIDE AND ALBUTEROL SULFATE 3 ML: .5; 3 SOLUTION RESPIRATORY (INHALATION) at 07:41

## 2018-01-08 RX ADMIN — FAMOTIDINE 10 MG: 20 TABLET ORAL at 09:29

## 2018-01-08 RX ADMIN — PROCHLORPERAZINE EDISYLATE 10 MG: 5 INJECTION INTRAMUSCULAR; INTRAVENOUS at 00:04

## 2018-01-08 RX ADMIN — IPRATROPIUM BROMIDE AND ALBUTEROL SULFATE 3 ML: .5; 3 SOLUTION RESPIRATORY (INHALATION) at 19:57

## 2018-01-08 RX ADMIN — DICYCLOMINE HYDROCHLORIDE 20 MG: 10 CAPSULE ORAL at 22:36

## 2018-01-08 RX ADMIN — TAMSULOSIN HYDROCHLORIDE 0.4 MG: 0.4 CAPSULE ORAL at 09:29

## 2018-01-08 RX ADMIN — DICYCLOMINE HYDROCHLORIDE 20 MG: 10 CAPSULE ORAL at 18:04

## 2018-01-08 RX ADMIN — BUDESONIDE 500 MCG: 0.5 INHALANT RESPIRATORY (INHALATION) at 19:57

## 2018-01-08 RX ADMIN — METRONIDAZOLE 500 MG: 250 TABLET ORAL at 09:29

## 2018-01-08 RX ADMIN — PROCHLORPERAZINE EDISYLATE 10 MG: 5 INJECTION INTRAMUSCULAR; INTRAVENOUS at 18:04

## 2018-01-08 NOTE — ROUTINE PROCESS
Bedside and Verbal shift change report given to Liat Gonzales RN (oncoming nurse) by Rasheeda Watson RN (offgoing nurse). Report included the following information SBAR, Intake/Output, MAR, and Med Rec Status.

## 2018-01-08 NOTE — PROGRESS NOTES
I discussed pt with attendingg. The plan per attending is for pt to have an EGD and colonoscopy on Tuesday with a probable discharge home on Wednesday. When [facility] has accepted pt for home health needs.   Maxwell Calderón  Team B  [phone]

## 2018-01-08 NOTE — PROGRESS NOTES
Problem: Self Care Deficits Care Plan (Adult)  Goal: *Acute Goals and Plan of Care (Insert Text)  Occupational Therapy Goals  Initiated 1/5/2018  1. Patient will perform upper body dressing with independence within 7 day(s). 2.  Patient will perform lower body dressing with independence within 7 day(s). 4.  Patient will perform toilet transfers with independence within 7 day(s). 5.  Patient will perform all aspects of toileting with independence within 7 day(s). 6.  Patient will participate in upper extremity therapeutic exercise/activities with independence for 10 minutes within 7 day(s). 7.  Patient will utilize energy conservation techniques during functional activities with verbal cues within 7 day(s). Occupational Therapy TREATMENT  Patient: Kim Ramey (71 y.o. male)  Date: 1/8/2018  Diagnosis: Pneumonia <principal problem not specified>       Precautions: Fall    ASSESSMENT:  Patient received sitting EOB with nursing staff for toilet transfer and up to chair. Patient on 2L supplemental O2 and sats remain at 90% or above during activity. Patient able to perform sit to stand with CGA, transfers to [facility] near bed and demonstrates ability to perform hygiene with CGA. Upon completion, patient able to transfer to chair for visiting with wife. Patient will benefit from continued OT services to increase safety and independence with ADL tasks. Recommend HH at discharge. Progression toward goals:  [x]       Improving appropriately and progressing toward goals  []       Improving slowly and progressing toward goals  []       Not making progress toward goals and plan of care will be adjusted     PLAN:  Patient continues to benefit from skilled intervention to address the above impairments. Continue treatment per established plan of care. Discharge Recommendations:  Home Health  Further Equipment Recommendations for Discharge:  TBD     SUBJECTIVE:   Patient stated I am feeling better.   Is it time to go home? Kenny Trotter    OBJECTIVE DATA SUMMARY:   Cognitive/Behavioral Status:  Neurologic State: Alert  Orientation Level: Oriented X4  Cognition: Appropriate decision making; Follows commands  Perception: Appears intact  Perseveration: No perseveration noted  Safety/Judgement: Awareness of environment    Functional Mobility and Transfers for ADLs:  Bed Mobility:  Scooting: Supervision    Transfers:  Sit to Stand: Contact guard assistance       Balance:  Sitting: Intact  Standing: Intact; With support    ADL Intervention:     Toileting  Toileting Assistance: Contact guard assistance  Bowel Hygiene: Contact guard assistance  Clothing Management: Contact guard assistance  Cues: Verbal cues provided    Cognitive Retraining  Safety/Judgement: Awareness of environment    Neuro Re-Education:           Therapeutic Exercises:     Pain:  Pain Scale 1: Numeric (0 - 10)  Pain Intensity 1: 0              Activity Tolerance:   VSS  Please refer to the flowsheet for vital signs taken during this treatment.   After treatment:   [x] Patient left in no apparent distress sitting up in chair  [] Patient left in no apparent distress in bed  [x] Call bell left within reach  [x] Nursing notified  [x] Caregiver present  [x] Bed alarm activated    COMMUNICATION/COLLABORATION:   The patients plan of care was discussed with: Registered Nurse    Jojo Smith OTR/L  Time Calculation: 19 mins

## 2018-01-08 NOTE — ROUTINE PROCESS
Bedside and Verbal shift change report given to Shun Perkins RN (oncoming nurse) by Guru Jacobs RN (offgoing nurse). Report included the following information SBAR, Kardex, ED Summary, Intake/Output, MAR and Recent Results.

## 2018-01-08 NOTE — PROGRESS NOTES
Danny Chaparro [facility] 79  [address]  [phone]      Medical Progress Note      NAME: Mariya Wolf   :  1940  MRM:  [id number]    Date/Time: 2018         Subjective:     Chief Complaint:  Patient was seen and examined. Chart reviewed. Abd pain and diarrhea continue today. Objective:       Vitals:       Last 24hrs VS reviewed since prior progress note.  Most recent are:    Visit Vitals    /67 (BP 1 Location: Left arm, BP Patient Position: Sitting)    Pulse 80    Temp 98.2 °F (36.8 °C)    Resp 18    Ht 5' 6\" (1.676 m)    Wt 88.7 kg (195 lb 8.8 oz)    SpO2 94%    BMI 31.56 kg/m2     SpO2 Readings from Last 6 Encounters:   18 94%   17 96%   17 94%   16 95%   16 95%   12/28/15 96%    O2 Flow Rate (L/min): 2 l/min       Intake/Output Summary (Last 24 hours) at 18 1502  Last data filed at 18 0803   Gross per 24 hour   Intake                0 ml   Output             1100 ml   Net            -1100 ml        Exam:     Physical Exam:    Gen:  Well-developed, well-nourished, obese, in no acute distress  HEENT:  Pink conjunctivae, PERRL, hearing intact to voice, moist mucous membranes  Neck:  Supple, without masses, thyroid non-tender  Resp:  No accessory muscle use, clear breath sounds without wheezes rales or rhonchi  Card:  No murmurs, normal S1, S2 without thrills, bruits or peripheral edema  Abd:  Soft, LLQ pain, mildly distended, hypooactive bowel sounds  Musc:  No cyanosis or clubbing  Skin:  No rashes  Neuro:  Cranial nerves 3-12 are grossly intact, follows commands appropriately  Psych:  Good insight, oriented to person, place and time, alert    Medications Reviewed: (see below)    Lab Data Reviewed: (see below)    ______________________________________________________________________    Medications:     Current Facility-Administered Medications   Medication Dose Route Frequency    peg 3350-electrolytes (COLYTE) 4000 mL  2,000 mL Oral ONCE    peg 3350-electrolytes (COLYTE) 4000 mL  2,000 mL Oral ONCE    tamsulosin (FLOMAX) capsule 0.4 mg  0.4 mg Oral DAILY    levoFLOXacin (LEVAQUIN) tablet 750 mg  750 mg Oral Q24H    metroNIDAZOLE (FLAGYL) tablet 500 mg  500 mg Oral Q12H    prochlorperazine (COMPAZINE) injection 10 mg  10 mg IntraVENous Q6H    dicyclomine (BENTYL) capsule 20 mg  20 mg Oral QID    lactobac ac& pc-s.therm-b.anim (DENA Q/RISAQUAD)  1 Cap Oral DAILY    lisinopril (PRINIVIL, ZESTRIL) tablet 20 mg  20 mg Oral DAILY    simvastatin (ZOCOR) tablet 20 mg  20 mg Oral QHS    clopidogrel (PLAVIX) tablet 75 mg  75 mg Oral DAILY    influenza vaccine 2017-18 (3 yrs+)(PF) (FLUZONE QUAD/FLUARIX QUAD) injection 0.5 mL  0.5 mL IntraMUSCular PRIOR TO DISCHARGE    sodium chloride (NS) flush 5-10 mL  5-10 mL IntraVENous PRN    atenolol (TENORMIN) tablet 50 mg  50 mg Oral DAILY    famotidine (PEPCID) tablet 10 mg  10 mg Oral BID    albuterol-ipratropium (DUO-NEB) 2.5 MG-0.5 MG/3 ML  3 mL Nebulization Q6H RT    acetaminophen (TYLENOL) tablet 650 mg  650 mg Oral Q4H PRN    diphenhydrAMINE (BENADRYL) capsule 25 mg  25 mg Oral Q4H PRN    ondansetron (ZOFRAN) injection 4 mg  4 mg IntraVENous Q4H PRN    HYDROcodone-acetaminophen (NORCO) 5-325 mg per tablet 1 Tab  1 Tab Oral Q4H PRN    enoxaparin (LOVENOX) injection 40 mg  40 mg SubCUTAneous Q24H    budesonide (PULMICORT) 500 mcg/2 ml nebulizer suspension  500 mcg Nebulization BID RT    arformoterol (BROVANA) neb solution 15 mcg  15 mcg Nebulization BID RT          Lab Review:     Recent Labs      01/07/18   0512  01/06/18   0337   WBC  11.8*  11.7*   HGB  12.2  12.1   HCT  35.8*  35.7*   PLT  206  203     Recent Labs      01/07/18   0512  01/06/18   0337   NA  138  137   K  3.7  4.0   CL  105  104   CO2  25  24   GLU  93  104*   BUN  13  10   CREA  1.05  1.04   CA  9.0  9.2   MG   --   1.9   PHOS   --   4.0     Lab Results   Component Value Date/Time    Glucose (POC) 139 03/31/2017 04:06 PM    Glucose (POC) 132 03/31/2017 07:39 AM          Assessment / Plan: Active Problems:    70 yo hx of COPD, CAD s/p CABG, prostate CA, presented w/ N/V, enteritis    1) Abd pain/N/V/D: CT with small bowel enteritis. Stool studies negative so far. Abx switched to PO. KUB showed colonic distention. Increased bentyl. Continue probiotics. Added scheduled compazine and prn zofran. Advancing diet as tolerated. Appreciate GI input. Will undergo EGD and C-scope tomorrow per GI. 2) Pneumonia: CXR was neg, but this was seen on abd CT. No respiratory symptoms. Already on Abx as above. Will monitor    3) Sepsis/leukocytosis: had 2/4 SIRS on admission, now improving. Will cont IVF, Abx    4) CAD: cont plavix, statin    5) HTN: Continue lisinopril    6) COPD: stable, cont nebs prn    7) Urinary retention. May be due to meds v. Illness. Started flomax.  Continue bladder checks and straight cath as needed    Total time spent with patient: 35 min                  Care Plan discussed with: Patient, nursing    Discussed:  Care Plan    Prophylaxis:  Lovenox    Disposition:  Home w/Family           ___________________________________________________    Attending Physician: Kristopher Bales DO

## 2018-01-08 NOTE — ROUTINE PROCESS
1/8/18   1:53PM  PCP ALCON appt scheduled with  [facility] on 1/12/18 at 4:00PM. Appt added to AVS. Hailey Romero CM Specialist

## 2018-01-08 NOTE — PROGRESS NOTES
Eber Decker. Juliet Corey MD  [phone] office  [phone] [facility]     Gastroenterology Progress Note    January 8, 2018  Admit Date: 1/3/2018         Narrative Assessment and Plan   · Diarrhea  · Abdominal pain    Ongoing symptoms  Will arrange colonoscopy tomorrow. Subjective:   · The diarrhea! Location:  Seven times this morning  Duration: no bleeding  Timing: ongoing complaints of crampy pain  Radiation: no vomting   Associated Symptoms: tolerating diet   Aggravating/Alleviating factors:     ROS:  The previous review of systems on initial consultation / H&P is noted and reviewed. Specific changes noted above in HPI.     Current Medications:     Current Facility-Administered Medications   Medication Dose Route Frequency    peg 3350-electrolytes (COLYTE) 4000 mL  2,000 mL Oral ONCE    peg 3350-electrolytes (COLYTE) 4000 mL  2,000 mL Oral ONCE    tamsulosin (FLOMAX) capsule 0.4 mg  0.4 mg Oral DAILY    levoFLOXacin (LEVAQUIN) tablet 750 mg  750 mg Oral Q24H    metroNIDAZOLE (FLAGYL) tablet 500 mg  500 mg Oral Q12H    prochlorperazine (COMPAZINE) injection 10 mg  10 mg IntraVENous Q6H    dicyclomine (BENTYL) capsule 20 mg  20 mg Oral QID    lactobac ac& pc-s.therm-b.anim (DENA Q/RISAQUAD)  1 Cap Oral DAILY    lisinopril (PRINIVIL, ZESTRIL) tablet 20 mg  20 mg Oral DAILY    simvastatin (ZOCOR) tablet 20 mg  20 mg Oral QHS    clopidogrel (PLAVIX) tablet 75 mg  75 mg Oral DAILY    influenza vaccine 2017-18 (3 yrs+)(PF) (FLUZONE QUAD/FLUARIX QUAD) injection 0.5 mL  0.5 mL IntraMUSCular PRIOR TO DISCHARGE    sodium chloride (NS) flush 5-10 mL  5-10 mL IntraVENous PRN    atenolol (TENORMIN) tablet 50 mg  50 mg Oral DAILY    famotidine (PEPCID) tablet 10 mg  10 mg Oral BID    albuterol-ipratropium (DUO-NEB) 2.5 MG-0.5 MG/3 ML  3 mL Nebulization Q6H RT    acetaminophen (TYLENOL) tablet 650 mg  650 mg Oral Q4H PRN    diphenhydrAMINE (BENADRYL) capsule 25 mg  25 mg Oral Q4H PRN    ondansetron (ZOFRAN) injection 4 mg  4 mg IntraVENous Q4H PRN    HYDROcodone-acetaminophen (NORCO) 5-325 mg per tablet 1 Tab  1 Tab Oral Q4H PRN    enoxaparin (LOVENOX) injection 40 mg  40 mg SubCUTAneous Q24H    budesonide (PULMICORT) 500 mcg/2 ml nebulizer suspension  500 mcg Nebulization BID RT    arformoterol (BROVANA) neb solution 15 mcg  15 mcg Nebulization BID RT       Objective:     VITALS:   Last 24hrs VS reviewed since prior progress note. Most recent are:  Visit Vitals    /51 (BP 1 Location: Right arm, BP Patient Position: At rest)    Pulse 73    Temp 97.7 °F (36.5 °C)    Resp 16    Ht 5' 6\" (1.676 m)    Wt 88.7 kg (195 lb 8.8 oz)    SpO2 94%    BMI 31.56 kg/m2     Temp (24hrs), Av.1 °F (36.7 °C), Min:97.5 °F (36.4 °C), Max:98.7 °F (37.1 °C)      Intake/Output Summary (Last 24 hours) at 18 1709  Last data filed at 18 0803   Gross per 24 hour   Intake                0 ml   Output             1100 ml   Net            -1100 ml       EXAM:  General:  Comfortable, no distress    HEENT:  Atraumatic skull, pupils equal  Lungs:  No abnormal audible breath sounds. Speaking in complete sentences  Heart:   No abnormal audible heart sounds. Well perfused  Abdomen:   Nondistended, nontender.   No mass, guarding or rebound  Musc:   No skeletal defects or deformities  Neurologic:   Cranial nerves grossly intact, moves all 4 extremities  Psych:    Good insight. Not anxious nor agitated  Derm:   No rash, jaundice, nor palpable dermatologic mass    Lab Data Reviewed:   Recent Labs      18   0512  18   033   WBC  11.8*  11.7*   HGB  12.2  12.1   HCT  35.8*  35.7*   PLT  206  203     Recent Labs      18   0512  18   0337   NA  138  137   K  3.7  4.0   CL  105  104   CO2  25  24   GLU  93  104*   BUN  13  10   CREA  1.05  1.04   CA  9.0  9.2   MG   --   1.9   PHOS   --   4.0     Lab Results   Component Value Date/Time    Glucose (POC) 139 2017 04:06 PM Glucose (POC) 132 03/31/2017 07:39 AM     No results for input(s): PH, PCO2, PO2, HCO3, FIO2 in the last 72 hours. No results for input(s): INR in the last 72 hours.     No lab exists for component: INREXT        Assessment:   (See above)  Active Problems:    CAD (coronary artery disease) ()      Overview: MI 7/2010,s/p CABG      COPD (chronic obstructive pulmonary disease) (HCC) ()      Overview: moderate, FEV1 1.41 7/2009      GERD (gastroesophageal reflux disease) ()      HTN (hypertension) ()      Hyperlipidemia with target LDL less than 70 ()      Pneumonia (1/3/2018)      Leukocytosis (1/3/2018)      Sinus tachycardia (1/3/2018)      Fever (1/3/2018)      Sepsis ([address] (1/3/2018)      Abdominal pain (1/3/2018)      Colitis (1/3/2018)      Nausea & vomiting (1/3/2018)        Plan:   (See above)      Signed By: Patrick Felipe MD     1/8/2018  5:09 PM

## 2018-01-09 ENCOUNTER — ANESTHESIA EVENT (OUTPATIENT)
Dept: ENDOSCOPY | Age: 78
DRG: 392 | End: 2018-01-09
Payer: MEDICARE

## 2018-01-09 ENCOUNTER — ANESTHESIA (OUTPATIENT)
Dept: ENDOSCOPY | Age: 78
DRG: 392 | End: 2018-01-09
Payer: MEDICARE

## 2018-01-09 LAB
ANION GAP SERPL CALC-SCNC: 13 MMOL/L (ref 5–15)
BUN SERPL-MCNC: 24 MG/DL (ref 6–20)
BUN/CREAT SERPL: 20 (ref 12–20)
CALCIUM SERPL-MCNC: 9.5 MG/DL (ref 8.5–10.1)
CHLORIDE SERPL-SCNC: 106 MMOL/L (ref 97–108)
CO2 SERPL-SCNC: 24 MMOL/L (ref 21–32)
CREAT SERPL-MCNC: 1.2 MG/DL (ref 0.7–1.3)
GLUCOSE SERPL-MCNC: 97 MG/DL (ref 65–100)
MAGNESIUM SERPL-MCNC: 1.7 MG/DL (ref 1.6–2.4)
PHOSPHATE SERPL-MCNC: 4.2 MG/DL (ref 2.6–4.7)
POTASSIUM SERPL-SCNC: 3.4 MMOL/L (ref 3.5–5.1)
SODIUM SERPL-SCNC: 143 MMOL/L (ref 136–145)

## 2018-01-09 PROCEDURE — 77010033678 HC OXYGEN DAILY

## 2018-01-09 PROCEDURE — 77030034850

## 2018-01-09 PROCEDURE — 84100 ASSAY OF PHOSPHORUS: CPT | Performed by: INTERNAL MEDICINE

## 2018-01-09 PROCEDURE — 51798 US URINE CAPACITY MEASURE: CPT

## 2018-01-09 PROCEDURE — 74011250637 HC RX REV CODE- 250/637: Performed by: PHYSICIAN ASSISTANT

## 2018-01-09 PROCEDURE — 74011250637 HC RX REV CODE- 250/637: Performed by: INTERNAL MEDICINE

## 2018-01-09 PROCEDURE — 83735 ASSAY OF MAGNESIUM: CPT | Performed by: INTERNAL MEDICINE

## 2018-01-09 PROCEDURE — 65660000000 HC RM CCU STEPDOWN

## 2018-01-09 PROCEDURE — 74011250636 HC RX REV CODE- 250/636: Performed by: INTERNAL MEDICINE

## 2018-01-09 PROCEDURE — 36415 COLL VENOUS BLD VENIPUNCTURE: CPT | Performed by: INTERNAL MEDICINE

## 2018-01-09 PROCEDURE — 80048 BASIC METABOLIC PNL TOTAL CA: CPT | Performed by: INTERNAL MEDICINE

## 2018-01-09 PROCEDURE — 94640 AIRWAY INHALATION TREATMENT: CPT

## 2018-01-09 PROCEDURE — 74011250637 HC RX REV CODE- 250/637: Performed by: SPECIALIST

## 2018-01-09 PROCEDURE — 74011000250 HC RX REV CODE- 250: Performed by: INTERNAL MEDICINE

## 2018-01-09 RX ORDER — POTASSIUM CHLORIDE AND SODIUM CHLORIDE 900; 300 MG/100ML; MG/100ML
INJECTION, SOLUTION INTRAVENOUS CONTINUOUS
Status: DISCONTINUED | OUTPATIENT
Start: 2018-01-09 | End: 2018-01-10

## 2018-01-09 RX ORDER — MAGNESIUM CITRATE
296 SOLUTION, ORAL ORAL
Status: COMPLETED | OUTPATIENT
Start: 2018-01-09 | End: 2018-01-09

## 2018-01-09 RX ADMIN — IPRATROPIUM BROMIDE AND ALBUTEROL SULFATE 3 ML: .5; 3 SOLUTION RESPIRATORY (INHALATION) at 07:45

## 2018-01-09 RX ADMIN — Medication 10 ML: at 18:09

## 2018-01-09 RX ADMIN — SODIUM CHLORIDE AND POTASSIUM CHLORIDE: 9; 2.98 INJECTION, SOLUTION INTRAVENOUS at 23:50

## 2018-01-09 RX ADMIN — LISINOPRIL 20 MG: 20 TABLET ORAL at 09:44

## 2018-01-09 RX ADMIN — SODIUM CHLORIDE AND POTASSIUM CHLORIDE: 9; 2.98 INJECTION, SOLUTION INTRAVENOUS at 09:50

## 2018-01-09 RX ADMIN — DICYCLOMINE HYDROCHLORIDE 20 MG: 10 CAPSULE ORAL at 09:44

## 2018-01-09 RX ADMIN — IPRATROPIUM BROMIDE AND ALBUTEROL SULFATE 3 ML: .5; 3 SOLUTION RESPIRATORY (INHALATION) at 13:26

## 2018-01-09 RX ADMIN — PROCHLORPERAZINE EDISYLATE 10 MG: 5 INJECTION INTRAMUSCULAR; INTRAVENOUS at 12:15

## 2018-01-09 RX ADMIN — MAGESIUM CITRATE 296 ML: 1.75 LIQUID ORAL at 12:15

## 2018-01-09 RX ADMIN — BUDESONIDE 500 MCG: 0.5 INHALANT RESPIRATORY (INHALATION) at 07:45

## 2018-01-09 RX ADMIN — CLOPIDOGREL BISULFATE 75 MG: 75 TABLET ORAL at 09:44

## 2018-01-09 RX ADMIN — Medication 10 ML: at 18:12

## 2018-01-09 RX ADMIN — ENOXAPARIN SODIUM 40 MG: 100 INJECTION SUBCUTANEOUS at 21:00

## 2018-01-09 RX ADMIN — Medication 10 ML: at 23:51

## 2018-01-09 RX ADMIN — DICYCLOMINE HYDROCHLORIDE 20 MG: 10 CAPSULE ORAL at 13:36

## 2018-01-09 RX ADMIN — LEVOFLOXACIN 750 MG: 750 TABLET, FILM COATED ORAL at 09:44

## 2018-01-09 RX ADMIN — FAMOTIDINE 10 MG: 20 TABLET ORAL at 09:44

## 2018-01-09 RX ADMIN — Medication 1 CAPSULE: at 09:44

## 2018-01-09 RX ADMIN — PROCHLORPERAZINE EDISYLATE 10 MG: 5 INJECTION INTRAMUSCULAR; INTRAVENOUS at 18:08

## 2018-01-09 RX ADMIN — SIMVASTATIN 20 MG: 20 TABLET, FILM COATED ORAL at 23:54

## 2018-01-09 RX ADMIN — DICYCLOMINE HYDROCHLORIDE 20 MG: 10 CAPSULE ORAL at 18:08

## 2018-01-09 RX ADMIN — METRONIDAZOLE 500 MG: 250 TABLET ORAL at 23:54

## 2018-01-09 RX ADMIN — TAMSULOSIN HYDROCHLORIDE 0.4 MG: 0.4 CAPSULE ORAL at 09:44

## 2018-01-09 RX ADMIN — DICYCLOMINE HYDROCHLORIDE 20 MG: 10 CAPSULE ORAL at 23:53

## 2018-01-09 RX ADMIN — MAGESIUM CITRATE 296 ML: 1.75 LIQUID ORAL at 23:52

## 2018-01-09 RX ADMIN — PROCHLORPERAZINE EDISYLATE 10 MG: 5 INJECTION INTRAMUSCULAR; INTRAVENOUS at 07:37

## 2018-01-09 RX ADMIN — METRONIDAZOLE 500 MG: 250 TABLET ORAL at 09:44

## 2018-01-09 RX ADMIN — IPRATROPIUM BROMIDE AND ALBUTEROL SULFATE 3 ML: .5; 3 SOLUTION RESPIRATORY (INHALATION) at 01:05

## 2018-01-09 RX ADMIN — FAMOTIDINE 10 MG: 20 TABLET ORAL at 18:08

## 2018-01-09 RX ADMIN — PROCHLORPERAZINE EDISYLATE 10 MG: 5 INJECTION INTRAMUSCULAR; INTRAVENOUS at 01:50

## 2018-01-09 RX ADMIN — IPRATROPIUM BROMIDE AND ALBUTEROL SULFATE 3 ML: .5; 3 SOLUTION RESPIRATORY (INHALATION) at 21:33

## 2018-01-09 RX ADMIN — ATENOLOL 50 MG: 50 TABLET ORAL at 09:44

## 2018-01-09 RX ADMIN — BUDESONIDE 500 MCG: 0.5 INHALANT RESPIRATORY (INHALATION) at 21:33

## 2018-01-09 NOTE — PROGRESS NOTES
0830: Notified Dr. Mary Vo that patient has been experiencing urinary retention for about 2 days now. Currently orders in place to bladder scan every 6 hours and straight cath if amount above 300 ml. I noted that there was no urology consult placed yet for this patient and inquired about placing one. Order noted to insert heard catheter, patient agreeable to this and states that he has had to have one in the past. Patient out of bed eating breakfast, will place when he is finished. No complaints of bladder discomfort at this time, however he has been unable to void after multiple attempts while standing. 0915: Spoke with Nichol in endoscopy regarding patient's bowel prep status. Patient still having green watery stools, not yet clear. According to patient, he is unable to tolerate drinking any more bowel prep at this time, as it gives him too much abdominal pain. Also noted that patient's IV needed to be changed prior to the procedures, which has been completed. After Nichol discussed with Dr. [place], it was decided that the procedures would be rescheduled for tomorrow. I discussed this with the patient, and notified Dr. Mary Vo. 1530: Bedside shift change report given to Giuseppe Pereira, RN (oncoming nurse) by Patricia Berry RN (offgoing nurse). Report included the following information SBAR, Kardex, Intake/Output, MAR and Accordion.

## 2018-01-09 NOTE — PERIOP NOTES
Received a call from patient's nurse [facility] with concerns that the patient was not adequately prepped for his procedure. Discussed with Dr. Andres Alavrez and the Endoscopy procedures will be postponed until 1/10/2018. See note from Dr. Andres Alvaerz.

## 2018-01-09 NOTE — PROGRESS NOTES
Occupational Therapy Note:    Chart reviewed and spoke with pt's nurse. Attempted to see pt for therapy session. Pt received supine in bed, declining therapy session today as he was feeling too fatigued and not wanting to be OOB. Note that pt is currently on bowel prep to prepare for colonoscopy. Will continue to follow and attempt again next treatment day. Thank you.     Jenni Serrano, OTR/L

## 2018-01-09 NOTE — PROGRESS NOTES
Pt was not able to have his endoscopic procedure today due to incomplete prep. Pt is now being prepped with magnesium citrate . Hopefully, he will be able to complete his EGD and colonoscopy tomorrow. This likely detains his discharge plans now until Thursday. When discharged,pt has been accepted by [facility] for SN,PT and OT.     Amando Hankins  Team B with Dr Rancho Cole  [phone]

## 2018-01-09 NOTE — PROGRESS NOTES
Problem: Falls - Risk of  Goal: *Absence of Falls  Document Dennis Fall Risk and appropriate interventions in the flowsheet.    Outcome: Progressing Towards Goal  Fall Risk Interventions:  Mobility Interventions: Patient to call before getting OOB         Medication Interventions: Patient to call before getting OOB, Teach patient to arise slowly    Elimination Interventions: Call light in reach, Patient to call for help with toileting needs, Urinal in reach    History of Falls Interventions: Door open when patient unattended, Evaluate medications/consider consulting pharmacy, Room close to nurse's station

## 2018-01-09 NOTE — PROGRESS NOTES
Bedside and Verbal shift change report given to Jere Norton (oncoming nurse) by Mitchell Solorio (offgoing nurse). Report included the following information SBAR, Kardex, Intake/Output, MAR, Recent Results and Cardiac Rhythm NSR.

## 2018-01-09 NOTE — CONSULTS
Urology Consult and H&P    Subjective:     Date of Consultation:  January 9, 2018    Referring Physician: Severino Sands MD    Reason for Consultation: urinary retention and with heard    Patient Name: Eloy Ly  MRN: [id number]    History of Present Illness:   Patient is a 69 y.o.  male who is being seen for Retention and has a heard placed on admission. He has been a long standing patient of [facility] followed in the past for an apparent history of both prostate and bladder cancer. He was last seen in August 2017 for a Lupron shot. He is somewhat vague about his past  history and I am not confident in his response this afternoon    Fortunately, I finally reached my office and was able to get some information and apparantly he was treated for a probable low grade non recurrent bladder tumor in 2005 and has had negative follow up visits since. His last cysto was in November 2017 which was negative but a bladder diverticulum was noted by Dr. Luli Dobbins. He prostate cancer was a [place] 6=3+3 dx'ed in 2006 and was treated with HDT using lupron and has been on it ever since! 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 He was admitted to the hospital for Pneumonia  diarrhea. Past Medical History:   Diagnosis Date    Arthritis of foot, degenerative     Dr. Yovany Anne Bladder tumor 2004    Dr Leda Akbar, [facility] 10/29/11    CAD (coronary artery disease)     MI 7/2010,s/p CABG. Dr. Gay Lepe Carotid stenosis 10/20/15    Dr. Maye Boudreaux. endarterectomy 12/31/15. 50-69% L on doppler. 80% \"per CT\"    Cataract     COPD (chronic obstructive pulmonary disease) (HCC)     moderate, FEV1 1.41 7/2009.  Jj Slider    GERD (gastroesophageal reflux disease)     HTN (hypertension)     Hyperlipidemia LDL goal < 70     Osteoporosis     tx w calcium, DEXA improved T-2.1 10/2011, 2012, 1/2015    PHN (postherpetic neuralgia)     Physiological tremor     Dr. Joshi diana [facility]) 2/2004    on lupron. PSA increased 0.149 2/2014    Pulmonary nodule, right     5 mm, stable on CT 10/14/10, 1/14/14    Shingles 12/12/12    right neck and back. Past Surgical History:   Procedure Laterality Date    BLADDER TUMOR ASSOC      CABG, ARTERIAL, THREE  7/2010    Dr Matthew Luna. HUI to LAD, spah to cirm    CYSTOSCOPY  10/29/11    FISH, normal    CYSTOURETHROSCOPY  11/29/2016    normal    HX CAROTID ENDARTERECTOMY Left 12/31/15    Dr. Merlyn Primrose  12/2011    right eye, Dr. Teri Sexton HX COLONOSCOPY  10/2/13    hyperplastic polyp[facility] [address]  03/24/2017    gangranous. Dr. Len Garcia      Family History   Problem Relation Age of Onset    Cancer Mother      bladder    Heart Disease Brother       Social History   Substance Use Topics    Smoking status: Former Smoker     Types: Cigarettes     Quit date: 12/5/2003    Smokeless tobacco: Never Used    Alcohol use No     No Known Allergies   Prior to Admission medications    Medication Sig Start Date End Date Taking? Authorizing Provider   lisinopril (PRINIVIL, ZESTRIL) 20 mg tablet Take 20 mg by mouth daily. Yes Historical Provider   clopidogrel (PLAVIX) 75 mg tab Take 75 mg by mouth daily. Yes Historical Provider   atenolol (TENORMIN) 50 mg tablet Take 50 mg by mouth daily. Yes Yaniv Davila MD   promethazine (PHENERGAN) 25 mg tablet Take 1 Tab by mouth every six (6) hours as needed for Nausea.  1/3/18  Yes James Mac MD   raNITIdine (ZANTAC) 150 mg tablet TAKE ONE TABLET BY MOUTH EVERY DAY PLUS TAKE ONE TABLET AT BEDTIME 12/7/17  Yes Daniel Puga MD   [facility] 90 mcg/actuation inhaler USE 2 PUFFS EVERY 8 HOURS AS NEEDED 11/13/17  Yes Daniel Puga MD   simvastatin (ZOCOR) 20 mg tablet TAKE ONE TABLET BY MOUTH ONCE DAILY IN THE EVENING 10/5/17  Yes Daniel Puga MD   CALCIUM CARBONATE/VITAMIN D2 (CALCIUM 600 WITH VITAMIN D2 PO) Take 1 Tab by mouth two (2) times a day. Yes Historical Provider   multivitamin (ONE A DAY) tablet Take 1 Tab by mouth daily. Yes Historical Provider   [address] 18 mcg inhalation capsule Inhale the contents of 1  capsule via [employer]  daily 3/11/15  Yes Chloé Campos MD   fluticasone-vilanterol (BREO ELLIPTA) 100-25 mcg/dose inhaler Take 1 Puff by inhalation daily. Yes Historical Provider   OTHER Biofreeze Ointment applied twice daily to feet as needed for arthritis    Historical Provider   LEUPROLIDE ACETATE (LUPRON DEPOT, 4 MONTH, IM) by IntraMUSCular route. Every 4 months  Receives in providers office  Prescribed by Dr. Stacey Casillas    Historical Provider             Objective:     Data Review (Labs):    Recent Labs      18   0511  18   0512   WBC   --   11.8*   HGB   --   12.2   PLT   --   206   NA  143  138   K  3.4*  3.7   CREA  1.20  1.05   BUN  24*  13       Patient Vitals for the past 8 hrs:   BP Temp Pulse Resp SpO2   18 1326 - - - - 94 %   18 1233 122/60 97.7 °F (36.5 °C) 61 20 94 %     Temp (24hrs), Av.8 °F (36.6 °C), Min:97.5 °F (36.4 °C), Max:98.3 °F (36.8 °C)      Intake and Output:    1901 -  0700  In: 3000 [P.O.:3000]  Out: 750 [Urine:750]    Physical Exam:            General:    alert, cooperative, distracted, no distress, appears stated age                     Skin:  no rash or abnormalities except erythema on his rt wrist IV site                 Abdomen[de-identified]  soft, non-tender.  Bowel sounds normal. No masses,  no organomegaly normoprotuberant             Genitalia:  bilateral atrophy without masses or tenderness          Extremities:        Assessment:     Active Problems:    CAD (coronary artery disease) ()      Overview: MI 2010,s/p CABG      COPD (chronic obstructive pulmonary disease) (HCC) ()      Overview: moderate, FEV1 1.41 2009      GERD (gastroesophageal reflux disease) ()      HTN (hypertension) ()      Hyperlipidemia with target LDL less than 70 ()      Pneumonia (1/3/2018)      Leukocytosis (1/3/2018)      Sinus tachycardia (1/3/2018)      Fever (1/3/2018)      Sepsis ([address] (1/3/2018)      Abdominal pain (1/3/2018)      Colitis (1/3/2018)      Nausea & vomiting (1/3/2018)          History of bladder cancer in remission    History of prostate cancer with low risk parameters but treated with over 12 years of lupron and 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 Retention    Plan:     Recommend leave the heard for now and when discharged, Have him make an appointment with  to discuss future evaluation and management.     We will see again if requested    Signed By: Jorge Jenkins MD                         January 9, 2018

## 2018-01-09 NOTE — PROGRESS NOTES
Nutrition Assessment:    RECOMMENDATIONS/INTERVENTION(S):   Advance to Full liquid/GI lite post EGD/Colonoscopy as able  Monitor PO intakes, wt, lytes  Add ONS once diet advances if PO intakes < 50%. ASSESSMENT:   1/9: 69 yr old male admitted for n/v x 5 day. PMhx: bladder tumor, COPD, GERD, HTN, HLD. Pt seen for LOS. Pt continues to have diarrhea. Less nausea, no more vomiting. Scheduled for EGD/Colonoscopy tomorrow, delayed from today 2/2 not sufficiently prepped. Pt on Clear liquid diet currently. NPO tomorrow. Will continue to follow and visit pt after procedure tomorrow if/when diet advances. Per chart, pt has gained wt since early last year. Recent wt loss likely but not documented 2/2 poor PO intakes/low appetite, diarrhea. No intakes documented. K+ 3.4. BUN 24. SUBJECTIVE/OBJECTIVE:   Diet Order: Clear liquids  % Eaten:  No data found. Pertinent Medications: [x] Reviewed    Past Medical History:   Diagnosis Date    Arthritis of foot, degenerative     Dr. Yahir Hall Bladder tumor 2004    Dr Deepti Contreras, [facility] 10/29/11    CAD (coronary artery disease)     MI 7/2010,s/p CABG. Dr. Inessa Gimenez Carotid stenosis 10/20/15    Dr. Marisol Sales. endarterectomy 12/31/15. 50-69% L on doppler. 80% \"per CT\"    Cataract     COPD (chronic obstructive pulmonary disease) (HCC)     moderate, FEV1 1.41 7/2009. Bhakti Kari    GERD (gastroesophageal reflux disease)     HTN (hypertension)     Hyperlipidemia LDL goal < 70     Osteoporosis     tx w calcium, DEXA improved T-2.1 10/2011, 2012, 1/2015    PHN (postherpetic neuralgia)     Physiological tremor     Dr. Deepak Gonzalez [facility]) 2/2004    on lupron. PSA increased 0.149 2/2014    Pulmonary nodule, right     5 mm, stable on CT 10/14/10, 1/14/14    Shingles 12/12/12    right neck and back.           Chemistries:  Lab Results   Component Value Date/Time    Sodium 143 01/09/2018 05:11 AM    Potassium 3.4 01/09/2018 05:11 AM    Chloride 106 01/09/2018 05:11 AM    CO2 24 01/09/2018 05:11 AM    Anion gap 13 01/09/2018 05:11 AM    Glucose 97 01/09/2018 05:11 AM    BUN 24 01/09/2018 05:11 AM    Creatinine 1.20 01/09/2018 05:11 AM    BUN/Creatinine ratio 20 01/09/2018 05:11 AM    GFR est AA >60 01/09/2018 05:11 AM    GFR est non-AA 59 01/09/2018 05:11 AM    Calcium 9.5 01/09/2018 05:11 AM    AST (SGOT) 25 01/04/2018 05:23 AM    Alk. phosphatase 77 01/04/2018 05:23 AM    Protein, total 6.3 01/04/2018 05:23 AM    Albumin 2.7 01/04/2018 05:23 AM    Globulin 3.6 01/04/2018 05:23 AM    A-G Ratio 0.8 01/04/2018 05:23 AM    ALT (SGPT) 35 01/04/2018 05:23 AM      Anthropometrics: Height: 5' 6\" (167.6 cm) Weight: 89.1 kg (196 lb 6.4 oz)    IBW (%IBW):   ( ) UBW (%UBW):   (  %)    BMI: Body mass index is 31.7 kg/(m^2). This BMI is indicative of:     [] Underweight    [] Normal    [] Overweight    [x]  Obesity    []  Extreme Obesity (BMI>40)    Estimated Nutrition Needs (Based on): 0518 Kcals/day (SJW(3578Z3. 3)-250) , 89 g (-98g/day(1.0-1.1g/kg)) Protein  Carbohydrate: At Least 130 g/day  Fluids: 1800 mL/day    Last BM: 1/9   []Active     [x]Hyperactive  []Hypoactive       [] Absent   BS  Skin:    [x] Intact   [] Incision  [] Breakdown   [] DTI   [] Tears/Excoriation/Abrasion  []Edema [] Other:    Wt Readings from Last 30 Encounters:   01/09/18 89.1 kg (196 lb 6.4 oz)   04/24/17 84.3 kg (185 lb 12.8 oz)   04/08/17 83.1 kg (183 lb 3 oz)   03/31/17 92 kg (202 lb 13.2 oz)   12/15/16 90.7 kg (200 lb)   08/04/16 90.3 kg (199 lb)   01/27/16 83.5 kg (184 lb)   12/28/15 95.9 kg (211 lb 6.4 oz)   10/12/15 95.3 kg (210 lb)   06/22/15 95.9 kg (211 lb 6.4 oz)   12/22/14 93 kg (205 lb)   09/07/14 88.9 kg (196 lb)   08/22/14 93 kg (205 lb)   04/23/14 92.1 kg (203 lb)   01/20/14 87.5 kg (193 lb)   01/14/14 84.4 kg (186 lb)   12/30/13 85.7 kg (189 lb)   10/24/13 87.5 kg (193 lb)   10/02/13 81.6 kg (180 lb)   08/22/13 82.6 kg (182 lb)   06/20/13 84.8 kg (187 lb)   05/16/13 83.9 kg (185 lb) 04/25/13 84.4 kg (186 lb)   04/04/13 84.4 kg (186 lb)   03/21/13 84.6 kg (186 lb 9.6 oz)   03/07/13 83.9 kg (185 lb)   02/22/13 83.9 kg (185 lb)   02/08/13 84.4 kg (186 lb)   12/31/12 86.2 kg (190 lb)   12/14/12 85.7 kg (189 lb)      NUTRITION DIAGNOSES:   Problem:  Altered GI function     Etiology: related to decreased ability to consume sufficient energy     Signs/Symptoms: as evidenced by poor appetite, diarrhea x 5 days      NUTRITION INTERVENTIONS:  Meals/Snacks: General/healthful diet   Supplements: Commercial supplement              GOAL:   Pt will tolerate advanced diet without GI distress within 3-5 days    Cultural, Baptist, or Ethnic Dietary Needs: None     LEARNING NEEDS (Diet, Food/Nutrient-Drug Interaction):    [x] None Identified   [] Identified and Education Provided/Documented   [] Identified and Pt declined/was not appropriate      [x] Interdisciplinary Care Plan Reviewed/Documented    [x] Discharge Needs:   TBD   [] No Nutrition Related Discharge Needs    NUTRITION RISK:   Pt Is At Nutrition Risk  [x]     No Nutrition Risk Identified  []       PT SEEN FOR:    []  MD Consult: []Calorie Count      []Diabetic Diet Education        []Diet Education     []Electrolyte Management     []General Nutrition Management and Supplements     []Management of Tube Feeding     []TPN Recommendations    []  RN Referral:  []MST score >=2     []Enteral/Parenteral Nutrition PTA     []Pregnant: Gestational DM or Multigestation                 [] Pressure Ulcer      []  Low BMI      [x]  Length of Stay       [] Dysphagia Diet     [] Ventilator      [] Follow-Up        Previous Recommendations:   [] Implemented          [] Not Implemented          [x] Not Applicable    Previous Goal:   [] Met              [] Progressing Towards Goal              [] Not Progressing Towards Goal   [x] Not Applicable              Carter Marie, [address] [place] [address]   Pager [phone]

## 2018-01-09 NOTE — PROGRESS NOTES
Bedside shift change report given to Dequan Mcallister RN (oncoming nurse) by Isela Tate RN (offgoing nurse). Report included the following information SBAR, Kardex, MAR, Recent Results and Cardiac Rhythm NSR.

## 2018-01-09 NOTE — PROGRESS NOTES
Jaquan Michael. Gabbie Alvarado MD  [phone] office  [phone] [facility]     Gastroenterology Progress Note    January 9, 2018  Admit Date: 1/3/2018         Narrative Assessment and Plan   · Diarrhea  · Abdominal pain    Did not complete prep. Will offer ongoing prep and reschedule procedure for 1-10-18    Subjective:   · Discussed with RN, still having opaque stool and did not participate in prep (didn't drink it as prescribed). ROS:  The previous review of systems on initial consultation / H&P is noted and reviewed. Specific changes noted above in HPI.     Current Medications:     Current Facility-Administered Medications   Medication Dose Route Frequency    0.9% sodium chloride with KCl 40 mEq/L infusion   IntraVENous CONTINUOUS    tamsulosin (FLOMAX) capsule 0.4 mg  0.4 mg Oral DAILY    levoFLOXacin (LEVAQUIN) tablet 750 mg  750 mg Oral Q24H    metroNIDAZOLE (FLAGYL) tablet 500 mg  500 mg Oral Q12H    prochlorperazine (COMPAZINE) injection 10 mg  10 mg IntraVENous Q6H    dicyclomine (BENTYL) capsule 20 mg  20 mg Oral QID    lactobac ac& pc-s.therm-b.anim (DENA Q/RISAQUAD)  1 Cap Oral DAILY    lisinopril (PRINIVIL, ZESTRIL) tablet 20 mg  20 mg Oral DAILY    simvastatin (ZOCOR) tablet 20 mg  20 mg Oral QHS    clopidogrel (PLAVIX) tablet 75 mg  75 mg Oral DAILY    influenza vaccine 2017-18 (3 yrs+)(PF) (FLUZONE QUAD/FLUARIX QUAD) injection 0.5 mL  0.5 mL IntraMUSCular PRIOR TO DISCHARGE    sodium chloride (NS) flush 5-10 mL  5-10 mL IntraVENous PRN    atenolol (TENORMIN) tablet 50 mg  50 mg Oral DAILY    famotidine (PEPCID) tablet 10 mg  10 mg Oral BID    albuterol-ipratropium (DUO-NEB) 2.5 MG-0.5 MG/3 ML  3 mL Nebulization Q6H RT    acetaminophen (TYLENOL) tablet 650 mg  650 mg Oral Q4H PRN    diphenhydrAMINE (BENADRYL) capsule 25 mg  25 mg Oral Q4H PRN    ondansetron (ZOFRAN) injection 4 mg  4 mg IntraVENous Q4H PRN    HYDROcodone-acetaminophen (NORCO) 5-325 mg per tablet 1 Tab  1 Tab Oral Q4H PRN    enoxaparin (LOVENOX) injection 40 mg  40 mg SubCUTAneous Q24H    budesonide (PULMICORT) 500 mcg/2 ml nebulizer suspension  500 mcg Nebulization BID RT    arformoterol (BROVANA) neb solution 15 mcg  15 mcg Nebulization BID RT       Objective:     VITALS:   Last 24hrs VS reviewed since prior progress note. Most recent are:  Visit Vitals    /67 (BP 1 Location: Right arm, BP Patient Position: Sitting)    Pulse 94    Temp 97.9 °F (36.6 °C)    Resp 20    Ht 5' 6\" (1.676 m)    Wt 89.1 kg (196 lb 6.4 oz)    SpO2 92%    BMI 31.7 kg/m2     Temp (24hrs), Av.9 °F (36.6 °C), Min:97.5 °F (36.4 °C), Max:98.3 °F (36.8 °C)      Intake/Output Summary (Last 24 hours) at 18 0928  Last data filed at 18 0600   Gross per 24 hour   Intake             3000 ml   Output              450 ml   Net             2550 ml         Lab Data Reviewed:   Recent Labs      18   0512   WBC  11.8*   HGB  12.2   HCT  35.8*   PLT  206     Recent Labs      18   0511  18   0512   NA  143  138   K  3.4*  3.7   CL  106  105   CO2  24  25   GLU  97  93   BUN  24*  13   CREA  1.20  1.05   CA  9.5  9.0   MG  1.7   --    PHOS  4.2   --      Lab Results   Component Value Date/Time    Glucose (POC) 139 2017 04:06 PM    Glucose (POC) 132 2017 07:39 AM     No results for input(s): PH, PCO2, PO2, HCO3, FIO2 in the last 72 hours. No results for input(s): INR in the last 72 hours.     No lab exists for component: INREXT        Assessment:   (See above)  Active Problems:    CAD (coronary artery disease) ()      Overview: MI 2010,s/p CABG      COPD (chronic obstructive pulmonary disease) (HCC) ()      Overview: moderate, FEV1 1.41 2009      GERD (gastroesophageal reflux disease) ()      HTN (hypertension) ()      Hyperlipidemia with target LDL less than 70 ()      Pneumonia (1/3/2018)      Leukocytosis (1/3/2018)      Sinus tachycardia (1/3/2018)      Fever (1/3/2018) Sepsis ([facility] [address] (1/3/2018)      Abdominal pain (1/3/2018)      Colitis (1/3/2018)      Nausea & vomiting (1/3/2018)        Plan:   (See above)      Signed By: Annalise Rodríguez MD     1/9/2018  9:28 AM

## 2018-01-09 NOTE — PROGRESS NOTES
Danny Chaparro [facility] [address]  [address] [facility], [address] [place] [address]  [phone]      Medical Progress Note      NAME: Kaya Lang   :  1940  MRM:  [id number]    Date/Time: 2018         Subjective:     Chief Complaint:  Patient was seen and examined. Chart reviewed. \"I still have diarrhea\"     Pt states that his diarrhea has not remitted. He still has ab pain but this has improved. Unable to undergo EGD/colonoscopy this AM due to incomplete prep. Currently on clears        Objective:       Vitals:       Last 24hrs VS reviewed since prior progress note. Most recent are:    Visit Vitals    /67 (BP 1 Location: Right arm, BP Patient Position: Sitting)    Pulse 94    Temp 97.9 °F (36.6 °C)    Resp 20    Ht 5' 6\" (1.676 m)    Wt 89.1 kg (196 lb 6.4 oz)    SpO2 92%    BMI 31.7 kg/m2     SpO2 Readings from Last 6 Encounters:   18 92%   17 96%   17 94%   16 95%   16 95%   12/28/15 96%    O2 Flow Rate (L/min): 2 l/min       Intake/Output Summary (Last 24 hours) at 18 1152  Last data filed at 18 0600   Gross per 24 hour   Intake             3000 ml   Output              450 ml   Net             2550 ml        Exam:     Physical Exam:    Gen:  Well-developed, well-nourished, obese, in no acute distress  HEENT:  Pink conjunctivae, PERRL, hearing intact to voice, moist mucous membranes  Neck:  Supple, without masses, thyroid non-tender  Resp:  No accessory muscle use, clear breath sounds without wheezes rales or rhonchi  Card:  No murmurs, normal S1, S2 without thrills, bruits or peripheral edema  Abd: high pitched BS.  Mild tenderness in the LUQ and LLQ   Musc:  No cyanosis or clubbing  Skin:  No rashes  Neuro:  Cranial nerves 3-12 are grossly intact, follows commands appropriately  Psych:  Good insight, oriented to person, place and time, alert    Medications Reviewed: (see below)    Lab Data Reviewed: (see below)    ______________________________________________________________________    Medications:     Current Facility-Administered Medications   Medication Dose Route Frequency    0.9% sodium chloride with KCl 40 mEq/L infusion   IntraVENous CONTINUOUS    magnesium citrate solution 296 mL  296 mL Oral NOW    magnesium citrate solution 296 mL  296 mL Oral NOW    tamsulosin (FLOMAX) capsule 0.4 mg  0.4 mg Oral DAILY    levoFLOXacin (LEVAQUIN) tablet 750 mg  750 mg Oral Q24H    metroNIDAZOLE (FLAGYL) tablet 500 mg  500 mg Oral Q12H    prochlorperazine (COMPAZINE) injection 10 mg  10 mg IntraVENous Q6H    dicyclomine (BENTYL) capsule 20 mg  20 mg Oral QID    lactobac ac& pc-s.therm-b.anim (DENA Q/RISAQUAD)  1 Cap Oral DAILY    lisinopril (PRINIVIL, ZESTRIL) tablet 20 mg  20 mg Oral DAILY    simvastatin (ZOCOR) tablet 20 mg  20 mg Oral QHS    clopidogrel (PLAVIX) tablet 75 mg  75 mg Oral DAILY    influenza vaccine 2017-18 (3 yrs+)(PF) (FLUZONE QUAD/FLUARIX QUAD) injection 0.5 mL  0.5 mL IntraMUSCular PRIOR TO DISCHARGE    sodium chloride (NS) flush 5-10 mL  5-10 mL IntraVENous PRN    atenolol (TENORMIN) tablet 50 mg  50 mg Oral DAILY    famotidine (PEPCID) tablet 10 mg  10 mg Oral BID    albuterol-ipratropium (DUO-NEB) 2.5 MG-0.5 MG/3 ML  3 mL Nebulization Q6H RT    acetaminophen (TYLENOL) tablet 650 mg  650 mg Oral Q4H PRN    diphenhydrAMINE (BENADRYL) capsule 25 mg  25 mg Oral Q4H PRN    ondansetron (ZOFRAN) injection 4 mg  4 mg IntraVENous Q4H PRN    HYDROcodone-acetaminophen (NORCO) 5-325 mg per tablet 1 Tab  1 Tab Oral Q4H PRN    enoxaparin (LOVENOX) injection 40 mg  40 mg SubCUTAneous Q24H    budesonide (PULMICORT) 500 mcg/2 ml nebulizer suspension  500 mcg Nebulization BID RT    arformoterol (BROVANA) neb solution 15 mcg  15 mcg Nebulization BID RT          Lab Review:     Recent Labs      01/07/18   0512   WBC  11.8*   HGB  12.2   HCT  35.8*   PLT  206     Recent Labs      01/09/18   0511 01/07/18   0512   NA  143  138   K  3.4*  3.7   CL  106  105   CO2  24  25   GLU  97  93   BUN  24*  13   CREA  1.20  1.05   CA  9.5  9.0   MG  1.7   --    PHOS  4.2   --      Lab Results   Component Value Date/Time    Glucose (POC) 139 03/31/2017 04:06 PM    Glucose (POC) 132 03/31/2017 07:39 AM          Assessment / Plan:   70 yo hx of COPD, CAD s/p CABG, prostate CA, presented w/ N/V, enteritis    1) Abd pain/N/V/D: CT with small bowel enteritis  -stool studies negative   -plans for EGD/colonscopy in the AM   -continue levo/flagyl    2) Pneumonia: CXR was neg, but this was seen on abd CT.  No respiratory symptoms   -antibiotics as above     3) Sepsis/leukocytosis: had 2/4 SIRS on admission likely 2/2 #1  -continue antibiotics    4) CAD:   -cont plavix, statin, beta blocker     5) HTN:   -continue lisinopril, atenolol     6) COPD: stable  -nebs PRN     7) Urinary retention: with h/o urinary retention   -heard placed   -continue Flomax  -urology follow-up    Total time spent with patient: 35 min                  Care Plan discussed with: Patient, nursing    Discussed:  Care Plan    Prophylaxis:  Lovenox    Disposition:  Home w/Family           ___________________________________________________    Attending Physician: Rossy Lowery MD

## 2018-01-10 ENCOUNTER — APPOINTMENT (OUTPATIENT)
Dept: GENERAL RADIOLOGY | Age: 78
DRG: 392 | End: 2018-01-10
Attending: INTERNAL MEDICINE
Payer: MEDICARE

## 2018-01-10 LAB
ANION GAP SERPL CALC-SCNC: 10 MMOL/L (ref 5–15)
BASOPHILS # BLD: 0 K/UL (ref 0–0.1)
BASOPHILS NFR BLD: 0 % (ref 0–1)
BNP SERPL-MCNC: 181 PG/ML (ref 0–450)
BUN SERPL-MCNC: 16 MG/DL (ref 6–20)
BUN/CREAT SERPL: 16 (ref 12–20)
CALCIUM SERPL-MCNC: 9 MG/DL (ref 8.5–10.1)
CHLORIDE SERPL-SCNC: 111 MMOL/L (ref 97–108)
CO2 SERPL-SCNC: 25 MMOL/L (ref 21–32)
CREAT SERPL-MCNC: 1.01 MG/DL (ref 0.7–1.3)
EOSINOPHIL # BLD: 0.2 K/UL (ref 0–0.4)
EOSINOPHIL NFR BLD: 2 % (ref 0–7)
ERYTHROCYTE [DISTWIDTH] IN BLOOD BY AUTOMATED COUNT: 12.9 % (ref 11.5–14.5)
GLUCOSE SERPL-MCNC: 108 MG/DL (ref 65–100)
HCT VFR BLD AUTO: 33.4 % (ref 36.6–50.3)
HGB BLD-MCNC: 11.5 G/DL (ref 12.1–17)
LYMPHOCYTES # BLD: 1.2 K/UL (ref 0.8–3.5)
LYMPHOCYTES NFR BLD: 11 % (ref 12–49)
MAGNESIUM SERPL-MCNC: 2 MG/DL (ref 1.6–2.4)
MCH RBC QN AUTO: 31.7 PG (ref 26–34)
MCHC RBC AUTO-ENTMCNC: 34.4 G/DL (ref 30–36.5)
MCV RBC AUTO: 92 FL (ref 80–99)
MONOCYTES # BLD: 0.7 K/UL (ref 0–1)
MONOCYTES NFR BLD: 6 % (ref 5–13)
NEUTS SEG # BLD: 9.3 K/UL (ref 1.8–8)
NEUTS SEG NFR BLD: 81 % (ref 32–75)
PLATELET # BLD AUTO: 255 K/UL (ref 150–400)
POTASSIUM SERPL-SCNC: 3.3 MMOL/L (ref 3.5–5.1)
RBC # BLD AUTO: 3.63 M/UL (ref 4.1–5.7)
SODIUM SERPL-SCNC: 146 MMOL/L (ref 136–145)
WBC # BLD AUTO: 11.5 K/UL (ref 4.1–11.1)

## 2018-01-10 PROCEDURE — 76060000031 HC ANESTHESIA FIRST 0.5 HR: Performed by: SPECIALIST

## 2018-01-10 PROCEDURE — 94640 AIRWAY INHALATION TREATMENT: CPT

## 2018-01-10 PROCEDURE — 74011250636 HC RX REV CODE- 250/636: Performed by: INTERNAL MEDICINE

## 2018-01-10 PROCEDURE — 71045 X-RAY EXAM CHEST 1 VIEW: CPT

## 2018-01-10 PROCEDURE — 0DBE8ZX EXCISION OF LARGE INTESTINE, VIA NATURAL OR ARTIFICIAL OPENING ENDOSCOPIC, DIAGNOSTIC: ICD-10-PCS | Performed by: SPECIALIST

## 2018-01-10 PROCEDURE — 65660000000 HC RM CCU STEPDOWN

## 2018-01-10 PROCEDURE — 74011250637 HC RX REV CODE- 250/637: Performed by: INTERNAL MEDICINE

## 2018-01-10 PROCEDURE — 74011000250 HC RX REV CODE- 250: Performed by: INTERNAL MEDICINE

## 2018-01-10 PROCEDURE — 36415 COLL VENOUS BLD VENIPUNCTURE: CPT | Performed by: INTERNAL MEDICINE

## 2018-01-10 PROCEDURE — 77010033678 HC OXYGEN DAILY

## 2018-01-10 PROCEDURE — 85025 COMPLETE CBC W/AUTO DIFF WBC: CPT | Performed by: INTERNAL MEDICINE

## 2018-01-10 PROCEDURE — 88305 TISSUE EXAM BY PATHOLOGIST: CPT | Performed by: INTERNAL MEDICINE

## 2018-01-10 PROCEDURE — 83880 ASSAY OF NATRIURETIC PEPTIDE: CPT | Performed by: INTERNAL MEDICINE

## 2018-01-10 PROCEDURE — 77030009426 HC FCPS BIOP ENDOSC BSC -B: Performed by: SPECIALIST

## 2018-01-10 PROCEDURE — 76040000019: Performed by: SPECIALIST

## 2018-01-10 PROCEDURE — 74011250636 HC RX REV CODE- 250/636

## 2018-01-10 PROCEDURE — 80048 BASIC METABOLIC PNL TOTAL CA: CPT | Performed by: INTERNAL MEDICINE

## 2018-01-10 PROCEDURE — 88313 SPECIAL STAINS GROUP 2: CPT | Performed by: INTERNAL MEDICINE

## 2018-01-10 PROCEDURE — 83735 ASSAY OF MAGNESIUM: CPT | Performed by: INTERNAL MEDICINE

## 2018-01-10 PROCEDURE — 74011250637 HC RX REV CODE- 250/637: Performed by: PHYSICIAN ASSISTANT

## 2018-01-10 PROCEDURE — 0DB98ZX EXCISION OF DUODENUM, VIA NATURAL OR ARTIFICIAL OPENING ENDOSCOPIC, DIAGNOSTIC: ICD-10-PCS | Performed by: SPECIALIST

## 2018-01-10 PROCEDURE — 97535 SELF CARE MNGMENT TRAINING: CPT

## 2018-01-10 PROCEDURE — 74011000250 HC RX REV CODE- 250

## 2018-01-10 RX ORDER — LIDOCAINE HYDROCHLORIDE 20 MG/ML
INJECTION, SOLUTION EPIDURAL; INFILTRATION; INTRACAUDAL; PERINEURAL AS NEEDED
Status: DISCONTINUED | OUTPATIENT
Start: 2018-01-10 | End: 2018-01-10 | Stop reason: HOSPADM

## 2018-01-10 RX ORDER — FENTANYL CITRATE 50 UG/ML
25 INJECTION, SOLUTION INTRAMUSCULAR; INTRAVENOUS AS NEEDED
Status: DISCONTINUED | OUTPATIENT
Start: 2018-01-10 | End: 2018-01-10 | Stop reason: HOSPADM

## 2018-01-10 RX ORDER — PROPOFOL 10 MG/ML
INJECTION, EMULSION INTRAVENOUS
Status: DISCONTINUED | OUTPATIENT
Start: 2018-01-10 | End: 2018-01-10 | Stop reason: HOSPADM

## 2018-01-10 RX ORDER — CHOLESTYRAMINE 4 G/4.8G
4 POWDER, FOR SUSPENSION ORAL
Status: DISCONTINUED | OUTPATIENT
Start: 2018-01-10 | End: 2018-01-11 | Stop reason: HOSPADM

## 2018-01-10 RX ORDER — MIDAZOLAM HYDROCHLORIDE 1 MG/ML
.25-5 INJECTION, SOLUTION INTRAMUSCULAR; INTRAVENOUS AS NEEDED
Status: DISCONTINUED | OUTPATIENT
Start: 2018-01-10 | End: 2018-01-10 | Stop reason: HOSPADM

## 2018-01-10 RX ORDER — ATROPINE SULFATE 0.1 MG/ML
0.5 INJECTION INTRAVENOUS
Status: DISCONTINUED | OUTPATIENT
Start: 2018-01-10 | End: 2018-01-10 | Stop reason: HOSPADM

## 2018-01-10 RX ORDER — DEXTROMETHORPHAN/PSEUDOEPHED 2.5-7.5/.8
1.2 DROPS ORAL
Status: DISCONTINUED | OUTPATIENT
Start: 2018-01-10 | End: 2018-01-10 | Stop reason: HOSPADM

## 2018-01-10 RX ORDER — SODIUM CHLORIDE 9 MG/ML
50 INJECTION, SOLUTION INTRAVENOUS CONTINUOUS
Status: DISCONTINUED | OUTPATIENT
Start: 2018-01-10 | End: 2018-01-10

## 2018-01-10 RX ORDER — PROPOFOL 10 MG/ML
INJECTION, EMULSION INTRAVENOUS AS NEEDED
Status: DISCONTINUED | OUTPATIENT
Start: 2018-01-10 | End: 2018-01-10 | Stop reason: HOSPADM

## 2018-01-10 RX ORDER — SODIUM CHLORIDE 9 MG/ML
INJECTION, SOLUTION INTRAVENOUS
Status: DISCONTINUED | OUTPATIENT
Start: 2018-01-10 | End: 2018-01-10 | Stop reason: HOSPADM

## 2018-01-10 RX ORDER — EPINEPHRINE 0.1 MG/ML
1 INJECTION INTRACARDIAC; INTRAVENOUS
Status: DISCONTINUED | OUTPATIENT
Start: 2018-01-10 | End: 2018-01-10 | Stop reason: HOSPADM

## 2018-01-10 RX ORDER — POTASSIUM CHLORIDE AND SODIUM CHLORIDE 450; 150 MG/100ML; MG/100ML
INJECTION, SOLUTION INTRAVENOUS CONTINUOUS
Status: DISCONTINUED | OUTPATIENT
Start: 2018-01-10 | End: 2018-01-10

## 2018-01-10 RX ORDER — NALOXONE HYDROCHLORIDE 0.4 MG/ML
0.4 INJECTION, SOLUTION INTRAMUSCULAR; INTRAVENOUS; SUBCUTANEOUS
Status: DISCONTINUED | OUTPATIENT
Start: 2018-01-10 | End: 2018-01-10 | Stop reason: HOSPADM

## 2018-01-10 RX ORDER — FLUMAZENIL 0.1 MG/ML
0.2 INJECTION INTRAVENOUS
Status: DISCONTINUED | OUTPATIENT
Start: 2018-01-10 | End: 2018-01-10 | Stop reason: HOSPADM

## 2018-01-10 RX ADMIN — IPRATROPIUM BROMIDE AND ALBUTEROL SULFATE 3 ML: .5; 3 SOLUTION RESPIRATORY (INHALATION) at 08:44

## 2018-01-10 RX ADMIN — IPRATROPIUM BROMIDE AND ALBUTEROL SULFATE 3 ML: .5; 3 SOLUTION RESPIRATORY (INHALATION) at 20:06

## 2018-01-10 RX ADMIN — IPRATROPIUM BROMIDE AND ALBUTEROL SULFATE 3 ML: .5; 3 SOLUTION RESPIRATORY (INHALATION) at 15:03

## 2018-01-10 RX ADMIN — METRONIDAZOLE 500 MG: 250 TABLET ORAL at 21:13

## 2018-01-10 RX ADMIN — FAMOTIDINE 10 MG: 20 TABLET ORAL at 12:26

## 2018-01-10 RX ADMIN — SODIUM CHLORIDE: 9 INJECTION, SOLUTION INTRAVENOUS at 09:30

## 2018-01-10 RX ADMIN — DICYCLOMINE HYDROCHLORIDE 20 MG: 10 CAPSULE ORAL at 23:00

## 2018-01-10 RX ADMIN — Medication 1 CAPSULE: at 12:25

## 2018-01-10 RX ADMIN — METRONIDAZOLE 500 MG: 250 TABLET ORAL at 12:25

## 2018-01-10 RX ADMIN — DICYCLOMINE HYDROCHLORIDE 20 MG: 10 CAPSULE ORAL at 18:07

## 2018-01-10 RX ADMIN — CHOLESTYRAMINE 4 G: 4 POWDER, FOR SUSPENSION ORAL at 15:51

## 2018-01-10 RX ADMIN — CHOLESTYRAMINE 4 G: 4 POWDER, FOR SUSPENSION ORAL at 18:08

## 2018-01-10 RX ADMIN — ATENOLOL 50 MG: 50 TABLET ORAL at 12:26

## 2018-01-10 RX ADMIN — ENOXAPARIN SODIUM 40 MG: 100 INJECTION SUBCUTANEOUS at 21:13

## 2018-01-10 RX ADMIN — LISINOPRIL 20 MG: 20 TABLET ORAL at 12:25

## 2018-01-10 RX ADMIN — LEVOFLOXACIN 750 MG: 750 TABLET, FILM COATED ORAL at 12:44

## 2018-01-10 RX ADMIN — PROCHLORPERAZINE EDISYLATE 10 MG: 5 INJECTION INTRAMUSCULAR; INTRAVENOUS at 12:27

## 2018-01-10 RX ADMIN — PROPOFOL 40 MG: 10 INJECTION, EMULSION INTRAVENOUS at 10:27

## 2018-01-10 RX ADMIN — LIDOCAINE HYDROCHLORIDE 20 MG: 20 INJECTION, SOLUTION EPIDURAL; INFILTRATION; INTRACAUDAL; PERINEURAL at 10:27

## 2018-01-10 RX ADMIN — PROCHLORPERAZINE EDISYLATE 10 MG: 5 INJECTION INTRAMUSCULAR; INTRAVENOUS at 00:08

## 2018-01-10 RX ADMIN — BUDESONIDE 500 MCG: 0.5 INHALANT RESPIRATORY (INHALATION) at 08:44

## 2018-01-10 RX ADMIN — BUDESONIDE 500 MCG: 0.5 INHALANT RESPIRATORY (INHALATION) at 20:06

## 2018-01-10 RX ADMIN — DICYCLOMINE HYDROCHLORIDE 20 MG: 10 CAPSULE ORAL at 12:25

## 2018-01-10 RX ADMIN — TAMSULOSIN HYDROCHLORIDE 0.4 MG: 0.4 CAPSULE ORAL at 12:25

## 2018-01-10 RX ADMIN — SODIUM CHLORIDE AND POTASSIUM CHLORIDE: 4.5; 1.49 INJECTION, SOLUTION INTRAVENOUS at 12:25

## 2018-01-10 RX ADMIN — IPRATROPIUM BROMIDE AND ALBUTEROL SULFATE 3 ML: .5; 3 SOLUTION RESPIRATORY (INHALATION) at 01:02

## 2018-01-10 RX ADMIN — SIMVASTATIN 20 MG: 20 TABLET, FILM COATED ORAL at 23:00

## 2018-01-10 RX ADMIN — Medication 10 ML: at 06:07

## 2018-01-10 RX ADMIN — CLOPIDOGREL BISULFATE 75 MG: 75 TABLET ORAL at 12:25

## 2018-01-10 RX ADMIN — PROPOFOL 140 MCG/KG/MIN: 10 INJECTION, EMULSION INTRAVENOUS at 10:27

## 2018-01-10 RX ADMIN — FAMOTIDINE 10 MG: 20 TABLET ORAL at 18:08

## 2018-01-10 NOTE — PROGRESS NOTES
Danny Parkelsen [facility] 80  [address]  [phone]      Medical Progress Note      NAME: Rj Comer   :  1940  MRM:  [id number]    Date/Time: 1/10/2018         Subjective:     Chief Complaint:  Patient was seen and examined. Chart reviewed. \"I am okay\"     Apparently has not had a BM since yesterday evening. Underwent EGD/colonoscopy this AM. Biopsies taken. No ab pain. No N/V. Tolerating diet        Objective:       Vitals:       Last 24hrs VS reviewed since prior progress note.  Most recent are:    Visit Vitals    /77 (BP 1 Location: Right arm, BP Patient Position: At rest)    Pulse 85    Temp 97.9 °F (36.6 °C)    Resp 18    Ht 5' 6\" (1.676 m)    Wt 89.1 kg (196 lb 6.4 oz)    SpO2 96%    BMI 31.7 kg/m2     SpO2 Readings from Last 6 Encounters:   01/10/18 96%   17 96%   17 94%   16 95%   16 95%   12/28/15 96%    O2 Flow Rate (L/min): 3 l/min       Intake/Output Summary (Last 24 hours) at 01/10/18 1503  Last data filed at 01/10/18 1105   Gross per 24 hour   Intake              250 ml   Output              675 ml   Net             -425 ml        Exam:     Physical Exam:    Gen:  Well-developed, well-nourished, obese, in no acute distress  HEENT:  Pink conjunctivae, PERRL, hearing intact to voice, moist mucous membranes  Neck:  Supple, without masses, thyroid non-tender  Resp:  No accessory muscle use, clear breath sounds without wheezes rales or rhonchi  Card: bibasilar crackles  Abd: NT/ND  Musc:  No cyanosis or clubbing  Skin:  No rashes  Neuro:  Cranial nerves 3-12 are grossly intact, follows commands appropriately  Psych:  Good insight, oriented to person, place and time, alert    Medications Reviewed: (see below)    Lab Data Reviewed: (see below)    ______________________________________________________________________    Medications:     Current Facility-Administered Medications   Medication Dose Route Frequency    cholestyramine-aspartame (QUESTRAN LIGHT) packet 4 g  4 g Oral TID WITH MEALS    tamsulosin (FLOMAX) capsule 0.4 mg  0.4 mg Oral DAILY    levoFLOXacin (LEVAQUIN) tablet 750 mg  750 mg Oral Q24H    metroNIDAZOLE (FLAGYL) tablet 500 mg  500 mg Oral Q12H    prochlorperazine (COMPAZINE) injection 10 mg  10 mg IntraVENous Q6H    dicyclomine (BENTYL) capsule 20 mg  20 mg Oral QID    lactobac ac& pc-s.therm-b.anim (DENA Q/RISAQUAD)  1 Cap Oral DAILY    lisinopril (PRINIVIL, ZESTRIL) tablet 20 mg  20 mg Oral DAILY    simvastatin (ZOCOR) tablet 20 mg  20 mg Oral QHS    clopidogrel (PLAVIX) tablet 75 mg  75 mg Oral DAILY    influenza vaccine 2017-18 (3 yrs+)(PF) (FLUZONE QUAD/FLUARIX QUAD) injection 0.5 mL  0.5 mL IntraMUSCular PRIOR TO DISCHARGE    sodium chloride (NS) flush 5-10 mL  5-10 mL IntraVENous PRN    atenolol (TENORMIN) tablet 50 mg  50 mg Oral DAILY    famotidine (PEPCID) tablet 10 mg  10 mg Oral BID    albuterol-ipratropium (DUO-NEB) 2.5 MG-0.5 MG/3 ML  3 mL Nebulization Q6H RT    acetaminophen (TYLENOL) tablet 650 mg  650 mg Oral Q4H PRN    diphenhydrAMINE (BENADRYL) capsule 25 mg  25 mg Oral Q4H PRN    ondansetron (ZOFRAN) injection 4 mg  4 mg IntraVENous Q4H PRN    HYDROcodone-acetaminophen (NORCO) 5-325 mg per tablet 1 Tab  1 Tab Oral Q4H PRN    enoxaparin (LOVENOX) injection 40 mg  40 mg SubCUTAneous Q24H    budesonide (PULMICORT) 500 mcg/2 ml nebulizer suspension  500 mcg Nebulization BID RT    arformoterol (BROVANA) neb solution 15 mcg  15 mcg Nebulization BID RT          Lab Review:     Recent Labs      01/10/18   0502   WBC  11.5*   HGB  11.5*   HCT  33.4*   PLT  255     Recent Labs      01/10/18   0502  01/09/18   0511   NA  146*  143   K  3.3*  3.4*   CL  111*  106   CO2  25  24   GLU  108*  97   BUN  16  24*   CREA  1.01  1.20   CA  9.0  9.5   MG  2.0  1.7   PHOS   --   4.2     Lab Results   Component Value Date/Time    Glucose (POC) 139 03/31/2017 04:06 PM Glucose (POC) 132 03/31/2017 07:39 AM          Assessment / Plan:   70 yo hx of COPD, CAD s/p CABG, prostate CA, presented w/ N/V, enteritis    1) Abd pain/N/V/D: CT with small bowel enteritis  -stool studies negative   -continue levo/flagyl; will complete 10 day course   -s/p EGD/colonoscopy; no acute process noted => biopsies taken   -continue Florastor     2) Pneumonia: CXR was neg, but this was seen on abd CT  -repeat CXR considering hypoxia  -continue antibiotics    3) Sepsis/leukocytosis: had 2/4 SIRS on admission likely 2/2 #1  -continue antibiotics    4) CAD:   -cont plavix, statin, beta blocker     5) HTN:   -continue lisinopril, atenolol     6) COPD: stable  -continue nebs    7) Urinary retention: with h/o urinary retention   -heard placed   -continue Flomax  -urology follow-up     8) Hypoxia - likely 2/2 mild volume overload  -check proBNP     9) Hypokalemia  -replete; monitor     Total time spent with patient: 35 min                  Care Plan discussed with: Patient, nursing    Discussed:  Care Plan    Prophylaxis:  Lovenox    Disposition:  Home w/Family           ___________________________________________________    Attending Physician: Collette Murders, MD

## 2018-01-10 NOTE — PROGRESS NOTES
Pt is having his EGD and colonoscopy. Hopefully,he will be stable for discharge home tomorrow with [facility] for SN. PT and OT. Home Health is already set up for pt.   Braden Garrison  Team B [phone]

## 2018-01-10 NOTE — ROUTINE PROCESS
[facility]  1940  [id number]    Situation:  Verbal report received from: Christian Almonte RN  Procedure: Procedure(s):  ESOPHAGOGASTRODUODENOSCOPY (EGD)  COLONOSCOPY  ESOPHAGOGASTRODUODENAL (EGD) BIOPSY  COLON BIOPSY    Background:    Preoperative diagnosis: diarrhea  Postoperative diagnosis: Hiatal Hernia  Diverticulosis    :  Dr. Arndt Peer  Assistant(s): Endoscopy Technician-1: Walter Rey  Endoscopy RN-1: Kimani Meza RN    Specimens:   ID Type Source Tests Collected by Time Destination   1 : Duodenum Biopsies Preservative   Alvin Hearn MD 1/10/2018 1031 Pathology   2 : Random Colon Biopsies Preservative   Alvin Hearn MD 1/10/2018 1039 Pathology     H. Pylori  no    Assessment:  Intra-procedure medications     Anesthesia gave intra-procedure sedation and medications, see anesthesia flow sheet yes    Intravenous fluids: NS@ KVO     Vital signs stable     Abdominal assessment: round and soft     Recommendation:  Discharge patient per MD order. Return to floor  Permission to share finding with family or friend n/a.

## 2018-01-10 NOTE — PERIOP NOTES
Report form Tulio Rodriguez, see anesthesia record. ABD remains soft and non-tender post procedure. Pt has no complaints at this time and tolerated the procedure well.

## 2018-01-10 NOTE — ANESTHESIA POSTPROCEDURE EVALUATION
Post-Anesthesia Evaluation and Assessment    Patient: Albina Musa MRN: [id number]  SSN: [id number]    YOB: 1940  Age: 69 y.o. Sex: male       Cardiovascular Function/Vital Signs  Visit Vitals    /53    Pulse 83    Temp 36.9 °C (98.4 °F)    Resp 20    Ht 5' 6\" (1.676 m)    Wt 89.1 kg (196 lb 6.4 oz)    SpO2 97%    BMI 31.7 kg/m2       Patient is status post MAC anesthesia for Procedure(s):  ESOPHAGOGASTRODUODENOSCOPY (EGD)  COLONOSCOPY  ESOPHAGOGASTRODUODENAL (EGD) BIOPSY  COLON BIOPSY. Nausea/Vomiting: None    Postoperative hydration reviewed and adequate. Pain:  Pain Scale 1: Numeric (0 - 10) (01/10/18 1050)  Pain Intensity 1: 0 (01/10/18 1050)   Managed    Neurological Status:   Neuro  Neurologic State: Alert (01/10/18 0016)  Orientation Level: Oriented X4 (01/10/18 0016)  Cognition: Appropriate for age attention/concentration (01/10/18 0016)  Speech: Clear (01/10/18 0016)  LUE Motor Response: Purposeful (01/10/18 0016)  LLE Motor Response: Weak (01/10/18 0016)  RUE Motor Response: Purposeful (01/10/18 0016)  RLE Motor Response: Weak (01/10/18 0016)   At baseline    Mental Status and Level of Consciousness: Arousable    Pulmonary Status:   O2 Device: Nasal cannula (01/10/18 1050)   Adequate oxygenation and airway patent    Complications related to anesthesia: None    Post-anesthesia assessment completed.  No concerns    Signed By: Tona Damon MD     January 10, 2018

## 2018-01-10 NOTE — PERIOP NOTES
TRANSFER - OUT REPORT:    Verbal report given to Magnolia Dickey RN (name) on Loyda Bales  being transferred to 5th floor (unit) for ordered procedure       Report consisted of patients Situation, Background, Assessment and   Recommendations(SBAR). Information from the following report(s) Procedure Summary was reviewed with the receiving nurse. Lines:   Peripheral IV 01/10/18 Left Forearm (Active)   Site Assessment Clean, dry, & intact 1/10/2018 11:05 AM   Phlebitis Assessment 0 1/10/2018 11:05 AM   Infiltration Assessment 0 1/10/2018 11:05 AM   Dressing Status Clean, dry, & intact 1/10/2018 11:05 AM   Dressing Type Tape;Transparent 1/10/2018 11:05 AM   Hub Color/Line Status Pink;Capped 1/10/2018 11:05 AM   Action Taken Open ports on tubing capped 1/10/2018 11:05 AM   Alcohol Cap Used Yes 1/10/2018 11:05 AM        Opportunity for questions and clarification was provided.       Patient transported with:   Tech, O2 at 3L

## 2018-01-10 NOTE — PROGRESS NOTES
Bedside and Verbal shift change report given to Carolin Ott (oncoming nurse) by Heidi Sheth (offgoing nurse). Report included the following information SBAR, Kardex, Intake/Output, MAR, Recent Results and Cardiac Rhythm nsr.

## 2018-01-10 NOTE — PROGRESS NOTES
Problem: Self Care Deficits Care Plan (Adult)  Goal: *Acute Goals and Plan of Care (Insert Text)  Occupational Therapy Goals  Initiated 1/5/2018  1. Patient will perform upper body dressing with independence within 7 day(s). 2.  Patient will perform lower body dressing with independence within 7 day(s). 4.  Patient will perform toilet transfers with independence within 7 day(s). 5.  Patient will perform all aspects of toileting with independence within 7 day(s). 6.  Patient will participate in upper extremity therapeutic exercise/activities with independence for 10 minutes within 7 day(s). 7.  Patient will utilize energy conservation techniques during functional activities with verbal cues within 7 day(s). Occupational Therapy TREATMENT  Patient: Monica Puckett (19 y.o. male)  Date: 1/10/2018  Diagnosis: Pneumonia  diarrhea <principal problem not specified>  Procedure(s) (LRB):  ESOPHAGOGASTRODUODENOSCOPY (EGD) (N/A)  COLONOSCOPY (N/A)  ESOPHAGOGASTRODUODENAL (EGD) BIOPSY (N/A)  COLON BIOPSY (N/A) Day of Surgery  Precautions: Fall  Chart, occupational therapy assessment, plan of care, and goals were reviewed. ASSESSMENT:  Patient participated in OT this date do address toileting, feeding, and upright endurance to complete functional tasks. Overall, patient with decreased endurance, SOB, and easily fatigued requiring multiple rest breaks. Patient ambulated to [facility] 8 feet away and required rest break to complete toileting. Patient required several verbal prompts to maintain safety with lines, leads, and to not stand up without assist 2/2 to impulsivity. Patient requires CGA from [facility] to chair to complete face washing while seated for rest break and then from chair to EOB where he required increased time and pacing to complete feeding task with several drop of spoon and food into lap. He has tremor with eating and would benefit from built up  or weight utensil.   Patient required verbal cueing and increased time to sequence bed mobility 2/2 patient confused with lines and leads. Will cont to benefit from OT to address deficits and increase strength and participation as it relates to self care routines. Progression toward goals:  []          Improving appropriately and progressing toward goals  [x]          Improving slowly and progressing toward goals  []          Not making progress toward goals and plan of care will be adjusted     PLAN:  Patient continues to benefit from skilled intervention to address the above impairments. Continue treatment per established plan of care. Discharge Recommendations:  Rehab vs Home health  Further Equipment Recommendations for Discharge:  TBD     SUBJECTIVE:   Patient stated I need to use the bathroom.     OBJECTIVE DATA SUMMARY:   Cognitive/Behavioral Status:  Neurologic State: Alert  Orientation Level: Oriented X4  Cognition: Appropriate for age attention/concentration  Perception: Appears intact  Perseveration: No perseveration noted  Safety/Judgement: Awareness of environment  Functional Mobility and Transfers for ADLs:   Bed Mobility:     Supine to Sit: Additional time;Stand-by asssistance  Sit to Supine: Stand-by asssistance  Scooting: Additional time     Transfers:  Sit to Stand: Minimum assistance  Functional Transfers  Bathroom Mobility: Minimum assistance (patient demonstrates safety concern; impulsive)  Toilet Transfer : Minimum assistance  Cues: Verbal cues provided (patient impulsive; safety concerns)  Adaptive Equipment: Bedside commode;Walker (comment)    Balance:  Sitting: Intact  Standing: Impaired; With support (Patient with limited endurance/fatigue; SOB)  Standing - Static: Fair (decreased endurance)  Standing - Dynamic : Fair  ADL Intervention:   Patient ambulated to [facility] 8 feet away and required rest break to complete toileting.   Patient required several verbal prompts to maintain safety with lines, leads, and to not stand up without assist 2/2 to impulsivity. Patient requires CGA from [facility] to chair to complete face washing while seated for rest break and then from chair to EOB where he required increased time and pacing to complete feeding task with several drop of spoon and food into lap. He has tremor with eating and would benefit from built up  or weight utensil. Patient required verbal cueing and increased time to sequence bed mobility 2/2 patient confused with lines and leads. Feeding  Utensil Management: Supervision/set-up  Food to Mouth: Supervision/set-up  Drink to Mouth: Supervision/set-up  Cues: Physical assistance (patient dropping utensils and food 2/2 tremor)  Adaptive Equipment:  (may benefit from weighted spoon or built up )    Grooming  Washing Face: Supervision/set-up                        Toileting  Bladder Hygiene: Total assistance (dependent)  Bowel Hygiene: Stand-by assistance  Clothing Management: Minimum assistance    Cognitive Retraining  Safety/Judgement: Awareness of environment      Pain:  Pain Scale 1: Numeric (0 - 10)  Pain Intensity 1: 0  Pain Location 1: Abdomen  Pain Orientation 1: Left  Pain Description 1: Aching       Activity Tolerance:    Poor    Please refer to the flowsheet for vital signs taken during this treatment.   After treatment:   []  Patient left in no apparent distress sitting up in chair  [x]  Patient left in no apparent distress in bed  [x]  Call bell left within reach  [x]  Nursing notified  []  Caregiver present  []  Bed alarm activated  Left upright in bed with respiratory therapist.     COMMUNICATION/COLLABORATION:   The patients plan of care was discussed with: Registered Nurse and Respiratory Therapist    BELKIS Freitas/L  Time Calculation: 29 mins

## 2018-01-10 NOTE — ROUTINE PROCESS
Bedside shift change report given to Marlen Teague RN (oncoming nurse) by Hiar Espinal RN (offgoing nurse). Report included the following information SBAR and Kardex.

## 2018-01-10 NOTE — PERIOP NOTES
Vito Lockett [place]ormeaux  1940  [id number]    Situation:    Scheduled Procedure: Procedure(s):  ESOPHAGOGASTRODUODENOSCOPY (EGD)  COLONOSCOPY  Verbal report received from: Michelle Box RN  Preoperative diagnosis: diarrhea    Background:    Procedure: Procedure(s):  ESOPHAGOGASTRODUODENOSCOPY (EGD)  COLONOSCOPY  Physician performing procedure; Dr. Eliza Maddox RN    NPO Status/Last PO Intake: midnight    Pregnancy Test:Not applicable If yes, result: none    Is the patient taking Blood Thinners: yes If yes, list: Plavix and last taken 1/09/2018  Is the patient diabetic:no       If yes, what was the last BS:    Time taken? Anything given? no           Does the patient have a Pacemaker/Defibrillator in place?: no   Does the patient need antibiotics before/during/after procedure: no   If the patient is having a colon, How much prep was drank? all   What were the Colon prep results? Liquid light brown stools   Does the patient have SCD in place:no   Is patient on CONTACT precautions:yes        If yes, what kind of CONTACT precautions: Enteric    Assessment:  Are the vital signs stable prior to patient coming to ENDO?  yes  Is the patient alert/oriented and able to sign consent for the procedures:yes     Does the patient have a patient IV in place?  yes     Recommendation:  Family or Friend present no     Permission to share finding with Family or Friend n/a    EGD/Colonoscopy scheduled for 1000

## 2018-01-10 NOTE — PROCEDURES
[address] [facility] JOYCE Alvarez MD  [phone]      January 10, 2018    Esophagogastroduodenoscopy & Colonoscopy Procedure Note  Wendy Arreguin  : 1940  [employer] Medical Record Number: [id number]      Indications:    Diarrhea-presumed infectious origin abdominal pain and diarrhea  Referring Physician:  Janas Holstein, MD  Anesthesia/Sedation: Conscious Sedation/Moderate Sedation/MAC  Endoscopist:  Dr. Lissette Cheng  Complications:  None  Estimated Blood Loss:  None    Permit:  The indications, risks, benefits and alternatives were reviewed with the patient or their decision maker who was provided an opportunity to ask questions and all questions were answered. The specific risks of esophagogastroduodenoscopy with conscious sedation were reviewed, including but not limited to anesthetic complication, bleeding, adverse drug reaction, missed lesion, infection, IV site reactions, and intestinal perforation which would lead to the need for surgical repair. Alternatives to EGD and colonoscopy including radiographic imaging, observation without testing, or laboratory testing were reviewed as well as the limitations of those alternatives discussed. After considering the options and having all their questions answered, the patient or their decision maker provided both verbal and written consent to proceed. -----------EGD------------   Procedure in Detail:  After obtaining informed consent, positioning of the patient in the left lateral decubitus position, and conduction of a pre-procedure pause or \"time out\" the endoscope was introduced into the mouth and advanced to the duodenum. A careful inspection was made, and findings or interventions are described below.     Findings:   Esophagus:normal aside from small hiatus hernia  Stomach: normal   Duodenum/jejunum: normal.  A biopsy was taken from the duodenal mucosa for evaluation of villous atrophy or giardiasis. Hemostasis is confirmed from biopsy sites.          ----------Colonoscopy-----------    Procedure in Detail:  After obtaining informed consent, positioning of the patient in the left lateral decubitus position, and conduction of a pre-procedure pause or \"time out\" the endoscope was introduced into the anus and advanced to the cecum, which was identified by the ileocecal valve and appendiceal orifice. The quality of the colonic preparation was good. A careful inspection was made as the colonoscope was withdrawn, findings and interventions are described below. Findings:   Normal mucosa througout the colon. Random biopsies obtained for histology. There is diverticulosis in the sigmoid colon  without complications such as bleeding, inflammatory change, or luminal narrowing.      ------------------------------  Specimens:    See above    Complications:   None; patient tolerated the procedure well. Impressions:  EGD:  Normal aside from small hiatus hernia    Colonoscopy: Normal aside from diverticulosis. 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 Recommendations:     - Await pathology. -Advance diet and discharge planning. Thank you for entrusting me with this patient's care. Please do not hesitate to contact me with any questions or if I can be of assistance with any of your other patients' GI needs.     Signed By: Adam Celaya MD                        January 10, 2018

## 2018-01-11 ENCOUNTER — TELEPHONE (OUTPATIENT)
Dept: INTERNAL MEDICINE CLINIC | Age: 78
End: 2018-01-11

## 2018-01-11 VITALS
OXYGEN SATURATION: 92 % | BODY MASS INDEX: 31.11 KG/M2 | TEMPERATURE: 97.8 F | HEART RATE: 75 BPM | DIASTOLIC BLOOD PRESSURE: 57 MMHG | SYSTOLIC BLOOD PRESSURE: 118 MMHG | HEIGHT: 66 IN | RESPIRATION RATE: 18 BRPM | WEIGHT: 193.56 LBS

## 2018-01-11 LAB
ANION GAP SERPL CALC-SCNC: 5 MMOL/L (ref 5–15)
BASOPHILS # BLD: 0 K/UL (ref 0–0.1)
BASOPHILS NFR BLD: 0 % (ref 0–1)
BUN SERPL-MCNC: 12 MG/DL (ref 6–20)
BUN/CREAT SERPL: 12 (ref 12–20)
CALCIUM SERPL-MCNC: 8.6 MG/DL (ref 8.5–10.1)
CHLORIDE SERPL-SCNC: 112 MMOL/L (ref 97–108)
CO2 SERPL-SCNC: 29 MMOL/L (ref 21–32)
CREAT SERPL-MCNC: 0.97 MG/DL (ref 0.7–1.3)
EOSINOPHIL # BLD: 0.4 K/UL (ref 0–0.4)
EOSINOPHIL NFR BLD: 4 % (ref 0–7)
ERYTHROCYTE [DISTWIDTH] IN BLOOD BY AUTOMATED COUNT: 13 % (ref 11.5–14.5)
GLUCOSE SERPL-MCNC: 114 MG/DL (ref 65–100)
HCT VFR BLD AUTO: 34 % (ref 36.6–50.3)
HGB BLD-MCNC: 11.7 G/DL (ref 12.1–17)
LYMPHOCYTES # BLD: 1.2 K/UL (ref 0.8–3.5)
LYMPHOCYTES NFR BLD: 12 % (ref 12–49)
MCH RBC QN AUTO: 31.6 PG (ref 26–34)
MCHC RBC AUTO-ENTMCNC: 34.4 G/DL (ref 30–36.5)
MCV RBC AUTO: 91.9 FL (ref 80–99)
MONOCYTES # BLD: 0.6 K/UL (ref 0–1)
MONOCYTES NFR BLD: 6 % (ref 5–13)
NEUTS SEG # BLD: 8.3 K/UL (ref 1.8–8)
NEUTS SEG NFR BLD: 78 % (ref 32–75)
PLATELET # BLD AUTO: 258 K/UL (ref 150–400)
POTASSIUM SERPL-SCNC: 3.1 MMOL/L (ref 3.5–5.1)
RBC # BLD AUTO: 3.7 M/UL (ref 4.1–5.7)
SODIUM SERPL-SCNC: 146 MMOL/L (ref 136–145)
WBC # BLD AUTO: 10.4 K/UL (ref 4.1–11.1)

## 2018-01-11 PROCEDURE — 74011250637 HC RX REV CODE- 250/637: Performed by: INTERNAL MEDICINE

## 2018-01-11 PROCEDURE — 97530 THERAPEUTIC ACTIVITIES: CPT

## 2018-01-11 PROCEDURE — 80048 BASIC METABOLIC PNL TOTAL CA: CPT | Performed by: INTERNAL MEDICINE

## 2018-01-11 PROCEDURE — 74011250637 HC RX REV CODE- 250/637: Performed by: PHYSICIAN ASSISTANT

## 2018-01-11 PROCEDURE — 74011000250 HC RX REV CODE- 250: Performed by: INTERNAL MEDICINE

## 2018-01-11 PROCEDURE — 74011250636 HC RX REV CODE- 250/636: Performed by: INTERNAL MEDICINE

## 2018-01-11 PROCEDURE — 74011636637 HC RX REV CODE- 636/637: Performed by: INTERNAL MEDICINE

## 2018-01-11 PROCEDURE — 94640 AIRWAY INHALATION TREATMENT: CPT

## 2018-01-11 PROCEDURE — 77010033678 HC OXYGEN DAILY

## 2018-01-11 PROCEDURE — 77030010545

## 2018-01-11 PROCEDURE — 85025 COMPLETE CBC W/AUTO DIFF WBC: CPT | Performed by: INTERNAL MEDICINE

## 2018-01-11 PROCEDURE — 36415 COLL VENOUS BLD VENIPUNCTURE: CPT | Performed by: INTERNAL MEDICINE

## 2018-01-11 PROCEDURE — 97116 GAIT TRAINING THERAPY: CPT

## 2018-01-11 RX ORDER — CHOLESTYRAMINE 4 G/4.8G
4 POWDER, FOR SUSPENSION ORAL
Qty: 21 PACKET | Refills: 0 | Status: SHIPPED | OUTPATIENT
Start: 2018-01-11 | End: 2018-01-18

## 2018-01-11 RX ORDER — POTASSIUM CHLORIDE 750 MG/1
40 TABLET, FILM COATED, EXTENDED RELEASE ORAL
Status: COMPLETED | OUTPATIENT
Start: 2018-01-11 | End: 2018-01-11

## 2018-01-11 RX ORDER — TAMSULOSIN HYDROCHLORIDE 0.4 MG/1
0.4 CAPSULE ORAL DAILY
Qty: 30 CAP | Refills: 0 | Status: SHIPPED | OUTPATIENT
Start: 2018-01-12 | End: 2020-01-01

## 2018-01-11 RX ORDER — PREDNISONE 20 MG/1
TABLET ORAL
Qty: 7 TAB | Refills: 0 | Status: SHIPPED | OUTPATIENT
Start: 2018-01-11 | End: 2018-01-19 | Stop reason: ALTCHOICE

## 2018-01-11 RX ORDER — ALBUTEROL SULFATE 1.25 MG/3ML
1.25 SOLUTION RESPIRATORY (INHALATION)
Qty: 30 EACH | Refills: 1 | Status: SHIPPED | OUTPATIENT
Start: 2018-01-11 | End: 2020-01-01

## 2018-01-11 RX ORDER — PREDNISONE 20 MG/1
40 TABLET ORAL
Status: DISCONTINUED | OUTPATIENT
Start: 2018-01-11 | End: 2018-01-11 | Stop reason: HOSPADM

## 2018-01-11 RX ADMIN — IPRATROPIUM BROMIDE AND ALBUTEROL SULFATE 3 ML: .5; 3 SOLUTION RESPIRATORY (INHALATION) at 07:54

## 2018-01-11 RX ADMIN — CHOLESTYRAMINE 4 G: 4 POWDER, FOR SUSPENSION ORAL at 13:31

## 2018-01-11 RX ADMIN — METRONIDAZOLE 500 MG: 250 TABLET ORAL at 09:13

## 2018-01-11 RX ADMIN — ATENOLOL 50 MG: 50 TABLET ORAL at 09:13

## 2018-01-11 RX ADMIN — LEVOFLOXACIN 750 MG: 750 TABLET, FILM COATED ORAL at 09:13

## 2018-01-11 RX ADMIN — DICYCLOMINE HYDROCHLORIDE 20 MG: 10 CAPSULE ORAL at 13:31

## 2018-01-11 RX ADMIN — BUDESONIDE 500 MCG: 0.5 INHALANT RESPIRATORY (INHALATION) at 07:54

## 2018-01-11 RX ADMIN — TAMSULOSIN HYDROCHLORIDE 0.4 MG: 0.4 CAPSULE ORAL at 09:13

## 2018-01-11 RX ADMIN — POTASSIUM CHLORIDE 40 MEQ: 750 TABLET, FILM COATED, EXTENDED RELEASE ORAL at 09:13

## 2018-01-11 RX ADMIN — PROCHLORPERAZINE EDISYLATE 10 MG: 5 INJECTION INTRAMUSCULAR; INTRAVENOUS at 06:44

## 2018-01-11 RX ADMIN — Medication 10 ML: at 06:45

## 2018-01-11 RX ADMIN — PREDNISONE 40 MG: 20 TABLET ORAL at 13:31

## 2018-01-11 RX ADMIN — PROCHLORPERAZINE EDISYLATE 10 MG: 5 INJECTION INTRAMUSCULAR; INTRAVENOUS at 00:03

## 2018-01-11 RX ADMIN — IPRATROPIUM BROMIDE AND ALBUTEROL SULFATE 3 ML: .5; 3 SOLUTION RESPIRATORY (INHALATION) at 14:43

## 2018-01-11 RX ADMIN — LISINOPRIL 20 MG: 20 TABLET ORAL at 09:14

## 2018-01-11 RX ADMIN — DICYCLOMINE HYDROCHLORIDE 20 MG: 10 CAPSULE ORAL at 09:14

## 2018-01-11 RX ADMIN — Medication 1 CAPSULE: at 09:14

## 2018-01-11 RX ADMIN — IPRATROPIUM BROMIDE AND ALBUTEROL SULFATE 3 ML: .5; 3 SOLUTION RESPIRATORY (INHALATION) at 01:06

## 2018-01-11 RX ADMIN — FAMOTIDINE 10 MG: 20 TABLET ORAL at 09:14

## 2018-01-11 RX ADMIN — CHOLESTYRAMINE 4 G: 4 POWDER, FOR SUSPENSION ORAL at 09:14

## 2018-01-11 RX ADMIN — CLOPIDOGREL BISULFATE 75 MG: 75 TABLET ORAL at 09:13

## 2018-01-11 NOTE — TELEPHONE ENCOUNTER
Maria Alejandra  says they need the orders ASAP faxed to [phone] so they can get this before the place closes at 5pm since the pt is being discharged today

## 2018-01-11 NOTE — PROGRESS NOTES
CM received handoff and working on O2, nebulizer and rollator order. Sent referral through [employer] to [place] [facility]. Found out patient switched to [facility]. Contacted Dr. Magalis Rosales office and spoke with the nurse for his patients. They will work on obtaining the order for the O2, Nebulizer and Rollator. Will continue to follow.   JESSICA Wells

## 2018-01-11 NOTE — PROGRESS NOTES
Problem: Falls - Risk of  Goal: *Absence of Falls  Document Dennis Fall Risk and appropriate interventions in the flowsheet.    Fall Risk Interventions:  Mobility Interventions: Patient to call before getting OOB, Utilize walker, cane, or other assitive device, Communicate number of staff needed for ambulation/transfer, PT Consult for assist device competence         Medication Interventions: Patient to call before getting OOB, Teach patient to arise slowly    Elimination Interventions: Call light in reach, Patient to call for help with toileting needs    History of Falls Interventions: Door open when patient unattended

## 2018-01-11 NOTE — PROGRESS NOTES
Followed up with the Donavon Kussmaul the nurse who discharged the patient and the patient was discharged with her oxygen and that the patient did not need a rollator because she had one at home.  Notified AUNDREA Basilio of patient's d/c and f/u everardotJULIA and Jovi Coker set up / Tori Jansen of Case Management

## 2018-01-11 NOTE — PROGRESS NOTES
Patient discharged per MD order. Discharge instructions and prescriptions reviewed with patient. Patient sent home with leg bag. Teaching provided. Patient verbalized understanding. IV removed with tip intact. Opportunity to ask questions were provided. Patient discharged home via wheelchair with family.

## 2018-01-11 NOTE — PROGRESS NOTES
Aniyah Marroquin. Ronel Chandra MD  [phone] office  [phone] voicemail     Gastroenterology Progress Note    January 11, 2018  Admit Date: 1/3/2018         Narrative Assessment and Plan   · Diarrhea - improving  · Abdominal pain - resolved    Abdominal pain has resolved. Diarrhea is less frequent and he is now able to control bowel movements. Will notify patient of path results once available  Patient is stable for discharge from GI standpoint      ---  Aniyah Marroquin. Ronel Chandra MD  [phone] office  [phone] voicemail   I have personally reviewed the history and the chart and agree with the documentation recorded by the Mid Level Provider, including the assessment, treatment plan, and disposition. ASSESSMENT AND PLAN:  OK for discharge  Path pending, will check on that tomorrow and contact the patient if any change in plan is generated. I am signing off. Gloria Sandoval MD        Subjective:   · States feeling much better and he wants to go home. Diarrhea significantly decreased. Only once last night and one time this morning. States stool is loose but no longer has urgency and issues with incontinence. Avoids eating large meals because he does not want to make symptoms worse - tolerating liquids and small amounts of food. ROS:  The previous review of systems on initial consultation / H&P is noted and reviewed. Specific changes noted above in HPI.     Current Medications:     Current Facility-Administered Medications   Medication Dose Route Frequency    cholestyramine-aspartame (QUESTRAN LIGHT) packet 4 g  4 g Oral TID WITH MEALS    tamsulosin (FLOMAX) capsule 0.4 mg  0.4 mg Oral DAILY    levoFLOXacin (LEVAQUIN) tablet 750 mg  750 mg Oral Q24H    metroNIDAZOLE (FLAGYL) tablet 500 mg  500 mg Oral Q12H    prochlorperazine (COMPAZINE) injection 10 mg  10 mg IntraVENous Q6H    dicyclomine (BENTYL) capsule 20 mg  20 mg Oral QID    lactobac ac& pc-s.therm-b.anim (DENA Q/RISAQUAD)  1 Cap Oral DAILY    lisinopril (PRINIVIL, ZESTRIL) tablet 20 mg  20 mg Oral DAILY    simvastatin (ZOCOR) tablet 20 mg  20 mg Oral QHS    clopidogrel (PLAVIX) tablet 75 mg  75 mg Oral DAILY    influenza vaccine - (3 yrs+)(PF) (FLUZONE QUAD/FLUARIX QUAD) injection 0.5 mL  0.5 mL IntraMUSCular PRIOR TO DISCHARGE    sodium chloride (NS) flush 5-10 mL  5-10 mL IntraVENous PRN    atenolol (TENORMIN) tablet 50 mg  50 mg Oral DAILY    famotidine (PEPCID) tablet 10 mg  10 mg Oral BID    albuterol-ipratropium (DUO-NEB) 2.5 MG-0.5 MG/3 ML  3 mL Nebulization Q6H RT    acetaminophen (TYLENOL) tablet 650 mg  650 mg Oral Q4H PRN    diphenhydrAMINE (BENADRYL) capsule 25 mg  25 mg Oral Q4H PRN    ondansetron (ZOFRAN) injection 4 mg  4 mg IntraVENous Q4H PRN    HYDROcodone-acetaminophen (NORCO) 5-325 mg per tablet 1 Tab  1 Tab Oral Q4H PRN    enoxaparin (LOVENOX) injection 40 mg  40 mg SubCUTAneous Q24H    budesonide (PULMICORT) 500 mcg/2 ml nebulizer suspension  500 mcg Nebulization BID RT    arformoterol (BROVANA) neb solution 15 mcg  15 mcg Nebulization BID RT       Objective:     VITALS:   Last 24hrs VS reviewed since prior progress note. Most recent are:  Visit Vitals    /63 (BP 1 Location: Left arm, BP Patient Position: Sitting)    Pulse 92    Temp 97.9 °F (36.6 °C)    Resp 18    Ht 5' 6\" (1.676 m)    Wt 87.8 kg (193 lb 9 oz)    SpO2 94%    BMI 31.24 kg/m2     Temp (24hrs), Av °F (36.7 °C), Min:97.8 °F (36.6 °C), Max:98.4 °F (36.9 °C)      Intake/Output Summary (Last 24 hours) at 18 0918  Last data filed at 18 0421   Gross per 24 hour   Intake              250 ml   Output              650 ml   Net             -400 ml       EXAM:  General:  Comfortable, no distress    HEENT:  Atraumatic skull, pupils equal  Lungs:              No abnormal audible breath sounds. Speaking in complete sentences  Abdomen:   Nondistended, nontender.   No mass, guarding or rebound  Musc:   No skeletal defects or deformities  Neurologic:   Cranial nerves grossly intact, moves all 4 extremities  Psych:    Good insight. Not anxious nor agitated    Lab Data Reviewed:   Recent Labs      01/11/18   0534  01/10/18   0502   WBC  10.4  11.5*   HGB  11.7*  11.5*   HCT  34.0*  33.4*   PLT  258  255     Recent Labs      01/11/18   0534  01/10/18   0502  01/09/18   0511   NA  146*  146*  143   K  3.1*  3.3*  3.4*   CL  112*  111*  106   CO2  29  25  24   GLU  114*  108*  97   BUN  12  16  24*   CREA  0.97  1.01  1.20   CA  8.6  9.0  9.5   MG   --   2.0  1.7   PHOS   --    --   4.2     Lab Results   Component Value Date/Time    Glucose (POC) 139 03/31/2017 04:06 PM    Glucose (POC) 132 03/31/2017 07:39 AM     No results for input(s): PH, PCO2, PO2, HCO3, FIO2 in the last 72 hours. No results for input(s): INR in the last 72 hours.     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

## 2018-01-11 NOTE — PROGRESS NOTES
I called therapy to inform PT that pt has a 6MW orderd by attending.   Ary Robledo  Team B with Dr Genaro Figueroa  [phone]

## 2018-01-11 NOTE — PROGRESS NOTES
Problem: Mobility Impaired (Adult and Pediatric)  Goal: *Acute Goals and Plan of Care (Insert Text)  Physical Therapy Goals  Initiated 1/4/2018  Weekly Reassessment 1/11/18 (all goals on going)  1. Patient will move from supine to sit and sit to supine  in bed with modified independence within 7 day(s). 2.  Patient will transfer from bed to chair and chair to bed with modified independence using the least restrictive device within 7 day(s). 3.  Patient will perform sit to stand with modified independence within 7 day(s). 4.  Patient will ambulate with modified independence for 150 feet with the least restrictive device within 7 day(s). 5.  Patient will ascend/descend 4 stairs with 1 handrail(s) with modified independence within 7 day(s). physical Therapy TREATMENT: WEEKLY REASSESSMENT  Patient: Katerin Knight (30 y.o. male)  Date: 1/11/2018  Diagnosis: Pneumonia  diarrhea <principal problem not specified>  Procedure(s) (LRB):  ESOPHAGOGASTRODUODENOSCOPY (EGD) (N/A)  COLONOSCOPY (N/A)  ESOPHAGOGASTRODUODENAL (EGD) BIOPSY (N/A)  COLON BIOPSY (N/A) 1 Day Post-Op  Precautions: Fall, enteric    ASSESSMENT:  Patient cleared for PT and 6 minute walk test. Patient reports feeling much better than upon admission and anxious to go home. Patient admitted with PNA and had EGD/colonoscopy yesterday; biopsies pending. Today  he was able to stand and ambulate for 6 minutes using rollator and monitored for O2 sats. Instructed patient in use of rollator and he demonstrates and expressed understanding. Patient with decreased O2 sats on room air but improved with 2 liters. Educated him in pursed lip breathing.    Documentation for home O2:     ROOM AIR    AT REST   O2 SATS  93 HR  84   ROOM AIR WITH ACTIVITY 02 SATS  85 HR  90   (2    ) LITERS OF O2 WITH ACTIVITY O2 SATS  90 HR  95   (2    )LITERS OF 02 PATIENT LEFT COMFORTABLY  SITTING/SUPINE 02 SATS  95 HR  83   Recommend patient to be discharged with HHPT, Home O2 and rollator. Patient's progression toward goals since last assessment: Progressing but not fully achieved. All goals ongoing. PLAN:  Goals have been updated based on progression since last assessment. Patient continues to benefit from skilled intervention to address the above impairments. Continue to follow the patient 5 times a week to address goals. Planned Interventions:  [x]              Bed Mobility Training             []       Neuromuscular Re-Education  [x]              Transfer Training                   []       Orthotic/Prosthetic Training  [x]              Gait Training                         []       Modalities  [x]              Therapeutic Exercises           []       Edema Management/Control  []              Therapeutic Activities            []       Patient and Family Training/Education  []              Other (comment):  Discharge Recommendations: HHPT  Further Equipment Recommendations for Discharge: rollator and Home oxygen     SUBJECTIVE:   Patient stated I feel much better.     OBJECTIVE DATA SUMMARY:   Critical Behavior:  Neurologic State: Alert  Orientation Level: Oriented X4  Cognition: Appropriate decision making, Appropriate for age attention/concentration, Appropriate safety awareness  Safety/Judgement: Awareness of environment    Strength:          generally decreased but functional                 Functional Mobility Training:  Bed Mobility:     Supine to Sit: Additional time;Supervision              Transfers:  Sit to Stand: Contact guard assistance  Stand to Sit: Contact guard assistance        Bed to Chair: Contact guard assistance                    Balance:  Sitting: Intact  Standing: Intact; With support  Ambulation/Gait Training:  Distance (ft): 200 Feet (ft) (with seated rest as needed)  Assistive Device: Gait belt;Walker, rollator  Ambulation - Level of Assistance: Contact guard assistance (2nd person for O2 tank) Barthel Index:    Bathin  Bladder: 10  Bowels: 10  Groomin  Dressing: 10  Feeding: 10  Mobility: 10  Stairs: 5  Toilet Use: 10  Transfer (Bed to Chair and Back): 10  Total: 85       Barthel and G-code impairment scale:  Percentage of impairment CH  0% CI  1-19% CJ  20-39% CK  40-59% CL  60-79% CM  80-99% CN  100%   Barthel Score 0-100 100 99-80 79-60 59-40 20-39 1-19   0   Barthel Score 0-20 20 17-19 13-16 9-12 5-8 1-4 0      The Barthel ADL Index: Guidelines  1. The index should be used as a record of what a patient does, not as a record of what a patient could do. 2. The main aim is to establish degree of independence from any help, physical or verbal, however minor and for whatever reason. 3. The need for supervision renders the patient not independent. 4. A patient's performance should be established using the best available evidence. Asking the patient, friends/relatives and nurses are the usual sources, but direct observation and common sense are also important. However direct testing is not needed. 5. Usually the patient's performance over the preceding 24-48 hours is important, but occasionally longer periods will be relevant. 6. Middle categories imply that the patient supplies over 50 per cent of the effort. 7. Use of aids to be independent is allowed. Andrews Rueda., Barthel, DShabanaW. (8761). Functional evaluation: the Barthel Index. [facility] (14)2. JOHNSON Serrano, Ronni Cardona., [place] Jennifer., [place], [address] [place] [address] (). Measuring the change indisability after inpatient rehabilitation; comparison of the responsiveness of the Barthel Index and Functional Rickman Measure. Journal of Neurology, Neurosurgery, and Psychiatry, 66(4), 881-794. Abimbola Taylor, N.J.A, YANELY Hernandez, & Asif Bella MShabanaA. (2004.) Assessment of post-stroke quality of life in cost-effectiveness studies: The usefulness of the Barthel Index and the EuroQoL-5D.  [facility], 13, 303-14 Pain:  Pain Scale 1: Numeric (0 - 10)  Pain Intensity 1: 0              Activity Tolerance:   Decreased O2 sats on room air with activity. Please refer to the flowsheet for vital signs taken during this treatment. After treatment:   [x]  Patient left in no apparent distress sitting up in chair  []  Patient left in no apparent distress in bed  [x]  Call bell left within reach  [x]  Nursing notified  []  Caregiver present  []  Bed alarm activated    COMMUNICATION/COLLABORATION:   The patients plan of care was discussed with: Registered Nurse and Physician.      Deysi Guzmán, PT   Time Calculation: 25 mins

## 2018-01-11 NOTE — PROGRESS NOTES
Bedside and Verbal shift change report given to Kathy Ferguson RN (oncoming nurse) by Matty Hughes RN (offgoing nurse). Report included the following information SBAR, Kardex, Intake/Output, MAR, Accordion and Recent Results.

## 2018-01-11 NOTE — PROGRESS NOTES
Oxygen orders noted but I cannot find documentation of a 6MW. Home oxygen cannot be set up until this is documented. I asked Silva Major as pt has medicare A & B and secondary is humana if we would need Dr Abdias Peraza to write an order and Silva Major stated that since his primary is [place] is hoping that will not be necessary. I will hand-off this pt to Team B assistant as I am leaving early today. I will have Dr Ronaldo Cornell sign the oxygen orders,the H& P and will ask Dr Tommy Alberto to write a face -to-face stating why pt neds home oxygen & nebulizer. Rollator and oxygen can be ordered through [email or web address] and once approved a portable tank can be obtained out of the [facility] closet. [email or web address] did not get the bid for medicare nebulizers so the nebulizer will need   I discussed all of the above with Selene POTTS  co-worker.   Quin Gimenez

## 2018-01-11 NOTE — TELEPHONE ENCOUNTER
Margarette Shi a  with  [facility] called requesting orders for Continuous gasious Oxygen at 2 liter portable with tubing and  all supplies to maintain stats over 87%   , rollator home nebulizer and supplies . Pt is going to be d/c today     Margarette Shi can be reached at [phone]    ( she stated the orders have to be written a specific way.  Please call to verify above info)

## 2018-01-11 NOTE — PROGRESS NOTES
[facility]  Home Care  Face to Face Encounter    Patients Name: Loyda Bales    YOB: 1940    Primary Diagnosis: COPD     Date of Face to Face:   1/11/2018                                  Face to Face Encounter findings are related to primary reason for home care:   yes. 1. I certify that the patient needs intermittent care as follows: home O2     2. I certify that this patient is homebound, that is: 1) patient requires the use of a walker device, special transportation, or assistance of another to leave the home; or 2) patient's condition makes leaving the home medically contraindicated; and 3) patient has a normal inability to leave the home and leaving the home requires considerable and taxing effort. Patient may leave the home for infrequent and short duration for medical reasons, and occasional absences for non-medical reason    3. I certify that this patient is under my care and that I, or a nurse practitioner or [phone], or clinical nurse specialist, or certified nurse midwife, working with me, had a Face-to-Face Encounter that meets the physician Face-to-Face Encounter requirements. The following are the clinical findings from the [address] [place] [address] encounter that support the need for skilled services and is a summary of the encounter:     See attached progess note      Romana Areola, MD  1/11/2018      THE FOLLOWING TO BE COMPLETED BY THE COMMUNITY PHYSICIAN:    I concur with the findings described above from the [facility] encounter that this patient is homebound and in need of a skilled service.     Certifying Physician: _____________________________________      Printed Certifying Physician Name: _____________________________________    Date: _________________

## 2018-01-11 NOTE — PROGRESS NOTES
Pt is set up for home health with [facility]. Chata Jacob with case management(pager [phone]) will be setting up pt pt.'s home DME. Important:  Please do not discharge pt until his portable oxygen tank is delivered to his room. [place]  Team B [phone].

## 2018-01-11 NOTE — PROGRESS NOTES
Bedside and Verbal shift change report given to Marianela Cavanaugh (oncoming nurse) by Scotty Garcia (offgoing nurse). Report included the following information SBAR, Kardex, Procedure Summary, Intake/Output, MAR, Recent Results and Med Rec Status.

## 2018-01-12 ENCOUNTER — PATIENT OUTREACH (OUTPATIENT)
Dept: INTERNAL MEDICINE CLINIC | Age: 78
End: 2018-01-12

## 2018-01-13 ENCOUNTER — HOME CARE VISIT (OUTPATIENT)
Dept: SCHEDULING | Facility: HOME HEALTH | Age: 78
End: 2018-01-13
Payer: MEDICARE

## 2018-01-13 VITALS
RESPIRATION RATE: 20 BRPM | DIASTOLIC BLOOD PRESSURE: 70 MMHG | HEART RATE: 91 BPM | SYSTOLIC BLOOD PRESSURE: 120 MMHG | TEMPERATURE: 98.2 F | OXYGEN SATURATION: 93 %

## 2018-01-13 PROCEDURE — 3331090002 HH PPS REVENUE DEBIT

## 2018-01-13 PROCEDURE — G0151 HHCP-SERV OF PT,EA 15 MIN: HCPCS

## 2018-01-13 PROCEDURE — 3331090001 HH PPS REVENUE CREDIT

## 2018-01-13 PROCEDURE — 400013 HH SOC

## 2018-01-14 PROCEDURE — 3331090001 HH PPS REVENUE CREDIT

## 2018-01-14 PROCEDURE — 3331090002 HH PPS REVENUE DEBIT

## 2018-01-14 NOTE — DISCHARGE SUMMARY
Physician Discharge Summary     Patient ID:  Ladi Walker  [id number]  69 y.o.  1940    Admit date: 1/3/2018    Discharge date and time: 1/11/2018     Admission Diagnoses: Diarrhea     Discharge Diagnoses/Hospital Course   Mr. Manuel Hernandez is a 68 yo male with PMH of CAD, COPD, GERD, HTN, XOL admitted with diarrhea. Pt underwent CT that showed enteritis. Despite IV antibiotics and negative cultures, pt continued to have diarrhea. He subsequently underwent a endoscopy/colonoscopy and biopsies were taken. Pathology report read \"possible scarring from diverticulosis v. Mild ischemic colitis\". His diarrhea eventual improved and he was discharged to home in improved condition. Of note, pt was also treated for COPD exacerbation with nebs, steroids and antibiotics. He was discharged to home with home O2 and a nebulizer as well as a steroid taper. PCP: Marimar Hummel MD     Consults: GI    Discharge Exam:  Visit Vitals    /57 (BP 1 Location: Left arm, BP Patient Position: Sitting)    Pulse 75    Temp 97.8 °F (36.6 °C)    Resp 18    Ht 5' 6\" (1.676 m)    Wt 87.8 kg (193 lb 9 oz)    SpO2 92%    BMI 31.24 kg/m2     Gen:  Well-developed, well-nourished, obese, in no acute distress  HEENT:  Pink conjunctivae, PERRL, hearing intact to voice, moist mucous membranes  Neck:  Supple, without masses, thyroid non-tender  Resp:  No accessory muscle use, clear breath sounds without wheezes rales or rhonchi  Card: Scattered expiratory wheezing  Abd: NT/ND.  Normoactive BS   Musc:  No cyanosis or clubbing  Skin:  No rashes  Neuro:  Cranial nerves 3-12 are grossly intact, follows commands appropriately  Psych:  Good insight, oriented to person, place and time, alert  Disposition: home    Patient Instructions:   Discharge Medication List as of 1/11/2018  4:56 PM      START taking these medications    Details   cholestyramine-aspartame (QUESTRAN LIGHT) 4 gram packet Take 1 Packet by mouth three (3) times daily (with meals) for 7 days. , Print, Disp-21 Packet, R-0      L. acidoph & paracasei- S therm- Bifido (DENA-Q/RISAQUAD) 8 billion cell cap cap Take 1 Cap by mouth daily. , Print, Disp-30 Cap, R-0      predniSONE (DELTASONE) 20 mg tablet Take 40mg x 2 days then 20mg x 2 days then 10mg x 2 days, Print, Disp-7 Tab, R-0      tamsulosin (FLOMAX) 0.4 mg capsule Take 1 Cap by mouth daily. , Print, Disp-30 Cap, R-0      albuterol (ACCUNEB) 1.25 mg/3 mL nebu 3 mL by Nebulization route every six (6) hours as needed. Indications: Chronic Obstructive Pulmonary Disease, Print, Disp-30 Each, R-1      promethazine (PHENERGAN) 25 mg tablet Take 1 Tab by mouth every six (6) hours as needed for Nausea. , Print, Disp-12 Tab, R-0         CONTINUE these medications which have NOT CHANGED    Details   lisinopril (PRINIVIL, ZESTRIL) 20 mg tablet Take 20 mg by mouth daily. , Historical Med      clopidogrel (PLAVIX) 75 mg tab Take 75 mg by mouth daily. , Historical Med      atenolol (TENORMIN) 50 mg tablet Take 50 mg by mouth daily. , Historical Med      raNITIdine (ZANTAC) 150 mg tablet TAKE ONE TABLET BY MOUTH EVERY DAY PLUS TAKE ONE TABLET AT BEDTIME, Normal, Disp-60 Tab, R-11      PROAIR HFA 90 mcg/actuation inhaler USE 2 PUFFS EVERY 8 HOURS AS NEEDED, Normal, Disp-1 Inhaler, R-11      simvastatin (ZOCOR) 20 mg tablet TAKE ONE TABLET BY MOUTH ONCE DAILY IN THE EVENING, Normal, Disp-90 Tab, R-1      CALCIUM CARBONATE/VITAMIN D2 (CALCIUM 600 WITH VITAMIN D2 PO) Take 1 Tab by mouth two (2) times a day., Historical Med      multivitamin (ONE A DAY) tablet Take 1 Tab by mouth daily. , Historical Med      OTHER Biofreeze Ointment applied twice daily to feet as needed for arthritis, Historical Med      SPIRIVA WITH HANDIHALER 18 mcg inhalation capsule Inhale the contents of 1  capsule via HandiHaler  daily, Mail Order, Disp-90 Cap, R-2      fluticasone-vilanterol (BREO ELLIPTA) 100-25 mcg/dose inhaler Take 1 Puff by inhalation daily. , Historical Med LEUPROLIDE ACETATE (LUPRON DEPOT, 4 MONTH, IM) by IntraMUSCular route.  Every 4 months  Receives in providers office  Prescribed by Dr. Jim Bey, Historical Med             Activity: Activity as tolerated  Diet: Regular Diet  Wound Care: None needed    Follow-up Information     Follow up With Details Comments [address]   PT and OT [phone]  Dedrick Gallardo MD Go on 1/12/2018 Hospital follow-up appointment at 4:00PM [address] [place] [address] 250  [address] [facility] [address]  [phone]      [facility]  [address]  [phone]          Approximate time spent in patient care on day of discharge: 34 minutes     Signed:  Jose Grullon MD  1/13/2018  10:56 PM

## 2018-01-15 ENCOUNTER — HOME CARE VISIT (OUTPATIENT)
Dept: SCHEDULING | Facility: HOME HEALTH | Age: 78
End: 2018-01-15
Payer: MEDICARE

## 2018-01-15 VITALS
SYSTOLIC BLOOD PRESSURE: 120 MMHG | TEMPERATURE: 97.9 F | OXYGEN SATURATION: 97 % | DIASTOLIC BLOOD PRESSURE: 70 MMHG | HEART RATE: 66 BPM | RESPIRATION RATE: 18 BRPM

## 2018-01-15 PROCEDURE — 3331090002 HH PPS REVENUE DEBIT

## 2018-01-15 PROCEDURE — 3331090001 HH PPS REVENUE CREDIT

## 2018-01-15 PROCEDURE — G0151 HHCP-SERV OF PT,EA 15 MIN: HCPCS

## 2018-01-16 ENCOUNTER — HOME CARE VISIT (OUTPATIENT)
Dept: HOME HEALTH SERVICES | Facility: HOME HEALTH | Age: 78
End: 2018-01-16
Payer: MEDICARE

## 2018-01-16 PROCEDURE — 3331090002 HH PPS REVENUE DEBIT

## 2018-01-16 PROCEDURE — 3331090001 HH PPS REVENUE CREDIT

## 2018-01-16 PROCEDURE — G0299 HHS/HOSPICE OF RN EA 15 MIN: HCPCS

## 2018-01-16 RX ORDER — ATENOLOL 50 MG/1
TABLET ORAL
Qty: 90 TAB | Refills: 0 | Status: SHIPPED | OUTPATIENT
Start: 2018-01-16 | End: 2018-03-29 | Stop reason: SDUPTHER

## 2018-01-17 ENCOUNTER — HOME CARE VISIT (OUTPATIENT)
Dept: SCHEDULING | Facility: HOME HEALTH | Age: 78
End: 2018-01-17
Payer: MEDICARE

## 2018-01-17 VITALS
BODY MASS INDEX: 30.53 KG/M2 | RESPIRATION RATE: 18 BRPM | TEMPERATURE: 98 F | OXYGEN SATURATION: 97 % | DIASTOLIC BLOOD PRESSURE: 74 MMHG | HEIGHT: 66 IN | HEART RATE: 81 BPM | WEIGHT: 190 LBS | SYSTOLIC BLOOD PRESSURE: 130 MMHG

## 2018-01-17 VITALS
DIASTOLIC BLOOD PRESSURE: 80 MMHG | HEART RATE: 61 BPM | OXYGEN SATURATION: 92 % | TEMPERATURE: 97 F | SYSTOLIC BLOOD PRESSURE: 130 MMHG | RESPIRATION RATE: 18 BRPM

## 2018-01-17 PROCEDURE — G0151 HHCP-SERV OF PT,EA 15 MIN: HCPCS

## 2018-01-17 PROCEDURE — 3331090002 HH PPS REVENUE DEBIT

## 2018-01-17 PROCEDURE — 3331090001 HH PPS REVENUE CREDIT

## 2018-01-18 PROCEDURE — 3331090002 HH PPS REVENUE DEBIT

## 2018-01-18 PROCEDURE — 3331090001 HH PPS REVENUE CREDIT

## 2018-01-19 ENCOUNTER — OFFICE VISIT (OUTPATIENT)
Dept: INTERNAL MEDICINE CLINIC | Age: 78
End: 2018-01-19

## 2018-01-19 VITALS
OXYGEN SATURATION: 91 % | WEIGHT: 191 LBS | HEART RATE: 61 BPM | BODY MASS INDEX: 30.7 KG/M2 | DIASTOLIC BLOOD PRESSURE: 72 MMHG | SYSTOLIC BLOOD PRESSURE: 132 MMHG | RESPIRATION RATE: 24 BRPM | HEIGHT: 66 IN | TEMPERATURE: 97.7 F

## 2018-01-19 DIAGNOSIS — R53.83 FATIGUE, UNSPECIFIED TYPE: ICD-10-CM

## 2018-01-19 DIAGNOSIS — J44.9 CHRONIC OBSTRUCTIVE PULMONARY DISEASE, UNSPECIFIED COPD TYPE (HCC): ICD-10-CM

## 2018-01-19 DIAGNOSIS — R33.9 URINARY RETENTION: ICD-10-CM

## 2018-01-19 DIAGNOSIS — I10 ESSENTIAL HYPERTENSION: ICD-10-CM

## 2018-01-19 DIAGNOSIS — Z74.09 IMPAIRED FUNCTIONAL MOBILITY, BALANCE, GAIT, AND ENDURANCE: ICD-10-CM

## 2018-01-19 DIAGNOSIS — M54.50 CHRONIC LEFT-SIDED LOW BACK PAIN WITHOUT SCIATICA: ICD-10-CM

## 2018-01-19 DIAGNOSIS — K52.9 COLITIS: Primary | ICD-10-CM

## 2018-01-19 DIAGNOSIS — J18.9 PNEUMONIA OF BOTH LOWER LOBES DUE TO INFECTIOUS ORGANISM: ICD-10-CM

## 2018-01-19 DIAGNOSIS — R19.7 DIARRHEA, UNSPECIFIED TYPE: ICD-10-CM

## 2018-01-19 DIAGNOSIS — G89.29 CHRONIC LEFT-SIDED LOW BACK PAIN WITHOUT SCIATICA: ICD-10-CM

## 2018-01-19 PROCEDURE — 3331090002 HH PPS REVENUE DEBIT

## 2018-01-19 PROCEDURE — 3331090001 HH PPS REVENUE CREDIT

## 2018-01-19 RX ORDER — LOPERAMIDE HCL 2 MG
2 TABLET ORAL
Qty: 30 TAB | Refills: 2
Start: 2018-01-19 | End: 2018-01-29

## 2018-01-19 NOTE — PROGRESS NOTES
HISTORY OF PRESENT ILLNESS    Chief Complaint   Patient presents with   [facility] Follow Up       Presents for 7-day transitional care management (TCM) visit s/p of hospital admission. Nurse Navigator call noted to patient and documented in [facility]. Hospital record and relevant lab results, test results and consult notes have personally been reviewed by me at this office visit. Medications reviewed and reconciled. Admit date: 1/3/2018  Discharge date and time: 1/11/2018   Admission Diagnoses: Diarrhea   Discharge Diagnoses/Hospital Course   Mr. Keisha Muro is a 70 yo male with PMH of CAD, COPD, GERD, HTN, XOL admitted with diarrhea. Pt underwent CT that showed enteritis. Despite IV antibiotics and negative cultures, pt continued to have diarrhea. He subsequently underwent a endoscopy/colonoscopy and biopsies were taken. Pathology report read \"possible scarring from diverticulosis v. Mild ischemic colitis\". His diarrhea eventual improved and he was discharged to home in improved condition. Of note, pt was also treated for COPD exacerbation with nebs, steroids and antibiotics. He was discharged to home with home O2 and a nebulizer as well as a steroid taper. 1) Abd pain/N/V/D: CT with small bowel enteritis  -stool studies negative   -continue levo/flagyl; will complete 10 day course   -s/p EGD/colonoscopy; no acute process noted => biopsies taken   -continue Florastor   2) Pneumonia: CXR was neg, but this was seen on abd CT  -repeat CXR considering hypoxia  -continue antibiotics  3) Sepsis/leukocytosis: had 2/4 SIRS on admission likely 2/2 #1  -continue antibiotics  4) CAD:   -cont plavix, statin, beta blocker   5) HTN:   -continue lisinopril, atenolol   6) COPD: stable  -continue nebs  7) Urinary retention: with h/o urinary retention   -heard placed and  -continue Flomax  -urology follow-up   8) Hypoxia - likely 2/2 mild volume overload  9) Hypokalemia    Today, he presents with his wife .    He is feeling overall like he is improving, but does admit to significant fatigue. Mild SOB. Pulse ox is stable. No chest pain. He is doing PT/OT at home. Feels his legs are weak. He is still having some loose stools. Tried cholestyramine and it did not help. Denies abdominal pain or blood in stool. He is eating and is drinking fluids. Slowly improving. Completed antibiotics. Urine flow is mildly slow, taking flomax. He has mild left lateral back pains for a few weeks. Hurts to move, 4/10. No radiation down legs.       Review of Systems   All other systems reviewed and are negative, except as noted in HPI    Past Medical and Surgical History   has a past medical history of Arthritis of foot, degenerative; Bladder tumor (2004); CAD (coronary artery disease); Carotid stenosis (10/20/15); Cataract; COPD (chronic obstructive pulmonary disease) ([facility] GERD (gastroesophageal reflux disease); HTN (hypertension); Hyperlipidemia LDL goal < 70; Osteoporosis; PHN (postherpetic neuralgia); Physiological tremor; Prostate cancer ([facility] [address] [facility] (2/2004); Pulmonary nodule, right; and Shingles (12/12/12). has a past surgical history that includes pr cabg, arterial, three (7/2010); hx hernia repair (2000); bladder tumor assoc; cystoscopy (10/29/11); hx cataract removal (12/2011); hx colonoscopy (10/2/13); hx carotid endarterectomy (Left, 12/31/15); pr cystourethroscopy (11/29/2016); hx lap cholecystectomy (03/24/2017); and colonoscopy (N/A, 1/10/2018). reports that he quit smoking about 14 years ago. His smoking use included Cigarettes. He has never used smokeless tobacco. He reports that he does not drink alcohol.  family history includes Cancer in his mother; Heart Disease in his brother. Physical Exam   Nursing note and vitals reviewed. There were no vitals taken for this visit. Constitutional:  No distress. Eyes: Conjunctivae are normal.   Ears:  Hearing grossly intact  Cardiovascular: Normal rate. regular rhythm, no murmurs or gallops  No edema  Pulmonary/Chest: Effort normal.   CTAB  Musculoskeletal: moves all 4 extremities   Neurological: Alert and oriented to person, place, and time. Skin: No rash noted. Psychiatric: Normal mood and affect. Behavior is normal.     ASSESSMENT and PLAN  Diagnoses and all orders for this visit:    1. Colitis  2. Diarrhea, unspecified type  Slowly improving  Take imodium once, then with meals as needed if needed. [place] diet, low in dairy.   -     loperamide (IMODIUM A-D) 2 mg tablet; Take 1 Tab by mouth four (4) times daily as needed for Diarrhea for up to 10 days. 3. Pneumonia of both lower lobes due to infectious organism? 4. Chronic obstructive pulmonary disease, unspecified COPD type (HCC)  Chronic. Seems to be at baseline. Borderline hypoxia. He does not wish to use oxygen unless it is truly low and he is very symptomatic    5. Impaired functional mobility, balance, gait, and endurance  He could benefit from PT/OT at home, as scheduled    6. Chronic left-sided low back pain without sciatica    7. Fatigue, unspecified type - recovering from severe illness. Hgb was normal.      8. Urinary retention - doing better. Seeing urology. Cont flomax    9. Essential hypertension  Blood pressure 132/72, pulse 61, temperature 97.7 °F (36.5 °C), resp. rate 24, height 5' 6\" (1.676 m), weight 191 lb (86.6 kg), SpO2 91 %. Controlled on current regimen. Continue current medications as written in chart      lab results and schedule of future lab studies reviewed with patient  reviewed medications and side effects in detail  Return to clinic for further evaluation if new symptoms develop    Follow-up Disposition: Not on File    Current Outpatient Prescriptions   Medication Sig    atenolol (TENORMIN) 50 mg tablet TAKE ONE TABLET BY MOUTH EVERY DAY    L. acidoph & paracasei- S therm- Bifido (DENA-Q/RISAQUAD) 8 billion cell cap cap Take 1 Cap by mouth daily.     predniSONE (DELTASONE) 20 mg tablet Take 40mg x 2 days then 20mg x 2 days then 10mg x 2 days    tamsulosin (FLOMAX) 0.4 mg capsule Take 1 Cap by mouth daily.  albuterol (ACCUNEB) 1.25 mg/3 mL nebu 3 mL by Nebulization route every six (6) hours as needed. Indications: Chronic Obstructive Pulmonary Disease    lisinopril (PRINIVIL, ZESTRIL) 20 mg tablet Take 20 mg by mouth daily.  clopidogrel (PLAVIX) 75 mg tab Take 75 mg by mouth daily.  promethazine (PHENERGAN) 25 mg tablet Take 1 Tab by mouth every six (6) hours as needed for Nausea.  raNITIdine (ZANTAC) 150 mg tablet TAKE ONE TABLET BY MOUTH EVERY DAY PLUS TAKE ONE TABLET AT BEDTIME    PROAIR HFA 90 mcg/actuation inhaler USE 2 PUFFS EVERY 8 HOURS AS NEEDED    simvastatin (ZOCOR) 20 mg tablet TAKE ONE TABLET BY MOUTH ONCE DAILY IN THE EVENING    CALCIUM CARBONATE/VITAMIN D2 (CALCIUM 600 WITH VITAMIN D2 PO) Take 1 Tab by mouth two (2) times a day.  multivitamin (ONE A DAY) tablet Take 1 Tab by mouth daily.  OTHER Biofreeze Ointment applied twice daily to feet as needed for arthritis    SPIRIVA WITH HANDIHALER 18 mcg inhalation capsule Inhale the contents of 1  capsule via HandiHaler  daily    fluticasone-vilanterol (BREO ELLIPTA) 100-25 mcg/dose inhaler Take 1 Puff by inhalation daily.  LEUPROLIDE ACETATE (LUPRON DEPOT, 4 MONTH, IM) by IntraMUSCular route. Every 4 months  Receives in providers office  Prescribed by Dr. Isabel Zuluaga     No current facility-administered medications for this visit.

## 2018-01-19 NOTE — PROGRESS NOTES
Presents for follow up for pneumonia. Today he states if he walks distances, he will fall due to weakness in legs. Overall he states he feels weak. States having left sided back pain since before he was admitted.

## 2018-01-19 NOTE — MR AVS SNAPSHOT
[facility] 
 
 
 [address] [place] [address] [facility] 
[phone] Patient: Seven Doe MRN: [id number] AGN:[birth year] Visit Information Date & Time Provider Department Dept. Phone Encounter #  
 1/19/2018  4:00 PM Adam Martines MD [facility] [address] [id number] Upcoming Health Maintenance Date Due ZOSTER VACCINE AGE 60> 12/9/2000 GLAUCOMA SCREENING Q2Y 11/12/2017 MEDICARE YEARLY EXAM 12/16/2017 COLONOSCOPY 1/10/2023 DTaP/Tdap/Td series (2 - Td) 10/24/2023 Allergies as of 1/19/2018  Review Complete On: 1/19/2018 By: Milagros Campoverde No Known Allergies Current Immunizations  Reviewed on 1/19/2018 Name Date Influenza Vaccine 10/1/2017, 8/25/2015, 10/3/2013 Pneumococcal Conjugate (PCV-13) 12/28/2015 Tdap 10/24/2013 ZZZ-RETIRED (DO NOT USE) Pneumococcal Vaccine (Unspecified Type) 2/1/2011 Reviewed by Adam Martines MD on 1/19/2018 at  4:06 PM  
You Were Diagnosed With   
  
 Codes Comments Pneumonia of both lower lobes due to infectious organism    -  Primary ICD-10-CM: J18.9 ICD-9-CM: 483.8 Colitis     ICD-10-CM: K52.9 ICD-9-CM: 558.9 Diarrhea, unspecified type     ICD-10-CM: R19.7 ICD-9-CM: 787.91 Chronic obstructive pulmonary disease, unspecified COPD type ([facility]     ICD-10-CM: J44.9 ICD-9-CM: 249 Chronic left-sided low back pain without sciatica     ICD-10-CM: M54.5, G89.29 ICD-9-CM: 724.2, 338.29 Fatigue, unspecified type     ICD-10-CM: R53.83 ICD-9-CM: 780.79 Urinary retention     ICD-10-CM: R33.9 ICD-9-CM: 788.20 Essential hypertension     ICD-10-CM: I10 
ICD-9-CM: 401.9 Impaired functional mobility, balance, gait, and endurance     ICD-10-CM: Z74.09 
ICD-9-CM: V49.89 Vitals BP Pulse Temp Resp Height(growth percentile) Weight(growth percentile)  132/72 (BP 1 Location: Left arm, BP Patient Position: Sitting) 61 97.7 °F (36.5 °C) 24 5' 6\" (1.676 m) 191 lb (86.6 kg) SpO2 BMI Smoking Status 91% 30.83 kg/m2 Former Smoker Vitals History BMI and BSA Data Body Mass Index Body Surface Area  
 30.83 kg/m 2 2.01 m 2 Preferred Pharmacy Pharmacy Name Phone [facility] - [address] [phone] Your Updated Medication List  
  
   
This list is accurate as of: 1/19/18  4:56 PM.  Always use your most recent med list.  
  
  
  
  
 atenolol 50 mg tablet Commonly known as:  TENORMIN  
TAKE ONE TABLET BY MOUTH EVERY DAY  
  
 BREO ELLIPTA 100-25 mcg/dose inhaler Generic drug:  fluticasone-vilanterol Take 1 Puff by inhalation daily. CALCIUM 600 WITH VITAMIN D2 PO Take 1 Tab by mouth two (2) times a day. clopidogrel 75 mg Tab Commonly known as:  PLAVIX Take 75 mg by mouth daily. L. acidoph & paracasei- S therm- Bifido 8 billion cell Cap cap Commonly known as:  DENA-Q/RISAQUAD Take 1 Cap by mouth daily. lisinopril 20 mg tablet Commonly known as:  Isma Radha Take 20 mg by mouth daily. loperamide 2 mg tablet Commonly known as:  IMODIUM A-D Take 1 Tab by mouth four (4) times daily as needed for Diarrhea for up to 10 days. LUPRON DEPOT (4 MONTH) IM  
by IntraMUSCular route. Every 4 months Receives in providers office Prescribed by Dr. Jase Lanier  
  
 multivitamin tablet Commonly known as:  ONE A DAY Take 1 Tab by mouth daily. OTHER Biofreeze Ointment applied twice daily to feet as needed for arthritis * PROAIR HFA 90 mcg/actuation inhaler Generic drug:  albuterol USE 2 PUFFS EVERY 8 HOURS AS NEEDED  
  
 * albuterol 1.25 mg/3 mL Nebu Commonly known as:  ACCUNEB  
3 mL by Nebulization route every six (6) hours as needed. Indications: Chronic Obstructive Pulmonary Disease  
  
 promethazine 25 mg tablet Commonly known as:  PHENERGAN  
 Take 1 Tab by mouth every six (6) hours as needed for Nausea. raNITIdine 150 mg tablet Commonly known as:  ZANTAC TAKE ONE TABLET BY MOUTH EVERY DAY PLUS TAKE ONE TABLET AT BEDTIME  
  
 simvastatin 20 mg tablet Commonly known as:  ZOCOR  
TAKE ONE TABLET BY MOUTH ONCE DAILY IN THE EVENING  
  
 SPIRIVA WITH HANDIHALER 18 mcg inhalation capsule Generic drug:  tiotropium Inhale the contents of 1  capsule via HandiHaler  daily  
  
 tamsulosin 0.4 mg capsule Commonly known as:  FLOMAX Take 1 Cap by mouth daily. * Notice: This list has 2 medication(s) that are the same as other medications prescribed for you. Read the directions carefully, and ask your doctor or other care provider to review them with you. To-Do List   
 01/22/2018 To Be Determined Appointment with Chel Reynolds at [place] [address]  
  
 01/22/2018 To Be Determined Appointment with Valerie Maki OT at [place] [address]  
  
 01/22/2018 9:00 AM  
  Appointment with Kevan Garcia at [place] [address]  
  
 01/25/2018 To Be Determined Appointment with Kevan Garcia at [place] [address]  
  
 01/25/2018 To Be Determined Appointment with Opal Jean RN at [place] [address]  
  
 01/29/2018 To Be Determined Appointment with Kevan Garcia at [place] [address]  
  
 01/31/2018 To Be Determined Appointment with Kevan Garcia at [place] [address]  
  
 02/05/2018 To Be Determined Appointment with Kevan Garcia at [place] [address]  
  
 02/07/2018 To Be Determined Appointment with Kevan Garcia at [address] [facility] Dear Stefani Mcmanus: Thank you for requesting a [employer] account.   Our records indicate that you already have an active [facility] account. You can access your account anytime at [email or web address] Did you know that you can access your hospital and ER discharge instructions at any time in [facility]? You can also review all of your test results from your hospital stay or ER visit. Additional Information If you have questions, please visit the Frequently Asked Questions section of the [facility] website at [email or web address] Remember, [facility] is NOT to be used for urgent needs. For medical emergencies, dial 911. Now available from your iPhone and Android! Please provide this summary of care documentation to your next provider. Your primary care clinician is listed as Giuseppe Zapata. If you have any questions after today's visit, please call [phone].

## 2018-01-20 PROCEDURE — 3331090001 HH PPS REVENUE CREDIT

## 2018-01-20 PROCEDURE — 3331090002 HH PPS REVENUE DEBIT

## 2018-01-21 PROCEDURE — 3331090002 HH PPS REVENUE DEBIT

## 2018-01-21 PROCEDURE — 3331090001 HH PPS REVENUE CREDIT

## 2018-01-22 ENCOUNTER — HOME CARE VISIT (OUTPATIENT)
Dept: SCHEDULING | Facility: HOME HEALTH | Age: 78
End: 2018-01-22
Payer: MEDICARE

## 2018-01-22 VITALS
SYSTOLIC BLOOD PRESSURE: 122 MMHG | HEART RATE: 48 BPM | OXYGEN SATURATION: 95 % | RESPIRATION RATE: 18 BRPM | DIASTOLIC BLOOD PRESSURE: 80 MMHG | TEMPERATURE: 97.8 F

## 2018-01-22 VITALS
SYSTOLIC BLOOD PRESSURE: 122 MMHG | RESPIRATION RATE: 18 BRPM | TEMPERATURE: 97.7 F | OXYGEN SATURATION: 97 % | DIASTOLIC BLOOD PRESSURE: 68 MMHG | HEART RATE: 50 BPM

## 2018-01-22 VITALS
TEMPERATURE: 97.8 F | OXYGEN SATURATION: 96 % | SYSTOLIC BLOOD PRESSURE: 140 MMHG | HEART RATE: 53 BPM | RESPIRATION RATE: 17 BRPM | DIASTOLIC BLOOD PRESSURE: 69 MMHG

## 2018-01-22 PROCEDURE — G0151 HHCP-SERV OF PT,EA 15 MIN: HCPCS

## 2018-01-22 PROCEDURE — G0299 HHS/HOSPICE OF RN EA 15 MIN: HCPCS

## 2018-01-22 PROCEDURE — 3331090001 HH PPS REVENUE CREDIT

## 2018-01-22 PROCEDURE — G0152 HHCP-SERV OF OT,EA 15 MIN: HCPCS

## 2018-01-22 PROCEDURE — 3331090002 HH PPS REVENUE DEBIT

## 2018-01-23 PROCEDURE — 3331090002 HH PPS REVENUE DEBIT

## 2018-01-23 PROCEDURE — 3331090001 HH PPS REVENUE CREDIT

## 2018-01-24 ENCOUNTER — HOME CARE VISIT (OUTPATIENT)
Dept: SCHEDULING | Facility: HOME HEALTH | Age: 78
End: 2018-01-24
Payer: MEDICARE

## 2018-01-24 VITALS
OXYGEN SATURATION: 99 % | RESPIRATION RATE: 18 BRPM | DIASTOLIC BLOOD PRESSURE: 70 MMHG | HEART RATE: 54 BPM | SYSTOLIC BLOOD PRESSURE: 112 MMHG | TEMPERATURE: 97.3 F

## 2018-01-24 PROCEDURE — 3331090001 HH PPS REVENUE CREDIT

## 2018-01-24 PROCEDURE — 3331090002 HH PPS REVENUE DEBIT

## 2018-01-24 PROCEDURE — G0151 HHCP-SERV OF PT,EA 15 MIN: HCPCS

## 2018-01-25 PROCEDURE — 3331090001 HH PPS REVENUE CREDIT

## 2018-01-25 PROCEDURE — 3331090002 HH PPS REVENUE DEBIT

## 2018-01-26 ENCOUNTER — HOME CARE VISIT (OUTPATIENT)
Dept: SCHEDULING | Facility: HOME HEALTH | Age: 78
End: 2018-01-26
Payer: MEDICARE

## 2018-01-26 PROCEDURE — G0299 HHS/HOSPICE OF RN EA 15 MIN: HCPCS

## 2018-01-26 PROCEDURE — 3331090002 HH PPS REVENUE DEBIT

## 2018-01-26 PROCEDURE — 3331090001 HH PPS REVENUE CREDIT

## 2018-01-27 PROCEDURE — 3331090001 HH PPS REVENUE CREDIT

## 2018-01-27 PROCEDURE — 3331090002 HH PPS REVENUE DEBIT

## 2018-01-28 PROCEDURE — 3331090001 HH PPS REVENUE CREDIT

## 2018-01-28 PROCEDURE — 3331090002 HH PPS REVENUE DEBIT

## 2018-01-29 ENCOUNTER — HOME CARE VISIT (OUTPATIENT)
Dept: SCHEDULING | Facility: HOME HEALTH | Age: 78
End: 2018-01-29
Payer: MEDICARE

## 2018-01-29 VITALS
SYSTOLIC BLOOD PRESSURE: 130 MMHG | OXYGEN SATURATION: 96 % | HEART RATE: 53 BPM | RESPIRATION RATE: 18 BRPM | DIASTOLIC BLOOD PRESSURE: 70 MMHG | TEMPERATURE: 98.2 F

## 2018-01-29 PROCEDURE — G0151 HHCP-SERV OF PT,EA 15 MIN: HCPCS

## 2018-01-29 PROCEDURE — 3331090002 HH PPS REVENUE DEBIT

## 2018-01-29 PROCEDURE — 3331090001 HH PPS REVENUE CREDIT

## 2018-01-30 PROCEDURE — 3331090002 HH PPS REVENUE DEBIT

## 2018-01-30 PROCEDURE — 3331090001 HH PPS REVENUE CREDIT

## 2018-01-31 ENCOUNTER — HOME CARE VISIT (OUTPATIENT)
Dept: SCHEDULING | Facility: HOME HEALTH | Age: 78
End: 2018-01-31
Payer: MEDICARE

## 2018-01-31 VITALS
OXYGEN SATURATION: 94 % | HEART RATE: 61 BPM | TEMPERATURE: 97.7 F | RESPIRATION RATE: 18 BRPM | SYSTOLIC BLOOD PRESSURE: 120 MMHG | DIASTOLIC BLOOD PRESSURE: 78 MMHG

## 2018-01-31 PROCEDURE — 3331090002 HH PPS REVENUE DEBIT

## 2018-01-31 PROCEDURE — G0151 HHCP-SERV OF PT,EA 15 MIN: HCPCS

## 2018-01-31 PROCEDURE — 3331090001 HH PPS REVENUE CREDIT

## 2018-01-31 PROCEDURE — G0299 HHS/HOSPICE OF RN EA 15 MIN: HCPCS

## 2018-02-01 PROCEDURE — 3331090002 HH PPS REVENUE DEBIT

## 2018-02-01 PROCEDURE — 3331090001 HH PPS REVENUE CREDIT

## 2018-02-02 PROCEDURE — 3331090001 HH PPS REVENUE CREDIT

## 2018-02-02 PROCEDURE — 3331090002 HH PPS REVENUE DEBIT

## 2018-02-03 PROCEDURE — 3331090002 HH PPS REVENUE DEBIT

## 2018-02-03 PROCEDURE — 3331090001 HH PPS REVENUE CREDIT

## 2018-02-04 PROCEDURE — 3331090001 HH PPS REVENUE CREDIT

## 2018-02-04 PROCEDURE — 3331090002 HH PPS REVENUE DEBIT

## 2018-02-05 ENCOUNTER — HOME CARE VISIT (OUTPATIENT)
Dept: SCHEDULING | Facility: HOME HEALTH | Age: 78
End: 2018-02-05
Payer: MEDICARE

## 2018-02-05 VITALS
SYSTOLIC BLOOD PRESSURE: 120 MMHG | RESPIRATION RATE: 18 BRPM | TEMPERATURE: 97.4 F | HEART RATE: 63 BPM | OXYGEN SATURATION: 93 % | DIASTOLIC BLOOD PRESSURE: 70 MMHG

## 2018-02-05 VITALS
OXYGEN SATURATION: 97 % | SYSTOLIC BLOOD PRESSURE: 142 MMHG | RESPIRATION RATE: 18 BRPM | OXYGEN SATURATION: 96 % | SYSTOLIC BLOOD PRESSURE: 142 MMHG | HEART RATE: 75 BPM | TEMPERATURE: 97.3 F | TEMPERATURE: 97.8 F | RESPIRATION RATE: 16 BRPM | DIASTOLIC BLOOD PRESSURE: 78 MMHG | HEART RATE: 79 BPM | DIASTOLIC BLOOD PRESSURE: 80 MMHG

## 2018-02-05 PROCEDURE — 3331090002 HH PPS REVENUE DEBIT

## 2018-02-05 PROCEDURE — 3331090001 HH PPS REVENUE CREDIT

## 2018-02-05 PROCEDURE — G0151 HHCP-SERV OF PT,EA 15 MIN: HCPCS

## 2018-02-06 PROCEDURE — 3331090002 HH PPS REVENUE DEBIT

## 2018-02-06 PROCEDURE — 3331090001 HH PPS REVENUE CREDIT

## 2018-02-07 ENCOUNTER — HOME CARE VISIT (OUTPATIENT)
Dept: SCHEDULING | Facility: HOME HEALTH | Age: 78
End: 2018-02-07
Payer: MEDICARE

## 2018-02-07 VITALS
TEMPERATURE: 97.7 F | OXYGEN SATURATION: 98 % | RESPIRATION RATE: 18 BRPM | DIASTOLIC BLOOD PRESSURE: 76 MMHG | SYSTOLIC BLOOD PRESSURE: 132 MMHG | HEART RATE: 68 BPM

## 2018-02-07 VITALS
OXYGEN SATURATION: 95 % | DIASTOLIC BLOOD PRESSURE: 70 MMHG | TEMPERATURE: 97.8 F | SYSTOLIC BLOOD PRESSURE: 120 MMHG | RESPIRATION RATE: 16 BRPM | HEART RATE: 67 BPM

## 2018-02-07 PROCEDURE — 3331090002 HH PPS REVENUE DEBIT

## 2018-02-07 PROCEDURE — 3331090001 HH PPS REVENUE CREDIT

## 2018-02-07 PROCEDURE — 3331090003 HH PPS REVENUE ADJ

## 2018-02-07 PROCEDURE — G0151 HHCP-SERV OF PT,EA 15 MIN: HCPCS

## 2018-02-11 NOTE — PROGRESS NOTES
Patient on discharge report dated 1/11/18 from [facility]. Admission dates: 1/3/18 - 1/11/18. Diagnosis: pneumonia. Discharge summaries NOT noted on chart at this time. Left message on voicemail to return call and to make a f/u appt ASAP. Pt had a scheduled appt with PCP today 1/12/18, but cancelled. Will attempt to contact again. Need to complete post-discharge assessment.       RRAT SCORE - 18 patient

## 2018-03-29 ENCOUNTER — OFFICE VISIT (OUTPATIENT)
Dept: INTERNAL MEDICINE CLINIC | Age: 78
End: 2018-03-29

## 2018-03-29 VITALS
HEIGHT: 66 IN | BODY MASS INDEX: 31.18 KG/M2 | HEART RATE: 54 BPM | SYSTOLIC BLOOD PRESSURE: 135 MMHG | TEMPERATURE: 98.3 F | RESPIRATION RATE: 12 BRPM | OXYGEN SATURATION: 93 % | DIASTOLIC BLOOD PRESSURE: 64 MMHG | WEIGHT: 194 LBS

## 2018-03-29 DIAGNOSIS — I10 ESSENTIAL HYPERTENSION: ICD-10-CM

## 2018-03-29 DIAGNOSIS — E78.5 HYPERLIPIDEMIA WITH TARGET LDL LESS THAN 70: ICD-10-CM

## 2018-03-29 DIAGNOSIS — Z00.00 MEDICARE ANNUAL WELLNESS VISIT, SUBSEQUENT: Primary | ICD-10-CM

## 2018-03-29 DIAGNOSIS — J44.9 CHRONIC OBSTRUCTIVE PULMONARY DISEASE, UNSPECIFIED COPD TYPE (HCC): ICD-10-CM

## 2018-03-29 PROBLEM — R00.0 SINUS TACHYCARDIA: Status: RESOLVED | Noted: 2018-01-03 | Resolved: 2018-03-29

## 2018-03-29 PROBLEM — R50.9 FEVER: Status: RESOLVED | Noted: 2018-01-03 | Resolved: 2018-03-29

## 2018-03-29 PROBLEM — R11.2 NAUSEA & VOMITING: Status: RESOLVED | Noted: 2018-01-03 | Resolved: 2018-03-29

## 2018-03-29 PROBLEM — J18.9 PNEUMONIA: Status: RESOLVED | Noted: 2018-01-03 | Resolved: 2018-03-29

## 2018-03-29 PROBLEM — R10.9 ABDOMINAL PAIN: Status: RESOLVED | Noted: 2018-01-03 | Resolved: 2018-03-29

## 2018-03-29 PROBLEM — A41.9 SEPSIS (HCC): Status: RESOLVED | Noted: 2018-01-03 | Resolved: 2018-03-29

## 2018-03-29 PROBLEM — D72.829 LEUKOCYTOSIS: Status: RESOLVED | Noted: 2018-01-03 | Resolved: 2018-03-29

## 2018-03-29 RX ORDER — ATENOLOL 50 MG/1
25 TABLET ORAL DAILY
Qty: 90 TAB | Refills: 0
Start: 2018-03-29 | End: 2018-05-24 | Stop reason: SDUPTHER

## 2018-03-29 RX ORDER — LISINOPRIL 20 MG/1
20 TABLET ORAL DAILY
Qty: 90 TAB | Refills: 3 | Status: SHIPPED | OUTPATIENT
Start: 2018-03-29 | End: 2019-03-25 | Stop reason: SDUPTHER

## 2018-03-29 RX ORDER — ALBUTEROL SULFATE 90 UG/1
AEROSOL, METERED RESPIRATORY (INHALATION)
Qty: 1 INHALER | Refills: 11 | Status: SHIPPED | OUTPATIENT
Start: 2018-03-29 | End: 2019-05-24 | Stop reason: SDUPTHER

## 2018-03-29 NOTE — PATIENT INSTRUCTIONS
Well Visit, Over 72: Care Instructions  Your Care Instructions    Physical exams can help you stay healthy. Your doctor has checked your overall health and may have suggested ways to take good care of yourself. He or she also may have recommended tests. At home, you can help prevent illness with healthy eating, regular exercise, and other steps. Follow-up care is a key part of your treatment and safety. Be sure to make and go to all appointments, and call your doctor if you are having problems. It's also a good idea to know your test results and keep a list of the medicines you take. How can you care for yourself at home? · Reach and stay at a healthy weight. This will lower your risk for many problems, such as obesity, diabetes, heart disease, and high blood pressure. · Get at least 30 minutes of exercise on most days of the week. Walking is a good choice. You also may want to do other activities, such as running, swimming, cycling, or playing tennis or team sports. · Do not smoke. Smoking can make health problems worse. If you need help quitting, talk to your doctor about stop-smoking programs and medicines. These can increase your chances of quitting for good. · Protect your skin from too much sun. When you're outdoors from 10 a.m. to 4 p.m., stay in the shade or cover up with clothing and a hat with a wide brim. Wear sunglasses that block UV rays. Even when it's cloudy, put broad-spectrum sunscreen (SPF 30 or higher) on any exposed skin. · See a dentist one or two times a year for checkups and to have your teeth cleaned. · Wear a seat belt in the car. · Limit alcohol to 2 drinks a day for men and 1 drink a day for women. Too much alcohol can cause health problems. Follow your doctor's advice about when to have certain tests. These tests can spot problems early. For men and women  · Cholesterol.  Your doctor will tell you how often to have this done based on your overall health and other things that can increase your risk for heart attack and stroke. · Blood pressure. Have your blood pressure checked during a routine doctor visit. Your doctor will tell you how often to check your blood pressure based on your age, your blood pressure results, and other factors. · Diabetes. Ask your doctor whether you should have tests for diabetes. · Vision. Experts recommend that you have yearly exams for glaucoma and other age-related eye problems. · Hearing. Tell your doctor if you notice any change in your hearing. You can have tests to find out how well you hear. · Colon cancer tests. Keep having colon cancer tests as your doctor recommends. You can have one of several types of tests. · Heart attack and stroke risk. At least every 4 to 6 years, you should have your risk for heart attack and stroke assessed. Your doctor uses factors such as your age, blood pressure, cholesterol, and whether you smoke or have diabetes to show what your risk for a heart attack or stroke is over the next 10 years. · Osteoporosis. Talk to your doctor about whether you should have a bone density test to find out whether you have thinning bones. Also ask your doctor about whether you should take calcium and vitamin D supplements. For women  · Pap test and pelvic exam. You may no longer need a Pap test. Talk with your doctor about whether to stop or continue to have Pap tests. · Breast exam and mammogram. Ask how often you should have a mammogram, which is an X-ray of your breasts. A mammogram can spot breast cancer before it can be felt and when it is easiest to treat. · Thyroid disease. Talk to your doctor about whether to have your thyroid checked as part of a regular physical exam. Women have an increased chance of a thyroid problem. For men  · Prostate exam. Talk to your doctor about whether you should have a blood test (called a PSA test) for prostate cancer.  Experts disagree on whether men should have this test. Some experts recommend that you discuss the benefits and risks of the test with your doctor. · Abdominal aortic aneurysm. Ask your doctor whether you should have a test to check for an aneurysm. You may need a test if you ever smoked or if your parent, brother, sister, or child has had an aneurysm. When should you call for help? Watch closely for changes in your health, and be sure to contact your doctor if you have any problems or symptoms that concern you. Where can you learn more? Go to http://india-renard.info/. Enter B402 in the search box to learn more about \"Well Visit, Over 65: Care Instructions. \"  Current as of: May 12, 2017  Content Version: 11.4  © 9488-8668 [employer]. Care instructions adapted under license by [employer] (which disclaims liability or warranty for this information). If you have questions about a medical condition or this instruction, always ask your healthcare professional. [address] any warranty or liability for your use of this information. Body Mass Index: Care Instructions  Your Care Instructions    Body mass index (BMI) can help you see if your weight is raising your risk for health problems. It uses a formula to compare how much you weigh with how tall you are. · A BMI lower than 18.5 is considered underweight. · A BMI between 18.5 and 24.9 is considered healthy. · A BMI between 25 and 29.9 is considered overweight. A BMI of 30 or higher is considered obese. If your BMI is in the normal range, it means that you have a lower risk for weight-related health problems. If your BMI is in the overweight or obese range, you may be at increased risk for weight-related health problems, such as high blood pressure, heart disease, stroke, arthritis or joint pain, and diabetes.  If your BMI is in the underweight range, you may be at increased risk for health problems such as fatigue, lower protection (immunity) against illness, muscle loss, bone loss, hair loss, and hormone problems. BMI is just one measure of your risk for weight-related health problems. You may be at higher risk for health problems if you are not active, you eat an unhealthy diet, or you drink too much alcohol or use tobacco products. Follow-up care is a key part of your treatment and safety. Be sure to make and go to all appointments, and call your doctor if you are having problems. It's also a good idea to know your test results and keep a list of the medicines you take. How can you care for yourself at home? · Practice healthy eating habits. This includes eating plenty of fruits, vegetables, whole grains, lean protein, and low-fat dairy. · If your doctor recommends it, get more exercise. Walking is a good choice. Bit by bit, increase the amount you walk every day. Try for at least 30 minutes on most days of the week. · Do not smoke. Smoking can increase your risk for health problems. If you need help quitting, talk to your doctor about stop-smoking programs and medicines. These can increase your chances of quitting for good. · Limit alcohol to 2 drinks a day for men and 1 drink a day for women. Too much alcohol can cause health problems. If you have a BMI higher than 25  · Your doctor may do other tests to check your risk for weight-related health problems. This may include measuring the distance around your waist. A waist measurement of more than 40 inches in men or 35 inches in women can increase the risk of weight-related health problems. · Talk with your doctor about steps you can take to stay healthy or improve your health. You may need to make lifestyle changes to lose weight and stay healthy, such as changing your diet and getting regular exercise. If you have a BMI lower than 18.5  · Your doctor may do other tests to check your risk for health problems. · Talk with your doctor about steps you can take to stay healthy or improve your health. You may need to make lifestyle changes to gain or maintain weight and stay healthy, such as getting more healthy foods in your diet and doing exercises to build muscle. Where can you learn more? Go to http://india-renard.info/. Enter S176 in the search box to learn more about \"Body Mass Index: Care Instructions. \"  Current as of: October 13, 2016  Content Version: 11.4  © 7489-5219 [email or web address]. Care instructions adapted under license by [employer] (which disclaims liability or warranty for this information). If you have questions about a medical condition or this instruction, always ask your healthcare professional. [address] any warranty or liability for your use of this information.

## 2018-03-29 NOTE — PROGRESS NOTES
This is a Subsequent Medicare Annual Wellness Visit providing Personalized Prevention Plan Services (PPPS) (Performed 12 months after initial AWV and PPPS )    I have reviewed the patient's medical history in detail and updated the computerized patient record. Hypertension  Hypertension ROS: taking medications as instructed, no medication side effects noted, no TIA's, no chest pain on exertion, no dyspnea on exertion, no swelling of ankles     reports that he quit smoking about 14 years ago. His smoking use included Cigarettes. He has never used smokeless tobacco.    reports that he does not drink alcohol. BP Readings from Last 2 Encounters:   03/29/18 135/64   02/07/18 120/70       Hyperlipidemia  ROS: taking medications as instructed, no medication side effects noted  No new myalgias, no joint pains, no weakness  No TIA's, no chest pain on exertion, no dyspnea on exertion, no swelling of ankles. Lab Results   Component Value Date/Time    Cholesterol, total 167 12/19/2016 08:47 AM    HDL Cholesterol 52 12/19/2016 08:47 AM    LDL, calculated 71 12/19/2016 08:47 AM    VLDL, calculated 44 (H) 12/19/2016 08:47 AM    Triglyceride 220 (H) 12/19/2016 08:47 AM         History     Past Medical History:   Diagnosis Date    Arthritis of foot, degenerative     Dr. Candy Lujan Bladder tumor 2004    Dr Willam Holt, [facility] 10/29/11    CAD (coronary artery disease)     MI 7/2010,s/p CABG. Dr. Zion Orozco Carotid stenosis 10/20/15    Dr. Cely Amor. endarterectomy 12/31/15. 50-69% L on doppler. 80% \"per CT\"    Cataract     COPD (chronic obstructive pulmonary disease) (HCC)     moderate, FEV1 1.41 7/2009. Arvid Motto    GERD (gastroesophageal reflux disease)     HTN (hypertension)     Hyperlipidemia LDL goal < 70     Osteoporosis     tx w calcium, DEXA improved T-2.1 10/2011, 2012, 1/2015    PHN (postherpetic neuralgia)     Physiological tremor     Dr. Kimber Mccabe [facility]) 2/2004    on lupron.   PSA increased 0.149 2/2014    Pulmonary nodule, right     5 mm, stable on CT 10/14/10, 1/14/14    Shingles 12/12/12    right neck and back.  Urinary retention 01/10/2018    while hospitalized. heard cath removed 1/16/18       Past Surgical History:   Procedure Laterality Date    BLADDER TUMOR ASSOC      CABG, ARTERIAL, THREE  7/2010    Dr Sophia Sandhoff. HUI to LAD, spah to cirm    COLONOSCOPY N/A 1/10/2018    focal colitis. Sonia Kendrick MD at [address] [place] [address]  10/29/11    FISH, normal    HX CAROTID ENDARTERECTOMY Left 12/31/15    Dr. Jay Kidd  12/2011    right eye, Dr. Anne-Marie Dietz HX COLONOSCOPY  10/2/13    hyperplastic polyp, Dr. Jose Angel Romo HX ENDOSCOPY  01/10/2018    normal    HX HERNIA REPAIR  2000    HX LAP CHOLECYSTECTOMY  03/24/2017    gangranous. Dr. Wendy Plaza CYSTOURETHROSCOPY  11/29/2016    normal       Current Outpatient Prescriptions   Medication Sig    albuterol (PROAIR HFA) 90 mcg/actuation inhaler USE 2 PUFFS EVERY 8 HOURS AS NEEDED    atenolol (TENORMIN) 50 mg tablet Take 0.5 Tabs by mouth daily.  lisinopril (PRINIVIL, ZESTRIL) 20 mg tablet Take 1 Tab by mouth daily.  L. acidoph & paracasei- S therm- Bifido (DENA-Q/RISAQUAD) 8 billion cell cap cap Take 1 Cap by mouth daily.  tamsulosin (FLOMAX) 0.4 mg capsule Take 1 Cap by mouth daily.  albuterol (ACCUNEB) 1.25 mg/3 mL nebu 3 mL by Nebulization route every six (6) hours as needed. Indications: Chronic Obstructive Pulmonary Disease    clopidogrel (PLAVIX) 75 mg tab Take 75 mg by mouth daily.  promethazine (PHENERGAN) 25 mg tablet Take 1 Tab by mouth every six (6) hours as needed for Nausea.     raNITIdine (ZANTAC) 150 mg tablet TAKE ONE TABLET BY MOUTH EVERY DAY PLUS TAKE ONE TABLET AT BEDTIME    simvastatin (ZOCOR) 20 mg tablet TAKE ONE TABLET BY MOUTH ONCE DAILY IN THE EVENING    CALCIUM CARBONATE/VITAMIN D2 (CALCIUM 600 WITH VITAMIN D2 PO) Take 1 Tab by mouth two (2) times a day.    multivitamin (ONE A DAY) tablet Take 1 Tab by mouth daily.  OTHER Biofreeze Ointment applied twice daily to feet as needed for arthritis    SPIRIVA WITH HANDIHALER 18 mcg inhalation capsule Inhale the contents of 1  capsule via HandiHaler  daily    fluticasone-vilanterol (BREO ELLIPTA) 100-25 mcg/dose inhaler Take 1 Puff by inhalation daily.  LEUPROLIDE ACETATE (LUPRON DEPOT, 4 MONTH, IM) by IntraMUSCular route. Every 4 months  Receives in providers office  Prescribed by Dr. Leland Miller     No current facility-administered medications for this visit. No Known Allergies    Family History   Problem Relation Age of Onset    Cancer Mother      bladder    Heart Disease Brother         reports that he quit smoking about 14 years ago. His smoking use included Cigarettes. He has never used smokeless tobacco.   reports that he does not drink alcohol. Depression Risk Factor Screening:       Alcohol Risk Factor Screening: On any occasion during the past 3 months, have you had more than 3 drinks containing alcohol? No    Do you average more than 14 drinks per week? No      Functional Ability and Level of Safety:     Hearing Loss   mild    Activities of Daily Living   Self-care. Requires assistance with: no ADLs    Fall Risk     Fall Risk Assessment, last 12 mths 12/28/2015   Able to walk? Yes   Fall in past 12 months? No         Abuse Screen   Patient is not abused    Review of Systems   A comprehensive review of systems was negative except for that written in the HPI. Physical Examination     Evaluation of Cognitive Function:  Mood/affect:  neutral, happy  Appearance: age appropriate  Family member/caregiver input: none    Blood pressure 135/64, pulse (!) 54, temperature 98.3 °F (36.8 °C), temperature source Oral, resp. rate 12, height 5' 6\" (1.676 m), weight 194 lb (88 kg), SpO2 93 %.   General appearance: alert, cooperative, no distress, appears stated age  Neck: supple, symmetrical, trachea midline, no adenopathy, thyroid: not enlarged, symmetric, no tenderness/mass/nodules, no carotid bruit and no JVD  Lungs: clear to auscultation bilaterally  Heart: regular rate and rhythm, S1, S2 normal, no murmur, click, rub or gallop  Extremities: extremities normal, atraumatic, no cyanosis or edema    Patient Care Team:  Guillaume Dawkins MD as PCP - General (Internal Medicine)  Abelardo Ma MD (Ophthalmology)  Jeremie Sanchez MD (Urology)  Kwabena Santos MD (Pulmonary Disease)  Sarthak Napier MD (Cardiology)  Darnell Hastings MD (Gastroenterology)  Jeffrey Ferrara, RN as [address] (Internal Medicine)      Advice/Referrals/Counseling   Education and counseling provided. See below for specific orders    Assessment/Plan   Diagnoses and all orders for this visit:    1. Medicare annual wellness visit, subsequent    2. Chronic obstructive pulmonary disease, unspecified COPD type ([facility]  Controlled on current regimen. Continue current medications as written in chart  -     albuterol (PROAIR HFA) 90 mcg/actuation inhaler; USE 2 PUFFS EVERY 8 HOURS AS NEEDED    3. Hyperlipidemia with target LDL less than 70  Controlled on current regimen. Continue current medications as written in chart  -     LIPID PANEL    4. Essential hypertension- Controlled on current regimen. Continue current medications as written in chart. Seeing cardiology  -     atenolol (TENORMIN) 50 mg tablet; Take 0.5 Tabs by mouth daily. -     lisinopril (PRINIVIL, ZESTRIL) 20 mg tablet; Take 1 Tab by mouth daily.  -     CBC W/O DIFF  -     METABOLIC PANEL, BASIC    . Potential medication side effects were discussed with the patient; let me know if any occur.   Return for yearly Annual Wellness Visits

## 2018-03-29 NOTE — MR AVS SNAPSHOT
[facility] 
 
 
 [address] [place] [address] [facility] 
[phone] Patient: Serenity Barnard MRN: [id number] Visit Information Date & Time Provider Department Dept. Phone Encounter #  
 3/29/2018 10:00 AM Nasir Lemus MD [facility] [address] [id number] Upcoming Health Maintenance Date Due ZOSTER VACCINE AGE 60> 3/29/2019* MEDICARE YEARLY EXAM 3/30/2019 GLAUCOMA SCREENING Q2Y 2019 COLONOSCOPY 1/10/2023 DTaP/Tdap/Td series (2 - Td) 10/24/2023 *Topic was postponed. The date shown is not the original due date. Allergies as of 3/29/2018  Review Complete On: 3/29/2018 By: Nasir Lemus MD  
 No Known Allergies Current Immunizations  Reviewed on 2018 Name Date Influenza Vaccine 10/1/2017, 2015, 10/3/2013 Pneumococcal Conjugate (PCV-13) 2015 Pneumococcal Polysaccharide (PPSV-23) 2011 Tdap 10/24/2013 Not reviewed this visit You Were Diagnosed With   
  
 Codes Comments Medicare annual wellness visit, subsequent    -  Primary ICD-10-CM: Z00.00 ICD-9-CM: V70.0 Chronic obstructive pulmonary disease, unspecified COPD type ([facility]     ICD-10-CM: J44.9 ICD-9-CM: 024 Hyperlipidemia with target LDL less than 70     ICD-10-CM: E78.5 ICD-9-CM: 272.4 Essential hypertension     ICD-10-CM: I10 
ICD-9-CM: 401.9 Vitals BP Pulse Temp Resp Height(growth percentile) Weight(growth percentile) 135/64 (BP 1 Location: Left arm, BP Patient Position: Sitting) (!) 54 98.3 °F (36.8 °C) (Oral) 12 5' 6\" (1.676 m) 194 lb (88 kg) SpO2 BMI Smoking Status 93% 31.31 kg/m2 Former Smoker Vitals History BMI and BSA Data Body Mass Index Body Surface Area  
 31.31 kg/m 2 2.02 m 2 Preferred Pharmacy Pharmacy Name Phone [phone] [facility] [phone] Your Updated Medication List  
  
   
This list is accurate as of 3/29/18 10:30 AM.  Always use your most recent med list.  
  
  
  
  
 * albuterol 1.25 mg/3 mL Nebu Commonly known as:  ACCUNEB  
3 mL by Nebulization route every six (6) hours as needed. Indications: Chronic Obstructive Pulmonary Disease * albuterol 90 mcg/actuation inhaler Commonly known as:  PROAIR HFA  
USE 2 PUFFS EVERY 8 HOURS AS NEEDED  
  
 atenolol 50 mg tablet Commonly known as:  TENORMIN Take 0.5 Tabs by mouth daily. BREO ELLIPTA 100-25 mcg/dose inhaler Generic drug:  fluticasone-vilanterol Take 1 Puff by inhalation daily. CALCIUM 600 WITH VITAMIN D2 PO Take 1 Tab by mouth two (2) times a day. clopidogrel 75 mg Tab Commonly known as:  PLAVIX Take 75 mg by mouth daily. L. acidoph & paracasei- S therm- Bifido 8 billion cell Cap cap Commonly known as:  DENA-Q/RISAQUAD Take 1 Cap by mouth daily. lisinopril 20 mg tablet Commonly known as:  Nava Champ Take 1 Tab by mouth daily. LUPRON DEPOT (4 MONTH) IM  
by IntraMUSCular route. Every 4 months Receives in providers office Prescribed by Dr. Harinder Thomas  
  
 multivitamin tablet Commonly known as:  ONE A DAY Take 1 Tab by mouth daily. OTHER Biofreeze Ointment applied twice daily to feet as needed for arthritis  
  
 promethazine 25 mg tablet Commonly known as:  PHENERGAN Take 1 Tab by mouth every six (6) hours as needed for Nausea. raNITIdine 150 mg tablet Commonly known as:  ZANTAC TAKE ONE TABLET BY MOUTH EVERY DAY PLUS TAKE ONE TABLET AT BEDTIME  
  
 simvastatin 20 mg tablet Commonly known as:  ZOCOR  
TAKE ONE TABLET BY MOUTH ONCE DAILY IN THE EVENING  
  
 SPIRIVA WITH HANDIHALER 18 mcg inhalation capsule Generic drug:  tiotropium Inhale the contents of 1  capsule via HandiHaler  daily  
  
 tamsulosin 0.4 mg capsule Commonly known as:  FLOMAX Take 1 Cap by mouth daily. * Notice: This list has 2 medication(s) that are the same as other medications prescribed for you. Read the directions carefully, and ask your doctor or other care provider to review them with you. Prescriptions Sent to Pharmacy Refills  
 albuterol (PROAIR HFA) 90 mcg/actuation inhaler 11 Sig: USE 2 PUFFS EVERY 8 HOURS AS NEEDED Class: Normal  
 Pharmacy: [facility] Ph #: [phone]  
 lisinopril (PRINIVIL, ZESTRIL) 20 mg tablet 3 Sig: Take 1 Tab by mouth daily. Class: Normal  
 Pharmacy: [address] Ph #: [phone] Route: Oral  
  
We Performed the Following CBC W/O DIFF [52153 CPT(R)] LIPID PANEL [86442 CPT(R)] METABOLIC PANEL, BASIC [14232 CPT(R)] Patient Instructions Well Visit, Over 72: Care Instructions Your Care Instructions Physical exams can help you stay healthy. Your doctor has checked your overall health and may have suggested ways to take good care of yourself. He or she also may have recommended tests. At home, you can help prevent illness with healthy eating, regular exercise, and other steps. Follow-up care is a key part of your treatment and safety. Be sure to make and go to all appointments, and call your doctor if you are having problems. It's also a good idea to know your test results and keep a list of the medicines you take. How can you care for yourself at home? · Reach and stay at a healthy weight. This will lower your risk for many problems, such as obesity, diabetes, heart disease, and high blood pressure. · Get at least 30 minutes of exercise on most days of the week. Walking is a good choice. You also may want to do other activities, such as running, swimming, cycling, or playing tennis or team sports. · Do not smoke. Smoking can make health problems worse.  If you need help quitting, talk to your doctor about stop-smoking programs and medicines. These can increase your chances of quitting for good. · Protect your skin from too much sun. When you're outdoors from 10 a.m. to 4 p.m., stay in the shade or cover up with clothing and a hat with a wide brim. Wear sunglasses that block UV rays. Even when it's cloudy, put broad-spectrum sunscreen (SPF 30 or higher) on any exposed skin. · See a dentist one or two times a year for checkups and to have your teeth cleaned. · Wear a seat belt in the car. · Limit alcohol to 2 drinks a day for men and 1 drink a day for women. Too much alcohol can cause health problems. Follow your doctor's advice about when to have certain tests. These tests can spot problems early. For men and women · Cholesterol. Your doctor will tell you how often to have this done based on your overall health and other things that can increase your risk for heart attack and stroke. · Blood pressure. Have your blood pressure checked during a routine doctor visit. Your doctor will tell you how often to check your blood pressure based on your age, your blood pressure results, and other factors. · Diabetes. Ask your doctor whether you should have tests for diabetes. · Vision. Experts recommend that you have yearly exams for glaucoma and other age-related eye problems. · Hearing. Tell your doctor if you notice any change in your hearing. You can have tests to find out how well you hear. · Colon cancer tests. Keep having colon cancer tests as your doctor recommends. You can have one of several types of tests. · Heart attack and stroke risk. At least every 4 to 6 years, you should have your risk for heart attack and stroke assessed. Your doctor uses factors such as your age, blood pressure, cholesterol, and whether you smoke or have diabetes to show what your risk for a heart attack or stroke is over the next 10 years. · Osteoporosis. Talk to your doctor about whether you should have a bone density test to find out whether you have thinning bones. Also ask your doctor about whether you should take calcium and vitamin D supplements. For women · Pap test and pelvic exam. You may no longer need a Pap test. Talk with your doctor about whether to stop or continue to have Pap tests. · Breast exam and mammogram. Ask how often you should have a mammogram, which is an X-ray of your breasts. A mammogram can spot breast cancer before it can be felt and when it is easiest to treat. · Thyroid disease. Talk to your doctor about whether to have your thyroid checked as part of a regular physical exam. Women have an increased chance of a thyroid problem. For men · Prostate exam. Talk to your doctor about whether you should have a blood test (called a PSA test) for prostate cancer. Experts disagree on whether men should have this test. Some experts recommend that you discuss the benefits and risks of the test with your doctor. · Abdominal aortic aneurysm. Ask your doctor whether you should have a test to check for an aneurysm. You may need a test if you ever smoked or if your parent, brother, sister, or child has had an aneurysm. When should you call for help? Watch closely for changes in your health, and be sure to contact your doctor if you have any problems or symptoms that concern you. Where can you learn more? Go to http://india-renard.info/. Enter H750 in the search box to learn more about \"Well Visit, Over 65: Care Instructions. \" Current as of: May 12, 2017 Content Version: 11.4 © 0697-9394 Healthwise, Incorporated. Care instructions adapted under license by [employer] (which disclaims liability or warranty for this information).  If you have questions about a medical condition or this instruction, always ask your healthcare professional. Dilma Kate[employer]orporated disclaims any warranty or liability for your use of this information. Introducing hospitals & HEALTH SERVICES! Dear Melany Whalen: Thank you for requesting a [employer] account. Our records indicate that you already have an active [employer]Books account. You can access your account anytime at [email or web address] Did you know that you can access your hospital and ER discharge instructions at any time in FreshBooks? You can also review all of your test results from your hospital stay or ER visit. Additional Information If you have questions, please visit the Frequently Asked Questions section of the FreshBooks website at [email or web address] Remember, FreshBooks is NOT to be used for urgent needs. For medical emergencies, dial 911. Now available from your iPhone and Android! Please provide this summary of care documentation to your next provider. Your primary care clinician is listed as Jessenia Merrill. If you have any questions after today's visit, please call [phone].

## 2018-04-04 LAB
BUN SERPL-MCNC: 11 MG/DL (ref 8–27)
BUN/CREAT SERPL: 11 (ref 10–24)
CALCIUM SERPL-MCNC: 10.1 MG/DL (ref 8.6–10.2)
CHLORIDE SERPL-SCNC: 99 MMOL/L (ref 96–106)
CHOLEST SERPL-MCNC: 165 MG/DL (ref 100–199)
CO2 SERPL-SCNC: 26 MMOL/L (ref 18–29)
CREAT SERPL-MCNC: 0.98 MG/DL (ref 0.76–1.27)
ERYTHROCYTE [DISTWIDTH] IN BLOOD BY AUTOMATED COUNT: 14.1 % (ref 12.3–15.4)
GFR SERPLBLD CREATININE-BSD FMLA CKD-EPI: 74 ML/MIN/1.73
GFR SERPLBLD CREATININE-BSD FMLA CKD-EPI: 86 ML/MIN/1.73
GLUCOSE SERPL-MCNC: 88 MG/DL (ref 65–99)
HCT VFR BLD AUTO: 41.3 % (ref 37.5–51)
HDLC SERPL-MCNC: 53 MG/DL
HGB BLD-MCNC: 13.7 G/DL (ref 13–17.7)
INTERPRETATION, 910389: NORMAL
LDLC SERPL CALC-MCNC: 78 MG/DL (ref 0–99)
MCH RBC QN AUTO: 32.2 PG (ref 26.6–33)
MCHC RBC AUTO-ENTMCNC: 33.2 G/DL (ref 31.5–35.7)
MCV RBC AUTO: 97 FL (ref 79–97)
PLATELET # BLD AUTO: 173 X10E3/UL (ref 150–379)
POTASSIUM SERPL-SCNC: 4.5 MMOL/L (ref 3.5–5.2)
RBC # BLD AUTO: 4.26 X10E6/UL (ref 4.14–5.8)
SODIUM SERPL-SCNC: 141 MMOL/L (ref 134–144)
TRIGL SERPL-MCNC: 169 MG/DL (ref 0–149)
VLDLC SERPL CALC-MCNC: 34 MG/DL (ref 5–40)
WBC # BLD AUTO: 8.2 X10E3/UL (ref 3.4–10.8)

## 2018-04-11 ENCOUNTER — TELEPHONE (OUTPATIENT)
Dept: INTERNAL MEDICINE CLINIC | Age: 78
End: 2018-04-11

## 2018-04-11 NOTE — TELEPHONE ENCOUNTER
Patient's wife is requesting to speak with the nurse about patient's most recent lab results.  Also requesitng a copy be putin the mail to them , patient does not use his [employer]

## 2018-04-11 NOTE — TELEPHONE ENCOUNTER
Lab results given to wife, pt is not using My Chart FYI, will mail copy as requested , prior auth pendring for Proair.

## 2018-04-23 RX ORDER — SIMVASTATIN 20 MG/1
TABLET, FILM COATED ORAL
Qty: 90 TAB | Refills: 1 | Status: SHIPPED | OUTPATIENT
Start: 2018-04-23 | End: 2018-10-19 | Stop reason: SDUPTHER

## 2018-10-19 RX ORDER — SIMVASTATIN 20 MG/1
TABLET, FILM COATED ORAL
Qty: 90 TAB | Refills: 1 | Status: SHIPPED | OUTPATIENT
Start: 2018-10-19 | End: 2019-04-08 | Stop reason: SDUPTHER

## 2018-11-28 ENCOUNTER — HOSPITAL ENCOUNTER (OUTPATIENT)
Dept: NUCLEAR MEDICINE | Age: 78
Discharge: HOME | End: 2018-11-28
Attending: UROLOGY
Payer: MEDICARE

## 2018-11-28 ENCOUNTER — HOSPITAL ENCOUNTER (OUTPATIENT)
Dept: CT IMAGING | Age: 78
Discharge: HOME | End: 2018-11-28
Attending: UROLOGY
Payer: MEDICARE

## 2018-11-28 DIAGNOSIS — C61 PROSTATE CANCER (HCC): ICD-10-CM

## 2018-11-28 LAB — CREAT BLD-MCNC: 1.1 MG/DL (ref 0.6–1.3)

## 2018-11-28 PROCEDURE — 74177 CT ABD & PELVIS W/CONTRAST: CPT

## 2018-11-28 PROCEDURE — 74011636320 HC RX REV CODE- 636/320: Performed by: STUDENT IN AN ORGANIZED HEALTH CARE EDUCATION/TRAINING PROGRAM

## 2018-11-28 PROCEDURE — 82565 ASSAY OF CREATININE: CPT

## 2018-11-28 PROCEDURE — 78306 BONE IMAGING WHOLE BODY: CPT

## 2018-11-28 RX ADMIN — IOPAMIDOL 100 ML: 755 INJECTION, SOLUTION INTRAVENOUS at 13:03

## 2019-01-01 ENCOUNTER — TELEPHONE (OUTPATIENT)
Dept: NEUROLOGY | Age: 79
End: 2019-01-01

## 2019-01-01 ENCOUNTER — TELEPHONE (OUTPATIENT)
Dept: INTERNAL MEDICINE CLINIC | Age: 79
End: 2019-01-01

## 2019-01-01 ENCOUNTER — HOSPITAL ENCOUNTER (OUTPATIENT)
Dept: GENERAL RADIOLOGY | Age: 79
Discharge: HOME | End: 2019-11-21
Attending: PSYCHIATRY & NEUROLOGY
Payer: MEDICARE

## 2019-01-01 ENCOUNTER — OFFICE VISIT (OUTPATIENT)
Dept: NEUROLOGY | Age: 79
End: 2019-01-01

## 2019-01-01 ENCOUNTER — HOSPITAL ENCOUNTER (OUTPATIENT)
Dept: LAB | Age: 79
Discharge: HOME | End: 2019-11-22

## 2019-01-01 ENCOUNTER — OFFICE VISIT (OUTPATIENT)
Dept: INTERNAL MEDICINE CLINIC | Age: 79
End: 2019-01-01

## 2019-01-01 ENCOUNTER — HOSPITAL ENCOUNTER (OUTPATIENT)
Dept: MRI IMAGING | Age: 79
Discharge: HOME | End: 2019-10-08
Attending: INTERNAL MEDICINE
Payer: MEDICARE

## 2019-01-01 VITALS
DIASTOLIC BLOOD PRESSURE: 78 MMHG | HEIGHT: 66 IN | OXYGEN SATURATION: 92 % | HEART RATE: 92 BPM | TEMPERATURE: 97.8 F | WEIGHT: 196 LBS | SYSTOLIC BLOOD PRESSURE: 146 MMHG | RESPIRATION RATE: 18 BRPM | BODY MASS INDEX: 31.5 KG/M2

## 2019-01-01 VITALS
HEART RATE: 70 BPM | RESPIRATION RATE: 16 BRPM | TEMPERATURE: 98.6 F | OXYGEN SATURATION: 98 % | DIASTOLIC BLOOD PRESSURE: 80 MMHG | SYSTOLIC BLOOD PRESSURE: 130 MMHG | BODY MASS INDEX: 31.5 KG/M2 | WEIGHT: 196 LBS | HEIGHT: 66 IN

## 2019-01-01 VITALS
WEIGHT: 196 LBS | HEIGHT: 66 IN | BODY MASS INDEX: 31.5 KG/M2 | SYSTOLIC BLOOD PRESSURE: 102 MMHG | RESPIRATION RATE: 16 BRPM | TEMPERATURE: 98.6 F | HEART RATE: 70 BPM | OXYGEN SATURATION: 96 % | DIASTOLIC BLOOD PRESSURE: 60 MMHG

## 2019-01-01 DIAGNOSIS — S32.010K COMPRESSION FRACTURE OF L1 VERTEBRA WITH NONUNION, SUBSEQUENT ENCOUNTER: ICD-10-CM

## 2019-01-01 DIAGNOSIS — M79.672 PAIN IN BOTH FEET: ICD-10-CM

## 2019-01-01 DIAGNOSIS — M79.604 PAIN IN BOTH LOWER EXTREMITIES: ICD-10-CM

## 2019-01-01 DIAGNOSIS — M79.671 PAIN IN BOTH FEET: ICD-10-CM

## 2019-01-01 DIAGNOSIS — M79.671 PAIN IN BOTH FEET: Primary | ICD-10-CM

## 2019-01-01 DIAGNOSIS — M79.672 PAIN IN BOTH FEET: Primary | ICD-10-CM

## 2019-01-01 DIAGNOSIS — M19.071 PRIMARY OSTEOARTHRITIS OF BOTH FEET: ICD-10-CM

## 2019-01-01 DIAGNOSIS — Z13.1 ENCOUNTER FOR SCREENING FOR DIABETES MELLITUS: ICD-10-CM

## 2019-01-01 DIAGNOSIS — R29.898 LEG WEAKNESS, BILATERAL: ICD-10-CM

## 2019-01-01 DIAGNOSIS — M79.604 PAIN IN BOTH LOWER EXTREMITIES: Primary | ICD-10-CM

## 2019-01-01 DIAGNOSIS — M51.36 DDD (DEGENERATIVE DISC DISEASE), LUMBAR: ICD-10-CM

## 2019-01-01 DIAGNOSIS — Z23 ENCOUNTER FOR IMMUNIZATION: ICD-10-CM

## 2019-01-01 DIAGNOSIS — M79.605 PAIN IN BOTH LOWER EXTREMITIES: ICD-10-CM

## 2019-01-01 DIAGNOSIS — E53.0 RIBOFLAVIN DEFICIENCY: ICD-10-CM

## 2019-01-01 DIAGNOSIS — M79.605 PAIN IN BOTH LOWER EXTREMITIES: Primary | ICD-10-CM

## 2019-01-01 DIAGNOSIS — M19.072 PRIMARY OSTEOARTHRITIS OF BOTH FEET: ICD-10-CM

## 2019-01-01 DIAGNOSIS — C61 PROSTATE CANCER (HCC): ICD-10-CM

## 2019-01-01 LAB
ALBUMIN SERPL ELPH-MCNC: 4 G/DL (ref 2.9–4.4)
ALBUMIN/GLOB SERPL: 1.3 {RATIO} (ref 0.7–1.7)
ALPHA1 GLOB SERPL ELPH-MCNC: 0.3 G/DL (ref 0–0.4)
ALPHA2 GLOB SERPL ELPH-MCNC: 1.1 G/DL (ref 0.4–1)
ANA TITR SER IF: NEGATIVE {TITER}
B-GLOBULIN SERPL ELPH-MCNC: 1.2 G/DL (ref 0.7–1.3)
FOLATE SERPL-MCNC: 41.2 NG/ML (ref 5–21)
GAMMA GLOB SERPL ELPH-MCNC: 0.6 G/DL (ref 0.4–1.8)
GLOBULIN SER-MCNC: 3.1 G/DL (ref 2.2–3.9)
GLUCOSE 2H P 75 G GLC PO SERPL-MCNC: 139 MG/DL (ref 65–139)
GLUCOSE P FAST SERPL-MCNC: 89 MG/DL (ref 65–99)
IGA SERPL-MCNC: 202 MG/DL (ref 61–437)
IGG SERPL-MCNC: 625 MG/DL (ref 700–1600)
IGM SERPL-MCNC: 100 MG/DL (ref 15–143)
INTERPRETATION SERPL IEP-IMP: ABNORMAL
M PROTEIN SERPL ELPH-MCNC: ABNORMAL G/DL
PROT SERPL-MCNC: 7.1 G/DL (ref 6–8.5)
TSH SERPL DL<=0.05 MIU/L-ACNC: 0.52 UIU/ML (ref 0.36–3.74)
VIT B12 SERPL-MCNC: 846 PG/ML (ref 193–986)

## 2019-01-01 PROCEDURE — 73630 X-RAY EXAM OF FOOT: CPT

## 2019-01-01 PROCEDURE — 74011250636 HC RX REV CODE- 250/636: Performed by: INTERNAL MEDICINE

## 2019-01-01 PROCEDURE — 72158 MRI LUMBAR SPINE W/O & W/DYE: CPT

## 2019-01-01 PROCEDURE — A9575 INJ GADOTERATE MEGLUMI 0.1ML: HCPCS | Performed by: INTERNAL MEDICINE

## 2019-01-01 RX ORDER — SIMVASTATIN 20 MG/1
TABLET, FILM COATED ORAL
Qty: 90 TAB | Refills: 1 | Status: SHIPPED | OUTPATIENT
Start: 2019-01-01

## 2019-01-01 RX ORDER — GABAPENTIN 300 MG/1
300 CAPSULE ORAL
Qty: 30 CAP | Refills: 2 | Status: SHIPPED | OUTPATIENT
Start: 2019-01-01 | End: 2019-01-01 | Stop reason: DRUGHIGH

## 2019-01-01 RX ORDER — GADOTERATE MEGLUMINE 376.9 MG/ML
18 INJECTION INTRAVENOUS
Status: COMPLETED | OUTPATIENT
Start: 2019-01-01 | End: 2019-01-01

## 2019-01-01 RX ORDER — GABAPENTIN 300 MG/1
300 CAPSULE ORAL 2 TIMES DAILY
Qty: 60 CAP | Refills: 5 | Status: SHIPPED | OUTPATIENT
Start: 2019-01-01 | End: 2020-01-01

## 2019-01-01 RX ADMIN — GADOTERATE MEGLUMINE 18 ML: 376.9 INJECTION INTRAVENOUS at 12:48

## 2019-03-25 DIAGNOSIS — I10 ESSENTIAL HYPERTENSION: ICD-10-CM

## 2019-03-25 RX ORDER — LISINOPRIL 20 MG/1
TABLET ORAL
Qty: 90 TAB | Refills: 3 | Status: SHIPPED | OUTPATIENT
Start: 2019-03-25 | End: 2020-01-01

## 2019-03-28 ENCOUNTER — HOSPITAL ENCOUNTER (OUTPATIENT)
Dept: GENERAL RADIOLOGY | Age: 79
Discharge: HOME | End: 2019-03-28
Payer: MEDICARE

## 2019-03-28 DIAGNOSIS — J44.9 COPD (CHRONIC OBSTRUCTIVE PULMONARY DISEASE) (HCC): ICD-10-CM

## 2019-03-28 PROCEDURE — 71046 X-RAY EXAM CHEST 2 VIEWS: CPT

## 2019-04-08 RX ORDER — SIMVASTATIN 20 MG/1
TABLET, FILM COATED ORAL
Qty: 90 TAB | Refills: 1 | Status: SHIPPED | OUTPATIENT
Start: 2019-04-08 | End: 2019-01-01 | Stop reason: SDUPTHER

## 2019-05-24 DIAGNOSIS — J44.9 CHRONIC OBSTRUCTIVE PULMONARY DISEASE, UNSPECIFIED COPD TYPE (HCC): ICD-10-CM

## 2019-05-24 RX ORDER — ALBUTEROL SULFATE 90 UG/1
AEROSOL, METERED RESPIRATORY (INHALATION)
Qty: 1 INHALER | Refills: 11 | Status: SHIPPED | OUTPATIENT
Start: 2019-05-24 | End: 2020-01-01

## 2019-05-30 ENCOUNTER — OFFICE VISIT (OUTPATIENT)
Dept: INTERNAL MEDICINE CLINIC | Age: 79
End: 2019-05-30

## 2019-05-30 VITALS
HEIGHT: 66 IN | HEART RATE: 94 BPM | RESPIRATION RATE: 18 BRPM | BODY MASS INDEX: 31.74 KG/M2 | WEIGHT: 197.5 LBS | TEMPERATURE: 97.9 F | SYSTOLIC BLOOD PRESSURE: 132 MMHG | DIASTOLIC BLOOD PRESSURE: 78 MMHG | OXYGEN SATURATION: 95 %

## 2019-05-30 DIAGNOSIS — Z00.00 MEDICARE ANNUAL WELLNESS VISIT, SUBSEQUENT: Primary | ICD-10-CM

## 2019-05-30 DIAGNOSIS — M19.071 PRIMARY OSTEOARTHRITIS OF BOTH FEET: ICD-10-CM

## 2019-05-30 DIAGNOSIS — M25.562 POSTERIOR KNEE PAIN, LEFT: ICD-10-CM

## 2019-05-30 DIAGNOSIS — E78.5 HYPERLIPIDEMIA WITH TARGET LDL LESS THAN 70: ICD-10-CM

## 2019-05-30 DIAGNOSIS — M62.831 MUSCLE SPASM OF LEFT CALF: ICD-10-CM

## 2019-05-30 DIAGNOSIS — J44.9 CHRONIC OBSTRUCTIVE PULMONARY DISEASE, UNSPECIFIED COPD TYPE (HCC): ICD-10-CM

## 2019-05-30 DIAGNOSIS — M19.072 PRIMARY OSTEOARTHRITIS OF BOTH FEET: ICD-10-CM

## 2019-05-30 DIAGNOSIS — C61 PROSTATE CANCER (HCC): ICD-10-CM

## 2019-05-30 DIAGNOSIS — R26.89 BALANCE DISORDER: ICD-10-CM

## 2019-05-30 RX ORDER — TIZANIDINE 4 MG/1
4 TABLET ORAL EVERY 6 HOURS
Qty: 30 TAB | Refills: 0 | Status: SHIPPED | OUTPATIENT
Start: 2019-05-30 | End: 2020-01-01

## 2019-05-30 RX ORDER — DEXTROMETHORPHAN HYDROBROMIDE, GUAIFENESIN 5; 100 MG/5ML; MG/5ML
650 LIQUID ORAL
Qty: 60 TAB | Refills: 5
Start: 2019-05-30 | End: 2020-01-01

## 2019-05-30 NOTE — PATIENT INSTRUCTIONS
Body Mass Index: Care Instructions  Your Care Instructions    Body mass index (BMI) can help you see if your weight is raising your risk for health problems. It uses a formula to compare how much you weigh with how tall you are. · A BMI lower than 18.5 is considered underweight. · A BMI between 18.5 and 24.9 is considered healthy. · A BMI between 25 and 29.9 is considered overweight. A BMI of 30 or higher is considered obese. If your BMI is in the normal range, it means that you have a lower risk for weight-related health problems. If your BMI is in the overweight or obese range, you may be at increased risk for weight-related health problems, such as high blood pressure, heart disease, stroke, arthritis or joint pain, and diabetes. If your BMI is in the underweight range, you may be at increased risk for health problems such as fatigue, lower protection (immunity) against illness, muscle loss, bone loss, hair loss, and hormone problems. BMI is just one measure of your risk for weight-related health problems. You may be at higher risk for health problems if you are not active, you eat an unhealthy diet, or you drink too much alcohol or use tobacco products. Follow-up care is a key part of your treatment and safety. Be sure to make and go to all appointments, and call your doctor if you are having problems. It's also a good idea to know your test results and keep a list of the medicines you take. How can you care for yourself at home? · Practice healthy eating habits. This includes eating plenty of fruits, vegetables, whole grains, lean protein, and low-fat dairy. · If your doctor recommends it, get more exercise. Walking is a good choice. Bit by bit, increase the amount you walk every day. Try for at least 30 minutes on most days of the week. · Do not smoke. Smoking can increase your risk for health problems. If you need help quitting, talk to your doctor about stop-smoking programs and medicines. These can increase your chances of quitting for good. · Limit alcohol to 2 drinks a day for men and 1 drink a day for women. Too much alcohol can cause health problems. If you have a BMI higher than 25  · Your doctor may do other tests to check your risk for weight-related health problems. This may include measuring the distance around your waist. A waist measurement of more than 40 inches in men or 35 inches in women can increase the risk of weight-related health problems. · Talk with your doctor about steps you can take to stay healthy or improve your health. You may need to make lifestyle changes to lose weight and stay healthy, such as changing your diet and getting regular exercise. If you have a BMI lower than 18.5  · Your doctor may do other tests to check your risk for health problems. · Talk with your doctor about steps you can take to stay healthy or improve your health. You may need to make lifestyle changes to gain or maintain weight and stay healthy, such as getting more healthy foods in your diet and doing exercises to build muscle. Where can you learn more? Go to http://india-renard.info/. Enter S176 in the search box to learn more about \"Body Mass Index: Care Instructions. \"  Current as of: October 13, 2016  Content Version: 11.4  © 9842-5281 Healthwise, Incorporated. Care instructions adapted under license by [employer] (which disclaims liability or warranty for this information). If you have questions about a medical condition or this instruction, always ask your healthcare professional. [address] any warranty or liability for your use of this information.

## 2019-05-30 NOTE — ACP (ADVANCE CARE PLANNING)
Advance Care Planning (ACP) Note    Date of ACP Conversation: 5/30/2019  Persons included in Conversation: patient  Length of ACP Conversation in minutes: <16 minutes (Non-Billable)    Conversation requested by:   Patient    Authorized Decision Maker (if patient is incapable of making informed decisions): This person is:  Healthcare Agent/Medical Power of  under Advance Directive    General ACP for ALL Patients with Decision Making Capacity: yes    Advance Directive Conversation with Patients who have not yet planned:  Importance of advance care planning, including choosing a healthcare agent to communicate patient's healthcare decisions if patient lost the ability to make decisions, such as after a sudden illness or accident  Explored patient's values, goals, and care preferences as related to these situations    Review of Existing Advance Directive: (Select questions covered)  Does this advance directive still reflect your preferences?   Yes    Interventions Provided:  Recommended review of completed ACP document annually or upon change in health status

## 2019-05-30 NOTE — PROGRESS NOTES
This is a Subsequent Medicare Annual Wellness Visit providing Personalized Prevention Plan Services (PPPS) (Performed 12 months after initial AWV and PPPS )    I have reviewed the patient's medical history in detail and updated the computerized patient record. He is overall doing very well. Comes in for repeat check. He is with his wife. No recent health update. Complains about bilateral knee pain and bilateral foot pain. He is walking with a cane. This is chronic. No recent falls. Has been having some spasm of his left calf at times. Occurs during the day and sometimes at night as well. History     Past Medical History:   Diagnosis Date    Arthritis of foot, degenerative     Dr. Jose A Buchanan disorder     uses cane.  Bladder tumor 2004    Dr John Tapia. Dr. Josue Holland, cyst and [facility] 10/29/11    CAD (coronary artery disease)     MI 7/2010,s/p CABG. Dr. Nettie Xavier Carotid stenosis 10/20/15    Dr. Arminda Yeboah. endarterectomy 12/31/15. 50-69% L on doppler. 80% \"per CT\"    Cataract     COPD (chronic obstructive pulmonary disease) ([facility] [address]     Dr. Rosi Taylor.  stable PFT 3/2019. moderate, FEV1 1.41 7/2009. Felisa Avendano    GERD (gastroesophageal reflux disease)     HTN (hypertension)     Hyperlipidemia LDL goal < 70     Osteoporosis     tx w calcium, DEXA improved T-2.1 10/2011, 2012, 1/2015    PHN (postherpetic neuralgia) 2012    Physiological tremor     Dr. Pattie Sol [facility]) 02/2004    Dr. John Tapia. on lupron. PSA increased 0.149 2/2014    Pulmonary nodule, right     5 mm, stable on CT 10/14/10, 1/14/14    Shingles 12/12/12    right neck and back.  Urinary retention 01/10/2018    while hospitalized. heard cath removed 1/16/18       Past Surgical History:   Procedure Laterality Date    BLADDER TUMOR ASSOC      CABG, ARTERIAL, THREE  7/2010    Dr Ioana Yeboah. LIMA to LAD, spah to cirm    COLONOSCOPY N/A 1/10/2018    focal colitis.   Gloria Sandoval MD at [place] [id number] CYSTOSCOPY  10/29/11    FISH, normal    HX CAROTID ENDARTERECTOMY Left 12/31/15    Dr. Walters Dire  12/2011    right eye, Dr. Mitchell Tilley HX COLONOSCOPY  10/2/13    hyperplastic polyp, Dr. Mara Schroeder HX ENDOSCOPY  01/10/2018    normal    HX HERNIA REPAIR  2000    HX LAP CHOLECYSTECTOMY  03/24/2017    gangranous. Dr. Yaa Guillaume CYSTOURETHROSCOPY  11/29/2016    normal       Current Outpatient Medications   Medication Sig    tiZANidine (ZANAFLEX) 4 mg tablet Take 1 Tab by mouth every six (6) hours. Indications: muscle spasm    acetaminophen (TYLENOL ARTHRITIS PAIN) 650 mg TbER Take 1 Tab by mouth every six (6) hours as needed for Pain.  albuterol (PROAIR HFA) 90 mcg/actuation inhaler USE 2 PUFFS EVERY 8 HOURS AS NEEDED    simvastatin (ZOCOR) 20 mg tablet TAKE 1 TABLET BY MOUTH ONCE DAILY IN THE EVENING    lisinopril (PRINIVIL, ZESTRIL) 20 mg tablet TAKE 1 TABLET BY MOUTH ONCE DAILY    raNITIdine (ZANTAC) 150 mg tablet TAKE ONE TABLET BY MOUTH EVERY DAY PLUS TAKE ONE TABLET AT BEDTIME    atenolol (TENORMIN) 50 mg tablet TAKE ONE TABLET BY MOUTH EVERY DAY    L. acidoph & paracasei- S therm- Bifido (DENA-Q/RISAQUAD) 8 billion cell cap cap Take 1 Cap by mouth daily.  tamsulosin (FLOMAX) 0.4 mg capsule Take 1 Cap by mouth daily.  albuterol (ACCUNEB) 1.25 mg/3 mL nebu 3 mL by Nebulization route every six (6) hours as needed. Indications: Chronic Obstructive Pulmonary Disease    clopidogrel (PLAVIX) 75 mg tab Take 75 mg by mouth daily.  promethazine (PHENERGAN) 25 mg tablet Take 1 Tab by mouth every six (6) hours as needed for Nausea.  CALCIUM CARBONATE/VITAMIN D2 (CALCIUM 600 WITH VITAMIN D2 PO) Take 1 Tab by mouth two (2) times a day.  multivitamin (ONE A DAY) tablet Take 1 Tab by mouth daily.     OTHER Biofreeze Ointment applied twice daily to feet as needed for arthritis    SPIRIVA WITH HANDIHALER 18 mcg inhalation capsule Inhale the contents of 1  capsule via HandiHaler  daily    fluticasone-vilanterol (BREO ELLIPTA) 100-25 mcg/dose inhaler Take 1 Puff by inhalation daily.  LEUPROLIDE ACETATE (LUPRON DEPOT, 4 MONTH, IM) by IntraMUSCular route. Every 4 months  Receives in providers office  Prescribed by Dr. Guille Moise     No current facility-administered medications for this visit. No Known Allergies    Family History   Problem Relation Age of Onset    Cancer Mother         bladder    Heart Disease Brother         reports that he quit smoking about 15 years ago. His smoking use included cigarettes. He has never used smokeless tobacco.   reports that he does not drink alcohol. Depression Risk Factor Screening:       Alcohol Risk Factor Screening: On any occasion during the past 3 months, have you had more than 3 drinks containing alcohol? No    Do you average more than 14 drinks per week? No      Functional Ability and Level of Safety:     Hearing Loss   mild    Activities of Daily Living   Self-care. Requires assistance with: no ADLs    Fall Risk     Fall Risk Assessment, last 12 mths 5/30/2019   Able to walk? Yes   Fall in past 12 months? No         Abuse Screen   Patient is not abused    Review of Systems   A comprehensive review of systems was negative except for that written in the HPI. Physical Examination     Evaluation of Cognitive Function:  Mood/affect:  neutral, happy  Appearance: age appropriate  Family member/caregiver input: none    Blood pressure 132/78, pulse 94, temperature 97.9 °F (36.6 °C), temperature source Oral, resp. rate 18, height 5' 6\" (1.676 m), weight 197 lb 8 oz (89.6 kg), SpO2 95 %.   General appearance: alert, cooperative, no distress, appears stated age  Neck: supple, symmetrical, trachea midline, no adenopathy, thyroid: not enlarged, symmetric, no tenderness/mass/nodules, no carotid bruit and no JVD  Lungs: clear to auscultation bilaterally  Heart: regular rate and rhythm, S1, S2 normal, no murmur, click, rub or gallop  Extremities: extremities normal, atraumatic, no cyanosis or edema    Patient Care Team:  Lynn Jackman MD as PCP - General (Internal Medicine)  eMllissa Valentin MD (Ophthalmology)  Jennifer Dixon MD (Urology)  Neftaly Lobato MD (Pulmonary Disease)  Philly Chaparro MD (Cardiology)  Rita Cao MD (Gastroenterology)      Advice/Referrals/Counseling   Education and counseling provided. See below for specific orders    Assessment/Plan   Diagnoses and all orders for this visit:    1. Medicare annual wellness visit, subsequent    2. Primary osteoarthritis of both feet  Do not take oral nsaids due to CAD. Try high dose tylenol. Consider topical voltaren  -     acetaminophen (TYLENOL ARTHRITIS PAIN) 650 mg TbER; Take 1 Tab by mouth every six (6) hours as needed for Pain. 3. Posterior knee pain, left    4. Muscle spasm of left calf  -     Try tiZANidine (ZANAFLEX) 4 mg tablet; Take 1 Tab by mouth every six (6) hours. Indications: muscle spasm    5. Prostate cancer (HCC)\Currently asymptomatic  F/u urology    6. Chronic obstructive pulmonary disease, unspecified COPD type ([facility] [address]  Controlled on current regimen. Continue current medications as written in chart. F/u D.r Mariam Chang    7. Balance disorder    8. Hyperlipidemia with target LDL less than 70  Controlled on current regimen. Continue current medications as written in chart  . Potential medication side effects were discussed with the patient; let me know if any occur. Return for yearly Annual Wellness Visits      Discussed the patient's BMI with him. The BMI follow up plan is as follows:     dietary management education, guidance, and counseling  encourage exercise  monitor weight  prescribed dietary intake    An After Visit Summary was printed and given to the patient.

## 2019-09-20 NOTE — TELEPHONE ENCOUNTER
Pts wife states he is having issues with walking and wants to know if  [facility] can recommend and refer him to a Neurologist. Wife would also like to speak with nurse. Thanks.

## 2019-10-02 PROBLEM — I73.9 PERIPHERAL VASCULAR DISEASE (HCC): Status: RESOLVED | Noted: 2019-01-01 | Resolved: 2019-01-01

## 2019-10-02 PROBLEM — I73.9 PERIPHERAL VASCULAR DISEASE (HCC): Status: ACTIVE | Noted: 2019-01-01

## 2019-10-02 NOTE — PROGRESS NOTES
HISTORY OF PRESENT ILLNESS Chief Complaint Patient presents with  Leg Pain  
  going to see neuro on the 31st not sure why they need to see pcp first  
 
 
Presents because he would like further evaluation of bilateral leg pains and weakness. He made an appointment with neurology at the end of this month, but I asked him to come and discuss the symptoms to see if I can help. Patient has long-standing pain of his bilateral lower legs from his calves to his bilateral feet. This has been worsening over the past several months. He can only walk about 50 feet before he has to rest because of severe bilateral leg pain and weakness of bilateral legs. He has fallen in the past, but not in the last couple of months. He is using a cane much of the time. He does have significant arthritis of his bilateral feet. No known history of neuropathy. He does have a history of degenerative disc disease as seen on a CT scan of his abdomen which showed mild DDD of lumbar spine, DJD, and a compression deformity of L1. He does have a history of prostate cancer and is followed by urology. His PSA remains present, but not increasing. He has never had dedicated spinal injury. As far as back pain is concerned, he does have some mild to moderate lower back pain, but nothing new or nothing different. He denies any recent injuries. He is taking tizanidine for muscle spasm 
 he is taking simvastatin for hyperlipidemia. Says that he try holding this in the past and it did not help a lot. Review of Systems All other systems reviewed and are negative, except as noted in HPI Past Medical and Surgical History 
 has a past medical history of Arthritis of foot, degenerative, Balance disorder, Bladder tumor (2004), CAD (coronary artery disease), Carotid stenosis (10/20/15), Cataract, COPD (chronic obstructive pulmonary disease) ([facility] [address] GERD (gastroesophageal reflux disease), HTN (hypertension), Hyperlipidemia LDL goal < 70, Osteoporosis, PHN (postherpetic neuralgia) (2012), Physiological tremor, Prostate cancer ([facility] [address] (02/2004), Pulmonary nodule, right, Shingles (12/12/12), and Urinary retention (01/10/2018). has a past surgical history that includes pr cabg, arterial, three (7/2010); hx hernia repair (2000); bladder tumor assoc; cystoscopy (10/29/11); hx cataract removal (12/2011); hx colonoscopy (10/2/13); hx carotid endarterectomy (Left, 12/31/15); pr cystourethroscopy (11/29/2016); hx lap cholecystectomy (03/24/2017); colonoscopy (N/A, 1/10/2018); and hx endoscopy (01/10/2018). reports that he quit smoking about 15 years ago. His smoking use included cigarettes. He has never used smokeless tobacco. He reports that he does not drink alcohol or use drugs. family history includes Cancer in his mother; Heart Disease in his brother. Physical Exam  
Nursing note and vitals reviewed. Blood pressure 146/78, pulse 92, temperature 97.8 °F (36.6 °C), temperature source Oral, resp. rate 18, height 5' 6\" (1.676 m), weight 196 lb (88.9 kg), SpO2 92 %. Constitutional: In no distress. Eyes: Conjunctivae are normal. 
HEENT:  No LAD or thyromegaly Cardiovascular: Normal rate. regular rhythm. No murmurs No edema Pulmonary/Chest: Effort normal. clear to ausculation blaterally Musculoskeletal:  no edema. Abd: 
Neurological: Alert and oriented. Grossly intact cranial nerves and motor function. Skin: No rash noted. Psychiatric: Normal mood and affect. Behavior is normal.  
 
ASSESSMENT and PLAN Diagnoses and all orders for this visit: 1. Pain in both lower extremities 2. Leg weakness, bilateral 
3. DDD (degenerative disc disease), lumbar 4. 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 Prostate cancer ([facility] [address] Progressive relatively debilitating symptoms.   I strongly suspect spinal stenosis causing neurogenic claudication. Cannot rule out vascular cause but I think less likely given bilateral leg symptoms. He does have significant spinal disease based on his CT scan in the past. 
We will set up MRI. Cannot rule out metastatic lesion to spine. After MRI, will likely refer to neurosurgery or pain management. He has an appointment with neurology which I encouraged him to keep for now. Could consider EMG. -     MRI LUMB SPINE W WO CONT; Future 6. Primary osteoarthritis of both feet Also contributes to pain in the feet, but this is pain of his legs as well. 7. Encounter for immunization 
-     INFLUENZA VACCINE INACTIVATED (IIV), SUBUNIT, ADJUVANTED, IM 
-     ADMIN INFLUENZA VIRUS VAC 
 
 
 
lab results and schedule of future lab studies reviewed with patient 
reviewed medications and side effects in detail Return to clinic for further evaluation if new symptoms develop or if current symptoms worsen or fail to resolve. There are no Patient Instructions on file for this visit.

## 2019-10-10 NOTE — TELEPHONE ENCOUNTER
Patients wife called requesting results from patients MRI done on Tuesday. Please call Christ Shelley back today if possible to advise.    [phone]

## 2019-10-11 NOTE — TELEPHONE ENCOUNTER
MRI does not show any major issues to explain his leg weakness . She does have some spinal arthritis, but no nerves or spinal cord are significantly compressed. No sign of cancer. Recommend seeing neurology as scheduled.   They may consider an EMG nerve test

## 2019-10-31 NOTE — PROGRESS NOTES
NEUROLOGY NEW PATIENT OFFICE CONSULTATION 
 
 
10/31/2019 RE: Venkata Goel 1940 REFERRED BY: 
Cielo Manrique MD 
 
 
 
CHIEF COMPLAINT:  This is Venkata Goel is a 67 y.o. male right handed retiree  who had concerns including New Patient (Patient is here today c/o pain, that started in his toes and is progressing up both of his legs x 1 1/2 yrs. He also noticed tremors in both hands 4 months ago.). HPI:  
 
For the past 2 yrs, patient noted pain in tips of toes to knees, constant, dull aching, 9/10, worse when standing and walking, better when laying down and sitting. Tylenon and biofreeze helped. (-) numbness (-) weakness (-) neck, middle or lower back pain (-) incontinence 
(+) arthritis in arms and feet Patient saw Saniya Barlow President and was given customized shoes with minimal benefit. ROS  
(-) fever (-) rash All other systems reviewed and are negative Past Medical Hx Past Medical History:  
Diagnosis Date  Arthritis of foot, degenerative Dr. Gustafson Bulla disorder   
 uses cane.  Bladder tumor 2004 Dr Eliceo Alvarado. Dr. Scot Neff, [facility] 10/29/11  CAD (coronary artery disease) MI 7/2010,s/p CABG. Dr. Prosper Matthews  Carotid stenosis 10/20/15 Dr. Rian Jacome. endarterectomy 12/31/15. 50-69% L on doppler. 80% \"per CT\"  Cataract  COPD (chronic obstructive pulmonary disease) (HCC) Dr. Prince Isaac.  stable PFT 3/2019. moderate, FEV1 1.41 7/2009. Flower Miller  GERD (gastroesophageal reflux disease)  HTN (hypertension)  Hyperlipidemia LDL goal < 70   
 Osteoporosis   
 tx w calcium, DEXA improved T-2.1 10/2011, 2012, 1/2015  PHN (postherpetic neuralgia) 2012  Physiological tremor   
 saw Dr. Foster Almaguer  Prostate cancer ([address] 02/2004 Dr. Eliceo Alvarado. on lupron. PSA increased 0.149 2/2014  Pulmonary nodule, right 5 mm, stable on CT 10/14/10, 1/14/14  Shingles 12/12/12  
 right neck and back.  Urinary retention 01/10/2018  
 while hospitalized. heard cath removed 1/16/18 L ICA s/p CEA 2015 Bladder and Prostate CA - on Lupron and chem 15 yrs ago Social Hx Social History Socioeconomic History  Marital status:  Spouse name: Not on file  Number of children: Not on file  Years of education: Not on file  Highest education level: Not on file Tobacco Use  Smoking status: Former Smoker Types: Cigarettes Last attempt to quit: 12/5/2003 Years since quitting: 15.9  Smokeless tobacco: Never Used Substance and Sexual Activity  Alcohol use: No  
 Drug use: Never Family Hx Family History Problem Relation Age of Onset  Cancer Mother   
     bladder  Heart Disease Brother ALLERGIES No Known Allergies CURRENT MEDS Current Outpatient Medications Medication Sig Dispense Refill  simvastatin (ZOCOR) 20 mg tablet TAKE 1 TABLET BY MOUTH ONCE DAILY IN THE EVENING 90 Tab 1  raNITIdine (ZANTAC) 150 mg tablet TAKE ONE TABLET BY MOUTH EVERY DAY PLUS TAKE ONE TABLET AT BEDTIME 60 Tab 5  tiZANidine (ZANAFLEX) 4 mg tablet Take 1 Tab by mouth every six (6) hours. Indications: muscle spasm 30 Tab 0  
 acetaminophen (TYLENOL ARTHRITIS PAIN) 650 mg TbER Take 1 Tab by mouth every six (6) hours as needed for Pain. 60 Tab 5  
 albuterol (PROAIR HFA) 90 mcg/actuation inhaler USE 2 PUFFS EVERY 8 HOURS AS NEEDED 1 Inhaler 11  
 lisinopril (PRINIVIL, ZESTRIL) 20 mg tablet TAKE 1 TABLET BY MOUTH ONCE DAILY 90 Tab 3  
 atenolol (TENORMIN) 50 mg tablet TAKE ONE TABLET BY MOUTH EVERY DAY 90 Tab 1  
 L. acidoph & paracasei- S therm- Bifido (DENA-Q/RISAQUAD) 8 billion cell cap cap Take 1 Cap by mouth daily. 30 Cap 0  
 tamsulosin (FLOMAX) 0.4 mg capsule Take 1 Cap by mouth daily. 30 Cap 0  
 albuterol (ACCUNEB) 1.25 mg/3 mL nebu 3 mL by Nebulization route every six (6) hours as needed.  Indications: Chronic Obstructive Pulmonary Disease 30 Each 1  
  clopidogrel (PLAVIX) 75 mg tab Take 75 mg by mouth daily.  promethazine (PHENERGAN) 25 mg tablet Take 1 Tab by mouth every six (6) hours as needed for Nausea. 12 Tab 0  
 CALCIUM CARBONATE/VITAMIN D2 (CALCIUM 600 WITH VITAMIN D2 PO) Take 1 Tab by mouth two (2) times a day.  multivitamin (ONE A DAY) tablet Take 1 Tab by mouth daily.  OTHER Biofreeze Ointment applied twice daily to feet as needed for arthritis  SPIRIVA WITH HANDIHALER 18 mcg inhalation capsule Inhale the contents of 1  capsule via HandiHaler  daily 90 Cap 2  
 fluticasone-vilanterol (BREO ELLIPTA) 100-25 mcg/dose inhaler Take 1 Puff by inhalation daily.  LEUPROLIDE ACETATE (LUPRON DEPOT, 4 MONTH, IM) by IntraMUSCular route. Every 4 months Receives in providers office Prescribed by Dr. Chris Cam PREVIOUS WORKUP: (reviewed) IMAGING: 
 
CT Results (recent): 
Results from Hospital Encounter encounter on 11/28/18 CT ABD PELV W CONT Narrative EXAM:  CT ABD PELV W CONT INDICATION: Prostate cancer ([address] COMPARISON: 1/3/2018 CONTRAST:  100 mL of Isovue-370. TECHNIQUE:  
Following the uneventful intravenous administration of contrast, thin axial 
images were obtained through the abdomen and pelvis. Coronal and sagittal 
reconstructions were generated. Oral contrast was administered. CT dose 
reduction was achieved through use of a standardized protocol tailored for this 
examination and automatic exposure control for dose modulation. FINDINGS:  
LUNG BASES: Clear. INCIDENTALLY IMAGED HEART AND MEDIASTINUM: Unremarkable. LIVER: No mass or biliary dilatation. GALLBLADDER: Surgically absent SPLEEN: No mass. PANCREAS: No mass or ductal dilatation. ADRENALS: Unremarkable. KIDNEYS: No mass, calculus, or hydronephrosis. STOMACH: Unremarkable. SMALL BOWEL: No dilatation or wall thickening. COLON: No dilatation or wall thickening. APPENDIX: Unremarkable. PERITONEUM: No ascites or pneumoperitoneum. RETROPERITONEUM: No lymphadenopathy or aortic aneurysm. There are extensive 
atherosclerotic changes REPRODUCTIVE ORGANS: Prostate gland is normal size URINARY BLADDER: No mass or calculus. BONES: Compression deformity of L1 is unchanged ADDITIONAL COMMENTS: Small focus of soft tissue density left inguinal region is 
unchanged Impression IMPRESSION: 
 
1. No adenopathy or mass is identified. There is no evidence of metastatic 
disease. There is extensive atherosclerotic changes with narrowing of the aorta at the 
level of the renal arteries and there is narrowing of the common iliac arteries 
and SMA MRI Results (recent): 
Results from Hospital Encounter encounter on 10/08/19 MRI LUMB SPINE W WO CONT Narrative EXAM: MRI LUMB SPINE W WO CONT INDICATION: Low back pain, cancer or infection suspected or immune compromise; 
Weakness, leg, progressive. Pain in right leg COMPARISON: None TECHNIQUE: MR imaging of the lumbar spine was performed using the following 
sequences: sagittal T1, T2, STIR;  axial T1, T2 prior to and following contrast 
administration. CONTRAST: 18 mL of Dotarem. FINDINGS: 
 
Remote compression deformity of L1 with good alignment. No evidence for bone 
marrow replacement. The conus appears unremarkable. No enhancing lesion 
paraspinal masses or fluid collections. T12-L1 shows no significant canal or foraminal compromise. L1-L2 show some minimal facet disease, no canal or foraminal compromise. L2-L3 shows some mild facet disease. There is no significant canal or foraminal 
compromise. L3-L4 shows some minimal facet disease. No canal or foraminal compromise. L4-5 show some moderate facet disease. There is central disc bulging but there 
is no significant foraminal or canal compromise. L5-S1 shows chronic disc degeneration with decreased height and intensity. Central bulge, mild bilateral facet hypertrophy but no significant canal or foraminal compromise. Impression IMPRESSION: Chronic compression deformity L1. Diffuse mild degenerative disc and joint disease without focal findings. No enhancing lesion or masses. IR Results (recent): No results found for this or any previous visit. VAS/US Results (recent): 
Results from Hospital Encounter encounter on 10/20/15 DUPLEX CAROTID BILATERAL  
 
 
 
 
LABS (reviewed) Results for orders placed or performed during the hospital encounter of 11/28/18 POC CREATININE Result Value Ref Range Creatinine (POC) 1.1 0.6 - 1.3 mg/dL GFRAA, POC >60 >60 ml/min/1.73m2 GFRNA, POC >60 >60 ml/min/1.73m2 Physical Exam:  
 
Visit Vitals /60 (BP 1 Location: Right arm, BP Patient Position: Sitting) Pulse 70 Temp 98.6 °F (37 °C) (Oral) Resp 16 Ht 5' 6\" (1.676 m) Wt 88.9 kg (196 lb) SpO2 96% BMI 31.64 kg/m² General:  Alert, cooperative, no distress. Head:  Normocephalic, without obvious abnormality, atraumatic. Eyes:  Conjunctivae/corneas clear. Lungs: 
Heart:   Non labored breathing Regular rate and rhythm, no carotid bruits Abdomen:   Soft, non-distended Extremities: Extremities normal, atraumatic, no cyanosis or edema. Pulses: 2+ and symmetric all extremities. Skin: Skin color, texture, turgor normal. No rashes or lesions. Neurologic Exam  
  Gen: Attention normal 
           Language: naming, repetition, fluency normal 
           Memory: intact recent and remote memory Cranial Nerves: 
I: smell Not tested II: visual fields Full to confrontation II: pupils Equal, round, reactive to light II: optic disc No papilledema III,VII: ptosis none III,IV,VI: extraocular muscles  Full ROM V: mastication normal  
V: facial light touch sensation  normal  
VII: facial muscle function   symmetric VIII: hearing symmetric IX: soft palate elevation  normal  
XI: trapezius strength  5/5 XI: sternocleidomastoid strength 5/5  
 XI: neck flexion strength  5/5 XII: tongue  midline Motor: normal bulk and tone, no tremor Strength: 5/5 all four extremities 
(+) tenderness near base of toes on palpitation Sensory: intact to LT, PP, vibration, and JPS Reflexes: 1+ throughout; Down going toes Coordination: Good FTN and HTS Gait: normal gait including tandem Impression:  
 
Yudi Bains is a 67 y.o. male who  has a past medical history of Arthritis of foot, degenerative, Balance disorder, Bladder tumor (2004), CAD (coronary artery disease), Carotid stenosis (10/20/15), Cataract, COPD (chronic obstructive pulmonary disease) ([facility] [address] GERD (gastroesophageal reflux disease), HTN (hypertension), Hyperlipidemia LDL goal < 70, Osteoporosis, PHN (postherpetic neuralgia) (2012), Physiological tremor, Prostate cancer ([facility] [address] (02/2004), Pulmonary nodule, right, Shingles (12/12/12), and Urinary retention (01/10/2018). who for the past 2 yrs, noted progressive pain in bottom of both feet which radiates to knees, constant, dull aching, described as \"walking on stones\", 9/10, worse when putting pressure in the bottom of both feet while standing and walking, better when laying down and sitting. Tylenon and biofreeze helped. Patient saw Prince Boucher and was given customized shoes with minimal benefit. Considerations include metatarsalgia (tenderness on palpation of base of toes), arthritis of the foot, neuromas and neuropathy (althought sensation and reflexes are intact) RECOMMENDATIONS 1. I had a long discussion with patient and wife. Discussed diagnosis, prognosis, pathophysiology and available treatment. All questions were answered. 2. Will do X-ray of both feet to look for arthrtis 3. EMG/NCS of both LE looking for neuropathy 4. If above tests are unremarkable, will refer to physical therapy and back to his Podiatrist Dr Silvia Delong to consider treatment for metatarsalgia Follow-up and Dispositions · Return for above testing. Thank you for the consultation Bia Clarke MD 
Diplomate, American Board of Psychiatry and Neurology Diplomate, Neuromuscular Medicine Diplomate, [address] [place] [address] Board of Electrodiagnostic Medicine CC: Dharmesh Le MD 
Fax: [phone]

## 2019-10-31 NOTE — PROGRESS NOTES
Chief Complaint Patient presents with  New Patient Patient is here today c/o pain, that started in his toes and is progressing up both of his legs x 1 1/2 yrs. He also noticed tremors in both hands 4 months ago. Visit Vitals /60 (BP 1 Location: Right arm, BP Patient Position: Sitting) Pulse 70 Temp 98.6 °F (37 °C) (Oral) Resp 16 Ht 5' 6\" (1.676 m) Wt 88.9 kg (196 lb) SpO2 96% BMI 31.64 kg/m²

## 2019-10-31 NOTE — LETTER
10/31/19 Patient: Priyanka Jon YOB: 1940 Date of Visit: 10/31/2019 Renata Purcell MD 
[place] [address] [address] [facility] [address] VIA In Basket Dear Renata Purcell MD, Thank you for referring Mr. Williams Moritz to [facility] for evaluation. My notes for this consultation are attached. If you have questions, please do not hesitate to call me. I look forward to following your patient along with you. Sincerely, Sudeep Agarwal MD

## 2019-11-21 PROBLEM — G62.89 AXONAL SENSORIMOTOR NEUROPATHY: Status: ACTIVE | Noted: 2019-01-01

## 2019-11-21 NOTE — PROGRESS NOTES
EMG/NCS done. See Procedure Note for results. 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     Denies alcohol  Has not done X-ray of feet    A> Bilateral foot pain, possible polyneuropathy  Evaluate for arthritis    P> 1) Blood test for HUI, SPEP, BEHZAD, 2 hr OGTT, Vit B12, Folate, Vit B6  2) Xray of both feet  3) Gabapentin 300 mg QHS for pain    FU in 1 month      Arpita Sanchez MD

## 2019-11-21 NOTE — PROCEDURES
EMG/ NCS Report  DRUG REHABILITATION  - DAY ONE [facility]IDENCE  [facility]  [facility], [address] [place], [address]   Ph: [phone]   FAX: [phone]/ [phone]  Test Date:  2019    Patient: Latrice Wallace : 1940 Physician: Arthuro Mcburney, MD   Sex: Male Height: ' \" Ref Phys: Antonina Mora MD   ID#: [id number] Weight:  lbs. Technician: Manuel Larsen     Patient History / Exam:  Patient is coming for neuropathy evaluation. Venkata Goel is a 67 y.o. male who  has a past medical history of Arthritis of foot, degenerative, Balance disorder, Bladder tumor (), CAD (coronary artery disease), Carotid stenosis (10/20/15), Cataract, COPD (chronic obstructive pulmonary disease) ([facility] [address] GERD (gastroesophageal reflux disease), HTN (hypertension), Hyperlipidemia LDL goal < 70, Osteoporosis, PHN (postherpetic neuralgia) (), Physiological tremor, Prostate cancer ([facility] [address] (2004), Pulmonary nodule, right, Shingles (12), and Urinary retention (01/10/2018). who for the past 2 yrs, noted progressive pain in bottom of both feet which radiates to knees, constant, dull aching, described as \"walking on stones\", 9/10, worse when putting pressure in the bottom of both feet while standing and walking, better when laying down and sitting. Tylenon and biofreeze helped. Patient saw Saniya Barlow President and was given customized shoes with minimal benefit.       Exam: Patient awake, alert, follows commands, clear speech; hearing grossly intact; EOMI, (-) facial asymmetry, tongue midline; Strength: 5/5 all four extremities  (+) tenderness near base of toes on palpitation  Sensory: intact to LT, PP, vibration, and JPS  Reflexes: 1+ throughout; Down going toes  Coordination: Good FTN and HTS  Gait: normal gait including tandem         EMG & NCV Findings:  Evaluation of the left Fibular motor and the right Fibular motor nerves showed normal distal onset latency (L4.1, R4.1 ms), reduced amplitude (L0.6, R0.4 mV), normal conduction velocity (B Fib-Ankle, L43, R38 m/s), and normal conduction velocity (Poplt-B Fib, L56, R42 m/s). The left Fibular TA motor and the right Fibular TA motor nerves showed normal distal onset latency (L3.2, R4.0 ms), normal amplitude (L7.8, R6.5 mV), and normal conduction velocity (Poplit-Fib Head, L63, R50 m/s). The left tibial motor and the right tibial motor nerves showed normal distal onset latency (L4.2, R5.3 ms), reduced amplitude (L0.5, R0.1 mV), and normal conduction velocity (Knee-Ankle, L41, R43 m/s). The left Sup Fibular sensory and the right Sup Fibular sensory nerves showed no response (Lower leg). The left sural sensory nerve showed no response (Calf). The right sural sensory nerve showed no response (Calf) and no response (Site 2). F Wave studies indicate that the left tibial F wave has no response. The right tibial F wave has no response. All H Reflex left vs. right side latency differences were within normal limits. Needle evaluation of the left extensor digitorum brevis, the left abductor hallucis, the right extensor digitorum brevis, and the right abductor hallucis muscles showed recruitment. All remaining muscles (as indicated in the following table) showed no evidence of electrical instability. 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           Amanda De León MD  Diplomate, American Board of Psychiatry and Neurology  Diplomate, Neuromuscular Medicine  Diplomate, American Board of Electrodiagnostic Medicine  Director, [address] [place] [address] Accredited Laboratory with Exemplary Status          Nerve Conduction Studies  Anti Sensory Summary Table     Stim Site NR Peak (ms) Norm Peak (ms) P-T Amp (µV) Norm P-T Amp Site1 Site2 Dist (cm)   Left Sup Fibular Anti Sensory (Lat ankle)  31.4°C   Lower leg NR  <4.6  >4 Lower leg Lat ankle 10.0   Right Sup Fibular Anti Sensory (Lat ankle)  30.7°C   Lower leg NR  <4.6  >4 Lower leg Lat ankle 10.0   Left Sural Anti Sensory (Lat Mall)  31.4°C   Calf NR  <4.5  >4.0 Calf Lat Mall 14.0   Right Sural Anti Sensory (Lat Mall)  30.8°C   Calf NR  <4.5  >4.0 Calf Lat Mall 14.0   Site 2 NR            Motor Summary Table     Stim Site NR Onset (ms) Norm Onset (ms) O-P Amp (mV) Norm O-P Amp Amp (Prev) (%) Site1 Site2 Dist (cm) Konrad (m/s) Norm Konrad (m/s)   Left Fibular Motor (Ext Dig Brev)  31.3°C   Ankle    4.1 <6.5 0.6 >1.1 100.0 Ankle Ext Dig Brev 8.0     B Fib    11.5  0.5  83.3 B Fib Ankle 32.0 43 >38   Poplt    13.3  0.4  80.0 Poplt B Fib 10.0 56 >42   Right Fibular Motor (Ext Dig Brev)  30.8°C   Ankle    4.1 <6.5 0.4 >1.1 100.0 Ankle Ext Dig Brev 8.0     B Fib    11.4  0.2  50.0 B Fib Ankle 28.0 38 >38   Poplt    13.8  0.1  50.0 Poplt B Fib 10.0 42 >42   Left Fibular TA Motor (Tib Ant)  31.1°C   Fib Head    3.2 <4.5 7.8 >3.0 100.0 Fib Head Tib Ant 10.0     Poplit    4.8  7.6  97.4 Poplit Fib Head 10.0 63 >40   Right Fibular TA Motor (Tib Ant)  30.7°C   Fib Head    4.0 <4.5 6.5 >3.0 100.0 Fib Head Tib Ant 10.0     Poplit    6.0  6.5  100.0 Poplit Fib Head 10.0 50 >40   Left Tibial Motor (Abd Love Brev)  31.1°C   Ankle    4.2 <6.1 0.5 >1.1 100.0 Ankle Abd Love Brev 8.0     Knee    12.3  0.5  100.0 Knee Ankle 33.0 41 >39   Right Tibial Motor (Abd Love Brev)  30.7°C   Ankle    5.3 <6.1 0.1 >1.1 100.0 Ankle Abd Love Brev 8.0     Knee    13.6  0.1  100.0 Knee Ankle 36.0 43 >39     F Wave Studies     NR F-Lat (ms) Lat Norm (ms) L-R F-Lat (ms) L-R Lat Norm   Left Tibial (Mrkrs) (Abd Hallucis)  31.2°C   NR  <56  <5.7   Right Tibial (Mrkrs) (Abd Hallucis)  30.7°C   NR  <56  <5.7     H Reflex Studies     NR H-Lat (ms) L-R H-Lat (ms) L-R Lat Norm   Left Tibial (Gastroc)  31.2°C      32.59 1.55 <2.0   Right Tibial (Gastroc)  30.7°C      34.14 1.55 <2.0     EMG     Side Muscle Nerve Root Ins Act Fibs Psw Recrt Duration Amp Poly Comment   Left Ext Dig Brev Dp Br Peron L5, S1 Nml Nml Nml SMU Nml Nml Nml    Left AbdHallucis MedPlantar S1-2 Nml Nml Nml SMU Nml Nml Nml    Left AntTibialis Dp Br Peron L4-5 Nml Nml Nml Nml Nml Nml Nml    Left MedGastroc Tibial S1-2 Nml Nml Nml Nml Nml Nml Nml    Left VastusLat Femoral L2-4 Nml Nml Nml Nml Nml Nml Nml    Right Ext Dig Brev Dp Br Peron L5, S1 Nml Nml Nml SMU Nml Nml Nml    Right AbdHallucis MedPlantar S1-2 Nml Nml Nml SMU Nml Nml Nml    Right AntTibialis Dp Br Peron L4-5 Nml Nml Nml Nml Nml Nml Nml    Right MedGastroc Tibial S1-2 Nml Nml Nml Nml Nml Nml Nml    Right VastusLat Femoral L2-4 Nml Nml Nml Nml Nml Nml Nml    Right GluteusMed SupGluteal L4-S1 Nml Nml Nml Nml Nml Nml Nml    Right Lower Lumb Parasp Rami L5,S1 Nml Nml Nml Nml Nml Nml Nml                Nerve Conduction Studies  Anti Sensory Left/Right Comparison     Stim Site L Lat (ms) R Lat (ms) L-R Lat (ms) L Amp (µV) R Amp (µV) L-R Amp (%) Site1 Site2 L Konrad (m/s) R Konrad (m/s) L-R Konrad (m/s)   Sup Fibular Anti Sensory (Lat ankle)  31.4°C   Lower leg       Lower leg Lat ankle      Sural Anti Sensory (Lat Mall)  31.4°C   Calf       Calf Lat Mall        Motor Left/Right Comparison     Stim Site L Lat (ms) R Lat (ms) L-R Lat (ms) L Amp (mV) R Amp (mV) L-R Amp (%) Site1 Site2 L Konrad (m/s) R Konrad (m/s) L-R Konrad (m/s)   Fibular Motor (Ext Dig Brev)  31.3°C   Ankle 4.1 4.1 0.0 0.6 0.4 33.3 Ankle Ext Dig Brev      B Fib 11.5 11.4 0.1 0.5 0.2 60.0 B Fib Ankle 43 38 5   Poplt 13.3 13.8 0.5 0.4 0.1 75.0 Poplt B Fib 56 42 14   Fibular TA Motor (Tib Ant)  31.1°C   Fib Head 3.2 4.0 0.8 7.8 6.5 16.7 Fib Head Tib Ant Poplit 4.8 6.0 1.2 7.6 6.5 14.5 Poplit Fib Head 63 50 13   Tibial Motor (Abd Love Brev)  31.1°C   Ankle 4.2 5.3 1.1 0.5 0.1 80.0 Ankle Abd Love Brev      Knee 12.3 13.6 1.3 0.5 0.1 80.0 Knee Ankle 41 43 2         Waveforms:

## 2019-11-21 NOTE — LETTER
2019 6:32 PM 
 
Patient:  Grover Darling YOB: 1940 Date of Visit: 2019 Dear MD [address] [place] [address] [address] [facility] 99 [phone] VIA In Basket Karishma Farrar MD 
[address] [facility] 99 [address] VIA In Basket 
 : Thank you for referring Mr. Kendy Musa to me for EMG/NCS. EMG/ NCS Report [address] [address], [address] [facility] [place] [address] Ph: [phone] FAX: [phone] Test Date:  2019 PatientDelbraeden Madrigal : 1940 Physician: Melvin Elizabeth MD  
Sex: Male Height: ' \" Ref Phys: Bob Aguilar MD  
ID#: [id number] Weight:  lbs. Technician: Sandi Fuller Patient History / Exam: 
Patient is coming for neuropathy evaluation. Grover Darling is a 67 y.o. male who  has a past medical history of Arthritis of foot, degenerative, Balance disorder, Bladder tumor (), CAD (coronary artery disease), Carotid stenosis (10/20/15), Cataract, COPD (chronic obstructive pulmonary disease) ([facility] [address] GERD (gastroesophageal reflux disease), HTN (hypertension), Hyperlipidemia LDL goal < 70, Osteoporosis, PHN (postherpetic neuralgia) (), Physiological tremor, Prostate cancer ([facility] [address] (2004), Pulmonary nodule, right, Shingles (12), and Urinary retention (01/10/2018). who for the past 2 yrs, noted progressive pain in bottom of both feet which radiates to knees, constant, dull aching, described as \"walking on stones\", 9/10, worse when putting pressure in the bottom of both feet while standing and walking, better when laying down and sitting. Tylenon and biofreeze helped. Patient saw Monique Farrar and was given customized shoes with minimal benefit.  
 
 
Exam: Patient awake, alert, follows commands, clear speech; hearing grossly intact; EOMI, (-) facial asymmetry, tongue midline; Strength: 5/5 all four extremities 
(+) tenderness near base of toes on palpitation Sensory: intact to LT, PP, vibration, and JPS Reflexes: 1+ throughout; Down going toes Coordination: Good FTN and HTS Gait: normal gait including tandem EMG & NCV Findings: 
Evaluation of the left Fibular motor and the right Fibular motor nerves showed normal distal onset latency (L4.1, R4.1 ms), reduced amplitude (L0.6, R0.4 mV), normal conduction velocity (B Fib-Ankle, L43, R38 m/s), and normal conduction velocity (Poplt-B Fib, L56, R42 m/s). The left Fibular TA motor and the right Fibular TA motor nerves showed normal distal onset latency (L3.2, R4.0 ms), normal amplitude (L7.8, R6.5 mV), and normal conduction velocity (Poplit-Fib Head, L63, R50 m/s). The left tibial motor and the right tibial motor nerves showed normal distal onset latency (L4.2, R5.3 ms), reduced amplitude (L0.5, R0.1 mV), and normal conduction velocity (Knee-Ankle, L41, R43 m/s). The left Sup Fibular sensory and the right Sup Fibular sensory nerves showed no response (Lower leg). The left sural sensory nerve showed no response (Calf). The right sural sensory nerve showed no response (Calf) and no response (Site 2). F Wave studies indicate that the left tibial F wave has no response. The right tibial F wave has no response. All H Reflex left vs. right side latency differences were within normal limits. Needle evaluation of the left extensor digitorum brevis, the left abductor hallucis, the right extensor digitorum brevis, and the right abductor hallucis muscles showed recruitment. All remaining muscles (as indicated in the following table) showed no evidence of electrical instability. 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 Lucio Bolton MD 
Diplomate, American Board of Psychiatry and Neurology Diplomate, Neuromuscular Medicine Diplomate, [address] [place] [address] Board of Electrodiagnostic Medicine Director, [address] [place] [address] Accredited Laboratory with Exemplary Status Nerve Conduction Studies Anti Sensory Summary Table Stim Site NR Peak (ms) Norm Peak (ms) P-T Amp (µV) Norm P-T Amp Site1 Site2 Dist (cm) Left Sup Fibular Anti Sensory (Lat ankle)  31.4°C Lower leg NR  <4.6  >4 Lower leg Lat ankle 10.0 Right Sup Fibular Anti Sensory (Lat ankle)  30.7°C Lower leg NR  <4.6  >4 Lower leg Lat ankle 10.0 Left Sural Anti Sensory (Lat Mall)  31.4°C Calf NR  <4.5  >4.0 Calf Lat Mall 14.0 Right Sural Anti Sensory (Lat Mall)  30.8°C Calf NR  <4.5  >4.0 Calf Lat Mall 14.0 Site 2 NR Motor Summary Table Stim Site NR Onset (ms) Norm Onset (ms) O-P Amp (mV) Norm O-P Amp Amp (Prev) (%) Site1 Site2 Dist (cm) Konrad (m/s) Norm Konrad (m/s) Left Fibular Motor (Ext Dig Brev)  31.3°C Ankle    4.1 <6.5 0.6 >1.1 100.0 Ankle Ext Dig Brev 8.0 B Fib    11.5  0.5  83.3 B Fib Ankle 32.0 43 >38 Poplt    13.3  0.4  80.0 Poplt B Fib 10.0 56 >42 Right Fibular Motor (Ext Dig Brev)  30.8°C Ankle    4.1 <6.5 0.4 >1.1 100.0 Ankle Ext Dig Brev 8.0 B Fib    11.4  0.2  50.0 B Fib Ankle 28.0 38 >38 Poplt    13.8  0.1  50.0 Poplt B Fib 10.0 42 >42 Left Fibular TA Motor (Tib Ant)  31.1°C Fib Head    3.2 <4.5 7.8 >3.0 100.0 Fib Head Tib Ant 10.0 Poplit    4.8  7.6  97.4 Poplit Fib Head 10.0 63 >40 Right Fibular TA Motor (Tib Ant)  30.7°C Fib Head    4.0 <4.5 6.5 >3.0 100.0 Fib Head Tib Ant 10.0 Poplit    6.0  6.5  100.0 Poplit Fib Head 10.0 50 >40 Left Tibial Motor (Abd Love Brev)  31.1°C Ankle    4.2 <6.1 0.5 >1.1 100.0 Ankle Abd Love Brev 8.0 Knee    12.3  0.5  100.0 Knee Ankle 33.0 41 >39 Right Tibial Motor (Abd Love Brev)  30.7°C Ankle    5.3 <6.1 0.1 >1.1 100.0 Ankle Abd Love Brev 8.0 Knee    13.6  0.1  100.0 Knee Ankle 36.0 43 >39 F Wave Studies NR F-Lat (ms) Lat Norm (ms) L-R F-Lat (ms) L-R Lat Norm Left Tibial (Mrkrs) (Abd Hallucis)  31.2°C NR  <56  <5.7 Right Tibial (Mrkrs) (Abd Hallucis)  30.7°C NR  <56  <5.7 H Reflex Studies NR H-Lat (ms) L-R H-Lat (ms) L-R Lat Norm Left Tibial (Gastroc)  31.2°C  
   32.59 1.55 <2.0 Right Tibial (Gastroc)  30.7°C  
   34.14 1.55 <2.0 EMG Side Muscle Nerve Root Ins Act Fibs Psw Recrt Duration Amp Poly Comment Left Ext Dig Brev Dp Br Peron L5, S1 Nml Nml Nml SMU Nml Nml Nml Left AbdHallucis MedPlantar S1-2 Nml Nml Nml SMU Nml Nml Nml Left AntTibialis Dp Br Peron L4-5 Nml Nml Nml Nml Nml Nml Nml Left MedGastroc Tibial S1-2 Nml Nml Nml Nml Nml Nml Nml Left VastusLat Femoral L2-4 Nml Nml Nml Nml Nml Nml Nml Right Ext Dig Brev Dp Br Peron L5, S1 Nml Nml Nml SMU Nml Nml Nml Right AbdHallucis MedPlantar S1-2 Nml Nml Nml SMU Nml Nml Nml Right AntTibialis Dp Br Peron L4-5 Nml Nml Nml Nml Nml Nml Nml Right MedGastroc Tibial S1-2 Nml Nml Nml Nml Nml Nml Nml Right VastusLat Femoral L2-4 Nml Nml Nml Nml Nml Nml Nml Right GluteusMed SupGluteal L4-S1 Nml Nml Nml Nml Nml Nml Nml Right Lower Lumb Parasp Rami L5,S1 Nml Nml Nml Nml Nml Nml Nml Nerve Conduction Studies Anti Sensory Left/Right Comparison Stim Site L Lat (ms) R Lat (ms) L-R Lat (ms) L Amp (µV) R Amp (µV) L-R Amp (%) Site1 Site2 L Konrad (m/s) R Konrad (m/s) L-R Konrad (m/s) Sup Fibular Anti Sensory (Lat ankle)  31.4°C Lower leg       Lower leg Lat ankle Sural Anti Sensory (Lat Mall)  31.4°C  
 Calf       Calf Lat Mall Motor Left/Right Comparison Stim Site L Lat (ms) R Lat (ms) L-R Lat (ms) L Amp (mV) R Amp (mV) L-R Amp (%) Site1 Site2 L Konrad (m/s) R Konrad (m/s) L-R Konrad (m/s) Fibular Motor (Ext Dig Brev)  31.3°C Ankle 4.1 4.1 0.0 0.6 0.4 33.3 Ankle Ext Dig Brev     
B Fib 11.5 11.4 0.1 0.5 0.2 60.0 B Fib Ankle 43 38 5 Poplt 13.3 13.8 0.5 0.4 0.1 75.0 Poplt B Fib 56 42 14 Fibular TA Motor (Tib Ant)  31.1°C Fib Head 3.2 4.0 0.8 7.8 6.5 16.7 Fib Head Tib Ant Poplit 4.8 6.0 1.2 7.6 6.5 14.5 Poplit Fib Head 63 50 13 Tibial Motor (Abd Love Brev)  31.1°C Ankle 4.2 5.3 1.1 0.5 0.1 80.0 Ankle Abd Love Brev     
Knee 12.3 13.6 1.3 0.5 0.1 80.0 Knee Ankle 41 43 2 Waveforms:

## 2019-11-26 NOTE — TELEPHONE ENCOUNTER
----- Message from Surekha Mehta sent at 11/26/2019 11:53 AM EST -----  Regarding: Dr. Lyudmila Grimes Message/Vendor Calls    Caller's first and last name: wife- Penny Summers      Reason for call: Test results      Callback required yes/no and why: yes      Best contact number(s): [phone]      Details to clarify the request:      Surekha Mehta

## 2019-11-27 NOTE — TELEPHONE ENCOUNTER
Per Dr. Mindy Demarco:  Pls inform patient X-rays of the foot reveales severe arthritis and bone spurs which can explain his foot pain. Also follow up his 2 hr oral glucose test.     P> Send X-ray copy to his podiatrist.     Zion Matthew and spoke with patient and gave him results and MDs recommendations.   I routed x-rays in [facility] to Dr. Silvia Delong.

## 2019-12-04 NOTE — TELEPHONE ENCOUNTER
----- Message from Ingris Sorenson sent at 12/4/2019  2:34 PM EST -----  Regarding: Lopez Jackson/Telephone  General Message/Vendor Calls    Caller's first and last name:  [facility]    Reason for call: Pt's wife, [facility] called on behalf of the pt. She questioned if is there was anything that can be done to treat the pt's spur condition in each foot which is causing him pain and discomfort before December 27th.        Callback required yes/no and why: Yes       Best contact number(s):[phone]        Details to clarify the request: N/A

## 2019-12-05 NOTE — TELEPHONE ENCOUNTER
Per Dr. Raquel Dumas:    Bia Macias MD  [facility], April M, LPN   Caller: Unspecified Basim Mae,  3:53 PM)             Clarify if patient started the Gabapentin 300 mg QHS yet. If so, offer if he wants to increase it to 300 mg BID. Schedule a follow up in 1 month. Called and spoke with patient. He said he is only taking Gabapentin 300 qhs. So, I advised him to increase to twice a day. Patient said he is scheduled to see Dr. Raquel Dumas 12/27 and he has not scheduled an appt back with his podiatrist.  I explained to him that the podiatrist is going to tell him how to treat the bone spurs. He stated understanding and then asked that I also explain it to his wife---she states understanding.

## 2019-12-10 NOTE — TELEPHONE ENCOUNTER
Patient's wife would like a call to be scheduled for a follow up before his next available on Feb 4 '20.

## 2019-12-13 NOTE — LETTER
12/13/19 Patient: Alejandrina Carty YOB: 1940 Date of Visit: 12/13/2019 MD [address] [facility] 99 [address] VIA In Basket Emil Larry MD 
[address] [facility] 99 [address] VIA In Basket Dear MD Emil Licea MD, Thank you for referring Mr. Angel Roman to [facility] for evaluation. My notes for this consultation are attached. If you have questions, please do not hesitate to call me. I look forward to following your patient along with you. Sincerely, Dina Meléndez MD

## 2019-12-13 NOTE — PROGRESS NOTES
Chief Complaint Patient presents with  Follow-up Patient is here today to f/u from last visit. Him and his wife were informed of the x-ray results and he is scheduled to see his podiatrist on Monday 12/16 at [facility]. Visit Vitals /80 (BP 1 Location: Right arm, BP Patient Position: Sitting) Pulse 70 Temp 98.6 °F (37 °C) (Oral) Resp 16 Ht 5' 6\" (1.676 m) Wt 88.9 kg (196 lb) SpO2 98% BMI 31.64 kg/m²

## 2019-12-13 NOTE — PROGRESS NOTES
Neurology Progress Note Patient ID: Venkata Goel [id number] 
64 y.o. 
1940 Subjective:  
History: 
Venkata Goel is a 67 y.o. male who  has a past medical history of Arthritis of foot, degenerative, Balance disorder, Bladder tumor (), CAD (coronary artery disease), Carotid stenosis (10/20/15), Cataract, [facility] ([facility]) ([facility] GERD (gastroesophageal reflux disease), HTN (hypertension), Hyperlipidemia LDL goal < 70, Osteoporosis, PHN (postherpetic neuralgia) (), Physiological tremor, [facility] ([facility] ([facility]), Pulmonary nodule, right, Shingles (12), and Urinary retention (01/10/2018). who for the past 2 yrs, noted progressive pain in bottom of both feet which radiates to knees, constant, dull aching, described as \"walking on stones\", 9/10, worse when putting pressure in the bottom of both feet while standing and walking, better when laying down and sitting. Tylenon and biofreeze helped. Patient saw Saniya Barlow President and was given customized shoes with minimal benefit. Patient had X-ray of both feet showing bilateral first MTP osteoarthritis, asymmetric to the left (severe) Bilateral plantar spurs, asymmetric to the right. Patient went back to see his podiatrist who planst to do surgery. Patient also now on Gabapentin 300 mg BID with some benefit. Was unable to do 2 hr OGTT. Recent test done: 
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 
  
 
ROS: 
Per HPI- 
Otherwise the remainder of ROS was negative Social Hx Social History Socioeconomic History  Marital status:  Spouse name: Not on file  Number of children: Not on file  Years of education: Not on file  Highest education level: Not on file Tobacco Use  Smoking status: Former Smoker Types: Cigarettes Last attempt to quit: 2003 Years since quittin.0  Smokeless tobacco: Never Used Substance and Sexual Activity  Alcohol use: No  
 Drug use: Never Meds: 
Current Outpatient Medications on File Prior to Visit Medication Sig Dispense Refill  simvastatin (ZOCOR) 20 mg tablet TAKE 1 TABLET BY MOUTH ONCE DAILY IN THE EVENING 90 Tab 1  raNITIdine (ZANTAC) 150 mg tablet TAKE ONE TABLET BY MOUTH EVERY DAY PLUS TAKE ONE TABLET AT BEDTIME 60 Tab 5  tiZANidine (ZANAFLEX) 4 mg tablet Take 1 Tab by mouth every six (6) hours. Indications: muscle spasm 30 Tab 0  
 acetaminophen (TYLENOL ARTHRITIS PAIN) 650 mg TbER Take 1 Tab by mouth every six (6) hours as needed for Pain. 60 Tab 5  
 albuterol (PROAIR HFA) 90 mcg/actuation inhaler USE 2 PUFFS EVERY 8 HOURS AS NEEDED 1 Inhaler 11  
 lisinopril (PRINIVIL, ZESTRIL) 20 mg tablet TAKE 1 TABLET BY MOUTH ONCE DAILY 90 Tab 3  
 atenolol (TENORMIN) 50 mg tablet TAKE ONE TABLET BY MOUTH EVERY DAY 90 Tab 1  
 L. acidoph & paracasei- S therm- Bifido (DENA-Q/RISAQUAD) 8 billion cell cap cap Take 1 Cap by mouth daily. 30 Cap 0  
 tamsulosin (FLOMAX) 0.4 mg capsule Take 1 Cap by mouth daily. 30 Cap 0  
 albuterol (ACCUNEB) 1.25 mg/3 mL nebu 3 mL by Nebulization route every six (6) hours as needed. Indications: Chronic Obstructive Pulmonary Disease 30 Each 1  
 clopidogrel (PLAVIX) 75 mg tab Take 75 mg by mouth daily.  promethazine (PHENERGAN) 25 mg tablet Take 1 Tab by mouth every six (6) hours as needed for Nausea. 12 Tab 0  
 CALCIUM CARBONATE/VITAMIN D2 (CALCIUM 600 WITH VITAMIN D2 PO) Take 1 Tab by mouth two (2) times a day.  multivitamin (ONE A DAY) tablet Take 1 Tab by mouth daily.  OTHER Biofreeze Ointment applied twice daily to feet as needed for arthritis  SPIRIVA WITH HANDIHALER 18 mcg inhalation capsule Inhale the contents of 1  capsule via HandiHaler  daily 90 Cap 2  
 fluticasone-vilanterol (BREO ELLIPTA) 100-25 mcg/dose inhaler Take 1 Puff by inhalation daily.  LEUPROLIDE ACETATE (LUPRON DEPOT, 4 MONTH, IM) by IntraMUSCular route. Every 4 months Receives in providers office Prescribed by Dr. Jarvis Chakraborty No current facility-administered medications on file prior to visit. Imaging: CT Results (recent): 
Results from Hospital Encounter encounter on 11/28/18 CT ABD PELV W CONT Narrative EXAM:  CT ABD PELV W CONT INDICATION: Prostate cancer ([address] COMPARISON: 1/3/2018 CONTRAST:  100 mL of Isovue-370. TECHNIQUE:  
Following the uneventful intravenous administration of contrast, thin axial 
images were obtained through the abdomen and pelvis. Coronal and sagittal 
reconstructions were generated. Oral contrast was administered. CT dose 
reduction was achieved through use of a standardized protocol tailored for this 
examination and automatic exposure control for dose modulation. FINDINGS:  
LUNG BASES: Clear. INCIDENTALLY IMAGED HEART AND MEDIASTINUM: Unremarkable. LIVER: No mass or biliary dilatation. GALLBLADDER: Surgically absent SPLEEN: No mass. PANCREAS: No mass or ductal dilatation. ADRENALS: Unremarkable. KIDNEYS: No mass, calculus, or hydronephrosis. STOMACH: Unremarkable. SMALL BOWEL: No dilatation or wall thickening. COLON: No dilatation or wall thickening. APPENDIX: Unremarkable. PERITONEUM: No ascites or pneumoperitoneum. RETROPERITONEUM: No lymphadenopathy or aortic aneurysm. There are extensive 
atherosclerotic changes REPRODUCTIVE ORGANS: Prostate gland is normal size URINARY BLADDER: No mass or calculus. BONES: Compression deformity of L1 is unchanged ADDITIONAL COMMENTS: Small focus of soft tissue density left inguinal region is 
unchanged Impression IMPRESSION: 
 
1. No adenopathy or mass is identified. There is no evidence of metastatic 
disease. There is extensive atherosclerotic changes with narrowing of the aorta at the 
level of the renal arteries and there is narrowing of the common iliac arteries and SMA MRI Results (recent): 
Results from Hospital Encounter encounter on 10/08/19 MRI LUMB SPINE W WO CONT Narrative EXAM: MRI LUMB SPINE W WO CONT INDICATION: Low back pain, cancer or infection suspected or immune compromise; 
Weakness, leg, progressive. Pain in right leg COMPARISON: None TECHNIQUE: MR imaging of the lumbar spine was performed using the following 
sequences: sagittal T1, T2, STIR;  axial T1, T2 prior to and following contrast 
administration. CONTRAST: 18 mL of Dotarem. FINDINGS: 
 
Remote compression deformity of L1 with good alignment. No evidence for bone 
marrow replacement. The conus appears unremarkable. No enhancing lesion 
paraspinal masses or fluid collections. T12-L1 shows no significant canal or foraminal compromise. L1-L2 show some minimal facet disease, no canal or foraminal compromise. L2-L3 shows some mild facet disease. There is no significant canal or foraminal 
compromise. L3-L4 shows some minimal facet disease. No canal or foraminal compromise. L4-5 show some moderate facet disease. There is central disc bulging but there 
is no significant foraminal or canal compromise. L5-S1 shows chronic disc degeneration with decreased height and intensity. Central bulge, mild bilateral facet hypertrophy but no significant canal or 
foraminal compromise. Impression IMPRESSION: Chronic compression deformity L1. Diffuse mild degenerative disc and joint disease without focal findings. No enhancing lesion or masses. IR Results (recent): No results found for this or any previous visit. VAS/US Results (recent): 
Results from Hospital Encounter encounter on 10/20/15 DUPLEX CAROTID BILATERAL Reviewed records in [employer] and [employer] tab today Lab Review Hospital Outpatient Visit on 11/22/2019 Component Date Value Ref Range Status  Vitamin B12 11/22/2019 846  193 - 986 pg/mL Final  
  Folate 11/22/2019 41.2* 5.0 - 21.0 ng/mL Final  
 Immunoglobulin G, Qt. 11/22/2019 625* 700 - 1,600 mg/dL Final  
 Immunoglobulin A, Qt. 11/22/2019 202  61 - 437 mg/dL Final  
 Immunoglobulin M, Qt. 11/22/2019 100  15 - 143 mg/dL Final  
 Protein, total 11/22/2019 7.1  6.0 - 8.5 g/dL Final  
 Albumin 11/22/2019 4.0  2.9 - 4.4 g/dL Final  
 Alpha-1-Globulin 11/22/2019 0.3  0.0 - 0.4 g/dL Final  
 Alpha-2-Globulin 11/22/2019 1.1* 0.4 - 1.0 g/dL Final  
 Beta Globulin 11/22/2019 1.2  0.7 - 1.3 g/dL Final  
 Gamma Globulin 11/22/2019 0.6  0.4 - 1.8 g/dL Final  
 M-Darci 11/22/2019 Not Observed  Not Observed g/dL Final  
 Globulin, total 11/22/2019 3.1  2.2 - 3.9 g/dL Final  
 A/G ratio 11/22/2019 1.3  0.7 - 1.7   Final  
 Immunofixation Result 11/22/2019 Comment    Final  
 Comment: (NOTE) An apparent normal immunofixation pattern.  Antinuclear Abs, IFA 11/22/2019 NEGATIVE     Final  
 Comment: (NOTE) Negative   <1:80 Borderline  1:80 Positive   >1:80 Performed At: [address] [id number] Cayden Guzman MD KP:[id number] 
  
 TSH 11/22/2019 0.52  0.36 - 3.74 uIU/mL Final  
 Comment:     
Due to TSH heterogeneity, both structurally and degree of glycosylation, monoclonal antibodies used in the TSH assay may not accurately quantitate TSH. Therefore, this result should be correlated with clinical findings as well as with other assessments of thyroid function, e.g., free T4, free T3. Objective:  
 
 
Exam: 
Visit Vitals /80 (BP 1 Location: Right arm, BP Patient Position: Sitting) Pulse 70 Temp 98.6 °F (37 °C) (Oral) Resp 16 Ht 5' 6\" (1.676 m) Wt 88.9 kg (196 lb) SpO2 98% BMI 31.64 kg/m² Gen: Awake, alert, follows commands Appropriate appearance, normal speech. Oriented to all spheres. No visual field defect on confrontation exam. 
Full eyes movement, with no nystagmus, no diplopia, no ptosis. Normal gag and swallow. All remaining cranial nerves were normal 
Motor: normal bulk and tone, no tremor Strength: 5/5 all four extremities 
(+) tenderness near base of toes on palpitation Sensory: intact to LT, PP, vibration, and JPS Reflexes: 1+ throughout; Down going toes Coordination: Good FTN and HTS Gait: normal gait including tandem Assessment: ICD-10-CM ICD-9-CM 1. Pain in both feet M79.671 729.5 gabapentin (NEURONTIN) 300 mg capsule  
 M79.672  GLUCOSE, 2 HR, PP GLUCOLA Metatarsalgia due to severe arthritis and spurs of both feet Generalized sensorimotor polyneuropathy, axon loss in type. 
  
RECOMMENDATIONS 1. Awaiting 2 hr OGTT. Rest of blood test were unremarkable 2. Continue Gabapentin 300 mg BID 4. Follow up with his Podiatrist Dr Justice Long re: surgery for arthritis and spurs of both feet Follow-up and Dispositions · Return in about 3 months (around 3/13/2020). Fariha Zurita MD 
Diplomate, American Board of Psychiatry and Neurology Diplomate, Neuromuscular Medicine Diplomate, [address] [place] [address] Board of Electrodiagnostic Medicine

## 2019-12-30 NOTE — TELEPHONE ENCOUNTER
Per Dr. Frey Fam:    Edmundo Monk MD  [facility], April M, LPN             Pls inform patient blood test was unremarkable for diabetes          Called and spoke with patient and gave him results and MDs recommendations. Patient states understanding.

## 2020-01-01 ENCOUNTER — HOSPITAL ENCOUNTER (EMERGENCY)
Age: 80
Discharge: HOME | End: 2020-02-21
Attending: EMERGENCY MEDICINE
Payer: MEDICARE

## 2020-01-01 ENCOUNTER — ANESTHESIA EVENT (OUTPATIENT)
Dept: SURGERY | Age: 80
DRG: 989 | End: 2020-01-01
Payer: MEDICARE

## 2020-01-01 ENCOUNTER — APPOINTMENT (OUTPATIENT)
Dept: GENERAL RADIOLOGY | Age: 80
DRG: 989 | End: 2020-01-01
Attending: PODIATRIST
Payer: MEDICARE

## 2020-01-01 ENCOUNTER — HOSPITAL ENCOUNTER (OUTPATIENT)
Age: 80
Setting detail: OUTPATIENT SURGERY
Discharge: HOME | DRG: 989 | End: 2020-06-04
Attending: PODIATRIST | Admitting: PODIATRIST
Payer: MEDICARE

## 2020-01-01 ENCOUNTER — HOSPITAL ENCOUNTER (OUTPATIENT)
Dept: PREADMISSION TESTING | Age: 80
Discharge: HOME | End: 2020-05-20
Payer: MEDICARE

## 2020-01-01 ENCOUNTER — HOSPITAL ENCOUNTER (EMERGENCY)
Dept: GENERAL RADIOLOGY | Age: 80
Discharge: HOME | DRG: 989 | End: 2020-06-05
Attending: EMERGENCY MEDICINE
Payer: MEDICARE

## 2020-01-01 ENCOUNTER — OFFICE VISIT (OUTPATIENT)
Dept: INTERNAL MEDICINE CLINIC | Age: 80
End: 2020-01-01

## 2020-01-01 ENCOUNTER — HOSPITAL ENCOUNTER (OUTPATIENT)
Dept: PREADMISSION TESTING | Age: 80
Discharge: HOME | End: 2020-03-17

## 2020-01-01 ENCOUNTER — APPOINTMENT (OUTPATIENT)
Dept: ULTRASOUND IMAGING | Age: 80
DRG: 989 | End: 2020-01-01
Attending: INTERNAL MEDICINE
Payer: MEDICARE

## 2020-01-01 ENCOUNTER — ANESTHESIA (OUTPATIENT)
Dept: SURGERY | Age: 80
DRG: 989 | End: 2020-01-01
Payer: MEDICARE

## 2020-01-01 ENCOUNTER — HOSPITAL ENCOUNTER (INPATIENT)
Age: 80
LOS: 5 days | Discharge: NURSING, REHAB OR LONG-TERM CARE | DRG: 989 | End: 2020-06-10
Attending: EMERGENCY MEDICINE | Admitting: INTERNAL MEDICINE
Payer: MEDICARE

## 2020-01-01 ENCOUNTER — TELEPHONE (OUTPATIENT)
Dept: INTERNAL MEDICINE CLINIC | Age: 80
End: 2020-01-01

## 2020-01-01 ENCOUNTER — APPOINTMENT (OUTPATIENT)
Dept: CT IMAGING | Age: 80
End: 2020-01-01
Attending: EMERGENCY MEDICINE
Payer: MEDICARE

## 2020-01-01 ENCOUNTER — HOSPITAL ENCOUNTER (OUTPATIENT)
Dept: PREADMISSION TESTING | Age: 80
Discharge: HOME | End: 2020-02-13
Payer: MEDICARE

## 2020-01-01 ENCOUNTER — HOSPITAL ENCOUNTER (OUTPATIENT)
Dept: PREADMISSION TESTING | Age: 80
Discharge: HOME | End: 2020-05-31
Payer: MEDICARE

## 2020-01-01 VITALS
SYSTOLIC BLOOD PRESSURE: 127 MMHG | HEART RATE: 106 BPM | RESPIRATION RATE: 20 BRPM | BODY MASS INDEX: 31.87 KG/M2 | DIASTOLIC BLOOD PRESSURE: 58 MMHG | WEIGHT: 198.31 LBS | OXYGEN SATURATION: 92 % | TEMPERATURE: 98.5 F | HEIGHT: 66 IN

## 2020-01-01 VITALS
SYSTOLIC BLOOD PRESSURE: 145 MMHG | OXYGEN SATURATION: 96 % | RESPIRATION RATE: 18 BRPM | TEMPERATURE: 97.8 F | DIASTOLIC BLOOD PRESSURE: 72 MMHG | HEART RATE: 80 BPM | WEIGHT: 189 LBS | BODY MASS INDEX: 29.66 KG/M2 | HEIGHT: 67 IN

## 2020-01-01 VITALS
WEIGHT: 203.06 LBS | DIASTOLIC BLOOD PRESSURE: 71 MMHG | RESPIRATION RATE: 19 BRPM | BODY MASS INDEX: 32.64 KG/M2 | OXYGEN SATURATION: 92 % | HEIGHT: 66 IN | HEART RATE: 100 BPM | SYSTOLIC BLOOD PRESSURE: 177 MMHG | TEMPERATURE: 97.7 F

## 2020-01-01 VITALS
BODY MASS INDEX: 32.3 KG/M2 | WEIGHT: 201 LBS | HEART RATE: 74 BPM | HEIGHT: 66 IN | RESPIRATION RATE: 18 BRPM | SYSTOLIC BLOOD PRESSURE: 153 MMHG | DIASTOLIC BLOOD PRESSURE: 73 MMHG | TEMPERATURE: 97.7 F | OXYGEN SATURATION: 96 %

## 2020-01-01 VITALS
SYSTOLIC BLOOD PRESSURE: 176 MMHG | DIASTOLIC BLOOD PRESSURE: 81 MMHG | HEIGHT: 66 IN | BODY MASS INDEX: 32.32 KG/M2 | OXYGEN SATURATION: 94 % | HEART RATE: 74 BPM | TEMPERATURE: 97.8 F | WEIGHT: 201.1 LBS | RESPIRATION RATE: 16 BRPM

## 2020-01-01 VITALS
HEIGHT: 67 IN | BODY MASS INDEX: 29.66 KG/M2 | OXYGEN SATURATION: 95 % | DIASTOLIC BLOOD PRESSURE: 65 MMHG | WEIGHT: 189 LBS | RESPIRATION RATE: 18 BRPM | TEMPERATURE: 98 F | SYSTOLIC BLOOD PRESSURE: 144 MMHG | HEART RATE: 102 BPM

## 2020-01-01 VITALS
DIASTOLIC BLOOD PRESSURE: 68 MMHG | TEMPERATURE: 97.2 F | RESPIRATION RATE: 15 BRPM | OXYGEN SATURATION: 95 % | SYSTOLIC BLOOD PRESSURE: 105 MMHG | HEART RATE: 55 BPM

## 2020-01-01 DIAGNOSIS — R19.7 NAUSEA VOMITING AND DIARRHEA: ICD-10-CM

## 2020-01-01 DIAGNOSIS — R10.9 ABDOMINAL PAIN, UNSPECIFIED ABDOMINAL LOCATION: Primary | ICD-10-CM

## 2020-01-01 DIAGNOSIS — R11.2 NAUSEA VOMITING AND DIARRHEA: ICD-10-CM

## 2020-01-01 DIAGNOSIS — J44.9 CHRONIC OBSTRUCTIVE PULMONARY DISEASE, UNSPECIFIED COPD TYPE (HCC): ICD-10-CM

## 2020-01-01 DIAGNOSIS — I10 ESSENTIAL HYPERTENSION: ICD-10-CM

## 2020-01-01 DIAGNOSIS — M19.072 PRIMARY OSTEOARTHRITIS OF LEFT FOOT: ICD-10-CM

## 2020-01-01 DIAGNOSIS — Z01.818 PREOP EXAMINATION: ICD-10-CM

## 2020-01-01 DIAGNOSIS — R10.84 ABDOMINAL PAIN, GENERALIZED: Primary | ICD-10-CM

## 2020-01-01 DIAGNOSIS — N17.9 AKI (ACUTE KIDNEY INJURY) (HCC): Primary | ICD-10-CM

## 2020-01-01 DIAGNOSIS — G89.18 ACUTE POST-OPERATIVE PAIN: ICD-10-CM

## 2020-01-01 DIAGNOSIS — M19.072 PRIMARY OSTEOARTHRITIS OF LEFT FOOT: Primary | ICD-10-CM

## 2020-01-01 LAB
ALBUMIN SERPL-MCNC: 3.5 G/DL (ref 3.5–5)
ALBUMIN SERPL-MCNC: 3.7 G/DL (ref 3.5–5)
ALBUMIN/GLOB SERPL: 0.9 {RATIO} (ref 1.1–2.2)
ALBUMIN/GLOB SERPL: 1 {RATIO} (ref 1.1–2.2)
ALP SERPL-CCNC: 52 U/L (ref 45–117)
ALP SERPL-CCNC: 65 U/L (ref 45–117)
ALT SERPL-CCNC: 22 U/L (ref 12–78)
ALT SERPL-CCNC: 37 U/L (ref 12–78)
ANION GAP SERPL CALC-SCNC: 4 MMOL/L (ref 5–15)
ANION GAP SERPL CALC-SCNC: 4 MMOL/L (ref 5–15)
ANION GAP SERPL CALC-SCNC: 5 MMOL/L (ref 5–15)
ANION GAP SERPL CALC-SCNC: 6 MMOL/L (ref 5–15)
ANION GAP SERPL CALC-SCNC: 7 MMOL/L (ref 5–15)
ANION GAP SERPL CALC-SCNC: 7 MMOL/L (ref 5–15)
ANION GAP SERPL CALC-SCNC: 9 MMOL/L (ref 5–15)
APPEARANCE UR: CLEAR
AST SERPL-CCNC: 38 U/L (ref 15–37)
AST SERPL-CCNC: 38 U/L (ref 15–37)
ATRIAL RATE: 103 BPM
BACTERIA URNS QL MICRO: NEGATIVE /HPF
BASOPHILS # BLD: 0 K/UL (ref 0–0.1)
BASOPHILS # BLD: 0 K/UL (ref 0–0.1)
BASOPHILS NFR BLD: 0 % (ref 0–1)
BASOPHILS NFR BLD: 0 % (ref 0–1)
BILIRUB SERPL-MCNC: 0.5 MG/DL (ref 0.2–1)
BILIRUB SERPL-MCNC: 0.5 MG/DL (ref 0.2–1)
BILIRUB UR QL: NEGATIVE
BUN SERPL-MCNC: 17 MG/DL (ref 6–20)
BUN SERPL-MCNC: 18 MG/DL (ref 6–20)
BUN SERPL-MCNC: 22 MG/DL (ref 6–20)
BUN SERPL-MCNC: 24 MG/DL (ref 6–20)
BUN SERPL-MCNC: 28 MG/DL (ref 6–20)
BUN SERPL-MCNC: 34 MG/DL (ref 6–20)
BUN SERPL-MCNC: 36 MG/DL (ref 6–20)
BUN/CREAT SERPL: 13 (ref 12–20)
BUN/CREAT SERPL: 14 (ref 12–20)
BUN/CREAT SERPL: 15 (ref 12–20)
BUN/CREAT SERPL: 17 (ref 12–20)
BUN/CREAT SERPL: 19 (ref 12–20)
BUN/CREAT SERPL: 20 (ref 12–20)
BUN/CREAT SERPL: 20 (ref 12–20)
CALCIUM SERPL-MCNC: 7.4 MG/DL (ref 8.5–10.1)
CALCIUM SERPL-MCNC: 8 MG/DL (ref 8.5–10.1)
CALCIUM SERPL-MCNC: 8 MG/DL (ref 8.5–10.1)
CALCIUM SERPL-MCNC: 8.8 MG/DL (ref 8.5–10.1)
CALCIUM SERPL-MCNC: 8.9 MG/DL (ref 8.5–10.1)
CALCIUM SERPL-MCNC: 9.9 MG/DL (ref 8.5–10.1)
CALCIUM SERPL-MCNC: 9.9 MG/DL (ref 8.5–10.1)
CALCULATED P AXIS, ECG09: 31 DEGREES
CALCULATED R AXIS, ECG10: 5 DEGREES
CALCULATED T AXIS, ECG11: 63 DEGREES
CHLORIDE SERPL-SCNC: 101 MMOL/L (ref 97–108)
CHLORIDE SERPL-SCNC: 105 MMOL/L (ref 97–108)
CHLORIDE SERPL-SCNC: 108 MMOL/L (ref 97–108)
CHLORIDE SERPL-SCNC: 111 MMOL/L (ref 97–108)
CHLORIDE SERPL-SCNC: 111 MMOL/L (ref 97–108)
CO2 SERPL-SCNC: 23 MMOL/L (ref 21–32)
CO2 SERPL-SCNC: 23 MMOL/L (ref 21–32)
CO2 SERPL-SCNC: 24 MMOL/L (ref 21–32)
CO2 SERPL-SCNC: 24 MMOL/L (ref 21–32)
CO2 SERPL-SCNC: 27 MMOL/L (ref 21–32)
CO2 SERPL-SCNC: 28 MMOL/L (ref 21–32)
CO2 SERPL-SCNC: 30 MMOL/L (ref 21–32)
COLOR UR: ABNORMAL
COMMENT, HOLDF: NORMAL
CREAT SERPL-MCNC: 1.14 MG/DL (ref 0.7–1.3)
CREAT SERPL-MCNC: 1.19 MG/DL (ref 0.7–1.3)
CREAT SERPL-MCNC: 1.2 MG/DL (ref 0.7–1.3)
CREAT SERPL-MCNC: 1.27 MG/DL (ref 0.7–1.3)
CREAT SERPL-MCNC: 1.4 MG/DL (ref 0.7–1.3)
CREAT SERPL-MCNC: 2.07 MG/DL (ref 0.7–1.3)
CREAT SERPL-MCNC: 2.38 MG/DL (ref 0.7–1.3)
DIAGNOSIS, 93000: NORMAL
DIFFERENTIAL METHOD BLD: ABNORMAL
DIFFERENTIAL METHOD BLD: ABNORMAL
EOSINOPHIL # BLD: 0 K/UL (ref 0–0.4)
EOSINOPHIL # BLD: 0.1 K/UL (ref 0–0.4)
EOSINOPHIL NFR BLD: 0 % (ref 0–7)
EOSINOPHIL NFR BLD: 1 % (ref 0–7)
EPITH CASTS URNS QL MICRO: ABNORMAL /LPF
ERYTHROCYTE [DISTWIDTH] IN BLOOD BY AUTOMATED COUNT: 12.6 % (ref 11.5–14.5)
ERYTHROCYTE [DISTWIDTH] IN BLOOD BY AUTOMATED COUNT: 13.6 % (ref 11.5–14.5)
ERYTHROCYTE [DISTWIDTH] IN BLOOD BY AUTOMATED COUNT: 13.7 % (ref 11.5–14.5)
GLOBULIN SER CALC-MCNC: 3.7 G/DL (ref 2–4)
GLOBULIN SER CALC-MCNC: 3.8 G/DL (ref 2–4)
GLUCOSE BLD STRIP.AUTO-MCNC: 100 MG/DL (ref 65–100)
GLUCOSE BLD STRIP.AUTO-MCNC: 102 MG/DL (ref 65–100)
GLUCOSE BLD STRIP.AUTO-MCNC: 77 MG/DL (ref 65–100)
GLUCOSE BLD STRIP.AUTO-MCNC: 83 MG/DL (ref 65–100)
GLUCOSE BLD STRIP.AUTO-MCNC: 86 MG/DL (ref 65–100)
GLUCOSE BLD STRIP.AUTO-MCNC: 90 MG/DL (ref 65–100)
GLUCOSE SERPL-MCNC: 108 MG/DL (ref 65–100)
GLUCOSE SERPL-MCNC: 116 MG/DL (ref 65–100)
GLUCOSE SERPL-MCNC: 88 MG/DL (ref 65–100)
GLUCOSE SERPL-MCNC: 89 MG/DL (ref 65–100)
GLUCOSE SERPL-MCNC: 89 MG/DL (ref 65–100)
GLUCOSE SERPL-MCNC: 91 MG/DL (ref 65–100)
GLUCOSE SERPL-MCNC: 93 MG/DL (ref 65–100)
GLUCOSE UR STRIP.AUTO-MCNC: NEGATIVE MG/DL
HCT VFR BLD AUTO: 35.2 % (ref 36.6–50.3)
HCT VFR BLD AUTO: 35.2 % (ref 36.6–50.3)
HCT VFR BLD AUTO: 41.3 % (ref 36.6–50.3)
HGB BLD-MCNC: 11.4 G/DL (ref 12.1–17)
HGB BLD-MCNC: 11.8 G/DL (ref 12.1–17)
HGB BLD-MCNC: 13.9 G/DL (ref 12.1–17)
HGB UR QL STRIP: ABNORMAL
HYALINE CASTS URNS QL MICRO: ABNORMAL /LPF (ref 0–5)
IMM GRANULOCYTES # BLD AUTO: 0 K/UL (ref 0–0.04)
IMM GRANULOCYTES # BLD AUTO: 0 K/UL (ref 0–0.04)
IMM GRANULOCYTES NFR BLD AUTO: 0 % (ref 0–0.5)
IMM GRANULOCYTES NFR BLD AUTO: 0 % (ref 0–0.5)
KETONES UR QL STRIP.AUTO: NEGATIVE MG/DL
LACTATE BLD-SCNC: 1.43 MMOL/L (ref 0.4–2)
LEUKOCYTE ESTERASE UR QL STRIP.AUTO: NEGATIVE
LIPASE SERPL-CCNC: 78 U/L (ref 73–393)
LYMPHOCYTES # BLD: 1.5 K/UL (ref 0.8–3.5)
LYMPHOCYTES # BLD: 1.5 K/UL (ref 0.8–3.5)
LYMPHOCYTES NFR BLD: 15 % (ref 12–49)
LYMPHOCYTES NFR BLD: 16 % (ref 12–49)
MAGNESIUM SERPL-MCNC: 1.7 MG/DL (ref 1.6–2.4)
MCH RBC QN AUTO: 32.7 PG (ref 26–34)
MCH RBC QN AUTO: 33 PG (ref 26–34)
MCH RBC QN AUTO: 33.1 PG (ref 26–34)
MCHC RBC AUTO-ENTMCNC: 32.4 G/DL (ref 30–36.5)
MCHC RBC AUTO-ENTMCNC: 33.5 G/DL (ref 30–36.5)
MCHC RBC AUTO-ENTMCNC: 33.7 G/DL (ref 30–36.5)
MCV RBC AUTO: 100.9 FL (ref 80–99)
MCV RBC AUTO: 98.3 FL (ref 80–99)
MCV RBC AUTO: 98.3 FL (ref 80–99)
MONOCYTES # BLD: 0.7 K/UL (ref 0–1)
MONOCYTES # BLD: 0.8 K/UL (ref 0–1)
MONOCYTES NFR BLD: 7 % (ref 5–13)
MONOCYTES NFR BLD: 8 % (ref 5–13)
NEUTS SEG # BLD: 7.4 K/UL (ref 1.8–8)
NEUTS SEG # BLD: 7.6 K/UL (ref 1.8–8)
NEUTS SEG NFR BLD: 76 % (ref 32–75)
NEUTS SEG NFR BLD: 77 % (ref 32–75)
NITRITE UR QL STRIP.AUTO: NEGATIVE
NRBC # BLD: 0 K/UL (ref 0–0.01)
NRBC BLD-RTO: 0 PER 100 WBC
P-R INTERVAL, ECG05: 142 MS
PH UR STRIP: 5 [PH] (ref 5–8)
PLATELET # BLD AUTO: 142 K/UL (ref 150–400)
PLATELET # BLD AUTO: 190 K/UL (ref 150–400)
PLATELET # BLD AUTO: 217 K/UL (ref 150–400)
PMV BLD AUTO: 9.1 FL (ref 8.9–12.9)
PMV BLD AUTO: 9.3 FL (ref 8.9–12.9)
PMV BLD AUTO: 9.4 FL (ref 8.9–12.9)
POTASSIUM SERPL-SCNC: 3.8 MMOL/L (ref 3.5–5.1)
POTASSIUM SERPL-SCNC: 4.3 MMOL/L (ref 3.5–5.1)
POTASSIUM SERPL-SCNC: 4.4 MMOL/L (ref 3.5–5.1)
POTASSIUM SERPL-SCNC: 4.6 MMOL/L (ref 3.5–5.1)
POTASSIUM SERPL-SCNC: 4.6 MMOL/L (ref 3.5–5.1)
POTASSIUM SERPL-SCNC: 4.8 MMOL/L (ref 3.5–5.1)
POTASSIUM SERPL-SCNC: 5.6 MMOL/L (ref 3.5–5.1)
PROT SERPL-MCNC: 7.3 G/DL (ref 6.4–8.2)
PROT SERPL-MCNC: 7.4 G/DL (ref 6.4–8.2)
PROT UR STRIP-MCNC: 30 MG/DL
Q-T INTERVAL, ECG07: 378 MS
QRS DURATION, ECG06: 116 MS
QTC CALCULATION (BEZET), ECG08: 495 MS
RBC # BLD AUTO: 3.49 M/UL (ref 4.1–5.7)
RBC # BLD AUTO: 3.58 M/UL (ref 4.1–5.7)
RBC # BLD AUTO: 4.2 M/UL (ref 4.1–5.7)
RBC #/AREA URNS HPF: ABNORMAL /HPF (ref 0–5)
SAMPLES BEING HELD,HOLD: NORMAL
SARS-COV-2, COV2: NOT DETECTED
SARS-COV-2, COV2NT: NOT DETECTED
SERVICE CMNT-IMP: ABNORMAL
SERVICE CMNT-IMP: NORMAL
SODIUM SERPL-SCNC: 136 MMOL/L (ref 136–145)
SODIUM SERPL-SCNC: 136 MMOL/L (ref 136–145)
SODIUM SERPL-SCNC: 137 MMOL/L (ref 136–145)
SODIUM SERPL-SCNC: 138 MMOL/L (ref 136–145)
SODIUM SERPL-SCNC: 139 MMOL/L (ref 136–145)
SODIUM SERPL-SCNC: 140 MMOL/L (ref 136–145)
SODIUM SERPL-SCNC: 141 MMOL/L (ref 136–145)
SOURCE, COVRS: NORMAL
SP GR UR REFRACTOMETRY: 1.01
SPECIMEN SOURCE, FCOV2M: NORMAL
UROBILINOGEN UR QL STRIP.AUTO: 0.2 EU/DL (ref 0.2–1)
VENTRICULAR RATE, ECG03: 103 BPM
WBC # BLD AUTO: 10.1 K/UL (ref 4.1–11.1)
WBC # BLD AUTO: 8.6 K/UL (ref 4.1–11.1)
WBC # BLD AUTO: 9.7 K/UL (ref 4.1–11.1)
WBC URNS QL MICRO: ABNORMAL /HPF (ref 0–4)

## 2020-01-01 PROCEDURE — 74011250636 HC RX REV CODE- 250/636: Performed by: ANESTHESIOLOGY

## 2020-01-01 PROCEDURE — 80048 BASIC METABOLIC PNL TOTAL CA: CPT

## 2020-01-01 PROCEDURE — 87635 SARS-COV-2 COVID-19 AMP PRB: CPT

## 2020-01-01 PROCEDURE — 74011250636 HC RX REV CODE- 250/636: Performed by: NURSE ANESTHETIST, CERTIFIED REGISTERED

## 2020-01-01 PROCEDURE — 36415 COLL VENOUS BLD VENIPUNCTURE: CPT

## 2020-01-01 PROCEDURE — 77030040831 HC BAG URINE DRNG MDII -A

## 2020-01-01 PROCEDURE — 77030040922 HC BLNKT HYPOTHRM STRY -A

## 2020-01-01 PROCEDURE — 99285 EMERGENCY DEPT VISIT HI MDM: CPT

## 2020-01-01 PROCEDURE — 97530 THERAPEUTIC ACTIVITIES: CPT

## 2020-01-01 PROCEDURE — 81001 URINALYSIS AUTO W/SCOPE: CPT

## 2020-01-01 PROCEDURE — 74011250636 HC RX REV CODE- 250/636: Performed by: INTERNAL MEDICINE

## 2020-01-01 PROCEDURE — 71045 X-RAY EXAM CHEST 1 VIEW: CPT

## 2020-01-01 PROCEDURE — 74011000250 HC RX REV CODE- 250: Performed by: PODIATRIST

## 2020-01-01 PROCEDURE — 65660000000 HC RM CCU STEPDOWN

## 2020-01-01 PROCEDURE — 74011250637 HC RX REV CODE- 250/637: Performed by: HOSPITALIST

## 2020-01-01 PROCEDURE — 74011636320 HC RX REV CODE- 636/320: Performed by: RADIOLOGY

## 2020-01-01 PROCEDURE — 74011250637 HC RX REV CODE- 250/637: Performed by: INTERNAL MEDICINE

## 2020-01-01 PROCEDURE — 83605 ASSAY OF LACTIC ACID: CPT

## 2020-01-01 PROCEDURE — 74011250636 HC RX REV CODE- 250/636: Performed by: EMERGENCY MEDICINE

## 2020-01-01 PROCEDURE — 96372 THER/PROPH/DIAG INJ SC/IM: CPT

## 2020-01-01 PROCEDURE — 83690 ASSAY OF LIPASE: CPT

## 2020-01-01 PROCEDURE — 83735 ASSAY OF MAGNESIUM: CPT

## 2020-01-01 PROCEDURE — 82962 GLUCOSE BLOOD TEST: CPT

## 2020-01-01 PROCEDURE — 97535 SELF CARE MNGMENT TRAINING: CPT

## 2020-01-01 PROCEDURE — 80053 COMPREHEN METABOLIC PANEL: CPT

## 2020-01-01 PROCEDURE — 65270000029 HC RM PRIVATE

## 2020-01-01 PROCEDURE — 73630 X-RAY EXAM OF FOOT: CPT

## 2020-01-01 PROCEDURE — 74011250636 HC RX REV CODE- 250/636: Performed by: PODIATRIST

## 2020-01-01 PROCEDURE — 74011000272 HC RX REV CODE- 272: Performed by: PODIATRIST

## 2020-01-01 PROCEDURE — 85025 COMPLETE CBC W/AUTO DIFF WBC: CPT

## 2020-01-01 PROCEDURE — 0QBP0ZZ EXCISION OF LEFT METATARSAL, OPEN APPROACH: ICD-10-PCS | Performed by: PODIATRIST

## 2020-01-01 PROCEDURE — 97161 PT EVAL LOW COMPLEX 20 MIN: CPT

## 2020-01-01 PROCEDURE — 77030040361 HC SLV COMPR DVT MDII -B

## 2020-01-01 PROCEDURE — 77030031139 HC SUT VCRL2 J&J -A: Performed by: PODIATRIST

## 2020-01-01 PROCEDURE — 77030036687 HC SHOE PSTOP S2SG -A

## 2020-01-01 PROCEDURE — 77030011640 HC PAD GRND REM COVD -A: Performed by: PODIATRIST

## 2020-01-01 PROCEDURE — 76060000034 HC ANESTHESIA 1.5 TO 2 HR: Performed by: PODIATRIST

## 2020-01-01 PROCEDURE — 77030000032 HC CUF TRNQT ZIMM -B: Performed by: PODIATRIST

## 2020-01-01 PROCEDURE — 76210000006 HC OR PH I REC 0.5 TO 1 HR: Performed by: PODIATRIST

## 2020-01-01 PROCEDURE — 76770 US EXAM ABDO BACK WALL COMP: CPT

## 2020-01-01 PROCEDURE — 77030002933 HC SUT MCRYL J&J -A: Performed by: PODIATRIST

## 2020-01-01 PROCEDURE — 77030006830 HC BLD SAW SAG MCRA -A: Performed by: PODIATRIST

## 2020-01-01 PROCEDURE — 76210000021 HC REC RM PH II 0.5 TO 1 HR: Performed by: PODIATRIST

## 2020-01-01 PROCEDURE — 96374 THER/PROPH/DIAG INJ IV PUSH: CPT

## 2020-01-01 PROCEDURE — 96361 HYDRATE IV INFUSION ADD-ON: CPT

## 2020-01-01 PROCEDURE — 77030018836 HC SOL IRR NACL ICUM -A: Performed by: PODIATRIST

## 2020-01-01 PROCEDURE — 93005 ELECTROCARDIOGRAM TRACING: CPT

## 2020-01-01 PROCEDURE — 74177 CT ABD & PELVIS W/CONTRAST: CPT

## 2020-01-01 PROCEDURE — 0QBM0ZZ EXCISION OF LEFT TARSAL, OPEN APPROACH: ICD-10-PCS | Performed by: PODIATRIST

## 2020-01-01 PROCEDURE — 76010000153 HC OR TIME 1.5 TO 2 HR: Performed by: PODIATRIST

## 2020-01-01 PROCEDURE — 85027 COMPLETE CBC AUTOMATED: CPT

## 2020-01-01 PROCEDURE — 97165 OT EVAL LOW COMPLEX 30 MIN: CPT

## 2020-01-01 RX ORDER — BUPIVACAINE HYDROCHLORIDE 2.5 MG/ML
INJECTION, SOLUTION EPIDURAL; INFILTRATION; INTRACAUDAL AS NEEDED
Status: DISCONTINUED | OUTPATIENT
Start: 2020-01-01 | End: 2020-01-01 | Stop reason: HOSPADM

## 2020-01-01 RX ORDER — ONDANSETRON 2 MG/ML
4 INJECTION INTRAMUSCULAR; INTRAVENOUS
Status: COMPLETED | OUTPATIENT
Start: 2020-01-01 | End: 2020-01-01

## 2020-01-01 RX ORDER — HYDROMORPHONE HYDROCHLORIDE 1 MG/ML
.25-1 INJECTION, SOLUTION INTRAMUSCULAR; INTRAVENOUS; SUBCUTANEOUS
Status: DISCONTINUED | OUTPATIENT
Start: 2020-01-01 | End: 2020-01-01 | Stop reason: HOSPADM

## 2020-01-01 RX ORDER — SODIUM CHLORIDE, SODIUM LACTATE, POTASSIUM CHLORIDE, CALCIUM CHLORIDE 600; 310; 30; 20 MG/100ML; MG/100ML; MG/100ML; MG/100ML
125 INJECTION, SOLUTION INTRAVENOUS CONTINUOUS
Status: DISCONTINUED | OUTPATIENT
Start: 2020-01-01 | End: 2020-01-01 | Stop reason: HOSPADM

## 2020-01-01 RX ORDER — OXYCODONE AND ACETAMINOPHEN 5; 325 MG/1; MG/1
1 TABLET ORAL
Qty: 10 TAB | Refills: 0 | Status: SHIPPED | OUTPATIENT
Start: 2020-01-01 | End: 2020-06-13

## 2020-01-01 RX ORDER — NYSTATIN 100000 [USP'U]/G
POWDER TOPICAL 2 TIMES DAILY
Status: DISCONTINUED | OUTPATIENT
Start: 2020-01-01 | End: 2020-01-01 | Stop reason: HOSPADM

## 2020-01-01 RX ORDER — CLOPIDOGREL BISULFATE 75 MG/1
75 TABLET ORAL DAILY
Status: DISCONTINUED | OUTPATIENT
Start: 2020-01-01 | End: 2020-01-01 | Stop reason: HOSPADM

## 2020-01-01 RX ORDER — GABAPENTIN 300 MG/1
300 CAPSULE ORAL
Status: ON HOLD | COMMUNITY
End: 2020-01-01 | Stop reason: SDUPTHER

## 2020-01-01 RX ORDER — GABAPENTIN 300 MG/1
300 CAPSULE ORAL
Status: DISCONTINUED | OUTPATIENT
Start: 2020-01-01 | End: 2020-01-01

## 2020-01-01 RX ORDER — ACETAMINOPHEN 325 MG/1
650 TABLET ORAL
Status: DISCONTINUED | OUTPATIENT
Start: 2020-01-01 | End: 2020-01-01 | Stop reason: HOSPADM

## 2020-01-01 RX ORDER — DICYCLOMINE HYDROCHLORIDE 10 MG/1
10 CAPSULE ORAL 4 TIMES DAILY
Qty: 20 CAP | Refills: 0 | Status: SHIPPED | OUTPATIENT
Start: 2020-01-01 | End: 2020-01-01

## 2020-01-01 RX ORDER — SODIUM CHLORIDE 0.9 % (FLUSH) 0.9 %
5-40 SYRINGE (ML) INJECTION EVERY 8 HOURS
Status: DISCONTINUED | OUTPATIENT
Start: 2020-01-01 | End: 2020-01-01 | Stop reason: HOSPADM

## 2020-01-01 RX ORDER — LIDOCAINE HYDROCHLORIDE 10 MG/ML
0.1 INJECTION, SOLUTION EPIDURAL; INFILTRATION; INTRACAUDAL; PERINEURAL AS NEEDED
Status: DISCONTINUED | OUTPATIENT
Start: 2020-01-01 | End: 2020-01-01 | Stop reason: HOSPADM

## 2020-01-01 RX ORDER — FAMOTIDINE 20 MG/1
40 TABLET, FILM COATED ORAL
Status: DISCONTINUED | OUTPATIENT
Start: 2020-01-01 | End: 2020-01-01

## 2020-01-01 RX ORDER — CALCIUM GLUCONATE 20 MG/ML
2 INJECTION, SOLUTION INTRAVENOUS ONCE
Status: COMPLETED | OUTPATIENT
Start: 2020-01-01 | End: 2020-01-01

## 2020-01-01 RX ORDER — THERA TABS 400 MCG
1 TAB ORAL DAILY
Status: DISCONTINUED | OUTPATIENT
Start: 2020-01-01 | End: 2020-01-01 | Stop reason: HOSPADM

## 2020-01-01 RX ORDER — FENTANYL CITRATE 50 UG/ML
25 INJECTION, SOLUTION INTRAMUSCULAR; INTRAVENOUS
Status: DISCONTINUED | OUTPATIENT
Start: 2020-01-01 | End: 2020-01-01 | Stop reason: HOSPADM

## 2020-01-01 RX ORDER — OXYCODONE AND ACETAMINOPHEN 5; 325 MG/1; MG/1
1 TABLET ORAL
Status: DISCONTINUED | OUTPATIENT
Start: 2020-01-01 | End: 2020-01-01 | Stop reason: HOSPADM

## 2020-01-01 RX ORDER — ZOLPIDEM TARTRATE 5 MG/1
5 TABLET ORAL
COMMUNITY
End: 2020-01-01

## 2020-01-01 RX ORDER — ONDANSETRON 2 MG/ML
INJECTION INTRAMUSCULAR; INTRAVENOUS AS NEEDED
Status: DISCONTINUED | OUTPATIENT
Start: 2020-01-01 | End: 2020-01-01 | Stop reason: HOSPADM

## 2020-01-01 RX ORDER — SODIUM CHLORIDE 0.9 % (FLUSH) 0.9 %
5-40 SYRINGE (ML) INJECTION AS NEEDED
Status: DISCONTINUED | OUTPATIENT
Start: 2020-01-01 | End: 2020-01-01 | Stop reason: HOSPADM

## 2020-01-01 RX ORDER — ALBUTEROL SULFATE 0.83 MG/ML
2.5 SOLUTION RESPIRATORY (INHALATION) AS NEEDED
Status: DISCONTINUED | OUTPATIENT
Start: 2020-01-01 | End: 2020-01-01 | Stop reason: HOSPADM

## 2020-01-01 RX ORDER — ONDANSETRON 2 MG/ML
4 INJECTION INTRAMUSCULAR; INTRAVENOUS AS NEEDED
Status: DISCONTINUED | OUTPATIENT
Start: 2020-01-01 | End: 2020-01-01 | Stop reason: HOSPADM

## 2020-01-01 RX ORDER — CLOPIDOGREL BISULFATE 75 MG/1
75 TABLET ORAL
COMMUNITY
Start: 2016-01-21 | End: 2020-01-01 | Stop reason: CLARIF

## 2020-01-01 RX ORDER — PROPOFOL 10 MG/ML
INJECTION, EMULSION INTRAVENOUS AS NEEDED
Status: DISCONTINUED | OUTPATIENT
Start: 2020-01-01 | End: 2020-01-01 | Stop reason: HOSPADM

## 2020-01-01 RX ORDER — GABAPENTIN 300 MG/1
300 CAPSULE ORAL
Qty: 60 CAP | Refills: 0 | Status: SHIPPED | OUTPATIENT
Start: 2020-01-01 | End: 2020-07-10

## 2020-01-01 RX ORDER — ATENOLOL 50 MG/1
TABLET ORAL
Qty: 90 TAB | Refills: 0 | Status: SHIPPED | OUTPATIENT
Start: 2020-01-01

## 2020-01-01 RX ORDER — ATORVASTATIN CALCIUM 10 MG/1
10 TABLET, FILM COATED ORAL
Status: DISCONTINUED | OUTPATIENT
Start: 2020-01-01 | End: 2020-01-01 | Stop reason: HOSPADM

## 2020-01-01 RX ORDER — DICYCLOMINE HYDROCHLORIDE 10 MG/ML
20 INJECTION INTRAMUSCULAR
Status: COMPLETED | OUTPATIENT
Start: 2020-01-01 | End: 2020-01-01

## 2020-01-01 RX ORDER — ATENOLOL 50 MG/1
50 TABLET ORAL DAILY
Status: DISCONTINUED | OUTPATIENT
Start: 2020-01-01 | End: 2020-01-01

## 2020-01-01 RX ORDER — FLUMAZENIL 0.1 MG/ML
0.2 INJECTION INTRAVENOUS
Status: DISCONTINUED | OUTPATIENT
Start: 2020-01-01 | End: 2020-01-01 | Stop reason: HOSPADM

## 2020-01-01 RX ORDER — FAMOTIDINE 20 MG/1
20 TABLET, FILM COATED ORAL
Status: DISCONTINUED | OUTPATIENT
Start: 2020-01-01 | End: 2020-01-01 | Stop reason: HOSPADM

## 2020-01-01 RX ORDER — CEPHALEXIN 500 MG/1
500 CAPSULE ORAL 3 TIMES DAILY
COMMUNITY
End: 2020-01-01

## 2020-01-01 RX ORDER — NYSTATIN 100000 [USP'U]/G
POWDER TOPICAL 2 TIMES DAILY
Qty: 1 BOTTLE | Refills: 0 | Status: SHIPPED | OUTPATIENT
Start: 2020-01-01

## 2020-01-01 RX ORDER — NALOXONE HYDROCHLORIDE 0.4 MG/ML
0.04 INJECTION, SOLUTION INTRAMUSCULAR; INTRAVENOUS; SUBCUTANEOUS
Status: DISCONTINUED | OUTPATIENT
Start: 2020-01-01 | End: 2020-01-01 | Stop reason: HOSPADM

## 2020-01-01 RX ORDER — ATENOLOL 50 MG/1
25 TABLET ORAL DAILY
Status: DISCONTINUED | OUTPATIENT
Start: 2020-01-01 | End: 2020-01-01 | Stop reason: HOSPADM

## 2020-01-01 RX ORDER — PROPOFOL 10 MG/ML
INJECTION, EMULSION INTRAVENOUS
Status: DISCONTINUED | OUTPATIENT
Start: 2020-01-01 | End: 2020-01-01 | Stop reason: HOSPADM

## 2020-01-01 RX ORDER — GABAPENTIN 300 MG/1
300 CAPSULE ORAL 2 TIMES DAILY
Status: DISCONTINUED | OUTPATIENT
Start: 2020-01-01 | End: 2020-01-01 | Stop reason: HOSPADM

## 2020-01-01 RX ORDER — ONDANSETRON 4 MG/1
4 TABLET, ORALLY DISINTEGRATING ORAL
Qty: 12 TAB | Refills: 0 | Status: SHIPPED | OUTPATIENT
Start: 2020-01-01 | End: 2020-01-01 | Stop reason: CLARIF

## 2020-01-01 RX ORDER — IPRATROPIUM BROMIDE AND ALBUTEROL SULFATE 2.5; .5 MG/3ML; MG/3ML
3 SOLUTION RESPIRATORY (INHALATION)
Status: DISCONTINUED | OUTPATIENT
Start: 2020-01-01 | End: 2020-01-01 | Stop reason: HOSPADM

## 2020-01-01 RX ORDER — HEPARIN SODIUM 5000 [USP'U]/ML
5000 INJECTION, SOLUTION INTRAVENOUS; SUBCUTANEOUS EVERY 8 HOURS
Status: DISCONTINUED | OUTPATIENT
Start: 2020-01-01 | End: 2020-01-01 | Stop reason: HOSPADM

## 2020-01-01 RX ORDER — HYDROCODONE BITARTRATE AND ACETAMINOPHEN 7.5; 325 MG/1; MG/1
1 TABLET ORAL ONCE
Status: DISPENSED | OUTPATIENT
Start: 2020-01-01 | End: 2020-01-01

## 2020-01-01 RX ORDER — SODIUM CHLORIDE 9 MG/ML
75 INJECTION, SOLUTION INTRAVENOUS CONTINUOUS
Status: DISCONTINUED | OUTPATIENT
Start: 2020-01-01 | End: 2020-01-01

## 2020-01-01 RX ORDER — DIPHENHYDRAMINE HYDROCHLORIDE 50 MG/ML
12.5 INJECTION, SOLUTION INTRAMUSCULAR; INTRAVENOUS AS NEEDED
Status: DISCONTINUED | OUTPATIENT
Start: 2020-01-01 | End: 2020-01-01 | Stop reason: HOSPADM

## 2020-01-01 RX ORDER — LISINOPRIL 20 MG/1
20 TABLET ORAL DAILY
Status: DISCONTINUED | OUTPATIENT
Start: 2020-01-01 | End: 2020-01-01 | Stop reason: HOSPADM

## 2020-01-01 RX ORDER — DEXTROMETHORPHAN HYDROBROMIDE, GUAIFENESIN 5; 100 MG/5ML; MG/5ML
1300 LIQUID ORAL
COMMUNITY

## 2020-01-01 RX ORDER — FAMOTIDINE 40 MG/1
40 TABLET, FILM COATED ORAL
Qty: 90 TAB | Refills: 1 | Status: SHIPPED | OUTPATIENT
Start: 2020-01-01

## 2020-01-01 RX ORDER — MORPHINE SULFATE 2 MG/ML
2 INJECTION, SOLUTION INTRAMUSCULAR; INTRAVENOUS
Status: DISCONTINUED | OUTPATIENT
Start: 2020-01-01 | End: 2020-01-01

## 2020-01-01 RX ADMIN — ATENOLOL 25 MG: 50 TABLET ORAL at 09:02

## 2020-01-01 RX ADMIN — PROPOFOL 50 MCG/KG/MIN: 10 INJECTION, EMULSION INTRAVENOUS at 12:37

## 2020-01-01 RX ADMIN — SODIUM CHLORIDE 1000 ML: 900 INJECTION, SOLUTION INTRAVENOUS at 14:15

## 2020-01-01 RX ADMIN — CLOPIDOGREL BISULFATE 75 MG: 75 TABLET ORAL at 09:02

## 2020-01-01 RX ADMIN — OXYCODONE HYDROCHLORIDE AND ACETAMINOPHEN 1 TABLET: 5; 325 TABLET ORAL at 14:54

## 2020-01-01 RX ADMIN — ATENOLOL 25 MG: 50 TABLET ORAL at 09:08

## 2020-01-01 RX ADMIN — CLOPIDOGREL BISULFATE 75 MG: 75 TABLET ORAL at 11:24

## 2020-01-01 RX ADMIN — ONDANSETRON HYDROCHLORIDE 4 MG: 2 SOLUTION INTRAMUSCULAR; INTRAVENOUS at 14:06

## 2020-01-01 RX ADMIN — ONDANSETRON 4 MG: 2 INJECTION INTRAMUSCULAR; INTRAVENOUS at 14:15

## 2020-01-01 RX ADMIN — ATORVASTATIN CALCIUM 10 MG: 10 TABLET, FILM COATED ORAL at 21:35

## 2020-01-01 RX ADMIN — CLOPIDOGREL BISULFATE 75 MG: 75 TABLET ORAL at 08:00

## 2020-01-01 RX ADMIN — Medication 10 ML: at 21:47

## 2020-01-01 RX ADMIN — HEPARIN SODIUM 5000 UNITS: 5000 INJECTION INTRAVENOUS; SUBCUTANEOUS at 23:04

## 2020-01-01 RX ADMIN — NYSTATIN: 100000 POWDER TOPICAL at 21:00

## 2020-01-01 RX ADMIN — Medication 10 ML: at 21:57

## 2020-01-01 RX ADMIN — ATENOLOL 25 MG: 50 TABLET ORAL at 10:01

## 2020-01-01 RX ADMIN — FAMOTIDINE 20 MG: 20 TABLET, FILM COATED ORAL at 22:49

## 2020-01-01 RX ADMIN — MORPHINE SULFATE 2 MG: 2 INJECTION, SOLUTION INTRAMUSCULAR; INTRAVENOUS at 03:08

## 2020-01-01 RX ADMIN — HEPARIN SODIUM 5000 UNITS: 5000 INJECTION INTRAVENOUS; SUBCUTANEOUS at 00:00

## 2020-01-01 RX ADMIN — HEPARIN SODIUM 5000 UNITS: 5000 INJECTION INTRAVENOUS; SUBCUTANEOUS at 15:33

## 2020-01-01 RX ADMIN — ATORVASTATIN CALCIUM 10 MG: 10 TABLET, FILM COATED ORAL at 21:57

## 2020-01-01 RX ADMIN — SODIUM CHLORIDE 75 ML/HR: 900 INJECTION, SOLUTION INTRAVENOUS at 08:02

## 2020-01-01 RX ADMIN — SODIUM CHLORIDE 75 ML/HR: 900 INJECTION, SOLUTION INTRAVENOUS at 16:55

## 2020-01-01 RX ADMIN — CLOPIDOGREL BISULFATE 75 MG: 75 TABLET ORAL at 10:01

## 2020-01-01 RX ADMIN — Medication 10 ML: at 22:00

## 2020-01-01 RX ADMIN — MORPHINE SULFATE 2 MG: 2 INJECTION, SOLUTION INTRAMUSCULAR; INTRAVENOUS at 12:27

## 2020-01-01 RX ADMIN — OXYCODONE HYDROCHLORIDE AND ACETAMINOPHEN 1 TABLET: 5; 325 TABLET ORAL at 21:52

## 2020-01-01 RX ADMIN — THERA TABS 1 TABLET: TAB at 08:00

## 2020-01-01 RX ADMIN — HEPARIN SODIUM 5000 UNITS: 5000 INJECTION INTRAVENOUS; SUBCUTANEOUS at 15:30

## 2020-01-01 RX ADMIN — NYSTATIN: 100000 POWDER TOPICAL at 00:22

## 2020-01-01 RX ADMIN — THERA TABS 1 TABLET: TAB at 10:01

## 2020-01-01 RX ADMIN — PROPOFOL 30 MG: 10 INJECTION, EMULSION INTRAVENOUS at 12:37

## 2020-01-01 RX ADMIN — ATENOLOL 50 MG: 50 TABLET ORAL at 08:00

## 2020-01-01 RX ADMIN — SODIUM CHLORIDE, SODIUM LACTATE, POTASSIUM CHLORIDE, AND CALCIUM CHLORIDE: 600; 310; 30; 20 INJECTION, SOLUTION INTRAVENOUS at 11:30

## 2020-01-01 RX ADMIN — OXYCODONE HYDROCHLORIDE AND ACETAMINOPHEN 1 TABLET: 5; 325 TABLET ORAL at 06:43

## 2020-01-01 RX ADMIN — HEPARIN SODIUM 5000 UNITS: 5000 INJECTION INTRAVENOUS; SUBCUTANEOUS at 08:00

## 2020-01-01 RX ADMIN — Medication 10 ML: at 06:43

## 2020-01-01 RX ADMIN — HEPARIN SODIUM 5000 UNITS: 5000 INJECTION INTRAVENOUS; SUBCUTANEOUS at 15:47

## 2020-01-01 RX ADMIN — HEPARIN SODIUM 5000 UNITS: 5000 INJECTION INTRAVENOUS; SUBCUTANEOUS at 06:43

## 2020-01-01 RX ADMIN — HEPARIN SODIUM 5000 UNITS: 5000 INJECTION INTRAVENOUS; SUBCUTANEOUS at 23:39

## 2020-01-01 RX ADMIN — ATENOLOL 25 MG: 50 TABLET ORAL at 11:24

## 2020-01-01 RX ADMIN — GABAPENTIN 300 MG: 300 CAPSULE ORAL at 17:47

## 2020-01-01 RX ADMIN — OXYCODONE HYDROCHLORIDE AND ACETAMINOPHEN 1 TABLET: 5; 325 TABLET ORAL at 09:50

## 2020-01-01 RX ADMIN — SODIUM CHLORIDE 75 ML/HR: 900 INJECTION, SOLUTION INTRAVENOUS at 21:45

## 2020-01-01 RX ADMIN — NYSTATIN: 100000 POWDER TOPICAL at 11:25

## 2020-01-01 RX ADMIN — THERA TABS 1 TABLET: TAB at 11:24

## 2020-01-01 RX ADMIN — Medication 10 ML: at 15:33

## 2020-01-01 RX ADMIN — THERA TABS 1 TABLET: TAB at 09:02

## 2020-01-01 RX ADMIN — HEPARIN SODIUM 5000 UNITS: 5000 INJECTION INTRAVENOUS; SUBCUTANEOUS at 06:34

## 2020-01-01 RX ADMIN — Medication 10 ML: at 15:44

## 2020-01-01 RX ADMIN — FAMOTIDINE 20 MG: 20 TABLET, FILM COATED ORAL at 22:00

## 2020-01-01 RX ADMIN — HEPARIN SODIUM 5000 UNITS: 5000 INJECTION INTRAVENOUS; SUBCUTANEOUS at 22:51

## 2020-01-01 RX ADMIN — HEPARIN SODIUM 5000 UNITS: 5000 INJECTION INTRAVENOUS; SUBCUTANEOUS at 06:05

## 2020-01-01 RX ADMIN — HEPARIN SODIUM 5000 UNITS: 5000 INJECTION INTRAVENOUS; SUBCUTANEOUS at 15:44

## 2020-01-01 RX ADMIN — OXYCODONE HYDROCHLORIDE AND ACETAMINOPHEN 1 TABLET: 5; 325 TABLET ORAL at 21:35

## 2020-01-01 RX ADMIN — ATORVASTATIN CALCIUM 10 MG: 10 TABLET, FILM COATED ORAL at 21:45

## 2020-01-01 RX ADMIN — FAMOTIDINE 20 MG: 20 TABLET, FILM COATED ORAL at 21:35

## 2020-01-01 RX ADMIN — HEPARIN SODIUM 5000 UNITS: 5000 INJECTION INTRAVENOUS; SUBCUTANEOUS at 14:51

## 2020-01-01 RX ADMIN — CALCIUM GLUCONATE 2 G: 20 INJECTION, SOLUTION INTRAVENOUS at 17:01

## 2020-01-01 RX ADMIN — HEPARIN SODIUM 5000 UNITS: 5000 INJECTION INTRAVENOUS; SUBCUTANEOUS at 07:43

## 2020-01-01 RX ADMIN — Medication 10 ML: at 16:55

## 2020-01-01 RX ADMIN — OXYCODONE HYDROCHLORIDE AND ACETAMINOPHEN 1 TABLET: 5; 325 TABLET ORAL at 22:49

## 2020-01-01 RX ADMIN — FAMOTIDINE 20 MG: 20 TABLET, FILM COATED ORAL at 21:57

## 2020-01-01 RX ADMIN — Medication 10 ML: at 05:49

## 2020-01-01 RX ADMIN — Medication 10 ML: at 15:31

## 2020-01-01 RX ADMIN — ATORVASTATIN CALCIUM 10 MG: 10 TABLET, FILM COATED ORAL at 22:00

## 2020-01-01 RX ADMIN — CLOPIDOGREL BISULFATE 75 MG: 75 TABLET ORAL at 09:08

## 2020-01-01 RX ADMIN — SODIUM CHLORIDE, SODIUM LACTATE, POTASSIUM CHLORIDE, AND CALCIUM CHLORIDE 125 ML/HR: 600; 310; 30; 20 INJECTION, SOLUTION INTRAVENOUS at 11:55

## 2020-01-01 RX ADMIN — OXYCODONE HYDROCHLORIDE AND ACETAMINOPHEN 1 TABLET: 5; 325 TABLET ORAL at 22:00

## 2020-01-01 RX ADMIN — DICYCLOMINE HYDROCHLORIDE 20 MG: 10 INJECTION INTRAMUSCULAR at 14:15

## 2020-01-01 RX ADMIN — NYSTATIN: 100000 POWDER TOPICAL at 10:05

## 2020-01-01 RX ADMIN — NYSTATIN: 100000 POWDER TOPICAL at 22:01

## 2020-01-01 RX ADMIN — THERA TABS 1 TABLET: TAB at 09:08

## 2020-01-01 RX ADMIN — SODIUM CHLORIDE 1000 ML: 900 INJECTION, SOLUTION INTRAVENOUS at 13:17

## 2020-01-01 RX ADMIN — LISINOPRIL 20 MG: 20 TABLET ORAL at 10:01

## 2020-01-01 RX ADMIN — Medication 10 ML: at 14:51

## 2020-01-01 RX ADMIN — OXYCODONE HYDROCHLORIDE AND ACETAMINOPHEN 1 TABLET: 5; 325 TABLET ORAL at 07:06

## 2020-01-01 RX ADMIN — IOPAMIDOL 100 ML: 755 INJECTION, SOLUTION INTRAVENOUS at 15:22

## 2020-01-01 RX ADMIN — GABAPENTIN 300 MG: 300 CAPSULE ORAL at 11:45

## 2020-01-01 RX ADMIN — NYSTATIN: 100000 POWDER TOPICAL at 09:03

## 2020-01-01 RX ADMIN — HEPARIN SODIUM 5000 UNITS: 5000 INJECTION INTRAVENOUS; SUBCUTANEOUS at 23:07

## 2020-01-01 RX ADMIN — LISINOPRIL 20 MG: 20 TABLET ORAL at 09:08

## 2020-01-01 RX ADMIN — NYSTATIN: 100000 POWDER TOPICAL at 09:09

## 2020-01-01 RX ADMIN — Medication 10 ML: at 12:27

## 2020-01-01 RX ADMIN — LISINOPRIL 20 MG: 20 TABLET ORAL at 09:02

## 2020-01-01 RX ADMIN — FAMOTIDINE 20 MG: 20 TABLET, FILM COATED ORAL at 21:45

## 2020-01-01 RX ADMIN — ATORVASTATIN CALCIUM 10 MG: 10 TABLET, FILM COATED ORAL at 22:49

## 2020-01-01 RX ADMIN — HEPARIN SODIUM 5000 UNITS: 5000 INJECTION INTRAVENOUS; SUBCUTANEOUS at 16:55

## 2020-01-01 RX ADMIN — Medication 10 ML: at 06:00

## 2020-01-01 RX ADMIN — CEFAZOLIN SODIUM 2 G: 1 POWDER, FOR SOLUTION INTRAMUSCULAR; INTRAVENOUS at 12:40

## 2020-01-17 NOTE — TELEPHONE ENCOUNTER
----- Message from Delia Gann sent at 1/17/2020 10:36 AM EST -----  Regarding: Dr. Yoko Bragg Message/Vendor Calls    Caller's first and last name: Celia Humphreys      Reason for call: discuss alternative for Ranitidine      Callback required yes/no and why: yes      Best contact number(s): [phone]      Details to clarify the request: Pt received a letter in the mail from [facility] stating that Ranitidine is on recall and he wanted to know is there an alternative in replace for the medication that he can take.        Delia Gann

## 2020-02-13 NOTE — H&P
Preoperative Evaluation                     History and Physical with Surgical Risk Stratification     2/13/2020    CC: Left toe pain  Surgery: Left 1st joint implant and left heel spur     HPI:   Ollie Streeter is a 79 y.o. male referred for pre-operative evaluation by Dr. Tracie Hernandez for surgery on 2/21/20. Mr. Antonio Erwin states he has bone spurs in both feet but the most pain is caused by the OA in his toes. He has RA in most toe joints. He had iliac stents placed in January that he was hoping would make his feet feel better. Pain is constant and a 8/10. Sitting helps with the pain, walking makes it worse. He sees [facility] and went on 1/31/20. The patient was evaluated in the surgeon's office and it was determined that the most appropriate plan of care is to proceed with surgical intervention. Patient's PCP Korina Jean Baptiste MD    Review of Systems     Constitutional: Negative for chills and fever  HENT: Negative for congestion and sore throat  Eyes: negative for blurred vision and double vision  Respiratory: Negative for cough, shortness of breath and wheezing  Mouth: Positive for top dentures and missing other teeth  Cardiovascular: Negative for chest pain and palpitations  Gastrointestinal: Negative for abdominal pain, constipation, diarrhea and nausea  Genitourinary: Negative for dysuria and hematuria. Urinary retention   Musculoskeletal: Positive for left foot pain  Skin: Negative for rash, open wounds. Negative for bruises easily  Neurological: Negative for dizziness, tremors and headaches. Balance disorder. Psychiatric: Negative for depression. The patient is not nervous/anxious.     Inherent Risk of Surgery     Surgical risk:  Intermediate  Low:  EIntermediate:   High:    Patient Cardiac Risk Assessment     Revised Cardiac Risk Index (RCRI)  Hx of CVD  Rate if cardiac death, nonfatal MI, nonfatal cardiac arrest by number of risk factor- 1.0%    RAINER/AHA 2007 Guidelines:   1) Surgery Emergency, Non-cardiac -> to surgery  2) If not, look at clinical predictors    Major Intermediate Minor   Prior MI    Blood Thinner: Plavix    METS      EQUAL TO 4 Care for self Walk indoors around house Walk 2-3 blocks on level ground (2-3 mph) Light work around house (dust, dishes)     Other Risk Factors:   Screening for ETOH use:  Done and low risk  Smoking status:   None    Personal or FH of bleeding problems:  No  Personal or FH of blood clots:  No  Personal or FH of anesthesia problems:   No    Pulmonary Risk:  Asthma or COPD:  Yes  Body mass index is 32.78 kg/m². Known AMINAH:  No  BUN normal    Past Medical, Surgical, Social History     Allergies: Allergies   Allergen Reactions    Latex Rash       Past Medical History:   Diagnosis Date    Arthritis of foot, degenerative     Dr. Anjana Ornelas Axonal sensorimotor neuropathy 11/2019    EMG. Dr. Leahy Seat Balance disorder     uses cane.  Bladder tumor 2004    Dr Eric Chandra. Dr. Lucio Cordero, cysto and [address] [place] [address] 10/29/11    CAD (coronary artery disease)     MI 7/2010,s/p CABG. Dr. Lacy  Carotid stenosis 10/20/15    Dr. Dariel Mcnulty. endarterectomy 12/31/15. 50-69% L on doppler. 80% \"per CT\"    COPD (chronic obstructive pulmonary disease) ([facility] [address]     Dr. Jin Caceres.  stable PFT 3/2019. moderate, FEV1 1.41 7/2009. Claudean Retort    GERD (gastroesophageal reflux disease)     HTN (hypertension)     Hyperlipidemia LDL goal < 70     Osteoporosis     tx w calcium, DEXA improved T-2.1 10/2011, 2012, 1/2015    PHN (postherpetic neuralgia) 2012    Physiological tremor     saw Dr. Michael Dickinson [facility]) 02/2004    Dr. Eric Chandra. on lupron. PSA increased 0.149 2/2014    Pulmonary nodule, right     5 mm, stable on CT 10/14/10, 1/14/14    PVD (peripheral vascular disease) ([facility] [address] 01/20/2020    Stents placed     Shingles 12/12/12    right neck and back.  Urinary retention 01/10/2018    while hospitalized.   heard cath removed 1/16/18     Past Surgical History: Procedure Laterality Date    BLADDER TUMOR ASSOC      CABG, ARTERIAL, THREE  2010    Dr Kade Garcia. LIMA to LAD, spah to cirm    COLONOSCOPY N/A 1/10/2018    focal colitis. Belgica Morales MD at [address]  10/29/11    FISH, normal    HX CAROTID ENDARTERECTOMY Left 12/31/15    Dr. Elenita Platt  2011    right eye, Dr. Gianfranco Tyler HX COLONOSCOPY  10/2/13    hyperplastic polyp, Dr. Yuliana Duenas HX ENDOSCOPY  01/10/2018    normal    HX HERNIA REPAIR  2000    HX LAP CHOLECYSTECTOMY  2017    gangranous. Dr. Destini Lancaster    IR PTA ILIAC INIT Bilateral 2020    Stent placement    [place] CYSTOURETHROSCOPY  2016    normal     Social History     Tobacco Use    Smoking status: Former Smoker     Packs/day: 1.50     Years: 55.00     Pack years: 82.50     Types: Cigarettes     Last attempt to quit: 2003     Years since quittin.2    Smokeless tobacco: Never Used   Substance Use Topics    Alcohol use: No    Drug use: Never     Family History   Problem Relation Age of Onset    Cancer Mother         bladder    Heart Disease Brother     Anesth Problems Neg Hx     Deep Vein Thrombosis Neg Hx        Objective     Vitals:    20 0953   BP: 177/71   Pulse: 100   Resp: 19   Temp: 97.7 °F (36.5 °C)   SpO2: 92%   Weight: 92.1 kg (203 lb 1 oz)   Height: 5' 6\" (1.676 m)       Constitutional:  Appears well,  No Acute Distress, Vitals noted  Psychiatric:   Affect normal, Alert and Oriented to person/place/time    Eyes:   Pupils equally round and reactive, EOMI, conjunctiva clear, eyelids normal  ENT:   External ears and nose normal/lips, teeth normal, gums normal, TMs and Orophyarynx normal  Neck:   General inspection and Thyroid normal.  No abnormal cervical or supraclavicular nodes    Lungs:   Clear to auscultation, good respiratory effort  Heart: Ausculation normal.  Regular rhythm. No cardiac murmurs.   No carotid bruits or palpable thrills  Chest wall normal  Musculoskeletal: Gait antalgic  Extremities:   Without edema, good peripheral pulses  Skin:   Warm to palpation, without rashes, bruising, or suspicious lesions     See CC    Assessment and Plan     Assessment/Plan:   1) Foot pain  2) Pre-Operative Evaluation    BMP reviewed    Preoperative Clearance  Per RCRI, the patient has a 1.0% risk of cardiac death, nonfatal MI, nonfatal cardiac arrest based on one risk factors. Per ACC/AHA guidelines, patient is intermediate risk for a(n) intermediate risk surgery and may proceed to planned surgery with the above noted risk.     Moses Carvalho, NP

## 2020-02-13 NOTE — PERIOP NOTES
[address], [address] [facility]   MAIN OR                                  [phone]   MAIN PRE OP                          [phone]                                                                                AMBULATORY PRE OP          [phone]  PRE-ADMISSION TESTING    [phone]   Surgery Date:   Friday 2/21/20         Is surgery arrival time given by surgeon? NO  If [facility] staff will call you Thursday 2/20/20 between 3 and 7pm the day before your surgery with your arrival time. (If your surgery is on a Monday, we will call you the Friday before.)    Call [phone] after 7pm Monday-Friday if you did not receive this call. INSTRUCTIONS BEFORE YOUR SURGERY   When You  Arrive Arrive at the [address]nd [address] [facility] on the day of your surgery  Have your insurance card, photo ID, and any copayment (if needed)   Food   and   Drink NO food or drink after midnight the night before surgery    This means NO water, gum, mints, coffee, juice, etc.  No alcohol (beer, wine, liquor) 24 hours before and after surgery   Medications to   TAKE   Morning of Surgery MEDICATIONS TO TAKE THE MORNING OF SURGERY WITH A SIP OF WATER:    proair if needed, spiriva, breo, atenolol,gabapentin   Medications  To  STOP      7 days before surgery  Non-Steroidal anti-inflammatory Drugs (NSAID's): for example, Ibuprofen (Advil, Motrin), Naproxen (Aleve)   Aspirin, if taking for pain    Herbal supplements, vitamins, and fish oil     Blood  Thinners  If you take  Aspirin, Plavix, Coumadin, or any blood-thinning or anti-blood clot medicine, talk to the doctor who prescribed the medications for pre-operative instructions.    Bathing Clothing  Jewelry  Valuables      May shower the morning of surgery, please do not apply anything to your skin (lotions, powders, deodorant)   Follow Chlorhexidine Care Fusion body wash instructions provided to you during PAT appointment. Begin 3 days prior to surgery.  Do not shave or trim anywhere 24 hours before surgery   Wear your hair loose or down; no pony-tails, buns, or metal hair clips   Wear loose, comfortable, clean clothes   Wear glasses instead of contacts   Leave money, valuables, and jewelry, including body piercings, at home   Going Home - or Spending the Night  SAME-DAY SURGERY: You must have a responsible adult drive you home and stay with you 24 hours after surgery   ADMITS: If your doctor is keeping you in the hospital after surgery, leave personal belongings/luggage in your car until you have a hospital room number. Hospital discharge time is 12 noon  Drivers must be here before 12 noon unless you are told differently   Special Instructions   · Use Chlorhexidine Care Fusion wash and sponges 3 days prior to surgery as instructed. · Pain pamphlet and Call Don't Fall reminder reviewed with patient. ·  parking is complimentary Monday - Friday 7 am - 5:30 pm  · Bring PTA Medication list day of surgery with the last doses taken documented   · Do not bring medication bottles the day of surgery     Follow all instructions so your surgery wont be cancelled. Please, be on time. If a situation occurs and you are delayed the day of surgery, call [phone] or [phone]. If your physical condition changes (like a fever, cold, flu, etc.) call your surgeon. Home medication(s) reviewed and verified with patient/list during PAT appointment. The patient was contacted  in person. The patient verbalizes understanding of all instructions and does not  need reinforcement.

## 2020-02-17 NOTE — PERIOP NOTES
Called Dr. Vadim Dewitt office and requested return of Plavix plan as patient is 5 days out from surgery.

## 2020-02-21 NOTE — DISCHARGE INSTRUCTIONS
Patient Education        Abdominal Pain: Care Instructions  Your Care Instructions    Abdominal pain has many possible causes. Some aren't serious and get better on their own in a few days. Others need more testing and treatment. If your pain continues or gets worse, you need to be rechecked and may need more tests to find out what is wrong. You may need surgery to correct the problem. Don't ignore new symptoms, such as fever, nausea and vomiting, urination problems, pain that gets worse, and dizziness. These may be signs of a more serious problem. Your doctor may have recommended a follow-up visit in the next 8 to 12 hours. If you are not getting better, you may need more tests or treatment. The doctor has checked you carefully, but problems can develop later. If you notice any problems or new symptoms, get medical treatment right away. Follow-up care is a key part of your treatment and safety. Be sure to make and go to all appointments, and call your doctor if you are having problems. It's also a good idea to know your test results and keep a list of the medicines you take. How can you care for yourself at home? · Rest until you feel better. · To prevent dehydration, drink plenty of fluids, enough so that your urine is light yellow or clear like water. Choose water and other caffeine-free clear liquids until you feel better. If you have kidney, heart, or liver disease and have to limit fluids, talk with your doctor before you increase the amount of fluids you drink. · If your stomach is upset, eat mild foods, such as rice, dry toast or crackers, bananas, and applesauce. Try eating several small meals instead of two or three large ones. · Wait until 48 hours after all symptoms have gone away before you have spicy foods, alcohol, and drinks that contain caffeine. · Do not eat foods that are high in fat. · Avoid anti-inflammatory medicines such as aspirin, ibuprofen (Advil, Motrin), and naproxen (Aleve). These can cause stomach upset. Talk to your doctor if you take daily aspirin for another health problem. When should you call for help? Call 911 anytime you think you may need emergency care. For example, call if:    · You passed out (lost consciousness).     · You pass maroon or very bloody stools.     · You vomit blood or what looks like coffee grounds.     · You have new, severe belly pain.    Call your doctor now or seek immediate medical care if:    · Your pain gets worse, especially if it becomes focused in one area of your belly.     · You have a new or higher fever.     · Your stools are black and look like tar, or they have streaks of blood.     · You have unexpected vaginal bleeding.     · You have symptoms of a urinary tract infection. These may include:  ? Pain when you urinate. ? Urinating more often than usual.  ? Blood in your urine.     · You are dizzy or lightheaded, or you feel like you may faint.    Watch closely for changes in your health, and be sure to contact your doctor if:    · You are not getting better after 1 day (24 hours). Where can you learn more? Go to http://indiaCasacandarenard.info/. Enter B457 in the search box to learn more about \"Abdominal Pain: Care Instructions. \"  Current as of: June 26, 2019  Content Version: 12.2  © 4611-0466 [employer]. Care instructions adapted under license by [employer] (which disclaims liability or warranty for this information). If you have questions about a medical condition or this instruction, always ask your healthcare professional. [place] [address] any warranty or liability for your use of this information. Patient Education        Diarrhea: Care Instructions  Your Care Instructions    Diarrhea is loose, watery stools (bowel movements). The exact cause is often hard to find. Sometimes diarrhea is your body's way of getting rid of what caused an upset stomach.  Viruses, food poisoning, and many medicines can cause diarrhea. Some people get diarrhea in response to emotional stress, anxiety, or certain foods. Almost everyone has diarrhea now and then. It usually isn't serious, and your stools will return to normal soon. The important thing to do is replace the fluids you have lost, so you can prevent dehydration. The doctor has checked you carefully, but problems can develop later. If you notice any problems or new symptoms, get medical treatment right away. Follow-up care is a key part of your treatment and safety. Be sure to make and go to all appointments, and call your doctor if you are having problems. It's also a good idea to know your test results and keep a list of the medicines you take. How can you care for yourself at home? · Watch for signs of dehydration, which means your body has lost too much water. Dehydration is a serious condition and should be treated right away. Signs of dehydration are:  ? Increasing thirst and dry eyes and mouth. ? Feeling faint or lightheaded. ? A smaller amount of urine than normal.  · To prevent dehydration, drink plenty of fluids. Choose water and other caffeine-free clear liquids until you feel better. If you have kidney, heart, or liver disease and have to limit fluids, talk with your doctor before you increase the amount of fluids you drink. · Begin eating small amounts of mild foods the next day, if you feel like it. ? Try yogurt that has live cultures of Lactobacillus. (Check the label.)  ? Avoid spicy foods, fruits, alcohol, and caffeine until 48 hours after all symptoms are gone. ? Avoid chewing gum that contains sorbitol. ? Avoid dairy products (except for yogurt with Lactobacillus) while you have diarrhea and for 3 days after symptoms are gone. · The doctor may recommend that you take over-the-counter medicine, such as loperamide (Imodium), if you still have diarrhea after 6 hours.  Read and follow all instructions on the label. Do not use this medicine if you have bloody diarrhea, a high fever, or other signs of serious illness. Call your doctor if you think you are having a problem with your medicine. When should you call for help? Call 911 anytime you think you may need emergency care. For example, call if:    · You passed out (lost consciousness).     · Your stools are maroon or very bloody.    Call your doctor now or seek immediate medical care if:    · You are dizzy or lightheaded, or you feel like you may faint.     · Your stools are black and look like tar, or they have streaks of blood.     · You have new or worse belly pain.     · You have symptoms of dehydration, such as:  ? Dry eyes and a dry mouth. ? Passing only a little dark urine. ? Feeling thirstier than usual.     · You have a new or higher fever.    Watch closely for changes in your health, and be sure to contact your doctor if:    · Your diarrhea is getting worse.     · You see pus in the diarrhea.     · You are not getting better after 2 days (48 hours). Where can you learn more? Go to http://india-renard.info/. Enter M253 in the search box to learn more about \"Diarrhea: Care Instructions. \"  Current as of: June 26, 2019  Content Version: 12.2  © 9532-2759 [employer]. Care instructions adapted under license by [employer] (which disclaims liability or warranty for this information). If you have questions about a medical condition or this instruction, always ask your healthcare professional. [place] [address] any warranty or liability for your use of this information. Patient Education        Nausea and Vomiting: Care Instructions  Your Care Instructions    When you are nauseated, you may feel weak and sweaty and notice a lot of saliva in your mouth. Nausea often leads to vomiting.  Most of the time you do not need to worry about nausea and vomiting, but they can be signs of other illnesses. Two common causes of nausea and vomiting are stomach flu and food poisoning. Nausea and vomiting from viral stomach flu will usually start to improve within 24 hours. Nausea and vomiting from food poisoning may last from 12 to 48 hours. The doctor has checked you carefully, but problems can develop later. If you notice any problems or new symptoms, get medical treatment right away. Follow-up care is a key part of your treatment and safety. Be sure to make and go to all appointments, and call your doctor if you are having problems. It's also a good idea to know your test results and keep a list of the medicines you take. How can you care for yourself at home? · To prevent dehydration, drink plenty of fluids, enough so that your urine is light yellow or clear like water. Choose water and other caffeine-free clear liquids until you feel better. If you have kidney, heart, or liver disease and have to limit fluids, talk with your doctor before you increase the amount of fluids you drink. · Rest in bed until you feel better. · When you are able to eat, try clear soups, mild foods, and liquids until all symptoms are gone for 12 to 48 hours. Other good choices include dry toast, crackers, cooked cereal, and gelatin dessert, such as Jell-O. When should you call for help? Call 911 anytime you think you may need emergency care. For example, call if:    · You passed out (lost consciousness).    Call your doctor now or seek immediate medical care if:    · You have symptoms of dehydration, such as:  ? Dry eyes and a dry mouth. ? Passing only a little dark urine. ?  Feeling thirstier than usual.     · You have new or worsening belly pain.     · You have a new or higher fever.     · You vomit blood or what looks like coffee grounds.    Watch closely for changes in your health, and be sure to contact your doctor if:    · You have ongoing nausea and vomiting.     · Your vomiting is getting worse.     · Your vomiting lasts longer than 2 days.     · You are not getting better as expected. Where can you learn more? Go to http://india-renard.info/. Enter 25 380036 in the search box to learn more about \"Nausea and Vomiting: Care Instructions. \"  Current as of: June 26, 2019  Content Version: 12.2  © 8147-8515 [employer]. Care instructions adapted under license by [employer] (which disclaims liability or warranty for this information). If you have questions about a medical condition or this instruction, always ask your healthcare professional. [address] any warranty or liability for your use of this information.

## 2020-02-21 NOTE — ED TRIAGE NOTES
Worsening diffuse abdominal pain over the last week, \"feels like when I was diagnosed with diverticulitis before. +N/V/D Abd pain 8-9/10.  Hr 110-115

## 2020-02-21 NOTE — ED PROVIDER NOTES
79 y.o. male with past medical history significant for bladder tumor, prostate cancer, hyperlipidemia, CAD, osteoporosis, COPD, GERD, HTN, physiological tremor, pulmonary nodule, shingles, PHN, arthritis, of foot, carotid stenosis, urinary retention, axonal sensorimotor neuropathy, and PVD who presents from home via EMS with chief complaint of abdominal pain. Patient reports onset of symptoms was 1 week ago. Patient reports his abdominal pain localizes to his LLQ and radiates throughout. Patient states abdominal pain is accompanied by nausea, vomiting, and diarrhea. Patient states he has 1-2 episodes of emesis per day, resulting in difficulty maintaining appetite. Patient states his diarrhea lacks blood. Patient denies any recent travel or sick contact. Patient notes he does have a history of diverticulitis. Patient also notes his gall bladder has been removed. Patient denies urinary symptoms and back pain. There are no other acute medical concerns at this time. Social hx: Former smoker PCP: Mayuri Prieto MD 
 
Note written by Fabiano Geronimo, as dictated by Yadiel Grant MD 1:34 PM  
 
 
The history is provided by the patient. No  was used. Past Medical History:  
Diagnosis Date  Arthritis of foot, degenerative Dr. Manaas Shah Axonal sensorimotor neuropathy 11/2019 EMG. Dr. Shereen Hoyt  Balance disorder   
 uses cane.  Bladder tumor 2004 [facility]. Dr. Margoth Campbell, cysto and [facility] 10/29/11  CAD (coronary artery disease) MI 7/2010,s/p CABG. Dr. Alexandra Llanos  Carotid stenosis 10/20/15 Dr. Sarah Rose. endarterectomy 12/31/15. 50-69% L on doppler. 80% \"per CT\"  COPD (chronic obstructive pulmonary disease) (HCC) Dr. Lynn Pugh.  stable PFT 3/2019. moderate, FEV1 1.41 7/2009. Durenda Snare  GERD (gastroesophageal reflux disease)  HTN (hypertension)  Hyperlipidemia LDL goal < 70   
 Osteoporosis tx w calcium, DEXA improved T-2.1 10/2011, 2012, 1/2015  PHN (postherpetic neuralgia) 2012  Physiological tremor   
 saw Dr. Nice Bone  Prostate cancer ([facility] [address] 02/2004 Dr. Cherylene Figures. on lupron. PSA increased 0.149 2/2014  Pulmonary nodule, right 5 mm, stable on CT 10/14/10, 1/14/14  PVD (peripheral vascular disease) ([facility] [address] 01/20/2020 Stents placed  Shingles 12/12/12  
 right neck and back.  Urinary retention 01/10/2018  
 while hospitalized. heard cath removed 1/16/18 Past Surgical History:  
Procedure Laterality Date  BLADDER TUMOR ASSOC  CABG, ARTERIAL, THREE  7/2010 Dr Kaiden Anderson. HUI to LAD, barbara to cirm  COLONOSCOPY N/A 1/10/2018  
 focal colitis. Valeria Engle MD at [address] [place] [address] 10  CYSTOSCOPY  10/29/11 FISH, normal  
 HX CAROTID ENDARTERECTOMY Left 12/31/15 Dr. Jorge Tinoco  HX CATARACT REMOVAL  12/2011  
 right eye, Dr. Itzel Casillas  HX COLONOSCOPY  10/2/13  
 hyperplastic polyp, [facility]  HX ENDOSCOPY  01/10/2018  
 normal  
 HX HERNIA REPAIR  2000  HX LAP CHOLECYSTECTOMY  03/24/2017  
 gangranous. Dr. Kaushik Baum  IR PTA ILIAC INIT Bilateral 01/14/2020 Stent placement  MO CYSTOURETHROSCOPY  11/29/2016  
 normal  
 
   
Family History:  
Problem Relation Age of Onset  Cancer Mother   
     bladder  Heart Disease Brother  Anesth Problems Neg Hx  Deep Vein Thrombosis Neg Hx Social History Socioeconomic History  Marital status:  Spouse name: Not on file  Number of children: Not on file  Years of education: Not on file  Highest education level: Not on file Occupational History  Not on file Social Needs  Financial resource strain: Not on file  Food insecurity:  
  Worry: Not on file Inability: Not on file  Transportation needs:  
  Medical: Not on file Non-medical: Not on file Tobacco Use  Smoking status: Former Smoker Packs/day: 1.50   Years: 55.00  
 Pack years: 82.50 Types: Cigarettes Last attempt to quit: 2003 Years since quittin.2  Smokeless tobacco: Never Used Substance and Sexual Activity  Alcohol use: No  
 Drug use: Never  Sexual activity: Not on file Lifestyle  Physical activity:  
  Days per week: Not on file Minutes per session: Not on file  Stress: Not on file Relationships  Social connections:  
  Talks on phone: Not on file Gets together: Not on file Attends Mosque service: Not on file Active member of club or organization: Not on file Attends meetings of clubs or organizations: Not on file Relationship status: Not on file  Intimate partner violence:  
  Fear of current or ex partner: Not on file Emotionally abused: Not on file Physically abused: Not on file Forced sexual activity: Not on file Other Topics Concern  Not on file Social History Narrative  Not on file ALLERGIES: Latex Review of Systems Constitutional: Negative for chills, diaphoresis and fever. HENT: Negative for congestion and trouble swallowing. Eyes: Negative for photophobia and visual disturbance. Respiratory: Negative for cough, chest tightness and shortness of breath. Cardiovascular: Negative for chest pain, palpitations and leg swelling. Gastrointestinal: Positive for abdominal pain, diarrhea, nausea and vomiting. Genitourinary: Negative for difficulty urinating, dysuria, flank pain and frequency. Musculoskeletal: Negative for back pain and myalgias. Skin: Negative for rash and wound. Neurological: Negative for dizziness, weakness, light-headedness and headaches. Hematological: Negative for adenopathy. Does not bruise/bleed easily. Psychiatric/Behavioral: Negative for agitation and confusion. Vitals:  
 20 1328 BP: 156/76 Pulse: (!) 104 Resp: 18 Temp: 98 °F (36.7 °C) SpO2: 94% Weight: 85.7 kg (189 lb) Height: 5' 7\" (1.702 m) Physical Exam 
Vitals signs and nursing note reviewed. Constitutional:   
   General: He is not in acute distress. Appearance: He is well-developed. He is not diaphoretic. HENT:  
   Head: Normocephalic. Eyes:  
   Conjunctiva/sclera: Conjunctivae normal.  
   Pupils: Pupils are equal, round, and reactive to light. Neck: Musculoskeletal: Normal range of motion and neck supple. Vascular: No JVD. Cardiovascular:  
   Rate and Rhythm: Regular rhythm. Tachycardia present. Heart sounds: Normal heart sounds. Pulmonary:  
   Effort: Pulmonary effort is normal.  
   Breath sounds: Normal breath sounds. Abdominal:  
   General: Bowel sounds are normal. There is distension. Palpations: Abdomen is soft. Tenderness: There is abdominal tenderness in the left lower quadrant. Musculoskeletal: Normal range of motion. General: No tenderness or deformity. Lymphadenopathy:  
   Cervical: No cervical adenopathy. Skin: 
   General: Skin is warm and dry. Capillary Refill: Capillary refill takes less than 2 seconds. Findings: No erythema or rash. Neurological:  
   Mental Status: He is alert and oriented to person, place, and time. Cranial Nerves: No cranial nerve deficit. Sensory: No sensory deficit. Note written by Fabiano Benson, as dictated by Marianne Real MD 1:34 PM  
 
 
MDM Procedures ED EKG interpretation: 13:33 Rhythm: sinus tachycardia with RBBB; and regular . Rate (approx.): 103; Axis: normal; ST/T wave: NO ST elevation or depression; QRS prolonged; Normal P duration Note written by Fabiano Benson, as dictated by Marianne Real MD 1:39 PM  
 
PROGRESS NOTE: 
4:27 PM 
Patient's symptoms have resolved and he is feeling better. Plan for discharge. Patient's results have been reviewed with them.   Patient and/or family have verbally conveyed their understanding and agreement of the patient's signs, symptoms, diagnosis, treatment and prognosis and additionally agree to follow up as recommended or return to the Emergency Room should their condition change prior to follow-up. Discharge instructions have also been provided to the patient with some educational information regarding their diagnosis as well a list of reasons why they would want to return to the ER prior to their follow-up appointment should their condition change.  
 
Lucho Acosta MD

## 2020-03-01 NOTE — PROGRESS NOTES
Assessment and Plan   Diagnoses and all orders for this visit:    1. Abdominal pain, unspecified abdominal location  Pain has since resolved. 2. Preop examination  RCRI score 0. EKG obtained during his ER visit is unchanged from previous EKGs. No further cardiac work-up required. Patient will be in touch with his vascular surgeon for instructions on what to do about his Plavix. Patient is low risk for low risk surgery. Benefits, risks, possible drug interactions, and side effects of all new medications were reviewed with the patient. Pt verbalized understanding. Return to clinic: As needed    Shun Alas MD  [facility]  3/2/2020    Future Appointments   Date Time Provider Isabel Chacon   3/16/2020  3:40 PM MD Valarie [address]        Subjective   Chief Complaint   ER follow-up    Jhon Osorio is a 79 y.o. male     Was seen in the ED on February 21 for severe left lower quadrant abdominal pain. CT abdomen and pelvis was negative and was diagnosed with gastroenteritis. Reports that his symptoms have since resolved denies any current abdominal pain, nausea, vomiting. Reports that his appetite has significantly improved. Patient was supposed to have surgery for bone spurs but had to be rescheduled because of his illness. Patient denies any chest pain, shortness of breath, prior issues with anesthesia. Has a latex allergy listed in the chart although patient denies a latex allergy to me reports that he has issues with the tape. Currently on Plavix followed by vascular surgery. Recently had a iliac stent placed. Denies any bleeding issues. Review of Systems   Constitutional: Negative for chills and fever. Respiratory: Negative for shortness of breath. Cardiovascular: Negative for chest pain. Gastrointestinal: Negative for abdominal pain, blood in stool, constipation, diarrhea, melena, nausea and vomiting. Objective   Vitals:       Visit Vitals  /72 (BP 1 Location: Left arm, BP Patient Position: Sitting)   Pulse 80   Temp 97.8 °F (36.6 °C) (Oral)   Resp 18   Ht 5' 7\" (1.702 m)   Wt 189 lb (85.7 kg)   SpO2 96%   BMI 29.60 kg/m²        Physical Exam  Cardiovascular:      Rate and Rhythm: Normal rate and regular rhythm. Heart sounds: No murmur. No friction rub. No gallop. Pulmonary:      Effort: No respiratory distress. Breath sounds: No wheezing, rhonchi or rales. Abdominal:      General: Bowel sounds are normal. There is no distension. Palpations: Abdomen is soft. There is no mass. Tenderness: There is no abdominal tenderness. There is no guarding or rebound. Neurological:      Mental Status: He is alert. Psychiatric:         Mood and Affect: Mood normal.         Thought Content:  Thought content normal.         Judgment: Judgment normal.          Voice recognition software is utilized and this note may contain transcription errors

## 2020-03-17 NOTE — PERIOP NOTES
[address] [facility] [address], [address] [facility]   MAIN OR                                  [phone]   MAIN PRE OP                          [phone]                                                                                AMBULATORY PRE OP          [phone]  PRE-ADMISSION TESTING    [phone]   Surgery Date:   03/19/2020        Is surgery arrival time given by surgeon? NO  If NO, [address] [facility] staff will call you between 3 and 7pm the day before your surgery with your arrival time. (If your surgery is on a Monday, we will call you the Friday before.)    Call [phone] after 7pm Monday-Friday if you did not receive this call. INSTRUCTIONS BEFORE YOUR SURGERY   When You  Arrive Arrive at the 2nd [address] [facility] on the day of your surgery  Have your insurance card, photo ID, and any copayment (if needed)   Food   and   Drink NO food or drink after midnight the night before surgery    This means NO water, gum, mints, coffee, juice, etc.  No alcohol (beer, wine, liquor) 24 hours before and after surgery   Medications to   TAKE   Morning of Surgery MEDICATIONS TO TAKE THE MORNING OF SURGERY WITH A SIP OF WATER:    Albuterol, Atenolol, Breo, Gabapentin,spiriva   Medications  To  STOP      7 days before surgery  Non-Steroidal anti-inflammatory Drugs (NSAID's): for example, Ibuprofen (Advil, Motrin), Naproxen (Aleve)   Aspirin, if taking for pain    Herbal supplements, vitamins, and fish oil   Other:  (Pain medications not listed above, including Tylenol may be taken)   Blood  Thinners  If you take  Aspirin, Plavix, Coumadin, or any blood-thinning or anti-blood clot medicine, talk to the doctor who prescribed the medications for pre-operative instructions.    Bathing Clothing  Jewelry  Valuables      If you shower the morning of surgery, please do not apply anything to your skin (lotions, powders, deodorant, or makeup, especially vida)   Follow Chlorhexidine Care Fusion body wash instructions provided to you during PAT appointment. Begin 3 days prior to surgery.  Do not shave or trim anywhere 24 hours before surgery   Wear your hair loose or down; no pony-tails, buns, or metal hair clips   Wear loose, comfortable, clean clothes   Wear glasses instead of contacts   Leave money, valuables, and jewelry, including body piercings, at home   Going Home - or Spending the Night  SAME-DAY SURGERY: You must have a responsible adult drive you home and stay with you 24 hours after surgery   ADMITS: If your doctor is keeping you in the hospital after surgery, leave personal belongings/luggage in your car until you have a hospital room number. Hospital discharge time is 12 noon  Drivers must be here before 12 noon unless you are told differently   Special Instructions 16. Special Instructions:  · Use Chlorhexidine Care Fusion wash and sponges 3 days prior to surgery as instructed. · Incentive spirometer given with instructions to practice at home and bring back to the hospital on the day of surgery. · [facility] will contact you if your Hemoglobin A1C is greater than 7.5. · Ensure/Glucerna  sample  and Ensure/Glucerna coupon given. · Pain pamphlet and Call Don't Fall reminder reviewed with patient. ·  parking is complimentary Monday - Friday 7 am - 5:30 pm  · Bring PTA Medication list day of surgery with the last doses taken documented   · Do not bring medication bottles the day of surgery     Follow all instructions so your surgery wont be cancelled. Please, be on time. If a situation occurs and you are delayed the day of surgery, call [phone]     If your physical condition changes (like a fever, cold, flu, etc.) call your surgeon. Home medication(s) reviewed and verified verbal during PAT appointment. The patient was contacted  in person.    The patient verbalizes understanding of all instructions and does not  need reinforcement.

## 2020-05-08 NOTE — PROGRESS NOTES
Contacted patient's wife, [facility], at this time regarding COVID testing prior to [address] 5/27/20. Pt is scheduled to arrive at [facility] on 5/24/20, between [phone]. Patient verbalizes understanding that surgery will not proceed as planned if screening appointment is missed or if COVID test is positive.

## 2020-05-20 NOTE — PERIOP NOTES
[address] , [address] [facility]   PRE-ADMISSION TESTING    [phone]     Surgery Date:   6/4/2020 Thursday     Is surgery arrival time given by surgeon? NO  If NO, [address] [facility] staff will call you between 3 and 7pm the day before your surgery with your arrival time. (If your surgery is on a Monday, we will call you the Friday before.)    Call [phone] after 7pm Monday-Friday if you did not receive this call. INSTRUCTIONS BEFORE YOUR SURGERY   When You  Arrive Arrive at the 2nd [address] [facility] on the day of your surgery  Have your insurance card, photo ID, and any copayment (if needed)   Food   and   Drink NO food or drink after midnight the night before surgery    This means NO water, gum, mints, coffee, juice, etc.  No alcohol (beer, wine, liquor) 24 hours before and after surgery   Medications to   TAKE   Morning of Surgery MEDICATIONS TO TAKE THE MORNING OF SURGERY WITH A SIP OF WATER:    Atenolol   Gabapentin   Breo and Spiriva inhaler     Medications  To  STOP      7 days before surgery  Non-Steroidal anti-inflammatory Drugs (NSAID's): for example, Ibuprofen (Advil, Motrin), Naproxen (Aleve)   Aspirin, if taking for pain    Herbal supplements, vitamins, and fish oil   Other:  (Pain medications not listed above, including Tylenol may be taken)   Blood  Thinners  Stop your Plavix as directed by Dr. Dana Hampton If you shower the morning of surgery, please do not apply anything to your skin (lotions, powders, deodorant, or makeup, especially mascara)   Follow Chlorhexidine Care Fusion body wash instructions provided to you during PAT appointment. Begin 3 days prior to surgery.    Do not shave or trim anywhere 24 hours before surgery   Wear your hair loose or down; no pony-tails, buns, or metal hair clips   Wear loose, comfortable, clean clothes   Wear glasses instead of contacts  [facility], valuables, and jewelry, including body piercings, at home   Going Home - or Spending the Night  SAME-DAY SURGERY: You must have a responsible adult drive you home and stay with you 24 hours after surgery   ADMITS: If your doctor is keeping you in the hospital after surgery, leave personal belongings/luggage in your car until you have a hospital room number. Hospital discharge time is 12 noon  Drivers must be here before 12 noon unless you are told differently   Special Instructions It is now mandated that all surgical patients be tested for COVID-19 prior to surgery. Testing has to be exactly 4 days prior to surgery. Your COVID test date is Sunday, 5/31/2020 between 7:30 am and 9:30 am.    COVID testing will be performed curbside at the [facility] [address]. There will be signs leading you to the testing site. You will need to bring a photo ID with you to be swabbed. Patients are advised to self-quarantine at home after testing and prior to your surgery date. You will be notified if your results are positive. What to watch for:   Coronavirus (COVID-19) affects different people in different ways   It also appears with a wide range of symptoms from mild to severe   Signs usually appear 2-14 days after exposure     If you develop any of the following, notify your doctor immediately:  o Fever  o Chills, with or without a shiver  o Muscle pain  o Headache  o Sore throat  o Dry cough  o New loss of taste or smell  o Tiredness      If you develop any of the following, call 911:  o Shortness of breath  o Difficulty breathing  o Chest pain  o New confusion  o Blueness of fingers and/or lips     Follow all instructions so your surgery wont be cancelled. Please, be on time. If a situation occurs and you are delayed the day of surgery, call [phone].     If your physical condition changes (like a fever, cold, flu, etc.) call your surgeon. Home medication(s) reviewed and verified verbally during PAT appointment. The patient was contacted  in person. The patient verbalizes understanding of all instructions and does not  need reinforcement.

## 2020-05-21 NOTE — H&P
Preoperative Evaluation                     History and Physical with Surgical Risk Stratification     5/20/2020    CC: [facility]  Surgery: LEFT 1ST METATARSAL PHALANGEAL JOINT CHEILECTOMYLEFT HEEL SPUR RESECTION     HPI:   Joe Cabello is a 79 y.o. male referred for pre-operative evaluation by Dr. Justice Long for surgery on 6/4/20. Mr. Quin Mendez was here in March but had to be cancelled r/t COVID-19. He returns today for his surgery. He notes his foot pain is a 6/10 today and has been present for a couple of years. See previous H/P for full pain history. He had an iliac shunt placed in January of this year and is on Plavix followed by Dr. Elias Tello. The patient was evaluated in the surgeon's office and it was determined that the most appropriate plan of care is to proceed with surgical intervention. Patient's PCP Chelle Ventura MD    Review of Systems     Constitutional: Negative for chills and fever  HENT: Negative for congestion and sore throat  Eyes: negative for blurred vision and double vision  Respiratory: Negative for cough, shortness of breath and wheezing  Mouth: Negative for loose, broken or chipped teeth. Top dentures   Cardiovascular: Negative for chest pain and palpitations  Gastrointestinal: Negative for abdominal pain, constipation, diarrhea and nausea  Genitourinary: Negative for dysuria and hematuria  Musculoskeletal: Left foot pain  Skin: Negative for rash, open wounds. Bruises easily  Neurological: Negative for dizziness, tremors and headaches  Psychiatric: Negative for depression. The patient is not nervous/anxious.     Inherent Risk of Surgery     Surgical risk: Low  Low:  EIntermediate:   High:    Patient Cardiac Risk Assessment     Revised Cardiac Risk Index (RCRI)  Hx of CVD  Rate if cardiac death, nonfatal MI, nonfatal cardiac arrest by number of risk factor- 1.0%    RAINER/AHA 2007 Guidelines:   1) Surgery Emergency, Non-cardiac -> to surgery  2) If not, look at clinical predictors    Major Intermediate Minor   Prior MI    Blood Thinner: Plavix    METS      EQUAL TO 4 Care for self Walk indoors around house Walk 2-3 blocks on level ground (2-3 mph) Light work around house (dust, dishes)     Other Risk Factors:   Screening for ETOH use:  Done and low risk  Smoking status:       Personal or FH of bleeding problems:  No  Personal or FH of blood clots:  No  Personal or FH of anesthesia problems:   No    Pulmonary Risk:  Asthma or COPD:  Yes  Body mass index is 32.46 kg/m². Known AMINAH:  No  BUN normal    Past Medical, Surgical, Social History     Allergies: Allergies   Allergen Reactions    Latex Rash    Cefazolin Other (comments)    Ketorolac Other (comments)     Medication Documentation Review Audit     Reviewed by Merlyn Blanc RN (Registered Nurse) on 05/20/20 at [phone]    Medication Sig Documenting Provider Last Dose Status Taking?   acetaminophen (TYLENOL ARTHRITIS PAIN) 650 mg TbER Take 1,300 mg by mouth two (2) times a day. Provider, Historical  Active Yes   albuterol (PROAIR HFA) 90 mcg/actuation inhaler USE 2 PUFFS EVERY 8 HOURS AS NEEDED Michelle Hall MD  Active Yes   atenolol (TENORMIN) 50 mg tablet TAKE ONE TABLET BY MOUTH EVERY DAY Michelle Hall MD  Active Yes   CALCIUM CARBONATE/VITAMIN D2 (CALCIUM 600 WITH VITAMIN D2 PO) Take 1 Tab by mouth two (2) times a day. Provider, Historical  Active Yes   cephALEXin (KEFLEX) 500 mg capsule Take 500 mg by mouth three (3) times daily. Provider, Historical  Active Yes   clopidogrel (PLAVIX) 75 mg tab Take 75 mg by mouth daily. Provider, Historical  Active Yes           Med Note (ABB, [address] May 20, 2020  9:30 AM)     famotidine (PEPCID) 40 mg tablet Take 1 Tab by mouth nightly. Cielo Manrique MD  Active Yes   fluticasone-vilanterol (BREO ELLIPTA) 100-25 mcg/dose inhaler Take 1 Puff by inhalation daily.  Provider, Historical  Active Yes   gabapentin (NEURONTIN) 300 mg capsule Take 1 Cap by mouth two (2) times a day. Max Daily Amount: 600 mg. Ynes Milan MD  Active Yes   LEUPROLIDE ACETATE (LUPRON DEPOT, 4 MONTH, IM) by SubCUTAneous route. Every 6 months  Receives in providers office  Prescribed by Dr. Mack Hearn Provider, Historical 1/2020 Active Yes           Med Note (Arleen Peñaibeth   u Jan 4, 2018 12:11 PM)     lisinopriL (PRINIVIL, ZESTRIL) 20 mg tablet Take 1 tablet by mouth once daily Latoya Hall MD  Active Yes   multivitamin (ONE A DAY) tablet Take 1 Tab by mouth daily. Centrum silver Provider, Historical  Active Yes   simvastatin (ZOCOR) 20 mg tablet TAKE 1 TABLET BY MOUTH ONCE DAILY IN THE [facility], Latoya Quiles MD  Active Yes   SPIRIVA WITH HANDIHALER 18 mcg inhalation capsule Inhale the contents of 1  capsule via HandiHaler  daily Latoya Hall MD  Active Yes   zolpidem (AMBIEN) 5 mg tablet Take 5 mg by mouth nightly as needed for Sleep. Provider, Historical  Active Yes                Past Medical History:   Diagnosis Date    Arthritis of foot, degenerative     Dr. Halie Bautista Autoimmune disease ([facility] [address]     RA of fingers, toes    Axonal sensorimotor neuropathy 11/2019    EMG. Dr. Alla Bloom Balance disorder     uses cane.  Bladder cancer [facility])     Bladder tumor 2004    Dr Heather Brown. Dr. Renzo Marie, [facility] 10/29/11    CAD (coronary artery disease)     MI 7/2010,s/p CABG. Dr. Sarabjit Bennett Carotid stenosis 10/20/15    Dr. Lyudmila Magana. endarterectomy 12/31/15. 50-69% L on doppler. 80% \"per CT\"    COPD (chronic obstructive pulmonary disease) ([facility]     Dr. Ramila Dawn.  stable PFT 3/2019. moderate, FEV1 1.41 7/2009. Katerine Lobe    GERD (gastroesophageal reflux disease)     HTN (hypertension)     Hyperlipidemia LDL goal < 70     Osteoporosis     tx w calcium, DEXA improved T-2.1 10/2011, 2012, 1/2015    PHN (postherpetic neuralgia) 2012    Physiological tremor     saw Dr. Gomez Field [facility]) 02/2004    Dr. Heather Brown. on lupron.   PSA increased 0.149 2/2014    Pulmonary nodule, right     5 mm, stable on CT 10/14/10, 14    PVD (peripheral vascular disease) ([address] 2020    Stents placed     Shingles 12    right neck and back.  Urinary retention 01/10/2018    while hospitalized. heard cath removed 18     Past Surgical History:   Procedure Laterality Date    BLADDER TUMOR ASSOC      CABG, ARTERIAL, THREE  2010    Dr Katarina Ochoa. LIMA to LAD, spah to cirm    COLONOSCOPY N/A 1/10/2018    focal colitis. Abagail Jeans, MD at [address] [place] [address]  10/29/11    FISH, normal    HX CAROTID ENDARTERECTOMY Left 12/31/15    Dr. Scarlet Garcia Bilateral 2011    right eye, Dr. Leonardo Cruz HX COLONOSCOPY  10/2/13    hyperplastic polyp, Dr. Zeenat Carias HX ENDOSCOPY  01/10/2018    normal    HX HERNIA REPAIR  2000    HX LAP CHOLECYSTECTOMY  2017    gangranous.   Dr. Azam Garber    IR PTA ILIAC INIT Bilateral 2020    Stent placement    IL CYSTOURETHROSCOPY  2016    normal     Social History     Tobacco Use    Smoking status: Former Smoker     Packs/day: 1.50     Years: 55.00     Pack years: 82.50     Types: Cigarettes     Last attempt to quit: 2003     Years since quittin.4    Smokeless tobacco: Never Used   Substance Use Topics    Alcohol use: No    Drug use: Never     Family History   Problem Relation Age of Onset    Cancer Mother         bladder    Heart Disease Brother     Anesth Problems Neg Hx     Deep Vein Thrombosis Neg Hx        Objective     Vitals:    20 0954 20 1025   BP: 188/83 176/81   Pulse: 74    Resp: 16    Temp: 97.8 °F (36.6 °C)    SpO2: 94%    Weight: 91.2 kg (201 lb 1.6 oz)    Height: 5' 6\" (1.676 m)        Constitutional:  Appears well,  No Acute Distress, Vitals noted  Psychiatric:   Affect normal, Alert and Oriented to person/place/time    Eyes:   Pupils equally round and reactive, EOMI, conjunctiva clear, eyelids normal  ENT:   External ears and nose normal, teeth normal, gums normal, TMs and Orophyarynx normal  Neck:   General inspection and Thyroid normal.  No abnormal cervical or supraclavicular nodes    Lungs:   Clear to auscultation, good respiratory effort  Heart: Ausculation normal.  Regular rhythm. No cardiac murmurs. No carotid bruits or palpable thrills  Chest wall normal  Musculoskeletal: Gait antalgic. Extremities:   Without edema, good peripheral pulses  Skin:   Warm to palpation, without rashes, bruising, or suspicious lesions     Recent Results (from the past 72 hour(s))   METABOLIC PANEL, BASIC    Collection Time: 05/20/20 10:37 AM   Result Value Ref Range    Sodium 139 136 - 145 mmol/L    Potassium 4.6 3.5 - 5.1 mmol/L    Chloride 105 97 - 108 mmol/L    CO2 30 21 - 32 mmol/L    Anion gap 4 (L) 5 - 15 mmol/L    Glucose 93 65 - 100 mg/dL    BUN 17 6 - 20 MG/DL    Creatinine 1.27 0.70 - 1.30 MG/DL    BUN/Creatinine ratio 13 12 - 20      GFR est AA >60 >60 ml/min/1.73m2    GFR est non-AA 55 (L) >60 ml/min/1.73m2    Calcium 9.9 8.5 - 10.1 MG/DL       Assessment and Plan     Assessment/Plan:   1) [place]  2) Pre-Operative Evaluation    Lab and EKG reviewed. Preoperative Clearance  Per RCRI, the patient has a 1.0% risk of cardiac death, nonfatal MI, nonfatal cardiac arrest based on one risk factors. Per ACC/AHA guidelines, patient is intermediate risk for a(n) low risk surgery and may proceed to planned surgery with the above noted risk.     Pema Smith, NP

## 2020-06-03 NOTE — PERIOP NOTES
The consent orders received for surgery differ from the surgical posting. Called Dr. Alejandrina Whittington office requesting updated consent orders be faxed to PAT if before 1500 or to the Main pre-op if after 1500 today.   DOS: 6/4/2020

## 2020-06-04 NOTE — DISCHARGE INSTRUCTIONS
Patient Education        Bone Spur Repair: What to Expect at Home  Your Recovery  A bone spur repair is surgery to remove a bone spur, a bony growth that forms on normal bone. Your doctor made one or more small cuts called incisions near the bone spur. Then he or she used small tools to remove the piece of bone. Your surgery may have been done using a few small incisions and a lighted viewing tube called an arthroscope (arthroscopic surgery). Or the doctor may have made one larger incision (open surgery). You may feel tired for several days after bone spur surgery. The surgery area may be swollen, and you may notice that your skin is a different color near the cuts (incisions). This is normal and will start to go away in a few days. Your recovery will depend on where the bone spur was and the type of surgery you had. It may take several days to a few weeks for you to feel better. You may have to limit your activity until your strength and movement return to normal.  This care sheet gives you a general idea about how long it will take for you to recover. But each person recovers at a different pace. Follow the steps below to get better as quickly as possible. How can you care for yourself at home? Activity  · Rest when you feel tired. Getting enough sleep will help you recover. · Stay active. Talk to your doctor about what you can do and about any limits on your normal routine. · Depending where on your body you had your bone spur surgery, you may need to use a sling, a brace, or crutches. Follow your doctor's directions for using them. · The amount of time off you will need depends on the area and extent of your surgery and the type of work you do. If you have a desk job, you may be able to return to work or your normal routine in a few days. If you lift heavy objects, or do other labor, it may be several weeks or longer before you can return to work. · Ask your doctor when you can take a shower.  You may wash the incisions with regular soap and water. · Ask your doctor when you can drive again. Diet  · You can eat your normal diet. If your stomach is upset, try bland, low-fat foods like plain rice, broiled chicken, toast, and yogurt. · You may notice that your bowel movements are not regular right after your surgery. This is common. Try to avoid constipation and straining with bowel movements. Take a fiber supplement every day. If you have not had a bowel movement after a couple of days, ask your doctor about taking a mild laxative. Medicines  · Your doctor will tell you if and when you can restart your medicines. He or she will also give you instructions about taking any new medicines. · If you take aspirin or some other blood thinner, ask your doctor if and when to start taking it again. Make sure that you understand exactly what your doctor wants you to do. · Be safe with medicines. Take pain medicines exactly as directed. ? If the doctor gave you a prescription medicine for pain, take it as prescribed. ? If you are not taking a prescription pain medicine, ask your doctor if you can take an over-the-counter medicine. · If you think your pain medicine is making you sick to your stomach:  ? Take your medicine after meals (unless your doctor has told you not to). ? Ask your doctor for a different pain medicine. · If your doctor prescribed antibiotics, take them as directed. Do not stop taking them just because you feel better. You need to take the full course of antibiotics. Incision care  · If you have dressings over the cuts the doctor made (incisions), keep them clean and dry. You may remove them when your doctor tells you to. · If your incisions are open to the air, keep the area clean and dry. · If you have strips of tape on the incisions the doctor made, leave the tape on for a week or until it falls off, unless your doctor gave you other instructions.   Ice  · Put ice or a cold pack on your incisions for 10 to 20 minutes at a time. Try to do this every 1 to 2 hours for the next 3 days (when you are awake) or until the swelling goes down. Put a thin cloth between the ice and your skin. Follow-up care is a key part of your treatment and safety. Be sure to make and go to all appointments, and call your doctor if you are having problems. It's also a good idea to know your test results and keep a list of the medicines you take. When should you call for help? [id number] anytime you think you may need emergency care. For example, call if:  · You passed out (lost consciousness). · You have chest pain, are short of breath, or you cough up blood. Call your doctor now or seek immediate medical care if:  · You have pain that does not get better after you take pain medicine. · You are sick to your stomach or cannot drink fluids. · You have loose stitches, or your incision comes open. · You have signs of a blood clot in your leg (called a deep vein thrombosis), such as:  ? Pain in your calf, back of the knee, thigh, or groin. ? Redness or swelling in your leg. · You have signs of infection, such as:  ? Increased pain, swelling, warmth, or redness. ? Red streaks leading from the incision. ? Pus draining from the incision. ? A fever. · You bleed through your bandage. Watch closely for any changes in your health, and be sure to contact your doctor if you have any problems. DISCHARGE SUMMARY from your Nurse    The following personal items collected during your admission are returned to you:   Dental Appliance: Dental Appliances: Partials, Uppers  Vision: Visual Aid: Glasses  Hearing Aid:    Jewelry: Jewelry: None  Clothing: Clothing: Other (comment)(clothes)  Other Valuables:  Other Valuables: [employer] sent to safe:      PATIENT INSTRUCTIONS:    After general anesthesia or intravenous sedation, for 24 hours or while taking prescription Narcotics:  · Limit your activities  · Do not drive and operate hazardous machinery  · Do not make important personal or business decisions  · Do  not drink alcoholic beverages  · If you have not urinated within 8 hours after discharge, please contact your surgeon on call. Report the following to your surgeon:  · Excessive pain, swelling, redness or odor of or around the surgical area  · Temperature over 100.5  · Nausea and vomiting lasting longer than 4 hours or if unable to take medications  · Any signs of decreased circulation or nerve impairment to extremity: change in color, persistent  numbness, tingling, coldness or increase pain  · Any questions    COUGH AND DEEP BREATHE    Breathing deep and coughing are very important exercises to do after surgery. Deep breathing and coughing open the little air tubes and air sacks in your lungs. You take deep breaths every day. You may not even notice - it is just something you do when you sigh or yawn. It is a natural exercise you do to keep these air passages open. After surgery, take deep breaths and cough, on purpose. Coughing and deep breathing help prevent bronchitis and pneumonia after surgery. If you had chest or belly surgery, use a pillow as a \"hug buddy\" and hold it tightly to your chest or belly when you cough. DIRECTIONS:  · Take 10 to 15 slow deep breaths every hour while awake. · Breathe in deeply, and hold it for 2 seconds. · Exhale slowly through puckered lips, like blowing up a balloon. · After every 4th or 5th deep breath, hug your pillow to your chest or belly and give a hard, deep cough. Yes, it will probably hurt. But doing this exercise is very important part of healing after surgery. Take your pain medicine to help you do this exercise without too much pain. IF YOU HAVE BEEN DIAGNOSED WITH SLEEP APNEA, PLEASE USE YOUR SLEEP APNEA DEVICE OR CPAP MACHINE WHEN YOU INTEND TO NAP AFTER TAKING PAIN MEDICATION.     Ankle Pumps    Ankle pumps increase the circulation of oxygenated blood to your lower extremities and decrease your risk for circulation problems such as blood clots. They also stretch the muscles, tendons and ligaments in your foot and ankle, and prevent joint contracture in the ankle and foot, especially after surgeries on the legs. It is important to do ankle pump exercises regularly after surgery because immobility increases your risk for developing a blood clot. Your doctor may also have you take an Aspirin for the next few days as well. If your doctor did not ask you to take an Aspirin, consult with him before starting Aspirin therapy on your own. Slowly point your foot forward, feeling the muscles on the top of your lower leg stretch, and hold this position for 5 seconds. Next, pull your foot back toward you as far as possible, stretching the calf muscles, and hold that position for 5 seconds. Repeat with the other foot. Perform 10 repetitions every hour while awake for both ankles if possible (down and then up with the foot once is one repetition). You should feel gentle stretching of the muscles in your lower leg when doing this exercise. If you feel pain, or your range of motion is limited, don't  Push too hard. Only go the limit your joint and muscles will let you go. If you have increasing pain, progressively worsening leg warmth or swelling, STOP the exercise and call your doctor. Below is information about the medications your doctor is prescribing after your visit:    Other information in your discharge envelope:  []     PRESCRIPTIONS  []     SCHOOL/WORK NOTE  []     INFECTION PREVENTION  []     [address]  []     REGIONAL NERVE BLOCK ON QUE PAMPHLET   []     EXPAREL  []     HANDICAP APPLICATION         These are general instructions for a healthy lifestyle:    *  Please give a list of your current medications to your Primary Care Provider.   *  Please update this list whenever your medications are discontinued, doses are      changed, or new medications (including over-the-counter products) are added. *  Please carry medication information at all times in case of emergency situations. About Smoking  No smoking / No tobacco products / Avoid exposure to second hand smoke    Surgeon General's Warning:  Quitting smoking now greatly reduces serious risk to your health. Obesity, smoking, and sedentary lifestyle greatly increases your risk for illness and disease. A healthy diet, regular physical exercise & weight monitoring are important for maintaining a healthy lifestyle. Congestive Heart Failure  You may be retaining fluid if you have a history of heart failure or if you experience any of the following symptoms:  Weight gain of 3 pounds or more overnight or 5 pounds in a week, increased swelling in our hands or feet or shortness of breath while lying flat in bed. Please call your doctor as soon as you notice any of these symptoms; do not wait until your next office visit. Recognize signs and symptoms of STROKE:  F - face looks uneven  A - arms unable to move or move even  S - speech slurred or non-existent  T - time-call 911 as soon as signs and symptoms begin-DO NOT go         Back to bed or wait to see if you get better-TIME IS BRAIN. Warning signs of HEART ATTACK  Call 911 if you have these symptoms    · Chest discomfort. Most heart attacks involve discomfort in the center of the chest that lasts more than a few minutes, or that goes away and comes back. It can feel like uncomfortable pressure, squeezing, fullness, or pain. · Discomfort in other areas of the upper body. Symptoms can include pain or discomfort in one or both        Arms, the back, neck, jaw, or stomach. ·  Shortness of breath with or without chest discomfort.   · Other signs may include breaking out in a cold sweat, nausea, or lightheadedness    Don't wait more than five minutes to call [address]! Fast action can save your life. Calling 911 is almost always the fastest way to get lifesaving treatment. Emergency Medical Services staff can begin treatment when they arrive - up to an hour sooner than if someone gets to the hospital by car.

## 2020-06-04 NOTE — ANESTHESIA PREPROCEDURE EVALUATION
Anesthetic History Review of Systems / Medical History Patient summary reviewed and nursing notes reviewed Pulmonary COPD: mild Sleep apnea: No treatment Comments: Hx of smoking Neuro/Psych Within defined limits Cardiovascular Hypertension: well controlled Past MI, CAD, PAD and CABG Exercise tolerance: >4 METS Comments: S/P CEA  
GI/Hepatic/Renal 
  
GERD: well controlled Endo/Other Obesity and arthritis Other Findings Physical Exam 
 
Airway Mallampati: III Cardiovascular Rhythm: regular Rate: normal 
 
 
 
 Dental 
 
Dentition: Full upper dentures and Lower partial plate Pulmonary Breath sounds clear to auscultation Abdominal 
 
 
 
 Other Findings Anesthetic Plan ASA: 3 Anesthesia type: MAC Anesthetic plan and risks discussed with: Patient Informed consent obtained.

## 2020-06-04 NOTE — PERIOP NOTES
Spoke with Aubrey Elder from Dr Moore Half office. She will get new surgical orders faxed over. Fax number given.

## 2020-06-04 NOTE — ANESTHESIA POSTPROCEDURE EVALUATION
Procedure(s): LEFT 1ST METATARSAL PHALANGEAL JOINT CHEILECTOMYLEFT HEEL SPUR RESECTION (LATEX ALLERGY). MAC Anesthesia Post Evaluation Multimodal analgesia: multimodal analgesia not used between 6 hours prior to anesthesia start to PACU discharge Patient location during evaluation: PACU Patient participation: complete - patient participated Level of consciousness: awake and alert Pain score: 0 Pain management: adequate Airway patency: patent Anesthetic complications: no 
Cardiovascular status: hemodynamically stable and acceptable Respiratory status: acceptable Hydration status: acceptable Comments: Patient seen and evaluated; no concerns. Post anesthesia nausea and vomiting:  none INITIAL Post-op Vital signs:  
Vitals Value Taken Time BP 96/77 6/4/2020  3:05 PM  
Temp 36.3 °C (97.4 °F) 6/4/2020  2:20 PM  
Pulse 50 6/4/2020  3:06 PM  
Resp 13 6/4/2020  3:06 PM  
SpO2 87 % 6/4/2020  3:06 PM  
Vitals shown include unvalidated device data.

## 2020-06-05 PROBLEM — N17.9 AKI (ACUTE KIDNEY INJURY) (HCC): Status: ACTIVE | Noted: 2020-01-01

## 2020-06-05 PROBLEM — R09.02 HYPOXIA: Status: ACTIVE | Noted: 2020-01-01

## 2020-06-05 NOTE — PROGRESS NOTES
6/5/2020 
2:09 PM 
Case management note Face to face, I was masked Reason for Admission:   AISHA Patient had out patient foot surgery for spurs yesterday. Today he here for increased pain and weakness. Patient is normally independent and uses a walker for ambulation Patient lives with spouse and he drives [address]  
               
RUR Score:     low PCP: First and Last name:  Lois Philippe Name of Practice:  
 Are you a current patient: Yes/No: yes Approximate date of last visit: 1 year Can you participate in a virtual visit if needed: no Do you (patient/family) have any concerns for transition/discharge? Poor health cognition Plan for utilizing home health: To be evaluated by pt/ot Current Advanced Directive/Advance Care Plan: On file (ACP note to chart) Transition of Care Plan: 1. Home with family assistance 2. PCP follow up 3. CM to follow through discharge Care Management Interventions PCP Verified by CM: Rachel Willoughby) Mode of Transport at Discharge: Self Transition of Care Consult (CM Consult): Discharge Planning Current Support Network: Lives with Spouse Confirm Follow Up Transport: Family The Plan for Transition of Care is Related to the Following Treatment Goals : AISHA Discharge Location Discharge Placement: Home with family assistance [address]

## 2020-06-05 NOTE — ROUTINE PROCESS
Bedside shift change report given to Alfonso Badillo (oncoming nurse) by Kourtney Stone (offgoing nurse). Report included the following information SBAR, Kardex, Procedure Summary, Intake/Output, MAR, Accordion, Recent Results and Med Rec Status.

## 2020-06-05 NOTE — PROGRESS NOTES
Problem: Pressure Injury - Risk of 
Goal: *Prevention of pressure injury Description: Document Eric Scale and appropriate interventions in the flowsheet. Outcome: Progressing Towards Goal 
Note: Pressure Injury Interventions: 
Sensory Interventions: Assess changes in LOC, Assess need for specialty bed, Check visual cues for pain, Discuss PT/OT consult with provider, Keep linens dry and wrinkle-free, Maintain/enhance activity level, Float heels, Monitor skin under medical devices, Pressure redistribution bed/mattress (bed type), Turn and reposition approx. every two hours (pillows and wedges if needed) Activity Interventions: Increase time out of bed, Pressure redistribution bed/mattress(bed type), PT/OT evaluation, Assess need for specialty bed Mobility Interventions: HOB 30 degrees or less, Pressure redistribution bed/mattress (bed type), PT/OT evaluation, Turn and reposition approx. every two hours(pillow and wedges) Nutrition Interventions: Document food/fluid/supplement intake, Offer support with meals,snacks and hydration Friction and Shear Interventions: HOB 30 degrees or less, Lift sheet, Lift team/patient mobility team, Apply protective barrier, creams and emollients, Transferring/repositioning devices

## 2020-06-05 NOTE — H&P
[address] [facility] 19 
[phone] Admission History and Physical 
 
 
NAME:  Everett Burk :   1940 MRN:  [id number] PCP:  Josy Treadwell MD  
 
Date/Time:  2020 Subjective: CHIEF COMPLAINT: foot pain HISTORY OF PRESENT ILLNESS:    
Mr. Erin Ramon is a 79 y.o. with h/o cad, htn, copd who presents with foot pain. Pt recently underwent foot surgery with podiatry. He states the pain medication isn't working. He has been unable to eat due to the pain. He denies sob, fever, cough. He has h/o copd. He has oxygen at home but only uses it occasionally. Past Medical History:  
Diagnosis Date  Arthritis of foot, degenerative Dr. Sanjeev Augustin Autoimmune disease [facility]) RA of fingers, toes  Axonal sensorimotor neuropathy 2019 EMG. Dr. Danny Fry  Balance disorder   
 uses cane.  [facility]  Bladder tumor  Dr Tamiko Toribio. Dr. Jorge Luis Max, [facility] 10/29/11  CAD (coronary artery disease) MI 2010,s/p CABG. Dr. Sherrill Molina  Carotid stenosis 10/20/15 Dr. Imani Yen. endarterectomy 12/31/15. 50-69% L on doppler. 80% \"per CT\"  COPD (chronic obstructive pulmonary disease) (HCC) Dr. Mirella Dc.  stable PFT 3/2019. moderate, FEV1 1.41 2009. Reuben Ibanez  GERD (gastroesophageal reflux disease)  HTN (hypertension)  Hyperlipidemia LDL goal < 70   
 Osteoporosis   
 tx w calcium, DEXA improved T-2.1 10/2011, , 2015  PHN (postherpetic neuralgia) 2012  Physiological tremor   
 saw Dr. Sandra Lizama  Prostate cancer ([facility] 2004 Dr. Tamiko Toribio. on lupron. PSA increased 0.149 2014  Pulmonary nodule, right 5 mm, stable on CT 10/14/10, 14  PVD (peripheral vascular disease) ([facility] 2020 Stents placed  Shingles 12  
 right neck and back.  Urinary retention 01/10/2018  
 while hospitalized. heard cath removed 18 Past Surgical History:  
Procedure Laterality Date  BLADDER TUMOR ASSOC  CABG, ARTERIAL, THREE  2010 Dr Kimberly Milan. HUI to LAD, spah to cirm  COLONOSCOPY N/A 1/10/2018  
 focal [facility]. [facility]  CYSTOSCOPY  10/29/11 FISH, normal  
 HX CAROTID ENDARTERECTOMY Left 12/31/15 Dr. Keiry Boyd  HX CATARACT REMOVAL Bilateral 2011  
 right eye, Dr. Tejeda Camera  HX COLONOSCOPY  10/2/13  
 hyperplastic polyp, Dr. Dutch Simon  HX ENDOSCOPY  01/10/2018  
 normal  
 HX HERNIA REPAIR  2000  HX LAP CHOLECYSTECTOMY  2017  
 gangranous. Dr. Ganesh Osorio  IR PTA ILIAC INIT Bilateral 2020 Stent placement  SC CYSTOURETHROSCOPY  2016  
 normal  
 
 
Social History Tobacco Use  Smoking status: Former Smoker Packs/day: 1.50 Years: 55.00 Pack years: 82.50 Types: Cigarettes Last attempt to quit: 2003 Years since quittin.5  Smokeless tobacco: Never Used Substance Use Topics  Alcohol use: No  
  
 
Family History Problem Relation Age of Onset  Cancer Mother   
     bladder  Heart Disease Brother  Anesth Problems Neg Hx  Deep Vein Thrombosis Neg Hx Allergies Allergen Reactions  Latex Rash  Cefazolin Other (comments) Tolerates Keflex po 6/3/2020  Ketorolac Other (comments) Prior to Admission medications Medication Sig Start Date End Date Taking? Authorizing Provider  
gabapentin (NEURONTIN) 300 mg capsule Take 300 mg by mouth two (2) times daily as needed for Pain.    Yes Provider, Historical  
atenoloL (TENORMIN) 50 mg tablet TAKE ONE TABLET BY MOUTH EVERY DAY 6/3/20  Yes Alize Hall MD  
albuterol (ProAir HFA) 90 mcg/actuation inhaler USE 2 PUFFS EVERY 8 HOURS AS NEEDED 20  Yes Alize Hall MD  
lisinopriL (PRINIVIL, ZESTRIL) 20 mg tablet Take 1 tablet by mouth once daily 20  Yes Dharmesh Le MD  
 acetaminophen (TYLENOL ARTHRITIS PAIN) 650 mg TbER Take 1,300 mg by mouth two (2) times daily as needed for Pain. Yes Provider, Historical  
famotidine (PEPCID) 40 mg tablet Take 1 Tab by mouth nightly. 1/17/20  Yes Silvia Hall MD  
simvastatin (ZOCOR) 20 mg tablet TAKE 1 TABLET BY MOUTH ONCE DAILY IN THE EVENING 10/11/19  Yes Silvia Hall MD  
clopidogrel (PLAVIX) 75 mg tab Take 75 mg by mouth daily. Yes Provider, Historical  
CALCIUM CARBONATE/VITAMIN D2 (CALCIUM 600 WITH VITAMIN D2 PO) Take 1 Tab by mouth three (3) times daily. Yes Provider, Historical  
multivitamin (ONE A DAY) tablet Take 1 Tab by mouth daily. Centrum silver   Yes Provider, Historical  
SPIRIVA WITH HANDIHALER 18 mcg inhalation capsule Inhale the contents of 1  capsule via HandiHaler  daily 3/11/15  Yes Angel Cardoso MD  
fluticasone-vilanterol (BREO ELLIPTA) 100-25 mcg/dose inhaler Take 1 Puff by inhalation daily. Yes Provider, Historical  
LEUPROLIDE ACETATE (LUPRON DEPOT, 4 MONTH, IM) by SubCUTAneous route. Every 6 months Receives in providers office Prescribed by Dr. Meyer Castleman   Yes Provider, Historical  
 
 
 
Review of Systems: 
  
 
Gen:  Eyes:  ENT:  CVS:  Pulm:  GI:   
:   
MS:  Skin:  Psych:  Endo:   
Hem:  Renal:   
Neuro:    
 
 
  
Objective: VITALS:   
Vital signs reviewed; most recent are: 
 
Visit Vitals /53 (BP 1 Location: Left arm, BP Patient Position: At rest) Pulse 64 Temp 97.7 °F (36.5 °C) Resp 16 Ht 5' 6\" (1.676 m) Wt 91.2 kg (201 lb) SpO2 96% BMI 32.44 kg/m² SpO2 Readings from Last 6 Encounters:  
06/05/20 96% 06/04/20 95% 05/20/20 94% 03/17/20 92% 03/02/20 96% 02/21/20 95% O2 Flow Rate (L/min): 2 l/min No intake or output data in the 24 hours ending 06/05/20 1543 Exam:  
 
Physical Exam: 
 
Gen:  Well-developed, well-nourished, in no acute distress HEENT:  Pink conjunctivae, PERRL, hearing intact to voice, moist mucous membranes Neck:  Supple, without masses, thyroid non-tender Resp:  No accessory muscle use, clear breath sounds without wheezes rales or rhonchi 
Card:  No murmurs, normal S1, S2 without thrills, bruits or peripheral edema Abd:  Soft, non-tender, non-distended, normoactive bowel sounds are present, no palpable organomegaly Lymph:  No cervical adenopathy Musc:  No cyanosis or clubbing Skin:  No rashes or ulcers, skin turgor is good Neuro:  Cranial nerves 3-12 are grossly intact,  strength is 5/5 bilaterally, dorsi / plantarflexion strength is 5/5 bilaterally, follows commands appropriately Psych:  Alert with good insight. Oriented to person, place, and time Labs: 
 
Recent Labs  
  06/05/20 
1211 WBC 10.1 HGB 11.8* HCT 35.2*  
 Recent Labs  
  06/05/20 
1211   
K 5.6*  
 CO2 27 * BUN 34* CREA 2.38* CA 8.9 ALB 3.5 ALT 22 No components found for: [place] No results for input(s): PH, PCO2, PO2, HCO3, FIO2 in the last 72 hours. No results for input(s): INR, INREXT in the last 72 hours. Chest Xray:  No acute process EKG reviewed:   NSR Assessment/Plan:   
  
Principal Problem: 
  AISHA (acute kidney injury): likely 2/2 IVVD from poor po intake. Start IVF and recheck. He does have a h/o urinary retention, will order ultrasound Uncontrolled foot pain / Arthritis of foot, degenerative: primary reason for ER visit is uncontrolled post op foot pain. Start IV morphine, consult podiatry Hyperkalemia: mild with no EKG changes. Give calcium. Start IVF. Hold lisinopril CAD (coronary artery disease):  MI 7/2010,s/p CABG. Resume atenolol, zocor, plavix COPD (chronic obstructive pulmonary disease) / Hypoxia: moderate, FEV1 1.41 7/2009. He reports needing intermittent oxygen at home. Now on 4L. cxr without acute process. Suspect he likely needs continuous oxygen at home HTN (hypertension): hold lisinopril and atenolol Hyperlipidemia with target LDL less than 70: resume zocor Surrogate decision maker: wife Total time spent with patient: 70 Minutes Care Plan discussed with: Patient Discussed:  Care Plan Prophylaxis:  Lovenox Probable Disposition:  Home w/Family 
        
___________________________________________________ Attending Physician: Maggie Urban MD

## 2020-06-05 NOTE — ED NOTES
New dressing applied to left foot; xerofoam dressing, non adhesive dressing and bulky dressing, dressing clean dry and intact at this time.

## 2020-06-05 NOTE — ED NOTES
TRANSFER - OUT REPORT: 
 
Verbal report given to Sanjayshola Sanchez (name) on Mar Yuan  being transferred to 5th floor (unit) for routine progression of care Report consisted of patients Situation, Background, Assessment and  
Recommendations(SBAR). Information from the following report(s) SBAR, ED Summary, MAR, Recent Results and Cardiac Rhythm NSR was reviewed with the receiving nurse. Lines:  
Peripheral IV 06/05/20 Right Antecubital (Active) Site Assessment Clean, dry, & intact 6/5/2020 12:10 PM  
Phlebitis Assessment 0 6/5/2020 12:10 PM  
Infiltration Assessment 0 6/5/2020 12:10 PM  
Dressing Status Clean, dry, & intact 6/5/2020 12:10 PM  
Dressing Type Tape;Transparent 6/5/2020 12:10 PM  
Hub Color/Line Status Pink;Patent; Flushed 6/5/2020 12:10 PM  
  
 
Opportunity for questions and clarification was provided. Patient transported with: 
 Registered Nurse

## 2020-06-05 NOTE — ED TRIAGE NOTES
Pt arrives by ems with the c/c of having foot surgery to remove spurs on left foot yesterday, pt reports has had pain since and some generalized weakness, pt had hydrocodone tablet about 30 mins prior to ems arrival.

## 2020-06-05 NOTE — ACP (ADVANCE CARE PLANNING)
6/5/2020 
2:16 PM 
 
 
Advance Care Planning Advance Care Planning Activator (Inpatient) Conversation Note Date of ACP Conversation: 06/05/20 Conversation Conducted with:  Patient with capacity ACP Activator: Qiana Frey Health Care Decision Maker: 
 
Current Designated Health Care Decision Maker:   Primary Decision Maker: Sree Baxter - Spouse - [phone] Confirmed with patient Kev Kim ICaleonardo Preferences Ventilation: \"If you were in your present state of health and suddenly became very ill and were unable to breathe on your own, what would your preference be about the use of a ventilator (breathing machine) if it were available to you? \" If patient would desire the use of a ventilator (breathing machine), NO \"If your health worsens and it becomes clear that your chance of recovery is unlikely, what would your preference be about the use of a ventilator (breathing machine) if it were available to you? \" Would the patient desire the use of a ventilator (breathing machine)? NO Resuscitation \"CPR works best to restart the heart when there is a sudden event, like a heart attack, in someone who is otherwise healthy. Unfortunately, CPR does not typically restart the heart for people who have serious health conditions or who are very sick. \" \"In the event your heart stopped as a result of an underlying serious health condition, would you want attempts to be made to restart your heart No CPR [x] Yes  [] No   Educated Patient  regarding differences between Advance Directives and portable DNR orders. Length of ACP Conversation in minutes:  15 minutes Conversation Outcomes: 
[x] ACP discussion completed 
[] Existing advance directive reviewed with patient; no changes to patient's previously recorded wishes 
 
 [] New Advance Directive completed 
 [] Portable Do Not Resuscitate prepared for Provider review and signature [] POLST/POST/MOLST/MOST prepared for Provider review and signature Follow-up plan:   
[] Schedule follow-up conversation to continue planning 
[] Referred individual to Provider for additional questions/concerns  
[] Advised patient/agent/surrogate to review completed ACP document and update if needed with changes in condition, patient preferences or care setting  
 
[] This note routed to one or more involved healthcare providers

## 2020-06-05 NOTE — PROGRESS NOTES
Admission Medication Reconciliation: 
  
Information obtained from:   
Spouse via phone call at the patient request. 
RxQuery data available¹:  YES Comments/Recommendations: The patient did not take any medications today prior to arriving in the ED. The patient's spouse is unsure which medications he took yesterday prior to his procedure. Updated PTA medication list 
Verified preferred pharmacy Reviewed patient's allergies ¹RxQuery pharmacy benefit data reflects medications filled and processed through the patient's insurance, however  
this data does NOT capture whether the medication was picked up or is currently being taken by the patient. Yazan Amezcua PTAMEDSLISTCSTM Prior to Admission Medications Prescriptions Last Dose Informant Taking? CALCIUM CARBONATE/VITAMIN D2 (CALCIUM 600 WITH VITAMIN D2 PO)  Significant Other Yes Sig: Take 1 Tab by mouth three (3) times daily. LEUPROLIDE ACETATE (LUPRON DEPOT, 4 MONTH, IM) 12/2019 Significant Other Yes Sig: by SubCUTAneous route. Every 6 months Receives in providers office Prescribed by Dr. Kanchan SHEEHAN WITH HANDIHALER 18 mcg inhalation capsule  Significant Other Yes Sig: Inhale the contents of 1  capsule via HandiHaler  daily  
acetaminophen (TYLENOL ARTHRITIS PAIN) 650 mg TbER  Significant Other Yes Sig: Take 1,300 mg by mouth two (2) times daily as needed for Pain. albuterol (ProAir HFA) 90 mcg/actuation inhaler  Significant Other Yes Sig: USE 2 PUFFS EVERY 8 HOURS AS NEEDED  
atenoloL (TENORMIN) 50 mg tablet 6/4/2020 at Unknown time Significant Other Yes Sig: TAKE ONE TABLET BY MOUTH EVERY DAY  
clopidogrel (PLAVIX) 75 mg tab  Significant Other Yes Sig: Take 75 mg by mouth daily. famotidine (PEPCID) 40 mg tablet  Significant Other Yes Sig: Take 1 Tab by mouth nightly. fluticasone-vilanterol (BREO ELLIPTA) 100-25 mcg/dose inhaler 6/4/2020 at Unknown time Significant Other Yes Sig: Take 1 Puff by inhalation daily. gabapentin (NEURONTIN) 300 mg capsule  Significant Other Yes Sig: Take 300 mg by mouth two (2) times daily as needed for Pain. lisinopriL (PRINIVIL, ZESTRIL) 20 mg tablet  Significant Other Yes Sig: Take 1 tablet by mouth once daily  
multivitamin (ONE A DAY) tablet  Significant Other Yes Sig: Take 1 Tab by mouth daily. Centrum silver  
simvastatin (ZOCOR) 20 mg tablet  Significant Other Yes Sig: TAKE 1 TABLET BY MOUTH ONCE DAILY IN THE EVENING Facility-Administered Medications: None Please contact the main inpatient pharmacy with any questions or concerns at [phone] and we will direct you to the clinical pharmacist covering this patient's care while in-house.   
Dhruv Jesus, PharmD, BCPS

## 2020-06-05 NOTE — ED PROVIDER NOTES
73-year-old male presents to the emergency department noting recent foot surgery on Wednesday by his podiatrist, Dr. Marnell Lundborg.  He presents to the emergency department by EMS complaining of severe pain in his left foot and has felt generalized fatigue and weakness with decreased p.o. intake secondary to pain as result. He states that he took his hydrocodone tablet about 30 minutes prior to arrival with no significant improvement in his symptoms. He denies any fever, chills, redness, purulent drainage, nausea, vomiting, diarrhea, or any other medical concerns. He states that he did have some bleeding from around his surgical wound and has noted some blood staining on his dressings but denies any persistent bleeding. He also has a history of COPD and wears O2 by nasal cannula \"as needed. \" 
 
 
  
 
Past Medical History:  
Diagnosis Date  Arthritis of foot, degenerative Dr. Tunde Shah Autoimmune disease [facility]) RA of fingers, toes  Axonal sensorimotor neuropathy 11/2019 EMG. Dr. Claudia Alfredo  Balance disorder   
 uses cane.  Bladder cancer ([facility] [address]  Bladder tumor 2004 Dr Dilan Durán. Dr. Debbi Corey, [facility] 10/29/11  CAD (coronary artery disease) MI 7/2010,s/p CABG. Dr. Catarina Jones  Carotid stenosis 10/20/15 Dr. Savilla Olszewski. endarterectomy 12/31/15. 50-69% L on doppler. 80% \"per CT\"  COPD (chronic obstructive pulmonary disease) (HCC) Dr. David Rivera.  stable PFT 3/2019. moderate, FEV1 1.41 7/2009. Sinan Cabrera  GERD (gastroesophageal reflux disease)  HTN (hypertension)  Hyperlipidemia LDL goal < 70   
 Osteoporosis   
 tx w calcium, DEXA improved T-2.1 10/2011, 2012, 1/2015  PHN (postherpetic neuralgia) 2012  Physiological tremor   
 saw Dr. Kristin Bullock  Prostate cancer ([facility] [address] 02/2004 Dr. Dilan Durán. on lupron. PSA increased 0.149 2/2014  Pulmonary nodule, right 5 mm, stable on CT 10/14/10, 1/14/14  PVD (peripheral vascular disease) ([facility] [address] 01/20/2020 Stents placed  Shingles 12  
 right neck and back.  Urinary retention 01/10/2018  
 while hospitalized. heard cath removed 18 Past Surgical History:  
Procedure Laterality Date  BLADDER TUMOR ASSOC  CABG, ARTERIAL, THREE  2010 Dr Greg Jones. LIMA to LAD, barbara to cir  COLONOSCOPY N/A 1/10/2018  
 focal colitis. Roxanne Almaguer MD at [address]  CYSTOSCOPY  10/29/11 FISH, normal  
 HX CAROTID ENDARTERECTOMY Left 12/31/15 Dr. Imani Yen  HX CATARACT REMOVAL Bilateral 2011  
 right eye, Dr. Zelalem Sharif  HX COLONOSCOPY  10/2/13  
 hyperplastic polyp, Dr. Hair Figueroa  HX ENDOSCOPY  01/10/2018  
 normal  
 HX HERNIA REPAIR  2000  HX LAP CHOLECYSTECTOMY  2017  
 gangranous. Dr. Dixon Clarity  IR PTA ILIAC INIT Bilateral 2020 Stent placement  CT CYSTOURETHROSCOPY  2016  
 normal  
 
   
Family History:  
Problem Relation Age of Onset  Cancer Mother   
     bladder  Heart Disease Brother  Anesth Problems Neg Hx  Deep Vein Thrombosis Neg Hx Social History Socioeconomic History  Marital status:  Spouse name: Not on file  Number of children: Not on file  Years of education: Not on file  Highest education level: Not on file Occupational History  Not on file Social Needs  Financial resource strain: Not on file  Food insecurity Worry: Not on file Inability: Not on file  Transportation needs Medical: Not on file Non-medical: Not on file Tobacco Use  Smoking status: Former Smoker Packs/day: 1.50 Years: 55.00 Pack years: 82.50 Types: Cigarettes Last attempt to quit: 2003 Years since quittin.5  Smokeless tobacco: Never Used Substance and Sexual Activity  Alcohol use: No  
 Drug use: Never  Sexual activity: Not on file Lifestyle  Physical activity Days per week: Not on file Minutes per session: Not on file  Stress: Not on file Relationships  Social connections Talks on phone: Not on file Gets together: Not on file Attends L[employer]-day service: Not on file Active member of club or organization: Not on file Attends meetings of clubs or organizations: Not on file Relationship status: Not on file  Intimate partner violence Fear of current or ex partner: Not on file Emotionally abused: Not on file Physically abused: Not on file Forced sexual activity: Not on file Other Topics Concern  Not on file Social History Narrative  Not on file ALLERGIES: Latex; Cefazolin; and Ketorolac Review of Systems Constitutional: Positive for activity change, appetite change and fatigue. Negative for chills and fever. HENT: Negative for congestion, rhinorrhea, sinus pain, sneezing and sore throat. Eyes: Negative for photophobia and visual disturbance. Respiratory: Negative for cough and shortness of breath. Cardiovascular: Negative for chest pain. Gastrointestinal: Negative for abdominal pain, blood in stool, constipation, diarrhea, nausea and vomiting. Genitourinary: Negative for difficulty urinating, dysuria, flank pain, hematuria, penile pain and testicular pain. Musculoskeletal: Negative for arthralgias, back pain, myalgias and neck pain. Skin: Positive for wound. Negative for rash. Neurological: Negative for syncope, weakness, light-headedness, numbness and headaches. Psychiatric/Behavioral: Negative for self-injury and suicidal ideas. All other systems reviewed and are negative. Vitals:  
 06/05/20 1151 06/05/20 1155 06/05/20 1156 BP: (!) 116/31 Pulse: 61 Resp: 18 Temp: 99.5 °F (37.5 °C) SpO2: (!) 82% (!) 85% (!) 88% Weight: 91.2 kg (201 lb) Height: 5' 6\" (1.676 m) Physical Exam 
Vitals signs and nursing note reviewed. Constitutional:   
   General: He is not in acute distress. Appearance: Normal appearance. He is well-developed. He is not diaphoretic. HENT:  
   Head: Normocephalic and atraumatic. Nose: Nose normal.  
Eyes:  
   Extraocular Movements: Extraocular movements intact. Conjunctiva/sclera: Conjunctivae normal.  
   Pupils: Pupils are equal, round, and reactive to light. Neck: Musculoskeletal: Neck supple. Cardiovascular:  
   Rate and Rhythm: Normal rate and regular rhythm. Heart sounds: Normal heart sounds. Pulmonary:  
   Effort: Pulmonary effort is normal.  
   Breath sounds: Normal breath sounds. Abdominal:  
   General: There is no distension. Palpations: Abdomen is soft. Tenderness: There is no abdominal tenderness. Musculoskeletal:     
   General: No tenderness. Left foot: Laceration (See attached clinical photo for surgical wounds which appear clean, intact, without surrounding redness, purulent drainage or dehiscence.) present. Skin: 
   General: Skin is warm and dry. Neurological:  
   General: No focal deficit present. Mental Status: He is alert and oriented to person, place, and time. Cranial Nerves: No cranial nerve deficit. Sensory: No sensory deficit. Motor: No weakness. Coordination: Coordination normal.  
 
  
 
 
 
MDM 23-year-old male with history of COPD, found to be hypoxic on arrival but history of COPD and home O2 use. Placed on nasal cannula oxygen and improved. No respiratory symptoms. Wounds as depicted above appear clean. Labs returned showing significant AISHA with creatinine of 2.38, BUN 34, given IV fluid bolus no leukocytosis, Hg 11.8. CXR viewed by myself and read by radiology showing no acute abnormalities. X-ray of left foot shows postoperative changes but no acute abnormality. Procedures 1:10 PM discussed case with Dr. Jonel Barajas, podiatry, letting him know that his patient is in the emergency department and will need admission to monitor renal function. No postoperative complications suspected other than pain. Hospitalist Julio Serve for Admission 1:14 PM 
 
ED Room Number: PS17/90 Patient Name and age:  Ivette Olmos 79 y.o.  male Working Diagnosis: 1. AISHA (acute kidney injury) ([facility] [address] 2. Acute post-operative pain 3. Chronic obstructive pulmonary disease, unspecified COPD type ([facility] [address] COVID-19 Suspicion:  no 
 
Code Status:  Full Code Readmission: no 
Isolation Requirements:  no 
Recommended Level of Care:  telemetry Department:[facility] ED - [phone] Other: Recent podiatry surgery on Wednesday. Presented for postoperative pain. Incisions look clean x-ray does not show anything acute. Podiatry aware. Labs show AISHA however and he states that he has had decreased p.o. intake over the last couple of days because of the pain. Getting IV fluid bolus. Also has history of COPD, reportedly uses O2 \"as needed\" able on NC O2 with no complaints of SOB.

## 2020-06-05 NOTE — OP NOTES
Danny Chaparro [facility] 
OPERATIVE REPORT Name:  Dada Martinez 
MR#:  [id number] :  1940 ACCOUNT #:  [de-identified] DATE OF SERVICE:  2020 PREOPERATIVE DIAGNOSES: 
1. Left foot hallux limitus. 2.  Left foot plantar calcaneal heel spur. POSTOPERATIVE DIAGNOSES: 
1. Left foot hallux limitus. 2.  Left foot plantar calcaneal heel spur. PROCEDURE PERFORMED: 1. Left foot first metatarsophalangeal joint cheilectomy. 2.  Left foot calcaneal heel spur resection. SURGEON:  Heidi Faria MD 
 
ASSISTANT:  none ANESTHESIA:  MAC. COMPLICATIONS:  none. SPECIMENS REMOVED:  To pathology, none. IMPLANTS:  none ESTIMATED BLOOD LOSS:  10 mL. INDICATIONS:  This 78-year-old male presents with painful longstanding deformities to both feet. He has opted for surgical intervention as he feels he cannot walk with the painful spurs that he has both at the big toe joint as well as the heel spur. The patient had known vascular disease and I had sent him for referral to Vascular Surgery and that improved the discomfort some but he still desired surgical intervention at this point and he feels he has no quality of life not being able to walk and ambulate and all conservative care has failed. Therefore, he has opted for surgical intervention at this time. The patient has signed informed consent and is aware of risks and complications associated with surgery but still opted for surgical intervention again as he feels he has zero quality of life. I did inform the patient even amputation could be possible if he did not heal and he was still willing to undergo the surgery due to the severity of the pain and nothing else working for many years.  
 
PROCEDURE:  IV antibiotics and anesthesia provided by Anesthesia team.  The patient given local infiltrate by myself of 50:50 mixture of 1% lidocaine plain mixed evenly with 0.5% Marcaine plain to both the first metatarsophalangeal joint as well as the plantar heel spur region. The patient was then prepped and draped in the usual sterile manner in a supine position and a pneumatic ankle tourniquet was inflated to the left ankle at 225 mmHg. This would to remain inflated for 46 minutes. Next, attention was directed to the left first metatarsophalangeal joint where skin and soft tissue were dissected through with extreme care taken to preserve all neurovascular structures. All bleeders were ligated with the Bovie. The extensor tendons were retracted laterally. The joint was easily identified as well as having large spurs at the first metatarsal head as well as the proximal phalanx base giving him very little motion. Therefore, the spurs were resected with the sagittal saw and rongeur and as well as a reciprocating rasp. The joint was found to be improved as far as motion goes and all the soft tissue was released in the usual fashion. There was an improvement in motion noted. Attention was then directed to the heel spur region where the spur was identified with C-arm. An incision was made directly over that about 4 cm in length directly over the spur. Skin and soft tissue were dissected through with extreme care taken to preserve all neurovascular structures. Again, all bleeders were ligated with the Bovie. The spur was identified and palpated and resected with the reciprocating rasp after all the soft tissue was freed from this. This was monitored with C-arm as we rasp the spur down and verified with C-arm. After everything was double-checked with C-arm, a copious flush was performed and the deep tissues were sutured with absorbable sutures followed by skin closure. 10 mL of 0.5% Marcaine plain injected postoperatively. A dry sterile compressive dressing was applied.   The patient was wheeled to the PACU with vital signs stable and vascular status intact. The patient tolerated the anesthesia and procedure well. Sponge and needle count done in the usual manner. Sanket Sen MD 
 
 
[id number]/ 
D:  06/04/2020 14:21 T:  06/05/2020 1:36 JOB #:  [id number] CC:  Schuyler Davidson MD

## 2020-06-06 NOTE — PROGRESS NOTES
Danny Chaparro [facility] 
[address] [facility], [place], [id number] [place] [address] 
[phone] Medical Progress Note NAME: Roxanna Schlatter :  1940 MRM:  [id number] Date of service: 2020  8:07 AM 
 
  
Assessment and Plan: 1. AISHA (acute kidney injury): likely 2/2 IVVD from poor po intake. Improving with IVF. He does have a h/o urinary retention and ultrasound without evidence of hydronephrosis or echogenic stones. 
  
2. Uncontrolled foot pain / Arthritis of foot, degenerative: primary reason for ER visit is uncontrolled post op foot pain. Start IV morphine, consult podiatry 
  
3. Hyperkalemia: mild. Resolved. Holding lisinopril 
  
4. CAD (coronary artery disease):  MI 2010,s/p CABG. Resume atenolol, zocor, plavix 
  
5. COPD (chronic obstructive pulmonary disease) / Hypoxia: moderate, FEV1 1.41 2009. He reports needing intermittent oxygen at home. Now on 2L. cxr without acute process.   
  
6. HTN (hypertension): hold lisinopril   
  
7. Hyperlipidemia with target LDL less than 70: resume zocor 
  
  
 
  
Subjective: Chief Complaint[de-identified] Patient was seen and examined as a follow up for IASHA. Chart was reviewed. still c/o foot pain ROS: 
(bold if positive, if negative) Tolerating PT  Tolerating Diet Objective:  
 
Last 24hrs VS reviewed since prior progress note. Most recent are: 
 
Visit Vitals /68 (BP 1 Location: Left arm, BP Patient Position: At rest) Pulse 66 Temp 98.1 °F (36.7 °C) Resp 18 Ht 5' 6\" (1.676 m) Wt 91.2 kg (201 lb) SpO2 97% BMI 32.44 kg/m² SpO2 Readings from Last 6 Encounters:  
20 97% 20 95% 20 94% 20 92% 20 96% 20 95% O2 Flow Rate (L/min): 2 l/min Intake/Output Summary (Last 24 hours) at 2020 3928 Last data filed at 2020 0130 Gross per 24 hour Intake  Output 300 ml Net -300 ml Physical Exam: Gen:  Well-developed, well-nourished, in no acute distress HEENT:  Pink conjunctivae, PERRL, hearing intact to voice, moist mucous membranes Neck:  Supple, without masses, thyroid non-tender Resp:  No accessory muscle use, clear breath sounds without wheezes rales or rhonchi 
Card:  No murmurs, normal S1, S2 without thrills, bruits or peripheral edema Abd:  Soft, non-tender, non-distended, normoactive bowel sounds are present, no palpable organomegaly and no detectable hernias Lymph:  No cervical or inguinal adenopathy Musc:  No cyanosis or clubbing Skin:  No rashes or ulcers, skin turgor is good Neuro:  Cranial nerves are grossly intact, no focal motor weakness, follows commands appropriately Psych:  Good insight, oriented to person, place and time, alert 
__________________________________________________________________ Medications Reviewed: (see below) Medications:  
 
Current Facility-Administered Medications Medication Dose Route Frequency  sodium chloride (NS) flush 5-40 mL  5-40 mL IntraVENous Q8H  
 sodium chloride (NS) flush 5-40 mL  5-40 mL IntraVENous PRN  
 acetaminophen (TYLENOL) tablet 650 mg  650 mg Oral Q4H PRN  
 morphine injection 2 mg  2 mg IntraVENous Q4H PRN  
 oxyCODONE-acetaminophen (PERCOCET) 5-325 mg per tablet 1 Tab  1 Tab Oral Q4H PRN  
 heparin (porcine) injection 5,000 Units  5,000 Units SubCUTAneous Q8H  
 0.9% sodium chloride infusion  75 mL/hr IntraVENous CONTINUOUS  
 albuterol-ipratropium (DUO-NEB) 2.5 MG-0.5 MG/3 ML  3 mL Nebulization Q6H PRN  
 atenoloL (TENORMIN) tablet 50 mg  50 mg Oral DAILY  clopidogreL (PLAVIX) tablet 75 mg  75 mg Oral DAILY  gabapentin (NEURONTIN) capsule 300 mg  300 mg Oral BID PRN  therapeutic multivitamin (THERAGRAN) tablet 1 Tab  1 Tab Oral DAILY  atorvastatin (LIPITOR) tablet 10 mg  10 mg Oral QHS  famotidine (PEPCID) tablet 20 mg  20 mg Oral QHS Lab Data Reviewed: (see below) Lab Review:  
 
Recent Labs 06/06/20 
0351 06/05/20 
1211 WBC 8.6 10.1 HGB 11.4* 11.8* HCT 35.2* 35.2*  
* 190 Recent Labs  
  06/06/20 
0351 06/05/20 
1211  136  
K 4.8 5.6*  
 105 CO2 24 27 GLU 88 108* BUN 36* 34* CREA 2.07* 2.38* CA 8.8 8.9 ALB  --  3.5 TBILI  --  0.5 ALT  --  22 Lab Results Component Value Date/Time Glucose (POC) 86 06/06/2020 06:45 AM  
 Glucose (POC) 100 06/05/2020 08:51 PM  
 Glucose (POC) 77 06/05/2020 04:53 PM  
 Glucose (POC) 139 (H) 03/31/2017 04:06 PM  
 Glucose (POC) 132 (H) 03/31/2017 07:39 AM  
 
No results for input(s): PH, PCO2, PO2, HCO3, FIO2 in the last 72 hours. No results for input(s): INR, INREXT in the last 72 hours. All Micro Results None I have reviewed notes of prior 24hr. Other pertinent lab: Total time spent with patient: 28 Care Plan discussed with: Patient, Nursing Staff and >50% of time spent in counseling and coordination of care Discussed:  Care Plan Prophylaxis:  Hep SQ Disposition:  Home w/Family 
        
___________________________________________________ Attending Physician: Gagan Iverson MD

## 2020-06-06 NOTE — PROGRESS NOTES
Problem: Pressure Injury - Risk of 
Goal: *Prevention of pressure injury Description: Document Eric Scale and appropriate interventions in the flowsheet. Outcome: Progressing Towards Goal 
Note: Pressure Injury Interventions: 
Sensory Interventions: Assess changes in LOC Activity Interventions: Assess need for specialty bed Mobility Interventions: Assess need for specialty bed Nutrition Interventions: Document food/fluid/supplement intake Friction and Shear Interventions: HOB 30 degrees or less

## 2020-06-06 NOTE — ROUTINE PROCESS
2113: Pt refusing pain medication at this time. Offered patient tylenol as well. Pt refusing. Will continue to monitor patient.

## 2020-06-06 NOTE — ROUTINE PROCESS
Attempted to reach out to podiatry (Dr. Silvia Delong) multiple times today for consult. Never was able to successfully get I contact with . Left unit call back number, without return call. Dr. Rosendo Orr notified.

## 2020-06-06 NOTE — ROUTINE PROCESS
Bedside shift change report given to Wicho Patel RN (oncoming nurse) by Amairani Becerra RN (offgoing nurse). Report included the following information SBAR, Kardex and MAR.

## 2020-06-06 NOTE — PROGRESS NOTES
Pharmacy renally adjusted famotidine to 20 mg qhs from 40 mg qhs, for CrCl of 27 ml/min, per protocol. Pharmacy will follow daily.

## 2020-06-06 NOTE — ROUTINE PROCESS
Bedside shift change report given to Yessi (oncoming nurse) by Ross Rogers (offgoing nurse). Report included the following information SBAR, Kardex, Procedure Summary, Intake/Output, MAR, Accordion and Recent Results.

## 2020-06-07 NOTE — ROUTINE PROCESS
Bedside shift change report given to Godwin Wilkes (oncoming nurse) by Rakesh Ramsey (offgoing nurse). Report included the following information SBAR, Kardex, Procedure Summary, Intake/Output, MAR, Accordion, Recent Results and Med Rec Status.

## 2020-06-07 NOTE — ROUTINE PROCESS
Bedside and Verbal shift change report given to [facility] (oncoming nurse) by Teressa Reese (offgoing nurse). Report included the following information SBAR and Kardex.

## 2020-06-07 NOTE — PROGRESS NOTES
Both incisions healing well, patient comfortable. Please continue daily dressing changes. NWB left foot continued. Thank you.

## 2020-06-07 NOTE — PROGRESS NOTES
Danny Chaparro [facility] 
[address] [facility], [place], [address] [place] [address] 
[phone] Medical Progress Note NAME: Jesus Navarrete :  1940 MRM:  [id number] Date of service: 2020  8:07 AM 
 
  
Assessment and Plan: 1. AISHA (acute kidney injury): likely 2/2 IVVD from poor po intake. Improving with IVF. He does have a h/o urinary retention and ultrasound without evidence of hydronephrosis or echogenic stones. 
  
2. Uncontrolled foot pain / Arthritis of foot, degenerative: primary reason for ER visit is uncontrolled post op foot pain. On IV morphine, consult podiatry, but no response from Dr Carol Ann Sneed, who had performed surgery on pt. Will consult the on call podiatrist   
  
3. Hyperkalemia: mild. Resolved. Holding lisinopril 
  
4. CAD (coronary artery disease):  MI 2010,s/p CABG. Resume atenolol, zocor, plavix 
  
5. COPD (chronic obstructive pulmonary disease) / Hypoxia: moderate, FEV1 1.41 2009. He reports needing intermittent oxygen at home. Now on 2L. cxr without acute process.   
  
6. HTN (hypertension): hold lisinopril   
  
7. Hyperlipidemia with target LDL less than 70: resume zocor 
  
  
 
  
Subjective: Chief Complaint[de-identified] Patient was seen and examined as a follow up for AISHA. Chart was reviewed. still c/o foot pain ROS: 
(bold if positive, if negative) Tolerating PT  Tolerating Diet Objective:  
 
Last 24hrs VS reviewed since prior progress note. Most recent are: 
 
Visit Vitals /57 (BP 1 Location: Right arm, BP Patient Position: At rest) Pulse 75 Temp 98.6 °F (37 °C) Resp 18 Ht 5' 6\" (1.676 m) Wt 91.2 kg (201 lb) SpO2 90% BMI 32.44 kg/m² SpO2 Readings from Last 6 Encounters:  
20 90% 20 95% 20 94% 20 92% 20 96% 20 95% O2 Flow Rate (L/min): 2 l/min Intake/Output Summary (Last 24 hours) at 2020 0730 Last data filed at 2020 9803 Gross per 24 hour Intake 975 ml Output  Net 975 ml Physical Exam: 
 
Gen:  Well-developed, well-nourished, in no acute distress HEENT:  Pink conjunctivae, PERRL, hearing intact to voice, moist mucous membranes Neck:  Supple, without masses, thyroid non-tender Resp:  No accessory muscle use, clear breath sounds without wheezes rales or rhonchi 
Card:  No murmurs, normal S1, S2 without thrills, bruits or peripheral edema Abd:  Soft, non-tender, non-distended, normoactive bowel sounds are present, no palpable organomegaly and no detectable hernias Lymph:  No cervical or inguinal adenopathy Musc:  No cyanosis or clubbing Skin:  No rashes or ulcers, skin turgor is good Neuro:  Cranial nerves are grossly intact, no focal motor weakness, follows commands appropriately Psych:  Good insight, oriented to person, place and time, alert 
__________________________________________________________________ Medications Reviewed: (see below) Medications:  
 
Current Facility-Administered Medications Medication Dose Route Frequency  nystatin (MYCOSTATIN) 100,000 unit/gram powder   Topical BID  atenoloL (TENORMIN) tablet 25 mg  25 mg Oral DAILY  sodium chloride (NS) flush 5-40 mL  5-40 mL IntraVENous Q8H  
 sodium chloride (NS) flush 5-40 mL  5-40 mL IntraVENous PRN  
 acetaminophen (TYLENOL) tablet 650 mg  650 mg Oral Q4H PRN  
 morphine injection 2 mg  2 mg IntraVENous Q4H PRN  
 oxyCODONE-acetaminophen (PERCOCET) 5-325 mg per tablet 1 Tab  1 Tab Oral Q4H PRN  
 heparin (porcine) injection 5,000 Units  5,000 Units SubCUTAneous Q8H  
 0.9% sodium chloride infusion  75 mL/hr IntraVENous CONTINUOUS  
 albuterol-ipratropium (DUO-NEB) 2.5 MG-0.5 MG/3 ML  3 mL Nebulization Q6H PRN  
 clopidogreL (PLAVIX) tablet 75 mg  75 mg Oral DAILY  gabapentin (NEURONTIN) capsule 300 mg  300 mg Oral BID PRN  therapeutic multivitamin (THERAGRAN) tablet 1 Tab  1 Tab Oral DAILY  atorvastatin (LIPITOR) tablet 10 mg  10 mg Oral QHS  famotidine (PEPCID) tablet 20 mg  20 mg Oral QHS Lab Data Reviewed: (see below) Lab Review:  
 
Recent Labs  
  06/06/20 
0351 06/05/20 
1211 WBC 8.6 10.1 HGB 11.4* 11.8* HCT 35.2* 35.2*  
* 190 Recent Labs  
  06/07/20 
0026 06/06/20 
0351 06/05/20 
1211  137 136  
K 4.6 4.8 5.6*  
* 108 105 CO2 23 24 27 GLU 91 88 108* BUN 28* 36* 34* CREA 1.40* 2.07* 2.38* CA 8.0* 8.8 8.9 ALB  --   --  3.5 TBILI  --   --  0.5 ALT  --   --  22 Lab Results Component Value Date/Time Glucose (POC) 90 06/06/2020 08:58 PM  
 Glucose (POC) 83 06/06/2020 04:13 PM  
 Glucose (POC) 102 (H) 06/06/2020 11:46 AM  
 Glucose (POC) 86 06/06/2020 06:45 AM  
 Glucose (POC) 100 06/05/2020 08:51 PM  
 
No results for input(s): PH, PCO2, PO2, HCO3, FIO2 in the last 72 hours. No results for input(s): INR, INREXT, INREXT in the last 72 hours. All Micro Results None I have reviewed notes of prior 24hr. Other pertinent lab: Total time spent with patient: 28 Care Plan discussed with: Patient, Nursing Staff and >50% of time spent in counseling and coordination of care Discussed:  Care Plan Prophylaxis:  Hep SQ Disposition:  Home w/Family 
        
___________________________________________________ Attending Physician: Carlos Silva MD

## 2020-06-07 NOTE — PROGRESS NOTES
Problem: Pressure Injury - Risk of 
Goal: *Prevention of pressure injury Outcome: Progressing Towards Goal 
Note: Pressure Injury Interventions: 
Sensory Interventions: Assess changes in LOC, Minimize linen layers Activity Interventions: Increase time out of bed, Pressure redistribution bed/mattress(bed type) Mobility Interventions: HOB 30 degrees or less, Pressure redistribution bed/mattress (bed type) Nutrition Interventions: Document food/fluid/supplement intake Friction and Shear Interventions: HOB 30 degrees or less

## 2020-06-07 NOTE — PROGRESS NOTES
Reason for Admission: increased pain and weakness after outpatient surgery last week. RUR Score:  17% LOW Plan for utilizing home health:  Depends upon progress, awaiting orders for therapy evaluations. PCP: First and Last name: Grzegorz Trotter Name of Practice:  
 Are you a current patient: Yes/No: yes Approximate date of last visit: 1 year Can you participate in a virtual visit with your PCP: no 
                 
Current Advanced Directive/Advance Care Plan: on file and ACP note in the chart done in ED this admission by CM Transition of Care Plan: 1- CM to follow and monitor progress 2- assess for discharge planning needs 3- coordinate follow up with patient and family. 4- assist as needed for transportation at discharge. 1:49 PM 
Communicated with Dr Linda Linares regarding need for therapy evaluations as patient is NWB per podiatry and recommendations for followup post discharge are needed.

## 2020-06-08 NOTE — PROGRESS NOTES
Problem: Mobility Impaired (Adult and Pediatric) Goal: *Acute Goals and Plan of Care (Insert Text) Description: FUNCTIONAL STATUS PRIOR TO ADMISSION: Patient was modified independent using a rollator for functional mobility. Patient has been immobile following outpatient surgery on LLE and NWB. HOME SUPPORT PRIOR TO ADMISSION: The patient lived with his wife but did not require assist. 
 
Physical Therapy Goals Initiated 6/8/2020 1. Patient will move from supine to sit and sit to supine  in bed with minimal assistance/contact guard assist within 7 day(s). 2.  Patient will transfer from bed to chair and chair to bed with moderate assistance  using the least restrictive device within 7 day(s). 3.  Patient will perform sit to stand with moderate assistance  within 7 day(s). 4.  Patient will ambulate with moderate assistance  for 5 feet with the least restrictive device within 7 day(s). Outcome: Progressing Towards Goal 
PHYSICAL THERAPY EVALUATION Patient: Keily Neal (71 y.o. male) Date: 6/8/2020 Primary Diagnosis: AISHA (acute kidney injury) ([facility] [address] [N17.9] Precautions: left foot NWB    
 
 
ASSESSMENT Based on the objective data described below, the patient presents with low endurance, impaired balance, and decreased strength following admission d/t uncontrolled pain following a recent podiatry surgery. He prescribed NWB on the LLE following surgery and unable to maintain this today. Facilitated transfer to EOB requiring moderate assist x2 and increased time. Sitting balance was fair with a posterior LOB which required moderate assist to return to sitting. He required moderate assist to place a urinal between his legs and greatly increased time to use it. Attempted to stand from EOB using a RW where he required moderate-maximum assist x2 to stand. Unable to maintain NWB despite maximum cues and returned to sitting on EOB after ~20 seconds. Gross concerns about discharging home d/t his inability to maintain precautions and limited support in the home. Highly recommend discharge to a rehab setting to improve his independence and safety with anjelica. Current Level of Function Impacting Discharge (mobility/balance): mod-max x2 sit to stand and unable to maintain NWB Patient will benefit from skilled therapy intervention to address the above noted impairments. PLAN : 
Recommendations and Planned Interventions: bed mobility training, transfer training, gait training and therapeutic exercises Frequency/Duration: Patient will be followed by physical therapy:  5 times a week to address goals. Recommendation for discharge: (in order for the patient to meet his/her long term goals) Therapy up to 5 days/week in SNF setting This discharge recommendation: 
Has not yet been discussed the attending provider and/or case management IF patient discharges home will need the following DME: to be determined (TBD) SUBJECTIVE:  
Patient stated Can I sit down? Hilton Hammond OBJECTIVE DATA SUMMARY:  
HISTORY:   
Past Medical History:  
Diagnosis Date  Arthritis of foot, degenerative Dr. Penny Tello Autoimmune disease 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 Dr. Greyson Odonnell  Balance disorder   
 uses cane.  Bladder cancer ([facility] [address]  Bladder tumor 2004 Dr Evan Beard. Dr. Lilliana See, [facility] 10/29/11  CAD (coronary artery disease) MI 7/2010,s/p CABG. Dr. Patricia Matt  Carotid stenosis 10/20/15 Dr. Elizabeth Burmfield. endarterectomy 12/31/15. 50-69% L on doppler. 80% \"per CT\"  COPD (chronic obstructive pulmonary disease) ([facility]) Dr. John Roach.  stable PFT 3/2019. moderate, FEV1 1.41 7/2009. Komal Davis  GERD (gastroesophageal reflux disease)  HTN (hypertension)  Hyperlipidemia LDL goal < 70   
 Osteoporosis   
 tx w calcium, DEXA improved T-2.1 10/2011, 2012, 1/2015  PHN (postherpetic neuralgia) 2012  Physiological tremor   
 saw Dr. Susannah Gallardo  Prostate cancer ([facility] [address] 02/2004 Dr. Mariah Kiser. on lupron. PSA increased 0.149 2/2014  Pulmonary nodule, right 5 mm, stable on CT 10/14/10, 1/14/14  PVD (peripheral vascular disease) ([facility] [address] 01/20/2020 Stents placed  Shingles 12/12/12  
 right neck and back.  Urinary retention 01/10/2018  
 while hospitalized. heard cath removed 1/16/18 Past Surgical History:  
Procedure Laterality Date  BLADDER TUMOR ASSOC  CABG, ARTERIAL, THREE  7/2010 Dr Amando East. HUI to LAD, spaluz to cirm  COLONOSCOPY N/A 1/10/2018  
 focal colitis. Maty Del Valle MD at [address] [place] [address]  CYSTOSCOPY  10/29/11 FISH, normal  
 HX CAROTID ENDARTERECTOMY Left 12/31/15 Dr. Sharona Monroy  HX CATARACT REMOVAL Bilateral 12/2011  
 right eye, Dr. Edda Doe  HX COLONOSCOPY  10/2/13  
 hyperplastic polyp, Dr. Tatiana Joaquin  HX ENDOSCOPY  01/10/2018  
 normal  
 HX HERNIA REPAIR  2000  HX LAP CHOLECYSTECTOMY  03/24/2017  
 gangranous. Dr. Jyothi Santos  IR PTA ILIAC INIT Bilateral 01/14/2020 Stent placement  OH CYSTOURETHROSCOPY  11/29/2016  
 normal  
 
 
Personal factors and/or comorbidities impacting plan of care:  
 
Home Situation Home Environment: Apartment # Steps to Enter: 4 Rails to Enter: Yes Hand Rails : Bilateral 
One/Two Story Residence: One story Living Alone: No 
Support Systems: Spouse/Significant Other/Partner Patient Expects to be Discharged to[de-identified] [id number] Current DME Used/Available at Home: Maggy Pouch, straight, Commode, bedside, Wheelchair, Chelly Brick, rollator Tub or Shower Type: Shower EXAMINATION/PRESENTATION/DECISION MAKING:  
Critical Behavior: 
Neurologic State: Alert, Confused Orientation Level: Oriented to person, Oriented to place, Disoriented to situation, Disoriented to time Cognition: Decreased attention/concentration, Follows commands(increased time ) Safety/Judgement: Awareness of environment Hearing: Auditory Auditory Impairment: None Skin:   
Edema:  
Range Of Motion: 
AROM: Generally decreased, functional 
  
  
  
  
  
  
  
Strength:   
Strength: Generally decreased, functional 
  
  
  
  
  
  
Tone & Sensation:  
Tone: Normal 
  
  
  
  
Sensation: Intact Coordination: 
Coordination: Generally decreased, functional 
Vision:  
  
Functional Mobility: 
Bed Mobility: 
  
Supine to Sit: Moderate assistance;Assist x2 Sit to Supine: Moderate assistance;Assist x2 Scooting: Moderate assistance Transfers: 
Sit to Stand: Moderate assistance;Maximum assistance;Assist x2 Stand to Sit: Moderate assistance;Assist x2 Balance:  
Sitting: Impaired Sitting - Static: Fair (occasional) Sitting - Dynamic: Fair (occasional) Standing: Impaired Standing - Static: Constant support Standing - Dynamic : Poor Physical Therapy Evaluation Charge Determination History Examination Presentation Decision-Making MEDIUM  Complexity : 1-2 comorbidities / personal factors will impact the outcome/ POC  MEDIUM Complexity : 3 Standardized tests and measures addressing body structure, function, activity limitation and / or participation in recreation  MEDIUM Complexity : Evolving with changing characteristics  MEDIUM Complexity : FOTO score of 26-74 Based on the above components, the patient evaluation is determined to be of the following complexity level: MEDIUM Activity Tolerance:  
Poor Please refer to the flowsheet for vital signs taken during this treatment. After treatment patient left in no apparent distress:  
Supine in bed and Call bell within reach COMMUNICATION/EDUCATION:  
The patients plan of care was discussed with: Occupational therapy assistant and Registered nurse.   
 
Fall prevention education was provided and the patient/caregiver indicated understanding., Patient/family have participated as able in goal setting and plan of care. and Patient/family agree to work toward stated goals and plan of care. Thank you for this referral. 
Emiliano Almonte, PT, DPT Time Calculation: 30 mins

## 2020-06-08 NOTE — WOUND CARE
Wound care consult: 
Initial visit for skin and wound assessment S/P left foot surgery per podiatry prior to admission. Assessment All skin folds and bony prominences assessed, turned with staff assistance. Incontinent of urine- sacral area intact- red and blanching. Heels and elbows intact Left foot incisions intact with approximated sutures, foot edematous and bruised- tender to touch. No drainage. Treatment Wound care to left  foot- 
Incontinent care given with 
Repositioned in bed Heels floated Recommendations/Plan Wound care orders per podiatry . Turn, reposition every 2 hours as tolerated, float heels, Incontinent care --- Apply Zinc to all open areas, moisture barrier as needed. Will follow, reconsult as needed.  
 
[address] [facility]

## 2020-06-08 NOTE — PROGRESS NOTES
Nutrition Assessment: 
 
RECOMMENDATIONS/INTERVENTION(S):  
1. Changed diet from consistent carb to cardiac as pt has no hx of DM and BG is WNL. 2. Add Ensure Clear BID to promote adequate intake. 3. Continue to monitor intakes, wt changes, labs. ASSESSMENT:  
6/8: Pt assessed for MST>2 for wt loss. Admitted with AISHA. PMH includes CAD, GERD, COPD, HTN. BMI 32.5, c/w overweight for age. Pt reports poor appetite for a few weeks PTA. Per H&P, pt unable to eat d/t pain. Lunch tray in room, a few bites of peaches eaten but otherwise untouched. Pt says he is not hungry so nothing sounds good. Pt currently denying any recent weight changes. Says he was eating 3 meals/d PTA despite not having an appetite. Recorded intakes since admission mostly <50% meals. Pt says he likes juice and is agreeable to trying Ensure Clear while appetite is poor- will add BID. No c/o N/V. Pt has no hx of DM, changed diet from consistent carb to cardiac. Continue to monitor and encourage intakes. Labs- Ca 8.0. Meds- MVI. Diet Order: Cardiac 
% Eaten:   
Patient Vitals for the past 72 hrs: 
 % Diet Eaten 06/08/20 0839 25 % 06/07/20 1754 25 % 06/07/20 1420 50 % 06/07/20 0957 10 % 06/06/20 0904 50 % Pertinent Medications: [x] Reviewed Labs: [x] Reviewed Anthropometrics: Height: 5' 6\" (167.6 cm) Weight: 91.2 kg (201 lb) IBW (%IBW):   ( ) UBW (%UBW):   (  %) BMI: Body mass index is 32.44 kg/m². This BMI is indicative of: 
 [] Underweight    [] Normal    [x] Overweight    []  Obesity    []  Extreme Obesity (BMI>40) Estimated Nutrition Needs (Based on): 2035 Kcals/day(1565 x 1.3 AF) , 91 g(0.8-1 g/kg) Protein Carbohydrate: At Least 130 g/day  Fluids: 2035 mL/day (1 ml/kcal) Last BM: unknown   []Active     []Hyperactive  [x]Hypoactive       [] Absent   BS Skin:    [] Intact   [x] Incision  [] Breakdown   [] DTI   [] Tears/Excoriation/Abrasion  []Edema [x] Other: abd MASD, sacral erythema Wt Readings from Last 30 Encounters:  
06/05/20 91.2 kg (201 lb) 05/20/20 91.2 kg (201 lb 1.6 oz) 03/17/20 90 kg (198 lb 5 oz) 03/02/20 85.7 kg (189 lb)  
02/21/20 85.7 kg (189 lb)  
02/13/20 92.1 kg (203 lb 1 oz) 12/13/19 88.9 kg (196 lb) 10/31/19 88.9 kg (196 lb) 10/02/19 88.9 kg (196 lb) 05/30/19 89.6 kg (197 lb 8 oz)  
03/29/18 88 kg (194 lb) 01/19/18 86.6 kg (191 lb) 01/17/18 86.2 kg (190 lb) 01/11/18 87.8 kg (193 lb 9 oz) 04/24/17 84.3 kg (185 lb 12.8 oz) 04/08/17 83.1 kg (183 lb 3 oz) 03/31/17 92 kg (202 lb 13.2 oz) 12/15/16 90.7 kg (200 lb) 08/04/16 90.3 kg (199 lb)  
01/27/16 83.5 kg (184 lb) 12/28/15 95.9 kg (211 lb 6.4 oz) 10/12/15 95.3 kg (210 lb)  
06/22/15 95.9 kg (211 lb 6.4 oz)  
12/22/14 93 kg (205 lb) 09/07/14 88.9 kg (196 lb)  
08/22/14 93 kg (205 lb) 04/23/14 92.1 kg (203 lb)  
01/20/14 87.5 kg (193 lb) 01/14/14 84.4 kg (186 lb) 12/30/13 85.7 kg (189 lb) NUTRITION DIAGNOSES:  
Problem:  Inadequate oral intake Etiology: related to decreased ability to consume sufficient po intake Signs/Symptoms: as evidenced by pt reports poor appetite, po intakes <50% meals NUTRITION INTERVENTIONS: 
Meals/Snacks: General/healthful diet   Supplements: Commercial supplement GOAL:  
PO intake >50% meals + ONS next 1-2 days Cultural, Judaism, or Ethnic Dietary Needs: None EDUCATION & DISCHARGE NEEDS:  
 [x] None Identified 
 [] Identified and Education Provided/Documented 
 [] Identified and Pt declined/was not appropriate 
  
 [] Interdisciplinary Care Plan Reviewed/Documented  
 [x] Discharge Needs: regular diet 
 [] No Nutrition Related Discharge Needs NUTRITION RISK:  
Pt Is At Nutrition Risk  [x] No Nutrition Risk Identified  [] PT SEEN FOR:  
 []  MD Consult: []Calorie Count []Diabetic Diet Education []Diet Education []Electrolyte Management []General Nutrition Management and Supplements []Management of Tube Feeding []TPN Recommendations [x]  RN Referral:  [x]MST score >=2 
   []Enteral/Parenteral Nutrition PTA []Pregnant: Gestational DM or Multigestation  
              [] Pressure Ulcer 
 
[]  Low BMI      []  Length of Stay       [] Dysphagia Diet         [] Ventilator 
[]  Follow-up Previous Recommendations: 
 [] Implemented          [] Not Implemented          [x] Not Applicable Previous Goal: 
 [] Met              [] Progressing Towards Goal              [] Not Progressing Towards Goal   [x] Not Applicable Bernabe Palmer RDN Pager [phone] Phone [phone]\

## 2020-06-08 NOTE — PROGRESS NOTES
Danny Chaparro [facility] 79 
[address] [facility], [place], [address] [place] [address] 
[phone] Medical Progress Note NAME: Manuel Durán :  1940 MRM:  [id number] Date of service: 2020  8:07 AM 
 
  
Assessment and Plan: 1. AISHA (acute kidney injury): likely 2/2 IVVD from poor po intake. Improving with IVF. He does have a h/o urinary retention and ultrasound without evidence of hydronephrosis or echogenic stones. 
  
2. Uncontrolled foot pain / Arthritis of foot, degenerative: primary reason for ER visit is uncontrolled post op foot pain. On IV morphine, consult podiatry, but no response from Dr Erma Hernandez, who had performed surgery on pt. Evaluated by podiatrist and recommended no weight bearing on the LT foot. PT eval 
  
3. Hyperkalemia: mild. Resolved. Continue lisinopril 
  
4. CAD (coronary artery disease):  MI 2010,s/p CABG. Resume atenolol, zocor, plavix 
  
5. COPD (chronic obstructive pulmonary disease) / Hypoxia: moderate, FEV1 1.41 2009. He reports needing intermittent oxygen at home. Now on 2L. cxr without acute process.   
  
6. HTN (hypertension): hold lisinopril   
  
7. Hyperlipidemia with target LDL less than 70: resume zocor 
  
  
 
  
Subjective: Chief Complaint[de-identified] Patient was seen and examined as a follow up for AISHA. Chart was reviewed. foot pain is better ROS: 
(bold if positive, if negative) Tolerating PT  Tolerating Diet Objective:  
 
Last 24hrs VS reviewed since prior progress note. Most recent are: 
 
Visit Vitals /69 (BP 1 Location: Right arm, BP Patient Position: At rest) Pulse 74 Temp 98.2 °F (36.8 °C) Resp 18 Ht 5' 6\" (1.676 m) Wt 91.2 kg (201 lb) SpO2 98% BMI 32.44 kg/m² SpO2 Readings from Last 6 Encounters:  
20 98% 20 95% 20 94% 20 92% 20 96% 20 95% O2 Flow Rate (L/min): 3 l/min Intake/Output Summary (Last 24 hours) at 2020 0900 Last data filed at 6/8/2020 0304 Gross per 24 hour Intake 420 ml Output  Net 420 ml Physical Exam: 
 
Gen:  Well-developed, well-nourished, in no acute distress HEENT:  Pink conjunctivae, PERRL, hearing intact to voice, moist mucous membranes Neck:  Supple, without masses, thyroid non-tender Resp:  No accessory muscle use, clear breath sounds without wheezes rales or rhonchi 
Card:  No murmurs, normal S1, S2 without thrills, bruits or peripheral edema Abd:  Soft, non-tender, non-distended, normoactive bowel sounds are present, no palpable organomegaly and no detectable hernias Lymph:  No cervical or inguinal adenopathy Musc:  No cyanosis or clubbing Skin:  No rashes or ulcers, skin turgor is good Neuro:  Cranial nerves are grossly intact, no focal motor weakness, follows commands appropriately Psych:  Good insight, oriented to person, place and time, alert 
__________________________________________________________________ Medications Reviewed: (see below) Medications:  
 
Current Facility-Administered Medications Medication Dose Route Frequency  nystatin (MYCOSTATIN) 100,000 unit/gram powder   Topical BID  lisinopriL (PRINIVIL, ZESTRIL) tablet 20 mg  20 mg Oral DAILY  atenoloL (TENORMIN) tablet 25 mg  25 mg Oral DAILY  sodium chloride (NS) flush 5-40 mL  5-40 mL IntraVENous Q8H  
 sodium chloride (NS) flush 5-40 mL  5-40 mL IntraVENous PRN  
 acetaminophen (TYLENOL) tablet 650 mg  650 mg Oral Q4H PRN  
 morphine injection 2 mg  2 mg IntraVENous Q4H PRN  
 oxyCODONE-acetaminophen (PERCOCET) 5-325 mg per tablet 1 Tab  1 Tab Oral Q4H PRN  
 heparin (porcine) injection 5,000 Units  5,000 Units SubCUTAneous Q8H  
 albuterol-ipratropium (DUO-NEB) 2.5 MG-0.5 MG/3 ML  3 mL Nebulization Q6H PRN  
 clopidogreL (PLAVIX) tablet 75 mg  75 mg Oral DAILY  gabapentin (NEURONTIN) capsule 300 mg  300 mg Oral BID PRN  
  therapeutic multivitamin (THERAGRAN) tablet 1 Tab  1 Tab Oral DAILY  atorvastatin (LIPITOR) tablet 10 mg  10 mg Oral QHS  famotidine (PEPCID) tablet 20 mg  20 mg Oral QHS Lab Data Reviewed: (see below) Lab Review:  
 
Recent Labs  
  06/06/20 
0351 06/05/20 
1211 WBC 8.6 10.1 HGB 11.4* 11.8* HCT 35.2* 35.2*  
* 190 Recent Labs  
  06/08/20 
8317 06/07/20 
0026 06/06/20 
0351 06/05/20 
1211  141 137 136  
K 4.3 4.6 4.8 5.6*  
* 111* 108 105 CO2 23 23 24 27 GLU 89 91 88 108* BUN 22* 28* 36* 34* CREA 1.14 1.40* 2.07* 2.38* CA 8.0* 8.0* 8.8 8.9 ALB  --   --   --  3.5 TBILI  --   --   --  0.5 ALT  --   --   --  22 Lab Results Component Value Date/Time Glucose (POC) 90 06/06/2020 08:58 PM  
 Glucose (POC) 83 06/06/2020 04:13 PM  
 Glucose (POC) 102 (H) 06/06/2020 11:46 AM  
 Glucose (POC) 86 06/06/2020 06:45 AM  
 Glucose (POC) 100 06/05/2020 08:51 PM  
 
No results for input(s): PH, PCO2, PO2, HCO3, FIO2 in the last 72 hours. No results for input(s): INR, INREXT, INREXT in the last 72 hours. All Micro Results None I have reviewed notes of prior 24hr. Other pertinent lab: Total time spent with patient: 28 Care Plan discussed with: Patient, Nursing Staff and >50% of time spent in counseling and coordination of care Discussed:  Care Plan Prophylaxis:  Hep SQ Disposition:  Home w/Family 
        
___________________________________________________ Attending Physician: Karl Winters MD

## 2020-06-08 NOTE — PROGRESS NOTES
Problem: Self Care Deficits Care Plan (Adult) Goal: *Acute Goals and Plan of Care (Insert Text) Description:  
FUNCTIONAL STATUS PRIOR TO ADMISSION: Patient reports able to perform ADLs without assistance, and used rollator for ambulation. HOME SUPPORT: The patient lived with spouse but did not require assist. 
 
Occupational Therapy Goals Initiated 6/8/2020 1. Patient will perform lower body dressing with moderate assistance  within 7 day(s). 2.  Patient will perform grooming, seated EOB, with supervision/set-up within 7 day(s). 3.  Patient will perform toilet transfers with minimal assistance/contact guard assist within 7 day(s). 4.  Patient will perform all aspects of toileting with minimal assistance/contact guard assist within 7 day(s). 5.  Patient will participate in upper extremity therapeutic exercise/activities with supervision/set-up for 10 minutes within 7 day(s). 6.  Patient will utilize energy conservation techniques during functional activities with verbal cues within 7 day(s). Outcome: Progressing Towards Goal 
 OCCUPATIONAL THERAPY EVALUATION Patient: Toby Lindqusit (30 y.o. male) Date: 6/8/2020 Primary Diagnosis: [facility] [N17.9] Precautions: fall , NWB L foot ASSESSMENT Based on the objective data described below, the patient presents with hospital admission secondary to increased pain to L foot following foot surgery 1 day prior, with general weakness and fatigue. Patient received supine in bed, agreeable to activity. With answering questions patinet able to provide history but noted some confusion, but follows commands though with increased time. Patient reports need for toileting but OT instructed patient to use urinal at EOB secondary to need for room setup and need to don surgical shoe prior to attempt to  stand.   Patient verbalized understanding but with difficulty placing urinal. Patient wheezing and short of breath, O2 sats reading 90% on room air. Patient reports only wearing O2 at home when he feels he is choking. Patient requires mod assist x 2 for supine to sitting. Patient stands with mod/max assist x 2, complains of pain to L foot, but not able to take side step/hop to [facility], as he is to be NWB to L foot. Patient returned to supine with mod assist x 2 and set up for meal in upright position . Current Level of Function Impacting Discharge (ADLs/self-care): mod x 2 for bed mobility, unable to safely transfer today Functional Outcome Measure: The patient scored 25/100 on the Barthel Index  outcome measure. Other factors to consider for discharge: lives with spouse, ability below baseline currently Patient will benefit from skilled therapy intervention to address the above noted impairments. PLAN : 
Recommendations and Planned Interventions: self care training, functional mobility training, therapeutic exercise, balance training, therapeutic activities, endurance activities, patient education, home safety training, and family training/education Frequency/Duration: Patient will be followed by occupational therapy 5 times a week to address goals. Recommendation for discharge: (in order for the patient to meet his/her long term goals) Therapy up to 5 days/week in SNF setting This discharge recommendation: 
Has not yet been discussed the attending provider and/or case management IF patient discharges home will need the following DME: TBD SUBJECTIVE:  
Patient stated I liked doing therapy. It has helped me so much in the past. OBJECTIVE DATA SUMMARY:  
HISTORY:  
Past Medical History:  
Diagnosis Date Arthritis of foot, degenerative Dr. Jorge Miranda  
 Autoimmune disease [facility]) RA of fingers, toes Axonal sensorimotor neuropathy 11/2019 EMG. Dr. Greyson Odonnell Balance disorder   
 uses cane. Bladder cancer ([facility] Bladder tumor 2004 Dr Tommie Sandoval. Dr. Jemal Leon, cysto and [facility] 10/29/11 CAD (coronary artery disease) MI 7/2010,s/p CABG. Dr. Carmelina Schmidt Carotid stenosis 10/20/15 Dr. Yuan De Jesus. endarterectomy 12/31/15. 50-69% L on doppler. 80% \"per CT\" COPD (chronic obstructive pulmonary disease) ([facility] Dr. Kendall Neal.  stable PFT 3/2019. moderate, FEV1 1.41 7/2009. Zbigniew Alberts GERD (gastroesophageal reflux disease) HTN (hypertension) Hyperlipidemia LDL goal < 70 Osteoporosis   
 tx w calcium, DEXA improved T-2.1 10/2011, 2012, 1/2015 PHN (postherpetic neuralgia) 2012 Physiological tremor   
 saw Dr. Lee Officer Prostate cancer ([facility] 02/2004 Dr. Tommie Sandoval. on lupron. PSA increased 0.149 2/2014 Pulmonary nodule, right 5 mm, stable on CT 10/14/10, 1/14/14 PVD (peripheral vascular disease) ([facility] 01/20/2020 Stents placed Shingles 12/12/12  
 right neck and back. Urinary retention 01/10/2018  
 while hospitalized. heard cath removed 1/16/18 Past Surgical History:  
Procedure Laterality Date BLADDER TUMOR ASSOC    
 CABG, ARTERIAL, THREE  7/2010 Dr Katarina Ochoa. HUI to LAD, spah to cirm COLONOSCOPY N/A 1/10/2018  
 focal colitis. Abagail Jeans, MD at [address]  10/29/11 FISH, normal  
 HX CAROTID ENDARTERECTOMY Left 12/31/15 Dr. Yuan De Jesus HX CATARACT REMOVAL Bilateral 12/2011  
 right eye, Dr. Jhonny Arellano HX COLONOSCOPY  10/2/13  
 hyperplastic polyp, Dr. Ruddy Silva HX ENDOSCOPY  01/10/2018  
 normal  
 HX HERNIA REPAIR  2000 HX LAP CHOLECYSTECTOMY  03/24/2017  
 gangranous. Dr. Azam Garber IR PTA ILIAC INIT Bilateral 01/14/2020 Stent placement SD CYSTOURETHROSCOPY  11/29/2016  
 normal  
 
 
Expanded or extensive additional review of patient history:  
 
Home Situation Home Environment: Apartment # Steps to Enter: 4 Rails to Enter: Yes Hand Rails : Bilateral 
One/Two Story Residence: One story Living Alone: No 
 Support Systems: Spouse/Significant Other/Partner Patient Expects to be Discharged to[de-identified] [id number] Current DME Used/Available at Home: Harlan Lev, straight, Commode, bedside, Wheelchair, [place], rollator Tub or Shower Type: Shower Hand dominance: Right EXAMINATION OF PERFORMANCE DEFICITS: 
Cognitive/Behavioral Status: 
Neurologic State: Alert;Confused Orientation Level: Oriented to person;Oriented to place; Disoriented to situation;Disoriented to time Cognition: Decreased attention/concentration; Follows commands(increased time ) Perception: Appears intact Perseveration: No perseveration noted Safety/Judgement: Awareness of environment Skin: intact as seen Edema: LLE Hearing: Auditory Auditory Impairment: None Vision/Perceptual:   
    
    
    
  
    
    
  
 
Range of Motion: 
AROM: Generally decreased, functional 
  
  
  
  
  
  
  
 
Strength: 
Strength: Generally decreased, functional 
  
  
  
  
 
Coordination: 
Coordination: Generally decreased, functional 
Fine Motor Skills-Upper: Left Intact; Right Intact Gross Motor Skills-Upper: Left Intact; Right Intact Tone & Sensation: 
Tone: Normal 
Sensation: Intact Balance: 
Sitting: Impaired Sitting - Static: Fair (occasional) Sitting - Dynamic: Fair (occasional) Standing: Impaired Standing - Static: Constant support Standing - Dynamic : Poor Functional Mobility and Transfers for ADLs: 
Bed Mobility: 
Supine to Sit: Moderate assistance;Assist x2 Sit to Supine: Moderate assistance;Assist x2 Scooting: Moderate assistance Transfers: 
Sit to Stand: Moderate assistance;Maximum assistance;Assist x2 Stand to Sit: Moderate assistance;Assist x2 Toilet Transfer : Total assistance(not able to perform today) Assistive Device : Walker, rolling ADL Assessment: 
Feeding: Setup Oral Facial Hygiene/Grooming: Minimum assistance Bathing: Maximum assistance Upper Body Dressing: Minimum assistance Lower Body Dressing: Total assistance Toileting: Maximum assistance ADL Intervention and task modifications: 
  
 
  
 
  
 
  
 
  
 
  
 
  
 
Cognitive Retraining Safety/Judgement: Awareness of environment Therapeutic Exercise: 
  
Functional Measure: 
Barthel Index: 
 
Bathin Bladder: 5 Bowels: 10 
Groomin Dressin Feedin Mobility: 0 Stairs: 0 Toilet Use: 0 Transfer (Bed to Chair and Back): 5 Total: 25/100 The Barthel ADL Index: Guidelines 1. The index should be used as a record of what a patient does, not as a record of what a patient could do. 2. The main aim is to establish degree of independence from any help, physical or verbal, however minor and for whatever reason. 3. The need for supervision renders the patient not independent. 4. A patient's performance should be established using the best available evidence. Asking the patient, friends/relatives and nurses are the usual sources, but direct observation and common sense are also important. However direct testing is not needed. 5. Usually the patient's performance over the preceding 24-48 hours is important, but occasionally longer periods will be relevant. 6. Middle categories imply that the patient supplies over 50 per cent of the effort. 7. Use of aids to be independent is allowed. Mennie Barthel., Barthel, D.W. ([id number]). Functional evaluation: the Barthel Index. [address] [facility] (14)2. JOHNSON Bomwan, Sergio Lawrence, Melisa Payan., [place], [address] [place] [address] (). Measuring the change indisability after inpatient rehabilitation; comparison of the responsiveness of the Barthel Index and Functional Hodgeman Measure. Journal of Neurology, Neurosurgery, and Psychiatry, 66(4), 757-714.  
Sylvia Dumont, N.J.A, MEREDITH Hernandez.TONNY, & Hailee Baez, M.A. (2004.) Assessment of post-stroke quality of life in cost-effectiveness studies: The usefulness of the Barthel Index and the EuroQoL-5D. [facility], 13, [phone] Occupational Therapy Evaluation Charge Determination History Examination Decision-Making LOW Complexity : Brief history review  LOW Complexity : 1-3 performance deficits relating to physical, cognitive , or psychosocial skils that result in activity limitations and / or participation restrictions  LOW Complexity : No comorbidities that affect functional and no verbal or physical assistance needed to complete eval tasks Based on the above components, the patient evaluation is determined to be of the following complexity level: LOW Pain Rating: 
 
 
Activity Tolerance:  
Fair, requires frequent rest breaks, and observed SOB with activity Please refer to the flowsheet for vital signs taken during this treatment. After treatment patient left in no apparent distress:   
Supine in bed, Call bell within reach, Bed / chair alarm activated, and Side rails x 3 
 
COMMUNICATION/EDUCATION:  
The patients plan of care was discussed with: Physical therapist and Registered nurse. Home safety education was provided and the patient/caregiver indicated understanding., Patient/family have participated as able in goal setting and plan of care. , and Patient/family agree to work toward stated goals and plan of care. This patients plan of care is appropriate for delegation to [facility]. Thank you for this referral. 
Vita Santos OTR/L Time Calculation: 26 mins

## 2020-06-08 NOTE — PROGRESS NOTES
Bedside and Verbal shift change report given to TJ Mendez (oncoming nurse) by Radha Robertson (offgoing nurse). Report included the following information SBAR, Kardex, Procedure Summary, Intake/Output, MAR, Accordion, Recent Results and Med Rec Status.

## 2020-06-08 NOTE — PROGRESS NOTES
CM Note: 
Transition of Care Plan:  RUR-16% 1. Non-wt bearing on L foot 2. PT/OT evals and recs--snf recommended 3. Podiatry following 4. Family to transport pt at d/c 
5. Pt's wife would like [facility] if possible--a referral was sent in [facility]

## 2020-06-08 NOTE — PROGRESS NOTES
CMS Note 6/8/2020 Patient received their 2nd IMM letter, patient was not able to sign for CMS. Patient was given a copy for their record. Constantin Ronquillo CMS

## 2020-06-09 NOTE — PROGRESS NOTES
1635: 
Plan of Care: RUR-15% [facility] not accepting pts at this time. [facility] accepted. Melissa Cullen was called for auth. Reference [id number]. Notified [facility]. Covid test done today; pt ready for d/c Wed. Will need stretcher transport. A referral was sent in [place] to [facility] and placed on a will call. Pt was notified. His wife was called and left a message for her to return the call. TJ Larios 
 
 
CM Note: 
Spoke with pt about Plan B, which would be snf. He was unable to process this and agreed to have me call his wife. TC to pt's wife, Mabel Underwood, for choice. She would like [facility] or [facility]. Referrals were sent in CC link. Pt to have Covid test today. Will start auth.  
TJ Larios

## 2020-06-09 NOTE — PROGRESS NOTES
Summary: Pt awake, alert and oriented to self and place earlier. Not aware of circumstance or year. Reoriented for brief period of time. Pleasant and cooperative. Dressing to left foot clean,dry, intact. Pain meds given as ordered with good effect. Continue to monitor status, maintain comfort, provide emotional support.

## 2020-06-09 NOTE — PROGRESS NOTES
Problem: Self Care Deficits Care Plan (Adult) Goal: *Acute Goals and Plan of Care (Insert Text) Description:  
FUNCTIONAL STATUS PRIOR TO ADMISSION: Patient reports able to perform ADLs without assistance, and used rollator for ambulation. HOME SUPPORT: The patient lived with spouse but did not require assist. 
 
Occupational Therapy Goals Initiated 6/8/2020 1. Patient will perform lower body dressing with moderate assistance  within 7 day(s). 2.  Patient will perform grooming, seated EOB, with supervision/set-up within 7 day(s). 3.  Patient will perform toilet transfers with minimal assistance/contact guard assist within 7 day(s). 4.  Patient will perform all aspects of toileting with minimal assistance/contact guard assist within 7 day(s). 5.  Patient will participate in upper extremity therapeutic exercise/activities with supervision/set-up for 10 minutes within 7 day(s). 6.  Patient will utilize energy conservation techniques during functional activities with verbal cues within 7 day(s). OCCUPATIONAL THERAPY TREATMENT Patient: Augie Nunes (14 y.o. male) Date: 6/9/2020 Diagnosis: AISHA (acute kidney injury) ([facility] [N17.9] AISHA (acute kidney injury) ([facility] Precautions:   
Chart, occupational therapy assessment, plan of care, and goals were reviewed. ASSESSMENT Patient continues with skilled OT services and is progressing towards goals. Pt confused as to time of day, knows he is in the hospital. He follows commands with increased time. Pt assisted to don surgical shoe L foot, verbalizes pain when L foot touched. Supine to sit assist x 1 in prep for grooming and to change gown. Pt incontinent urine. Pt too fatigued to engage with UE exercises however encouraged him to try later in the day. Current Level of Function Impacting Discharge (ADLs): Contact guard sitting edge of bed for simple grooming Other factors to consider for discharge: PLAN : 
 Patient continues to benefit from skilled intervention to address the above impairments. Continue treatment per established plan of care. to address goals. Recommend with staff: Bed in hair like position for ADl's Recommend next OT session: Cont towards goals Recommendation for discharge: (in order for the patient to meet his/her long term goals) Therapy up to 5 days/week in SNF setting This discharge recommendation: 
Has not yet been discussed the attending provider and/or case management IF patient discharges home will need the following DME:   
 
 
SUBJECTIVE:  
Patient stated I do need my glasses.  OBJECTIVE DATA SUMMARY:  
Cognitive/Behavioral Status: 
Neurologic State: Alert;Confused Orientation Level: Disoriented to time Cognition: Follows commands Functional Mobility and Transfers for ADLs: 
Bed Mobility: 
 Max assist x 1 Transfers: 
  
 Not tested Balance: 
Sitting: Impaired ADL Intervention: 
  
 
Grooming Position Performed: Seated edge of bed Washing Face: Set-up(seated edge of bed) Upper Body Dressing Assistance Dressing Assistance: Minimum assistance Hospital Gown: Minimum  assistance Lower Body Dressing Assistance Shoes with Velcro: Maximum assistance Toileting Toileting Assistance: Maximum assistance Therapeutic Exercises:  
Encouraged pt to engage with Ue exercises later in the day Pain: 
L foot Activity Tolerance:  
Fair Please refer to the flowsheet for vital signs taken during this treatment. After treatment patient left in no apparent distress:  
Supine in bed COMMUNICATION/COLLABORATION:  
The patients plan of care was discussed with: Occupational therapist and Registered nurse, Physical therapy. ALLIE Haley Time Calculation: 13 mins

## 2020-06-09 NOTE — ROUTINE PROCESS
Bedside and Verbal shift change report given to Cristy Gutierrez RN (oncoming nurse) by Rahel Livingston RN (offgoing nurse). Report included the following information SBAR, Kardex and Quality Measures.

## 2020-06-09 NOTE — PROGRESS NOTES
Problem: Mobility Impaired (Adult and Pediatric) Goal: *Acute Goals and Plan of Care (Insert Text) Description: FUNCTIONAL STATUS PRIOR TO ADMISSION: Patient was modified independent using a rollator for functional mobility. Patient has been immobile following outpatient surgery on LLE and NWB. HOME SUPPORT PRIOR TO ADMISSION: The patient lived with his wife but did not require assist. 
 
Physical Therapy Goals Initiated 6/8/2020 1. Patient will move from supine to sit and sit to supine  in bed with minimal assistance/contact guard assist within 7 day(s). 2.  Patient will transfer from bed to chair and chair to bed with moderate assistance  using the least restrictive device within 7 day(s). 3.  Patient will perform sit to stand with moderate assistance  within 7 day(s). 4.  Patient will ambulate with moderate assistance  for 5 feet with the least restrictive device within 7 day(s). Note: PHYSICAL THERAPY TREATMENT Patient: Yudi Bains (13 y.o. male) Date: 6/9/2020 Diagnosis: AISHA (acute kidney injury) ([facility] [address] [N17.9] AISHA (acute kidney injury) ([facility] [address] Precautions:   
Chart, physical therapy assessment, plan of care and goals were reviewed. ASSESSMENT Patient continues with skilled PT services. Pt comes to sit with mod to max assist.Pt progressed to CGA sitting on EOB. Pt sat on EOB for 4 minutes but declined to attempt standing secondary to left LE leg pain. Pt returned to supine mod assist of 2. Pt repositioned to semi sitting position in bed. Pt progress slow. Continue goals,. Current Level of Function Impacting Discharge (mobility/balance): Pt is declining to stand secondary to leg foot pain. PLAN : 
Patient continues to benefit from skilled intervention to address the above impairments. Continue treatment per established plan of care. to address goals. Recommendation for discharge: (in order for the patient to meet his/her long term goals) Therapy up to 5 days/week in SNF setting This discharge recommendation: 
Has been made in collaboration with the attending provider and/or case management IF patient discharges home will need the following DME: rolling walker SUBJECTIVE:  
 
 
OBJECTIVE DATA SUMMARY:  
Critical Behavior: 
Neurologic State: Alert, Confused Orientation Level: Disoriented to time Cognition: Follows commands Safety/Judgement: Awareness of environment Functional Mobility Training: 
Bed Mobility: 
  
Supine to Sit: Maximum assistance; Moderate assistance Sit to Supine: Moderate assistance;Assist x2 Balance: 
Sitting: Impaired Sitting - Static: Fair (occasional) Therapeutic Exercises:  
Pt unable to perform leg exercise secondary to pain. Activity Tolerance:  
Fair and Poor Please refer to the flowsheet for vital signs taken during this treatment. After treatment patient left in no apparent distress:  
Supine in bed COMMUNICATION/COLLABORATION:  
The patients plan of care was discussed with: Physical therapist.  
 
Panchito Juares PTA Time Calculation: 23 mins

## 2020-06-09 NOTE — PROGRESS NOTES
Danny Chaparro [facility] 
[address] [facility], [place], [address] [place] [address] 
[phone] Medical Progress Note NAME: Nayely Farooq :  1940 MRM:  [id number] Date of service: 2020  8:07 AM 
 
  
Assessment and Plan: 1. AISHA (acute kidney injury): likely 2/2 IVVD from poor po intake. Resolved with IVF. He does have a h/o urinary retention and ultrasound without evidence of hydronephrosis or echogenic stones. 
  
2. Uncontrolled foot pain / Arthritis of foot, degenerative/ debility: primary reason for ER visit is uncontrolled post op foot pain. On IV morphine, consult podiatry, but no response from Dr Julianne Joseph, who had performed surgery on pt. Evaluated by podiatrist and recommended no weight bearing on the LT foot. Needs SNF. Checking SARS-CoV-2 for clearance for admission at SNF 
  
3. Hyperkalemia: mild. Resolved. Continue lisinopril 
  
4. CAD (coronary artery disease):  MI 2010,s/p CABG. Resume atenolol, zocor, plavix 
  
5. COPD (chronic obstructive pulmonary disease) / Hypoxia: moderate, FEV1 1.41 2009. He reports needing intermittent oxygen at home. Now on 2L. cxr without acute process.   
  
6. HTN (hypertension): hold lisinopril   
  
7. Hyperlipidemia with target LDL less than 70: resume zocor 
  
  
 
  
Subjective: Chief Complaint[de-identified] Patient was seen and examined as a follow up for AISHA. Chart was reviewed. foot pain is worse today ROS: 
(bold if positive, if negative) Tolerating PT  Tolerating Diet Objective:  
 
Last 24hrs VS reviewed since prior progress note. Most recent are: 
 
Visit Vitals /69 (BP 1 Location: Right arm, BP Patient Position: At rest) Pulse 83 Temp 97.9 °F (36.6 °C) Resp 18 Ht 5' 6\" (1.676 m) Wt 91.2 kg (201 lb) SpO2 94% BMI 32.44 kg/m² SpO2 Readings from Last 6 Encounters:  
20 94% 20 95% 20 94% 20 92% 20 96% 20 95% O2 Flow Rate (L/min): 2 l/min Intake/Output Summary (Last 24 hours) at 6/9/2020 1518 Last data filed at 6/8/2020 1735 Gross per 24 hour Intake 240 ml Output  Net 240 ml Physical Exam: 
 
Gen:  Well-developed, well-nourished, in no acute distress HEENT:  Pink conjunctivae, PERRL, hearing intact to voice, moist mucous membranes Neck:  Supple, without masses, thyroid non-tender Resp:  No accessory muscle use, clear breath sounds without wheezes rales or rhonchi 
Card:  No murmurs, normal S1, S2 without thrills, bruits or peripheral edema Abd:  Soft, non-tender, non-distended, normoactive bowel sounds are present, no palpable organomegaly and no detectable hernias Lymph:  No cervical or inguinal adenopathy Musc:  No cyanosis or clubbing Skin:  No rashes or ulcers, skin turgor is good Neuro:  Cranial nerves are grossly intact, no focal motor weakness, follows commands appropriately Psych:  Good insight, oriented to person, place and time, alert 
__________________________________________________________________ Medications Reviewed: (see below) Medications:  
 
Current Facility-Administered Medications Medication Dose Route Frequency  nystatin (MYCOSTATIN) 100,000 unit/gram powder   Topical BID  lisinopriL (PRINIVIL, ZESTRIL) tablet 20 mg  20 mg Oral DAILY  atenoloL (TENORMIN) tablet 25 mg  25 mg Oral DAILY  sodium chloride (NS) flush 5-40 mL  5-40 mL IntraVENous Q8H  
 sodium chloride (NS) flush 5-40 mL  5-40 mL IntraVENous PRN  
 acetaminophen (TYLENOL) tablet 650 mg  650 mg Oral Q4H PRN  
 morphine injection 2 mg  2 mg IntraVENous Q4H PRN  
 oxyCODONE-acetaminophen (PERCOCET) 5-325 mg per tablet 1 Tab  1 Tab Oral Q4H PRN  
 heparin (porcine) injection 5,000 Units  5,000 Units SubCUTAneous Q8H  
 albuterol-ipratropium (DUO-NEB) 2.5 MG-0.5 MG/3 ML  3 mL Nebulization Q6H PRN  
 clopidogreL (PLAVIX) tablet 75 mg  75 mg Oral DAILY  gabapentin (NEURONTIN) capsule 300 mg  300 mg Oral BID PRN  therapeutic multivitamin (THERAGRAN) tablet 1 Tab  1 Tab Oral DAILY  atorvastatin (LIPITOR) tablet 10 mg  10 mg Oral QHS  famotidine (PEPCID) tablet 20 mg  20 mg Oral QHS Lab Data Reviewed: (see below) Lab Review: No results for input(s): WBC, HGB, HCT, PLT, HGBEXT, HCTEXT, PLTEXT, HGBEXT, HCTEXT, PLTEXT in the last 72 hours. Recent Labs  
  06/08/20 
0432 06/07/20 
0026  141  
K 4.3 4.6 * 111* CO2 23 23 GLU 89 91 BUN 22* 28* CREA 1.14 1.40* CA 8.0* 8.0* Lab Results Component Value Date/Time Glucose (POC) 90 06/06/2020 08:58 PM  
 Glucose (POC) 83 06/06/2020 04:13 PM  
 Glucose (POC) 102 (H) 06/06/2020 11:46 AM  
 Glucose (POC) 86 06/06/2020 06:45 AM  
 Glucose (POC) 100 06/05/2020 08:51 PM  
 
No results for input(s): PH, PCO2, PO2, HCO3, FIO2 in the last 72 hours. No results for input(s): INR, INREXT, INREXT in the last 72 hours. All Micro Results None I have reviewed notes of prior 24hr. Other pertinent lab: Total time spent with patient: 28 Care Plan discussed with: Patient, Nursing Staff and >50% of time spent in counseling and coordination of care Discussed:  Care Plan Prophylaxis:  Hep SQ Disposition:  Home w/Family 
        
___________________________________________________ Attending Physician: Saad Acosta MD

## 2020-06-10 NOTE — PROGRESS NOTES
CMS Note 6/10/2020 Patient received their 2nd IMM letter, patient was given a copy for their record. Maricarmen Foote CMS

## 2020-06-10 NOTE — ROUTINE PROCESS
Bedside and Verbal shift change report given to Dave Guan RN (oncoming nurse) by Gurmeet Reyes RN (offgoing nurse). Report included the following information SBAR, Kardex and Quality Measures.

## 2020-06-10 NOTE — PROGRESS NOTES
Bedside report given to Reji Apodaca RN, on coming shift. Reviewed Kardex, SBAR, plan of care and disposition of patient on disccharge. Opportunity given to answer questions.

## 2020-06-10 NOTE — PROGRESS NOTES
Report given to Austin Dawn at [facility], patient awaiting discharge and  by [facility]. Bedside and Verbal shift change report given to Harry Sommer (oncoming nurse) by Smith Vu (offgoing nurse). Report included the following information SBAR, Kardex, Intake/Output, MAR, Accordion and Recent Results.

## 2020-06-10 NOTE — PROGRESS NOTES
[facility] Medical Progress Note NAME: Monica Forrester :  1940 MRM:  [id number] Date/Time of service 6/10/2020  9:08 AM    
 
  
Assessment and Plan:  
 
Uncontrolled foot pain / Arthritis of foot, degenerative / debility - POA, post operative complication. Consulted podiatry, but no response from Dr Margot Ace. Out local podiatrist recommended no weight bearing on the LT foot. Needs SNF. Scheduled gabapentin and prn percocet. AISHA (acute kidney injury) / Hyperkalemia - POA likely 2/2 IVVD from poor po intake. Resolved with IVF. UA renal normal. 
  
Anorexia - Consulted nutrition. Encourage fluids. Add supplements. CAD (coronary artery disease) / HTN (hypertension) - Stable. MI 2010,s/p CABG. Resume lisinopril, atenolol, zocor, plavix 
  
COPD (chronic obstructive pulmonary disease) / Hypoxia: moderate -  FEV1 1.41 2009. He reports needing intermittent oxygen at home. Now on 2L. cxr without acute process. Checking SARS-CoV-2 for clearance for admission at [facility] Hyperlipidemia with target LDL less than 70 - Continue zocor Subjective: Chief Complaint:  Foot pain no better. Appetite no better ROS: 
(bold if positive, if negative) Tolerating some PT  Tolerating some Diet Objective:  
 
Last 24hrs VS reviewed since prior progress note. Most recent are: 
 
Visit Vitals /73 (BP 1 Location: Right arm, BP Patient Position: At rest) Pulse 80 Temp 98.2 °F (36.8 °C) Resp 18 Ht 5' 6\" (1.676 m) Wt 91.2 kg (201 lb) SpO2 95% BMI 32.44 kg/m² SpO2 Readings from Last 6 Encounters:  
06/10/20 95% 20 95% 20 94% 20 92% 20 96% 20 95% O2 Flow Rate (L/min): 2 l/min Intake/Output Summary (Last 24 hours) at 6/10/2020 7778 Last data filed at 2020 7233 Gross per 24 hour Intake 360 ml Output  Net 360 ml Physical Exam: 
 
Gen:  Obese, frail, ill appearing, acute distress HEENT:  Pink conjunctivae, PERRL, hearing intact to voice, moist mucous membranes Neck:  Supple, without masses, thyroid non-tender Resp:  No accessory muscle use, clear breath sounds without wheezes rales or rhonchi 
Card:  No murmurs, normal S1, S2 without thrills, bruits or peripheral edema Abd:  Soft, non-tender, non-distended, normoactive bowel sounds are present, no mass Lymph:  No cervical or inguinal adenopathy Musc:  No cyanosis or clubbing Skin:  No rashes or ulcers, skin turgor is good Neuro:  Cranial nerves are grossly intact, pain and LE motor weakness, follows commands appropriately Psych:  Good insight, oriented to person, place and time, alert Telemetry reviewed:   normal sinus rhythm 
__________________________________________________________________ Medications Reviewed: (see below) Medications:  
 
Current Facility-Administered Medications Medication Dose Route Frequency  gabapentin (NEURONTIN) capsule 300 mg  300 mg Oral BID  nystatin (MYCOSTATIN) 100,000 unit/gram powder   Topical BID  lisinopriL (PRINIVIL, ZESTRIL) tablet 20 mg  20 mg Oral DAILY  atenoloL (TENORMIN) tablet 25 mg  25 mg Oral DAILY  sodium chloride (NS) flush 5-40 mL  5-40 mL IntraVENous Q8H  
 sodium chloride (NS) flush 5-40 mL  5-40 mL IntraVENous PRN  
 acetaminophen (TYLENOL) tablet 650 mg  650 mg Oral Q4H PRN  
 morphine injection 2 mg  2 mg IntraVENous Q4H PRN  
 oxyCODONE-acetaminophen (PERCOCET) 5-325 mg per tablet 1 Tab  1 Tab Oral Q4H PRN  
 heparin (porcine) injection 5,000 Units  5,000 Units SubCUTAneous Q8H  
 albuterol-ipratropium (DUO-NEB) 2.5 MG-0.5 MG/3 ML  3 mL Nebulization Q6H PRN  
 clopidogreL (PLAVIX) tablet 75 mg  75 mg Oral DAILY  therapeutic multivitamin (THERAGRAN) tablet 1 Tab  1 Tab Oral DAILY  atorvastatin (LIPITOR) tablet 10 mg  10 mg Oral QHS  famotidine (PEPCID) tablet 20 mg  20 mg Oral QHS Lab Data Reviewed: (see below) Lab Review: No results for input(s): WBC, HGB, HCT, PLT, HGBEXT, HCTEXT, PLTEXT in the last 72 hours. Recent Labs  
  06/08/20 
9617   
K 4.3 * CO2 23 GLU 89 BUN 22* CREA 1.14  
CA 8.0* Lab Results Component Value Date/Time Glucose (POC) 90 06/06/2020 08:58 PM  
 Glucose (POC) 83 06/06/2020 04:13 PM  
 Glucose (POC) 102 (H) 06/06/2020 11:46 AM  
 Glucose (POC) 86 06/06/2020 06:45 AM  
 Glucose (POC) 100 06/05/2020 08:51 PM  
 
No results for input(s): PH, PCO2, PO2, HCO3, FIO2 in the last 72 hours. No results for input(s): INR, INREXT in the last 72 hours. All Micro Results None Other pertinent lab: none Total time spent with patient: 39 Minutes I personally reviewed chart, notes, data and current medications in the medical record. I have personally examined and treated the patient at bedside during this period. Care Plan discussed with: Patient Discussed:  Care Plan and D/C Planning Prophylaxis:  H2B/PPI Disposition:  SNF/LTC 
        
___________________________________________________ Attending Physician: Celina Bae MD

## 2020-06-10 NOTE — PROGRESS NOTES
Nutrition Assessment: 
 
RECOMMENDATIONS/INTERVENTION(S):  
1. Continue cardiac diet. 2. Increase Ensure Clear to TID and add Magic Cup BID to promote adequate intake. 3. Continue to monitor intakes, wt changes, labs. ASSESSMENT:  
6/10: F/u. Pt continues with poor appetite. Breakfast tray in room untouched, though 100% of Ensure Clear consumed. Says he likes Ensure and is drinking all of them but not consuming much else. Recorded intakes 20-50% meals. No c/o N/V. Says he likes ice cream and thinks he would be able to eat some, agreeable to trying Magic Cup- will add BID. Continue to monitor intakes. Labs- Ca 8.0. Meds- MVI. 
 
6/8: Pt assessed for MST>2 for wt loss. Admitted with AISHA. PMH includes CAD, GERD, COPD, HTN. BMI 32.5, c/w overweight for age. Pt reports poor appetite for a few weeks PTA. Per H&P, pt unable to eat d/t pain. Lunch tray in room, a few bites of peaches eaten but otherwise untouched. Pt says he is not hungry so nothing sounds good. Pt currently denying any recent weight changes. Says he was eating 3 meals/d PTA despite not having an appetite. Recorded intakes since admission mostly <50% meals. Pt says he likes juice and is agreeable to trying Ensure Clear while appetite is poor- will add BID. No c/o N/V. Pt has no hx of DM, changed diet from consistent carb to cardiac. Continue to monitor and encourage intakes. Labs- Ca 8.0. Meds- MVI. Diet Order: Cardiac 
% Eaten:   
Patient Vitals for the past 72 hrs: 
 % Diet Eaten 06/09/20 1145 50 % 06/09/20 0745 20 % 06/08/20 1735 25 % 06/08/20 1152 25 % 06/08/20 0839 25 % 06/07/20 1754 25 % 06/07/20 1420 50 % Pertinent Medications: [x] Reviewed Labs: [x] Reviewed Anthropometrics: Height: 5' 6\" (167.6 cm) Weight: 91.2 kg (201 lb) IBW (%IBW):   ( ) UBW (%UBW):   (  %) BMI: Body mass index is 32.44 kg/m².     This BMI is indicative of: 
 [] Underweight    [] Normal    [x] Overweight    []  Obesity    [] Extreme Obesity (BMI>40) Estimated Nutrition Needs (Based on): 2035 Kcals/day(1565 x 1.3 AF) , 91 g(0.8-1 g/kg) Protein Carbohydrate: At Least 130 g/day  Fluids: 2035 mL/day (1 ml/kcal) Last BM: unknown   []Active     []Hyperactive  [x]Hypoactive       [] Absent   BS Skin:    [] Intact   [x] Incision  [] Breakdown   [] DTI   [] Tears/Excoriation/Abrasion  []Edema [x] Other: abd MASD, sacral erythema Wt Readings from Last 30 Encounters:  
06/05/20 91.2 kg (201 lb) 05/20/20 91.2 kg (201 lb 1.6 oz) 03/17/20 90 kg (198 lb 5 oz) 03/02/20 85.7 kg (189 lb)  
02/21/20 85.7 kg (189 lb)  
02/13/20 92.1 kg (203 lb 1 oz) 12/13/19 88.9 kg (196 lb) 10/31/19 88.9 kg (196 lb) 10/02/19 88.9 kg (196 lb) 05/30/19 89.6 kg (197 lb 8 oz)  
03/29/18 88 kg (194 lb) 01/19/18 86.6 kg (191 lb) 01/17/18 86.2 kg (190 lb) 01/11/18 87.8 kg (193 lb 9 oz) 04/24/17 84.3 kg (185 lb 12.8 oz) 04/08/17 83.1 kg (183 lb 3 oz) 03/31/17 92 kg (202 lb 13.2 oz) 12/15/16 90.7 kg (200 lb) 08/04/16 90.3 kg (199 lb)  
01/27/16 83.5 kg (184 lb) 12/28/15 95.9 kg (211 lb 6.4 oz) 10/12/15 95.3 kg (210 lb)  
06/22/15 95.9 kg (211 lb 6.4 oz)  
12/22/14 93 kg (205 lb) 09/07/14 88.9 kg (196 lb)  
08/22/14 93 kg (205 lb) 04/23/14 92.1 kg (203 lb)  
01/20/14 87.5 kg (193 lb) 01/14/14 84.4 kg (186 lb) 12/30/13 85.7 kg (189 lb) NUTRITION DIAGNOSES:  
Problem:  Inadequate oral intake Etiology: related to decreased ability to consume sufficient po intake Signs/Symptoms: as evidenced by pt reports poor appetite, po intakes <50% meals 6/10: Nutrition dx continues, <50% meals. NUTRITION INTERVENTIONS: 
Meals/Snacks: General/healthful diet   Supplements: Commercial supplement GOAL:  
PO intake >50% meals + ONS next 1-3 days Cultural, Taoist, or Ethnic Dietary Needs: None EDUCATION & DISCHARGE NEEDS:  
 [x] None Identified 
 [] Identified and Education Provided/Documented [] Identified and Pt declined/was not appropriate 
  
 [] Interdisciplinary Care Plan Reviewed/Documented  
 [x] Discharge Needs: regular diet 
 [] No Nutrition Related Discharge Needs NUTRITION RISK:  
Pt Is At Nutrition Risk  [x] No Nutrition Risk Identified  [] PT SEEN FOR:  
 []  MD Consult: []Calorie Count []Diabetic Diet Education []Diet Education []Electrolyte Management []General Nutrition Management and Supplements []Management of Tube Feeding []TPN Recommendations []  RN Referral:  [x]MST score >=2 
   []Enteral/Parenteral Nutrition PTA []Pregnant: Gestational DM or Multigestation  
              [] Pressure Ulcer 
 
[]  Low BMI      []  Length of Stay       [] Dysphagia Diet         [] Ventilator [x]  Follow-up Previous Recommendations: 
 [x] Implemented          [] Not Implemented          [] Not Applicable Previous Goal: 
 [] Met              [] Progressing Towards Goal              [x] Not Progressing Towards Goal   [] Not Applicable Davy Arambula RDN Pager [phone] Phone [phone]\

## 2020-06-10 NOTE — PROGRESS NOTES
Has had quiet uneventful shift. Percocet effective against left foot pain. Was able to sleep through much of night. Pleasant and cooperative. For possible discharge today. Monitor pt status, give report to dayshift. Asst pt as needed.

## 2020-06-10 NOTE — PROGRESS NOTES
CM Note: 
Transition of Care Plan:  RUR-15% 1. Awaiting covid testing results 2. To d/c to [facility] 3. Pt on a will call for [facility]--packet on chart 4. Call wife, Louise Hearn ([phone]) when he is d/c'd AMIRA Hurtado

## 2020-06-10 NOTE — DISCHARGE INSTRUCTIONS
Patient Discharge Instructions    [facility] Goes / [id number] : 1940    Admitted 2020 Discharged: 6/10/2020     Primary Diagnoses  Problem List as of 6/10/2020 Date Reviewed: 2020           * (Principal) AISHA (acute kidney injury) ([facility] [address]   Hypoxia   Axonal sensorimotor neuropathy   Arthritis of foot, degenerative   Balance disorder   Urinary retention   Colitis   Hyperlipidemia with target LDL less than 70   CAD (coronary artery disease)   COPD (chronic obstructive pulmonary disease) (HCC)   GERD (gastroesophageal reflux disease)   HTN (hypertension)          Take Home Medications     · It is important that you take the medication exactly as they are prescribed. · Keep your medication in the bottles provided by the pharmacist and keep a list of the medication names, dosages, and times to be taken in your wallet. · Do not take other medications without consulting your doctor. What to do at Home    Recommended diet: Encourage fluid, Cardiac Diet, Low fat, Low cholesterol and add supplements with every meal    Recommended activity: Activity as tolerated, PT/OT Eval and Treat and See surgical instructions    If you experience worse pain, please follow up with Dr Danis Meng.    Follow-up with your PCP in a few weeks        Information obtained by :  I understand that if any problems occur once I am at home I am to contact my physician. I understand and acknowledge receipt of the instructions indicated above.                                                                                                                                            Physician's or R.N.'s Signature                                                                  Date/Time                                                                                                                                              Patient or Representative Signature                                                          Date/Time

## 2020-06-10 NOTE — PROGRESS NOTES
Pharmacist Discharge Medication Reconciliation Discharge Provider:  Dr. Camelia Lomeli Discharge Medications: My Medications START taking these medications Instructions Each Dose to Equal Morning Noon Evening Bedtime  
nystatin powder Commonly known as:  MYCOSTATIN Your last dose was: Your next dose is:   
 
  
 Apply  to affected area two (2) times a day. oxyCODONE-acetaminophen 5-325 mg per tablet Commonly known as:  PERCOCET Your last dose was: Your next dose is: Take 1 Tab by mouth every four (4) hours as needed for Pain for up to 3 days. Max Daily Amount: 6 Tabs. 1 Tab CONTINUE taking these medications Instructions Each Dose to Equal Morning Noon Evening Bedtime  
albuterol 90 mcg/actuation inhaler Commonly known as:  ProAir HFA Your last dose was: Your next dose is:   
 
  
 USE 2 PUFFS EVERY 8 HOURS AS NEEDED 
   
  
  
  
  
atenoloL 50 mg tablet Commonly known as:  TENORMIN Your last dose was: Your next dose is: TAKE ONE TABLET BY MOUTH EVERY DAY Breo Ellipta 100-25 mcg/dose inhaler Generic drug:  fluticasone furoate-vilanteroL Your last dose was: Your next dose is: Take 1 Puff by inhalation daily. 1 Puff CALCIUM 600 WITH VITAMIN D2 PO Your last dose was: Your next dose is: Take 1 Tab by mouth three (3) times daily. 1 Tab 
  
  
  
  
  
clopidogreL 75 mg Tab Commonly known as:  PLAVIX Your last dose was: Your next dose is: Take 75 mg by mouth daily. 75 mg 
  
  
  
  
  
famotidine 40 mg tablet Commonly known as:  PEPCID Your last dose was: Your next dose is: Take 1 Tab by mouth nightly. 40 mg 
  
  
  
  
  
gabapentin 300 mg capsule Commonly known as:  NEURONTIN Your last dose was: Your next dose is: Take 1 Cap by mouth two (2) times daily as needed for Pain for up to 30 days. Max Daily Amount: 600 mg. 
 300 mg 
  
  
  
  
  
lisinopriL 20 mg tablet Commonly known as:  [place] Your last dose was: Your next dose is: Take 1 tablet by mouth once daily LUPRON DEPOT (4 MONTH) IM Your last dose was: Your next dose is:   
 
  
 by SubCUTAneous route. Every 6 months Receives in providers office Prescribed by Dr. Cecy Grubbs 
   
  
  
  
  
multivitamin tablet Commonly known as:  ONE A DAY Your last dose was: Your next dose is: Take 1 Tab by mouth daily. Centrum silver 1 Tab 
  
  
  
  
  
simvastatin 20 mg tablet Commonly known as:  ZOCOR Your last dose was: Your next dose is: TAKE 1 TABLET BY MOUTH ONCE DAILY IN THE EVENING Spiriva with HandiHaler 18 mcg inhalation capsule Generic drug:  tiotropium Your last dose was: Your next dose is:   
 
  
 Inhale the contents of 1  capsule via HandiHaler  daily Tylenol Arthritis Pain 650 mg Rosangela Blizzard Generic drug:  acetaminophen Your last dose was: Your next dose is: Take 1,300 mg by mouth two (2) times daily as needed for Pain. 1,300 mg Where to Get Your Medications Information on where to get these meds will be given to you by the nurse or doctor. Ask your nurse or doctor about these medications 
gabapentin 300 mg capsule 
nystatin powder 
oxyCODONE-acetaminophen 5-325 mg per tablet Percocet added for additional pain control The patient's chart, MAR, and AVS were reviewed by 
 Sanjeev June, LEROY, Contact: [phone]

## 2020-06-10 NOTE — DISCHARGE SUMMARY
Physician Discharge Summary Patient ID: Venkata Goel [id number] 
79 y.o. 
1940 Admit date: 6/5/2020 Discharge date of service and time: 6/10/2020 Admission Diagnoses: AISHA (acute kidney injury) ([facility] [address] [N17.9] Discharge Diagnoses:   
Principal Diagnosis Uncontrolled foot pain Hospital Course and other diagnoses Uncontrolled foot pain / Arthritis of foot, degenerative / debility - POA, post operative complication. Consulted podiatry, but no response from Dr Bethanie Mims. Out local podiatrist recommended no weight bearing on the LT foot. Needs SNF. Scheduled gabapentin and prn percocet.  
  
AISHA (acute kidney injury) / Hyperkalemia - POA likely 2/2 IVVD from poor po intake. Resolved with IVF. UA renal normal. 
  
Anorexia - Consulted nutrition. Encourage fluids. Add supplements. 
  
CAD (coronary artery disease) / HTN (hypertension) - Stable. 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 Resume lisinopril, atenolol, zocor, plavix 
  
COPD (chronic obstructive pulmonary disease) / Hypoxia: moderate -  FEV1 1.41 7/2009. He reports needing intermittent oxygen at home. Now on 2L. cxr without acute process.  Checking SARS-CoV-2 for clearance for admission at SNF 
  
Hyperlipidemia with target LDL less than 70 - Continue zocor 
  
PCP: Cielo Manrique MD 
 
Consults: podiatry Significant Diagnostic Studies: See Hospital Course Discharged home in improved condition. Discharge Exam: 
/73 (BP 1 Location: Right arm, BP Patient Position: At rest) Pulse 80 Temp 98.2 °F (36.8 °C) Resp 18 Ht 5' 6\" (1.676 m) Wt 91.2 kg (201 lb) SpO2 95% BMI 32.44 kg/m²  
  
Gen:  Obese, frail, ill appearing, acute distress HEENT:  Pink conjunctivae, PERRL, hearing intact to voice, moist mucous membranes Neck:  Supple, without masses, thyroid non-tender Resp:  No accessory muscle use, clear breath sounds without wheezes rales or rhonchi Card:  No murmurs, normal S1, S2 without thrills, bruits or peripheral edema Abd:  Soft, non-tender, non-distended, normoactive bowel sounds are present, no mass Lymph:  No cervical or inguinal adenopathy Musc:  No cyanosis or clubbing Skin:  No rashes or ulcers, skin turgor is good Neuro:  Cranial nerves are grossly intact, pain and LE motor weakness, follows commands appropriately Psych:  Good insight, oriented to person, place and time, alert Patient Instructions:  
Current Discharge Medication List  
  
START taking these medications Details  
nystatin (MYCOSTATIN) powder Apply  to affected area two (2) times a day. Qty: 1 Bottle, Refills: 0  
  
oxyCODONE-acetaminophen (PERCOCET) 5-325 mg per tablet Take 1 Tab by mouth every four (4) hours as needed for Pain for up to 3 days. Max Daily Amount: 6 Tabs. Qty: 10 Tab, Refills: 0 Associated Diagnoses: Primary osteoarthritis of left foot CONTINUE these medications which have CHANGED Details  
gabapentin (NEURONTIN) 300 mg capsule Take 1 Cap by mouth two (2) times daily as needed for Pain for up to 30 days. Max Daily Amount: 600 mg. Qty: 60 Cap, Refills: 0 Associated Diagnoses: Primary osteoarthritis of left foot CONTINUE these medications which have NOT CHANGED Details  
atenoloL (TENORMIN) 50 mg tablet TAKE ONE TABLET BY MOUTH EVERY DAY Qty: 90 Tab, Refills: 0 Associated Diagnoses: Essential hypertension  
  
albuterol (ProAir HFA) 90 mcg/actuation inhaler USE 2 PUFFS EVERY 8 HOURS AS NEEDED Qty: 8.5 g, Refills: 5 Associated Diagnoses: [facility] pulmonary disease, [facility]  
  
lisinopriL (PRINIVIL, ZESTRIL) 20 mg tablet Take 1 tablet by mouth once daily 
Qty: 90 Tab, Refills: 1 Associated Diagnoses: Essential hypertension  
  
acetaminophen (TYLENOL ARTHRITIS PAIN) 650 mg TbER Take 1,300 mg by mouth two (2) times daily as needed for Pain. famotidine (PEPCID) 40 mg tablet Take 1 Tab by mouth nightly. Qty: 90 Tab, Refills: 1  
  
simvastatin (ZOCOR) 20 mg tablet TAKE 1 TABLET BY MOUTH ONCE DAILY IN THE EVENING Qty: 90 Tab, Refills: 1  
  
clopidogrel (PLAVIX) 75 mg tab Take 75 mg by mouth daily. CALCIUM CARBONATE/VITAMIN D2 (CALCIUM 600 WITH VITAMIN D2 PO) Take 1 Tab by mouth three (3) times daily. multivitamin (ONE A DAY) tablet Take 1 Tab by mouth daily. Centrum silver SPIRIVA WITH HANDIHALER 18 mcg inhalation capsule Inhale the contents of 1  capsule via HandiHaler  daily 
Qty: 90 Cap, Refills: 2  
  
fluticasone-vilanterol (BREO ELLIPTA) 100-25 mcg/dose inhaler Take 1 Puff by inhalation daily. Associated Diagnoses: COPD (chronic obstructive pulmonary disease) ([facility] [address] LEUPROLIDE ACETATE (LUPRON DEPOT, 4 MONTH, IM) by SubCUTAneous route. Every 6 months Receives in providers office Prescribed by Dr. Chandrika Velasco Activity: Activity as tolerated, PT/OT Eval and Treat and See surgical instructions Diet: Cardiac Diet, Low fat, Low cholesterol and add supplements. Wound Care: Keep wound clean and dry, Reinforce dressing PRN and As directed Follow-up with your PCP and podiatry in 1 week. Follow-up tests/labs - none Signed: 
Celina Bae MD 
6/10/2020 
9:19 AM

## 2023-01-06 NOTE — DISCHARGE INSTRUCTIONS
HOSPITALIST DISCHARGE INSTRUCTIONS  NAME: Jean Pierre Gregory   :  1940   MRN:  [id number]     Date/Time:  2018 12:06 PM    ADMIT DATE: 1/3/2018     DISCHARGE DATE: 2018     ADMITTING DIAGNOSIS:  Diarrhea    DISCHARGE DIAGNOSIS:  As above     MEDICATIONS:     · It is important that you take the medication exactly as they are prescribed. · Keep your medication in the bottles provided by the pharmacist and keep a list of the medication names, dosages, and times to be taken in your wallet. · Do not take other medications without consulting your doctor. Pain Management: per above medications    What to do at Home    Recommended diet:  Regular Diet    Recommended activity: Activity as tolerated    If you experience any of the following symptoms then please call your primary care physician or return to the emergency room if you cannot get hold of your doctor:  Fever, chills, nausea, vomiting, diarrhea, change in mentation, falling, bleeding, shortness of breath, chest pain     Follow Up: Follow-up Information     Follow up With Details Comments [address] [facility]  SN PT and OT [address] [facility] [address]  Dedrick Gallardo MD Go on 2018 Hospital follow-up appointment at 4:00PM [address] [place], [place] [address]  Internal [address] [id number] [facility]  [phone]          Please follow-up with [place] [facility] in one week for voiding trial as an outpatient. Please call [phone] to schedule an appointment     Information obtained by :  I understand that if any problems occur once I am at home I am to contact my physician. I understand and acknowledge receipt of the instructions indicated above.                                                                                                                                            Physician's or R.N.'s Signature Date/Time                                                                                                                                              Patient or Representative Signature                                                          Date/Time  Diarrhea: Care Instructions  Your Care Instructions    Diarrhea is loose, watery stools (bowel movements). The exact cause is often hard to find. Sometimes diarrhea is your body's way of getting rid of what caused an upset stomach. Viruses, food poisoning, and many medicines can cause diarrhea. Some people get diarrhea in response to emotional stress, anxiety, or certain foods. Almost everyone has diarrhea now and then. It usually isn't serious, and your stools will return to normal soon. The important thing to do is replace the fluids you have lost, so you can prevent dehydration. The doctor has checked you carefully, but problems can develop later. If you notice any problems or new symptoms, get medical treatment right away. Follow-up care is a key part of your treatment and safety. Be sure to make and go to all appointments, and call your doctor if you are having problems. It's also a good idea to know your test results and keep a list of the medicines you take. How can you care for yourself at home? · Watch for signs of dehydration, which means your body has lost too much water. Dehydration is a serious condition and should be treated right away. Signs of dehydration are:  ¨ Increasing thirst and dry eyes and mouth. ¨ Feeling faint or lightheaded. ¨ Darker urine, and a smaller amount of urine than normal.  · To prevent dehydration, drink plenty of fluids, enough so that your urine is light yellow or clear like water. Choose water and other caffeine-free clear liquids until you feel better. If you have kidney, heart, or liver disease and have to limit fluids, talk with your doctor before you increase the amount of fluids you drink.   · Begin eating small amounts of mild foods the next day, if you feel like it. ¨ Try yogurt that has live cultures of Lactobacillus. (Check the label.)  ¨ Avoid spicy foods, fruits, alcohol, and caffeine until 48 hours after all symptoms are gone. ¨ Avoid chewing gum that contains sorbitol. ¨ Avoid dairy products (except for yogurt with Lactobacillus) while you have diarrhea and for 3 days after symptoms are gone. · The doctor may recommend that you take over-the-counter medicine, such as loperamide (Imodium), if you still have diarrhea after 6 hours. Read and follow all instructions on the label. Do not use this medicine if you have bloody diarrhea, a high fever, or other signs of serious illness. Call your doctor if you think you are having a problem with your medicine. When should you call for help? Call 911 anytime you think you may need emergency care. For example, call if:  ? · You passed out (lost consciousness). ? · Your stools are maroon or very bloody. ?Call your doctor now or seek immediate medical care if:  ? · You are dizzy or lightheaded, or you feel like you may faint. ? · Your stools are black and look like tar, or they have streaks of blood. ? · You have new or worse belly pain. ? · You have symptoms of dehydration, such as:  ¨ Dry eyes and a dry mouth. ¨ Passing only a little dark urine. ¨ Feeling thirstier than usual.   ? · You have a new or higher fever. ? Watch closely for changes in your health, and be sure to contact your doctor if:  ? · Your diarrhea is getting worse. ? · You see pus in the diarrhea. ? · You are not getting better after 2 days (48 hours). Where can you learn more? Go to http://india-renard.info/. Enter B910 in the search box to learn more about \"Diarrhea: Care Instructions. \"  Current as of: March 20, 2017  Content Version: 11.4  © 7842-1624 [employer].  Care instructions adapted under license by [employer] (which disclaims liability or warranty for this information). If you have questions about a medical condition or this instruction, always ask your healthcare professional. [place] [address] any warranty or liability for your use of this information. Pneumonia: Care Instructions  Your Care Instructions    Pneumonia is an infection of the lungs. Most cases are caused by infections from bacteria or viruses. Pneumonia may be mild or very severe. If it is caused by bacteria, you will be treated with antibiotics. It may take a few weeks to a few months to recover fully from pneumonia, depending on how sick you were and whether your overall health is good. Follow-up care is a key part of your treatment and safety. Be sure to make and go to all appointments, and call your doctor if you are having problems. It's also a good idea to know your test results and keep a list of the medicines you take. How can you care for yourself at home? · Take your antibiotics exactly as directed. Do not stop taking the medicine just because you are feeling better. You need to take the full course of antibiotics. · Take your medicines exactly as prescribed. Call your doctor if you think you are having a problem with your medicine. · Get plenty of rest and sleep. You may feel weak and tired for a while, but your energy level will improve with time. · To prevent dehydration, drink plenty of fluids, enough so that your urine is light yellow or clear like water. Choose water and other caffeine-free clear liquids until you feel better. If you have kidney, heart, or liver disease and have to limit fluids, talk with your doctor before you increase the amount of fluids you drink. · Take care of your cough so you can rest. A cough that brings up mucus from your lungs is common with pneumonia. It is one way your body gets rid of the infection.  But if coughing keeps you from resting or causes severe fatigue and chest-wall pain, talk to your doctor. He or she may suggest that you take a medicine to reduce the cough. · Use a vaporizer or humidifier to add moisture to your bedroom. Follow the directions for cleaning the machine. · Do not smoke or allow others to smoke around you. Smoke will make your cough last longer. If you need help quitting, talk to your doctor about stop-smoking programs and medicines. These can increase your chances of quitting for good. · Take an over-the-counter pain medicine, such as acetaminophen (Tylenol), ibuprofen (Advil, Motrin), or naproxen (Aleve). Read and follow all instructions on the label. · Do not take two or more pain medicines at the same time unless the doctor told you to. Many pain medicines have acetaminophen, which is Tylenol. Too much acetaminophen (Tylenol) can be harmful. · If you were given a spirometer to measure how well your lungs are working, use it as instructed. This can help your doctor tell how your recovery is going. · To prevent pneumonia in the future, talk to your doctor about getting a flu vaccine (once a year) and a pneumococcal vaccine (one time only for most people). When should you call for help? Call 911 anytime you think you may need emergency care. For example, call if:  ? · You have severe trouble breathing. ?Call your doctor now or seek immediate medical care if:  ? · You cough up dark brown or bloody mucus (sputum). ? · You have new or worse trouble breathing. ? · You are dizzy or lightheaded, or you feel like you may faint. ? Watch closely for changes in your health, and be sure to contact your doctor if:  ? · You have a new or higher fever. ? · You are coughing more deeply or more often. ? · You are not getting better after 2 days (48 hours). ? · You do not get better as expected. Where can you learn more? Go to http://india-renard.info/. Enter [id number] in the search box to learn more about \"Pneumonia: Care Instructions. \"  Current as of: May 12, 2017  Content Version: 11.4  © 6085-9870 [employer]. Care instructions adapted under license by [employer] (which disclaims liability or warranty for this information). If you have questions about a medical condition or this instruction, always ask your healthcare professional. [facility] [address] any warranty or liability for your use of this information. Weakness: Care Instructions  Your Care Instructions    Weakness is a lack of physical or muscle strength. You may feel that you need to make extra effort to move your arms, legs, or other muscles. Generalized weakness means that you feel weak in most areas of your body. Another type of weakness may affect just one muscle or group of muscles. You may feel weak and tired after you have done too much activity, such as taking an extra-long hike. This is not a serious problem. It often goes away on its own. Feeling weak can also be caused by medical conditions like thyroid problems, depression, or a virus. Sometimes the cause can be serious. Your doctor may want to do more tests to try to find the cause of the weakness. The doctor has checked you carefully, but problems can develop later. If you notice any problems or new symptoms, get medical treatment right away. Follow-up care is a key part of your treatment and safety. Be sure to make and go to all appointments, and call your doctor if you are having problems. It's also a good idea to know your test results and keep a list of the medicines you take. How can you care for yourself at home? · Rest when you feel tired. · Be safe with medicines. If your doctor prescribed medicine, take it exactly as prescribed. Call your doctor if you think you are having a problem with your medicine. You will get more details on the specific medicines your doctor prescribes. · Do not skip meals. Eating a balanced diet may increase your energy level.   · Get some physical activity every day, but do not get too tired. When should you call for help? Call your doctor now or seek immediate medical care if:  ? · You have new or worse weakness. ? · You are dizzy or lightheaded, or you feel like you may faint. ? Watch closely for changes in your health, and be sure to contact your doctor if:  ? · You do not get better as expected. Where can you learn more? Go to http://india-renard.info/. Enter 521 [id number] in the search box to learn more about \"Weakness: Care Instructions. \"  Current as of: March 20, 2017  Content Version: 11.4  © 3849-1590 [employer]. Care instructions adapted under license by [employer] (which disclaims liability or warranty for this information). If you have questions about a medical condition or this instruction, always ask your healthcare professional. [address] any warranty or liability for your use of this information. Implemented All Fall Risk Interventions:  [place] to call system. Call bell, personal items and telephone within reach. Instruct patient to call for assistance. Room bathroom lighting operational. Non-slip footwear when patient is off stretcher. Physically safe environment: no spills, clutter or unnecessary equipment. Stretcher in lowest position, wheels locked, appropriate side rails in place. Provide visual cue, wrist band, yellow gown, etc. Monitor gait and stability. Monitor for mental status changes and reorient to person, place, and time. Review medications for side effects contributing to fall risk. Reinforce activity limits and safety measures with patient and family.

## 2023-07-13 NOTE — PROGRESS NOTES
Primary Nurse Ethan Snyder RN and Parish Cervantes RN performed a dual skin assessment on this patient No impairment noted  Eric score is 18 no

## (undated) DEVICE — TUBING SUCT 10FR MAL ALUM SHFT FN CAP VENT UNIV CONN W/ OBT

## (undated) DEVICE — STRIP,CLOSURE,WOUND,MEDI-STRIP,1/2X4: Brand: MEDLINE

## (undated) DEVICE — BITEBLOCK ENDOSCP 60FR MAXI WHT POLYETH STURDY W/ VELC WVN

## (undated) DEVICE — NEEDLE HYPO 18GA L1.5IN PNK S STL HUB POLYPR SHLD REG BVL

## (undated) DEVICE — SYRINGE,EAR/ULCER, 2 OZ, STERILE: Brand: MEDLINE

## (undated) DEVICE — Device

## (undated) DEVICE — FORCEPS BX L240CM JAW DIA2.8MM L CAP W/ NDL MIC MESH TOOTH

## (undated) DEVICE — 1200 GUARD II KIT W/5MM TUBE W/O VAC TUBE: Brand: GUARDIAN

## (undated) DEVICE — COVER LT HNDL PLAS RIG 1 PER PK

## (undated) DEVICE — REM POLYHESIVE ADULT PATIENT RETURN ELECTRODE: Brand: VALLEYLAB

## (undated) DEVICE — STOCKINETTE: Brand: DEROYAL

## (undated) DEVICE — BASIN EMSIS 16OZ GRAPHITE PLAS KID SHP MOLD GRAD FOR ORAL

## (undated) DEVICE — BNDG ELAS HK LOOP 4X5YD NS -- MATRIX

## (undated) DEVICE — CANN NASAL O2 CAPNOGRAPHY AD -- FILTERLINE

## (undated) DEVICE — MASTISOL ADHESIVE LIQ 2/3ML

## (undated) DEVICE — BLADE SAW SAG CRS TOOTH 5.8MM

## (undated) DEVICE — BANDAGE,GAUZE,CONFORMING,3"X75",STRL,LF: Brand: MEDLINE

## (undated) DEVICE — STRAP,POSITIONING,KNEE/BODY,FOAM,4X60": Brand: MEDLINE

## (undated) DEVICE — NEEDLE HYPO 25GA L1.5IN BVL ORIENTED ECLIPSE

## (undated) DEVICE — SOL IRRIGATION INJ NACL 0.9% 500ML BTL

## (undated) DEVICE — TOWEL,OR,DSP,ST,BLUE,STD,2/PK,40PK/CS: Brand: MEDLINE

## (undated) DEVICE — SOLIDIFIER MEDC 1200ML -- CONVERT TO 356117

## (undated) DEVICE — GAUZE,SPONGE,FLUFF,6"X6.75",STRL,5/TRAY: Brand: MEDLINE

## (undated) DEVICE — BANDAGE,ELASTIC,ESMARK,STERILE,4"X9',LF: Brand: MEDLINE

## (undated) DEVICE — BAG BELONG PT PERS CLEAR HANDL

## (undated) DEVICE — BAG SPEC BIOHZRD 10 X 10 IN --

## (undated) DEVICE — KIT COLON W/ 1.1OZ LUB AND 2 END

## (undated) DEVICE — STERILE POLYISOPRENE POWDER-FREE SURGICAL GLOVES: Brand: PROTEXIS

## (undated) DEVICE — SUTURE COAT VCRL SZ 3-0 L18IN ABSRB VLT L19MM FS-2 3/8 CIR J393H

## (undated) DEVICE — ADULT SPO2 SENSOR: Brand: NELLCOR

## (undated) DEVICE — SYR 10ML LUER LOK 1/5ML GRAD --

## (undated) DEVICE — DRAPE,EXTREMITY,89X128,STERILE: Brand: MEDLINE

## (undated) DEVICE — INFECTION CONTROL KIT SYS

## (undated) DEVICE — KENDALL RADIOLUCENT FOAM MONITORING ELECTRODE -RECTANGULAR SHAPE: Brand: KENDALL

## (undated) DEVICE — SPONGE GZ W4XL4IN COT 12 PLY TYP VII WVN C FLD DSGN

## (undated) DEVICE — SUTURE MCRYL SZ 4-0 L27IN ABSRB UD L19MM PS-2 1/2 CIR PRIM Y426H

## (undated) DEVICE — ZIMMER® STERILE DISPOSABLE TOURNIQUET CUFF WITH PROTECTIVE SLEEVE AND PLC, DUAL PORT, SINGLE BLADDER, 18 IN. (46 CM)

## (undated) DEVICE — CONTAINER SPEC 20 ML LID NEUT BUFF FORMALIN 10 % POLYPR STS

## (undated) DEVICE — 3M™ CUROS™ DISINFECTING CAP FOR NEEDLELESS CONNECTORS 270/CARTON 20 CARTONS/CASE CFF1-270: Brand: CUROS™